# Patient Record
Sex: FEMALE | Race: WHITE | NOT HISPANIC OR LATINO | Employment: OTHER | URBAN - METROPOLITAN AREA
[De-identification: names, ages, dates, MRNs, and addresses within clinical notes are randomized per-mention and may not be internally consistent; named-entity substitution may affect disease eponyms.]

---

## 2017-01-13 ENCOUNTER — OFFICE VISIT (OUTPATIENT)
Dept: MEDICAL GROUP | Facility: PHYSICIAN GROUP | Age: 62
End: 2017-01-13
Payer: COMMERCIAL

## 2017-01-13 VITALS
DIASTOLIC BLOOD PRESSURE: 62 MMHG | SYSTOLIC BLOOD PRESSURE: 106 MMHG | TEMPERATURE: 98.4 F | WEIGHT: 214 LBS | HEIGHT: 67 IN | RESPIRATION RATE: 14 BRPM | HEART RATE: 86 BPM | BODY MASS INDEX: 33.59 KG/M2 | OXYGEN SATURATION: 96 %

## 2017-01-13 DIAGNOSIS — R42 VERTIGO: ICD-10-CM

## 2017-01-13 DIAGNOSIS — R42 DIZZINESS: ICD-10-CM

## 2017-01-13 LAB — GLUCOSE BLD-MCNC: 163 MG/DL (ref 70–100)

## 2017-01-13 PROCEDURE — 82962 GLUCOSE BLOOD TEST: CPT | Performed by: PHYSICIAN ASSISTANT

## 2017-01-13 PROCEDURE — 99214 OFFICE O/P EST MOD 30 MIN: CPT | Performed by: PHYSICIAN ASSISTANT

## 2017-01-13 RX ORDER — MECLIZINE HYDROCHLORIDE 50 MG/1
25 TABLET ORAL EVERY 8 HOURS PRN
Qty: 20 TAB | Refills: 0 | Status: SHIPPED | OUTPATIENT
Start: 2017-01-13 | End: 2017-03-07

## 2017-01-13 ASSESSMENT — ENCOUNTER SYMPTOMS: DIZZINESS: 1

## 2017-01-13 NOTE — PATIENT INSTRUCTIONS
Vertigo  Vertigo means you feel like you or your surroundings are moving when they are not. Vertigo can be dangerous if it occurs when you are at work, driving, or performing difficult activities.   CAUSES   Vertigo occurs when there is a conflict of signals sent to your brain from the visual and sensory systems in your body. There are many different causes of vertigo, including:  · Infections, especially in the inner ear.  · A bad reaction to a drug or misuse of alcohol and medicines.  · Withdrawal from drugs or alcohol.  · Rapidly changing positions, such as lying down or rolling over in bed.  · A migraine headache.  · Decreased blood flow to the brain.  · Increased pressure in the brain from a head injury, infection, tumor, or bleeding.  SYMPTOMS   You may feel as though the world is spinning around or you are falling to the ground. Because your balance is upset, vertigo can cause nausea and vomiting. You may have involuntary eye movements (nystagmus).  DIAGNOSIS   Vertigo is usually diagnosed by physical exam. If the cause of your vertigo is unknown, your caregiver may perform imaging tests, such as an MRI scan (magnetic resonance imaging).  TREATMENT   Most cases of vertigo resolve on their own, without treatment. Depending on the cause, your caregiver may prescribe certain medicines. If your vertigo is related to body position issues, your caregiver may recommend movements or procedures to correct the problem. In rare cases, if your vertigo is caused by certain inner ear problems, you may need surgery.  HOME CARE INSTRUCTIONS   · Follow your caregiver's instructions.  · Avoid driving.  · Avoid operating heavy machinery.  · Avoid performing any tasks that would be dangerous to you or others during a vertigo episode.  · Tell your caregiver if you notice that certain medicines seem to be causing your vertigo. Some of the medicines used to treat vertigo episodes can actually make them worse in some people.  SEEK  IMMEDIATE MEDICAL CARE IF:   · Your medicines do not relieve your vertigo or are making it worse.  · You develop problems with talking, walking, weakness, or using your arms, hands, or legs.  · You develop severe headaches.  · Your nausea or vomiting continues or gets worse.  · You develop visual changes.  · A family member notices behavioral changes.  · Your condition gets worse.  MAKE SURE YOU:  · Understand these instructions.  · Will watch your condition.  · Will get help right away if you are not doing well or get worse.     This information is not intended to replace advice given to you by your health care provider. Make sure you discuss any questions you have with your health care provider.     Document Released: 09/27/2006 Document Revised: 03/11/2013 Document Reviewed: 04/11/2016  Elsevier Interactive Patient Education ©2016 [employer] Inc.

## 2017-01-13 NOTE — PROGRESS NOTES
Subjective:      Shirley Unger is a 61 y.o. female who presents with Dizziness            Dizziness     patient presents for same-day access to discuss new onset of dizziness. Started in the middle of the night when she got up to go to the bathroom. Room spinning sensation. No loss of consciousness. Seems to be worse when shehad forward and backward. This has not happened before. She does have diabetes and did check her blood sugar last night which was about 134. No chest pain, shortness breath or difficulty breathing. No vision change. No focal weakness. No history of heart arrhythmia. No history of stroke. No recent change in medicine. No recent head injury. No ear pain, hearing loss or ringing in ears. No recent cold symptoms, congestion, sinus pain.    Review of Systems   Neurological: Positive for dizziness.    no fever or chills. No headache. No neck pain or stiffness. No chest pain, shortness breath or difficulty breathing. No nausea, vomiting or abdominal pain. No rash or skin lesion. No joint pain or swelling.    Active Ambulatory Problems     Diagnosis Date Noted   • Herpes genitalia 03/22/2013   • Dyslipidemia    • Nephrolithiasis 03/22/2013   • Vitamin D deficiency disease 07/08/2013   • Hand eczema 07/29/2014   • Albuminuria 12/09/2014   • Type 2 diabetes mellitus with other diabetic kidney complication (HCC) 08/04/2015   • Colon polyps 05/16/2016   • Primary osteoarthritis of left hip 07/07/2016   • Trigger finger of right thumb 07/07/2016   • Anxiety reaction 10/05/2016   • Acute mastitis of left breast 11/29/2016   • Herpes dermatitis 11/29/2016     Resolved Ambulatory Problems     Diagnosis Date Noted   • Type 2 DM, uncontrolled    • Right hand pain 02/25/2014     Past Medical History   Diagnosis Date   • DM (diabetes mellitus) (HCC) 2004   • Hyperlipidemia LDL goal < 100    • Vitamin d deficiency 7/8/2013      Current Outpatient Prescriptions   Medication Sig Dispense Refill   • meclizine (ANTIVERT)  50 MG tablet Take 0.5 Tabs by mouth every 8 hours as needed. 20 Tab 0   • fluticasone (FLONASE) 50 MCG/ACT nasal spray Spray 2 Sprays in nose every day. 16 g 0   • FREESTYLE LITE strip USE ONE STRIP TWICE DAILY AND AS NEEDED FOR SIGNS AND SYMPTOM OF HIGH OR LOW BLOOD SUGAR 100 Strip 0   • metformin (GLUCOPHAGE) 1000 MG tablet TAKE 1/2 TABLET BY MOUTH IN THE MORNING AND ONE TABLET AT NIGHT 180 Tab 3   • Exenatide ER (BYDUREON) 2 MG Pen-injector Inject 2 mg as instructed every 7 days. 13 Each 2   • glimepiride (AMARYL) 4 MG Tab Take 1 Tab by mouth every morning. 90 Tab 3   • atorvastatin (LIPITOR) 20 MG Tab Take 1 Tab by mouth every day. 90 Tab 3   • Canagliflozin (INVOKANA) 300 MG Tab Take 300 mg by mouth every day. 90 Tab 3   • colesevelam (WELCHOL) 625 MG Tab TAKE ONE TABLET BY MOUTH IN THE MORNING AND THEN TAKE TWO TABLETS IN THE EVENING WITH DINNER 270 Tab 3   • lisinopril (PRINIVIL) 5 MG Tab Take 1 Tab by mouth every bedtime. 90 Tab 3   • trolamine salicylate (ASPERCREME/ALOE) 10 % cream Apply 1 Squirt to affected area(s) 4 times a day. 1 Tube 3   • glucose blood (FREESTYLE LITE) strip Use twice a day and as needed signs and symptoms of high or low blood sugar. 100 Strip 3   • Lancets MISC Lancets order: Lancets for Abbott Freestyle Lite meter. Sig: use BID and prn ssx high or low sugar. #100 RF x 3 100 Each 3   • triamcinolone acetonide (KENALOG) 0.1 % OINT Apply to affected areas BID 15 g 1   • mupirocin calcium (BACTROBAN) 2 % CREA Apply to affected area(s) 2 times a day. 15 g 2   • Blood Glucose Monitoring Suppl SUPPLIES MISC Test strips order: Test strips for Abbott Freestyle Lite meter. Sig: use BID and prn ssx high or low sugar #100 RF x 3 100 Each 3   • cetirizine (ZYRTEC) 10 MG Tab Take 1 Tab by mouth every day. 30 Tab 3   • aspirin (ASA) 81 MG CHEW Take 81 mg by mouth every day.       • POTASSIUM CITRATE PO Take 2 Tabs by mouth 3 times a day.       No current facility-administered medications for this  "visit.   allergy to macrobid and PCN  Non-smoker. [employer] employee. Works from home   Objective:     /62 mmHg  Pulse 86  Temp(Src) 36.9 °C (98.4 °F)  Resp 14  Ht 1.702 m (5' 7\")  Wt 97.07 kg (214 lb)  BMI 33.51 kg/m2  SpO2 96%  LMP 11/29/2004  Breastfeeding? No     Physical Exam   Constitutional: She is oriented to person, place, and time. She appears well-developed and well-nourished. No distress.   HENT:   Head: Normocephalic and atraumatic.   Right Ear: Hearing, tympanic membrane, external ear and ear canal normal.   Left Ear: Hearing, tympanic membrane, external ear and ear canal normal.   Nose: Nose normal.   Mouth/Throat: Uvula is midline and oropharynx is clear and moist.   Eyes: Conjunctivae and EOM are normal. Pupils are equal, round, and reactive to light.   Neck: Normal range of motion. Neck supple.   Cardiovascular: Normal rate, regular rhythm and normal heart sounds.    Pulmonary/Chest: Effort normal and breath sounds normal.   Musculoskeletal: She exhibits no edema.   Neurological: She is alert and oriented to person, place, and time. No cranial nerve deficit. Coordination normal.   Vertigo is reproducible by having patient move head forward and backward   Skin: Skin is warm and dry. No rash noted. She is not diaphoretic. No pallor.   Psychiatric: She has a normal mood and affect. Her behavior is normal. Judgment and thought content normal.   Vitals reviewed.            EKG normal sinus rhythm. CT scan  In office blood glucose 166  Assessment/Plan:     1. Dizziness    - EKG; Future  - POCT Glucose  - meclizine (ANTIVERT) 50 MG tablet; Take 0.5 Tabs by mouth every 8 hours as needed.  Dispense: 20 Tab; Refill: 0    2. Vertigo  - meclizine (ANTIVERT) 50 MG tablet; Take 0.5 Tabs by mouth every 8 hours as needed.  Dispense: 20 Tab; Refill: 0     Discussed with patient this is likely benign vertigo either from viral labyrinthitis or benign positional vertigo. Emergency room precautions given. " If she develops headache, vision change, weakness, worsening dizziness, vomiting she should go to the ER for further evaluation. Otherwise follow-up with primary care within the week. Prescription for meclizine given to use when necessary.

## 2017-01-13 NOTE — MR AVS SNAPSHOT
"        Shirley Taylorp   2017 10:20 AM   Office Visit   MRN: [id number]    Department:  [facility]   Dept Phone:  [phone]    Description:  Female : 1955   Provider:  Brittnee Chaudhari PA-C           Reason for Visit     Dizziness x 1 day       Allergies as of 2017     Allergen Noted Reactions    Macrobid [Nitrofurantoin Monohydrate Macrocrystals] 10/17/2012   Hives    Pcn [Penicillins] 10/17/2012   Hives      You were diagnosed with     Dizziness   [983245]       Vertigo   [193036]         Vital Signs     Blood Pressure Pulse Temperature Respirations Height Weight    106/62 mmHg 86 36.9 °C (98.4 °F) 14 1.702 m (5' 7\") 97.07 kg (214 lb)    Body Mass Index Oxygen Saturation Last Menstrual Period Breastfeeding? Smoking Status       33.51 kg/m2 96% 2004 No Never Smoker        Basic Information     Date Of Birth Sex Race Ethnicity Preferred Language    1955 Female White Unknown English      Your appointments     2017  8:20 AM   Established Patient with Cristofer Tena M.D.   [facility] ([place])    [address] [facility] [address] [phone]           You will be receiving a confirmation call a few days before your appointment from our automated call confirmation system.            Mar 09, 2017  9:40 AM   Diabetes Care Visit with Amy Jain M.D., ENDOCRINOLOGY DIABETES RN   [facility]    [address] [facility], [address]  [facility] [address] [phone]           You will be receiving a confirmation call a few days before your appointment from our automated call confirmation system.              Problem List              ICD-10-CM Priority Class Noted - Resolved    Herpes genitalia A60.00   3/22/2013 - Present    Dyslipidemia E78.5   Unknown - Present    Nephrolithiasis N20.0   3/22/2013 - Present    Vitamin D deficiency disease E55.9   2013 - Present    Hand eczema L30.9   2014 - Present   " Albuminuria R80.9   12/9/2014 - Present    Type 2 diabetes mellitus with other diabetic kidney complication (HCC) E11.29   8/4/2015 - Present    Colon polyps K63.5   5/16/2016 - Present    Primary osteoarthritis of left hip M16.12   7/7/2016 - Present    Trigger finger of right thumb M65.311   7/7/2016 - Present    Anxiety reaction F41.1   10/5/2016 - Present    Acute mastitis of left breast N61.0   11/29/2016 - Present    Herpes dermatitis B00.89   11/29/2016 - Present      Health Maintenance        Date Due Completion Dates    DIABETES MONOFILAMTENT / LE EXAM 4/22/2017 4/22/2016 (Done), 12/10/2015 (Done), 6/18/2015 (Done), 7/29/2014 (Done), 7/8/2013, 7/8/2013 (Done)    Override on 4/22/2016: Done    Override on 12/10/2015: Done    Override on 6/18/2015: Done    Override on 7/29/2014: Done    Override on 7/8/2013: Done    FASTING LIPID PROFILE 4/23/2017 4/23/2016, 7/19/2014, 8/17/2013    URINE ACR / MICROALBUMIN 4/23/2017 4/23/2016, 6/20/2015, 7/19/2014, 8/17/2013    SERUM CREATININE 4/23/2017 4/23/2016, 3/21/2015, 7/19/2014, 3/22/2014, 2/18/2014, 11/16/2013, 8/17/2013, 4/7/2005    A1C SCREENING 5/10/2017 11/10/2016, 9/1/2016, 4/23/2016, 12/10/2015, 6/11/2015, 3/21/2015, 12/11/2014, 9/27/2014, 7/19/2014, 3/22/2014, 2/15/2014, 11/16/2013, 8/17/2013, 3/2/2013    MAMMOGRAM 7/5/2017 7/5/2016, 7/1/2015, 5/27/2014, 4/11/2013, 4/8/2013, 12/17/2008, 12/17/2008, 11/2/2007, 11/2/2007, 10/19/2006, 10/18/2005, 9/24/2004    RETINAL SCREENING 8/16/2017 8/16/2016, 8/16/2016 (Done), 7/22/2014, 6/19/2013    Override on 8/16/2016: Done    COLONOSCOPY 5/16/2021 5/16/2016    IMM DTaP/Tdap/Td Vaccine (2 - Td) 9/1/2022 9/1/2012            Results     POCT Glucose      Component Value Standard Range & Units    Glucose - Accu-Ck 163 70 - 100 mg/dL                        Current Immunizations     Influenza TIV (IM) 10/10/2013    Influenza Vaccine Quad Inj (Pf) 11/2/2016 11:13 AM, 10/9/2014    Influenza Vaccine Quad Inj (Preserved)  10/13/2015    Pneumococcal polysaccharide vaccine (PPSV-23) 1/1/2005    SHINGLES VACCINE 3/28/2014    Tdap Vaccine 9/1/2012      Below and/or attached are the medications your provider expects you to take. Review all of your home medications and newly ordered medications with your provider and/or pharmacist. Follow medication instructions as directed by your provider and/or pharmacist. Please keep your medication list with you and share with your provider. Update the information when medications are discontinued, doses are changed, or new medications (including over-the-counter products) are added; and carry medication information at all times in the event of emergency situations     Allergies:  MACROBID - Hives     PCN - Hives               Medications  Valid as of: January 13, 2017 - 11:22 AM    Generic Name Brand Name Tablet Size Instructions for use    Aspirin (Chew Tab) ASA 81 MG Take 81 mg by mouth every day.          Atorvastatin Calcium (Tab) LIPITOR 20 MG Take 1 Tab by mouth every day.        Blood Glucose Monitoring Suppl (Misc) Blood Glucose Monitoring Suppl SUPPLIES Test strips order: Test strips for Abbott Freestyle Lite meter. Sig: use BID and prn ssx high or low sugar #100 RF x 3        Canagliflozin (Tab) Canagliflozin 300 MG Take 300 mg by mouth every day.        Cetirizine HCl (Tab) ZYRTEC 10 MG Take 1 Tab by mouth every day.        Colesevelam HCl (Tab) WELCHOL 625 MG TAKE ONE TABLET BY MOUTH IN THE MORNING AND THEN TAKE TWO TABLETS IN THE EVENING WITH DINNER        Exenatide (Pen-injector) Exenatide ER 2 MG Inject 2 mg as instructed every 7 days.        Fluticasone Propionate (Suspension) FLONASE 50 MCG/ACT Spray 2 Sprays in nose every day.        Glimepiride (Tab) AMARYL 4 MG Take 1 Tab by mouth every morning.        Glucose Blood (Strip) glucose blood  Use twice a day and as needed signs and symptoms of high or low blood sugar.        Glucose Blood (Strip) FREESTYLE LITE  USE ONE STRIP TWICE  DAILY AND AS NEEDED FOR SIGNS AND SYMPTOM OF HIGH OR LOW BLOOD SUGAR        Lancets (Misc) Lancets  Lancets order: Lancets for Abbott Freestyle Lite meter. Sig: use BID and prn ssx high or low sugar. #100 RF x 3        Lisinopril (Tab) PRINIVIL 5 MG Take 1 Tab by mouth every bedtime.        Meclizine HCl (Tab) ANTIVERT 50 MG Take 0.5 Tabs by mouth every 8 hours as needed.        MetFORMIN HCl (Tab) GLUCOPHAGE 1000 MG TAKE 1/2 TABLET BY MOUTH IN THE MORNING AND ONE TABLET AT NIGHT        Mupirocin Calcium (Cream) BACTROBAN 2 % Apply to affected area(s) 2 times a day.        Potassium Citrate   Take 2 Tabs by mouth 3 times a day.        Triamcinolone Acetonide (Ointment) KENALOG 0.1 % Apply to affected areas BID        Trolamine Salicylate (Cream) ASPERCREME or MYOFLEX 10 % Apply 1 Squirt to affected area(s) 4 times a day.        .                 Medicines prescribed today were sent to:     [facility] [address] [place] [address]), [place]  [address]) [place] [address]    Phone: [phone] Fax: [phone]    Open 24 Hours?: No      Medication refill instructions:       If your prescription bottle indicates you have medication refills left, it is not necessary to call your provider’s office. Please contact your pharmacy and they will refill your medication.    If your prescription bottle indicates you do not have any refills left, you may request refills at any time through one of the following ways: The online [email or web address] system (except Urgent Care), by calling your provider’s office, or by asking your pharmacy to contact your provider’s office with a refill request. Medication refills are processed only during regular business hours and may not be available until the next business day. Your provider may request additional information or to have a follow-up visit with you prior to refilling your medication.   *Please Note: Medication refills are assigned a new Rx number when refilled  electronically. Your pharmacy may indicate that no refills were authorized even though a new prescription for the same medication is available at the pharmacy. Please request the medicine by name with the pharmacy before contacting your provider for a refill.        Your To Do List     Future Labs/Procedures Complete By Expires    EKG  As directed 1/13/2018      Instructions    Vertigo  Vertigo means you feel like you or your surroundings are moving when they are not. Vertigo can be dangerous if it occurs when you are at work, driving, or performing difficult activities.   CAUSES   Vertigo occurs when there is a conflict of signals sent to your brain from the visual and sensory systems in your body. There are many different causes of vertigo, including:  · Infections, especially in the inner ear.  · A bad reaction to a drug or misuse of alcohol and medicines.  · Withdrawal from drugs or alcohol.  · Rapidly changing positions, such as lying down or rolling over in bed.  · A migraine headache.  · Decreased blood flow to the brain.  · Increased pressure in the brain from a head injury, infection, tumor, or bleeding.  SYMPTOMS   You may feel as though the world is spinning around or you are falling to the ground. Because your balance is upset, vertigo can cause nausea and vomiting. You may have involuntary eye movements (nystagmus).  DIAGNOSIS   Vertigo is usually diagnosed by physical exam. If the cause of your vertigo is unknown, your caregiver may perform imaging tests, such as an MRI scan (magnetic resonance imaging).  TREATMENT   Most cases of vertigo resolve on their own, without treatment. Depending on the cause, your caregiver may prescribe certain medicines. If your vertigo is related to body position issues, your caregiver may recommend movements or procedures to correct the problem. In rare cases, if your vertigo is caused by certain inner ear problems, you may need surgery.  HOME CARE INSTRUCTIONS   · Follow  your caregiver's instructions.  · Avoid driving.  · Avoid operating heavy machinery.  · Avoid performing any tasks that would be dangerous to you or others during a vertigo episode.  · Tell your caregiver if you notice that certain medicines seem to be causing your vertigo. Some of the medicines used to treat vertigo episodes can actually make them worse in some people.  SEEK IMMEDIATE MEDICAL CARE IF:   · Your medicines do not relieve your vertigo or are making it worse.  · You develop problems with talking, walking, weakness, or using your arms, hands, or legs.  · You develop severe headaches.  · Your nausea or vomiting continues or gets worse.  · You develop visual changes.  · A family member notices behavioral changes.  · Your condition gets worse.  MAKE SURE YOU:  · Understand these instructions.  · Will watch your condition.  · Will get help right away if you are not doing well or get worse.     This information is not intended to replace advice given to you by your health care provider. Make sure you discuss any questions you have with your health care provider.     Document Released: 09/27/2006 Document Revised: 03/11/2013 Document Reviewed: 04/11/2016  [employer] Interactive Patient Education ©2016 [employer] Inc.            [employer] Access Code: Activation code not generated  Current [employer] Status: Active

## 2017-03-07 ENCOUNTER — OFFICE VISIT (OUTPATIENT)
Dept: MEDICAL GROUP | Facility: PHYSICIAN GROUP | Age: 62
End: 2017-03-07
Payer: COMMERCIAL

## 2017-03-07 VITALS
OXYGEN SATURATION: 95 % | DIASTOLIC BLOOD PRESSURE: 60 MMHG | HEART RATE: 83 BPM | BODY MASS INDEX: 32.98 KG/M2 | TEMPERATURE: 97.9 F | SYSTOLIC BLOOD PRESSURE: 90 MMHG | HEIGHT: 67 IN | WEIGHT: 210.1 LBS

## 2017-03-07 DIAGNOSIS — E78.5 DYSLIPIDEMIA: ICD-10-CM

## 2017-03-07 DIAGNOSIS — M65.311 TRIGGER FINGER OF RIGHT THUMB: ICD-10-CM

## 2017-03-07 DIAGNOSIS — E11.29 TYPE 2 DIABETES MELLITUS WITH OTHER DIABETIC KIDNEY COMPLICATION (HCC): ICD-10-CM

## 2017-03-07 DIAGNOSIS — M16.12 PRIMARY OSTEOARTHRITIS OF LEFT HIP: ICD-10-CM

## 2017-03-07 DIAGNOSIS — E55.9 VITAMIN D DEFICIENCY DISEASE: ICD-10-CM

## 2017-03-07 DIAGNOSIS — J30.1 SEASONAL ALLERGIC RHINITIS DUE TO POLLEN: ICD-10-CM

## 2017-03-07 DIAGNOSIS — N20.0 RECURRENT NEPHROLITHIASIS: ICD-10-CM

## 2017-03-07 PROCEDURE — 99214 OFFICE O/P EST MOD 30 MIN: CPT | Performed by: INTERNAL MEDICINE

## 2017-03-07 NOTE — MR AVS SNAPSHOT
"        Shirley Taylorp   3/7/2017 8:20 AM   Office Visit   MRN: [id number]    Department:  [facility]   Dept Phone:  [phone]    Description:  Female : 1955   Provider:  Rogers Mahmood M.D.           Reason for Visit     Establish Care diabetes check, pneumonia shot. lab work      Allergies as of 3/7/2017     Allergen Noted Reactions    Macrobid [Nitrofurantoin Monohydrate Macrocrystals] 10/17/2012   Hives    Pcn [Penicillins] 10/17/2012   Hives      You were diagnosed with     Seasonal allergic rhinitis due to pollen   [0925282]       Vitamin D deficiency disease   [866882]       Type 2 diabetes mellitus with other diabetic kidney complication (CMS-HCC)   [6907557]       Trigger finger of right thumb   [0571451]       Primary osteoarthritis of left hip   [925021]       Dyslipidemia   [914279]         Vital Signs     Blood Pressure Pulse Temperature Height Weight Body Mass Index    90/60 mmHg 83 36.6 °C (97.9 °F) 1.702 m (5' 7.01\") 95.3 kg (210 lb 1.6 oz) 32.90 kg/m2    Oxygen Saturation Last Menstrual Period Breastfeeding? Smoking Status          95% 2004 No Never Smoker         Basic Information     Date Of Birth Sex Race Ethnicity Preferred Language    1955 Female White Non- English      Your appointments     Mar 09, 2017 10:00 AM   Established Patient with Amy Jain M.D.   [facility]    [address], [address]  [facility] [address] [phone]           You will be receiving a confirmation call a few days before your appointment from our automated call confirmation system.              Problem List              ICD-10-CM Priority Class Noted - Resolved    Dyslipidemia E78.5   Unknown - Present    Recurrent nephrolithiasis N20.0   3/22/2013 - Present    Vitamin D deficiency disease E55.9   2013 - Present    Albuminuria R80.9   2014 - Present    Type 2 diabetes mellitus with other diabetic kidney complication (CMS-HCC) " E11.29   8/4/2015 - Present    Colon polyps K63.5   5/16/2016 - Present    Primary osteoarthritis of left hip M16.12   7/7/2016 - Present    Trigger finger of right thumb M65.311   7/7/2016 - Present    Herpes dermatitis B00.89   11/29/2016 - Present    Seasonal allergic rhinitis due to pollen J30.1   3/7/2017 - Present      Health Maintenance        Date Due Completion Dates    DIABETES MONOFILAMENT / LE EXAM 4/22/2017 4/22/2016 (Done), 12/10/2015 (Done), 6/18/2015 (Done), 7/29/2014 (Done), 7/8/2013, 7/8/2013 (Done)    Override on 4/22/2016: Done    Override on 12/10/2015: Done    Override on 6/18/2015: Done    Override on 7/29/2014: Done    Override on 7/8/2013: Done    FASTING LIPID PROFILE 4/23/2017 4/23/2016, 7/19/2014, 8/17/2013    URINE ACR / MICROALBUMIN 4/23/2017 4/23/2016, 6/20/2015, 7/19/2014, 8/17/2013    SERUM CREATININE 4/23/2017 4/23/2016, 3/21/2015, 7/19/2014, 3/22/2014, 2/18/2014, 11/16/2013, 8/17/2013, 4/7/2005    A1C SCREENING 5/10/2017 11/10/2016, 9/1/2016, 4/23/2016, 12/10/2015, 6/11/2015, 3/21/2015, 12/11/2014, 9/27/2014, 7/19/2014, 3/22/2014, 2/15/2014, 11/16/2013, 8/17/2013, 3/2/2013    RETINAL SCREENING 8/16/2017 8/16/2016, 8/16/2016 (Done), 7/22/2014, 6/19/2013    Override on 8/16/2016: Done    MAMMOGRAM 12/15/2017 12/15/2016, 7/5/2016, 7/1/2015, 5/27/2014, 4/11/2013, 4/8/2013, 12/17/2008, 12/17/2008, 11/2/2007, 11/2/2007, 10/19/2006, 10/18/2005, 9/24/2004    COLONOSCOPY 5/16/2021 5/16/2016    IMM DTaP/Tdap/Td Vaccine (2 - Td) 9/1/2022 9/1/2012            Current Immunizations     Influenza TIV (IM) 10/10/2013    Influenza Vaccine Quad Inj (Pf) 11/2/2016 11:13 AM, 10/9/2014    Influenza Vaccine Quad Inj (Preserved) 10/13/2015    Pneumococcal polysaccharide vaccine (PPSV-23) 1/1/2005    SHINGLES VACCINE 3/28/2014    Tdap Vaccine 9/1/2012      Below and/or attached are the medications your provider expects you to take. Review all of your home medications and newly ordered medications with  your provider and/or pharmacist. Follow medication instructions as directed by your provider and/or pharmacist. Please keep your medication list with you and share with your provider. Update the information when medications are discontinued, doses are changed, or new medications (including over-the-counter products) are added; and carry medication information at all times in the event of emergency situations     Allergies:  MACROBID - Hives     PCN - Hives               Medications  Valid as of: March 07, 2017 -  9:23 AM    Generic Name Brand Name Tablet Size Instructions for use    Aspirin (Chew Tab) ASA 81 MG Take 81 mg by mouth every day.          Atorvastatin Calcium (Tab) LIPITOR 20 MG Take 1 Tab by mouth every day.        Blood Glucose Monitoring Suppl (Misc) Blood Glucose Monitoring Suppl SUPPLIES Test strips order: Test strips for Abbott Freestyle Lite meter. Sig: use BID and prn ssx high or low sugar #100 RF x 3        Canagliflozin (Tab) Canagliflozin 300 MG Take 300 mg by mouth every day.        Cetirizine HCl (Tab) ZYRTEC 10 MG Take 1 Tab by mouth every day.        Colesevelam HCl (Tab) WELCHOL 625 MG TAKE ONE TABLET BY MOUTH IN THE MORNING AND THEN TAKE TWO TABLETS IN THE EVENING WITH DINNER        Exenatide (Pen-injector) Exenatide ER 2 MG Inject 2 mg as instructed every 7 days.        Fluticasone Propionate (Suspension) FLONASE 50 MCG/ACT Spray 2 Sprays in nose every day.        Glimepiride (Tab) AMARYL 4 MG Take 1 Tab by mouth every morning.        Glucose Blood (Strip) glucose blood  Use twice a day and as needed signs and symptoms of high or low blood sugar.        Glucose Blood (Strip) FREESTYLE LITE  USE ONE STRIP TWICE DAILY AND AS NEEDED FOR SIGNS AND SYMPTOM OF HIGH OR LOW BLOOD SUGAR        Lancets (Misc) Lancets  Lancets order: Lancets for Abbott Freestyle Lite meter. Sig: use BID and prn ssx high or low sugar. #100 RF x 3        Lisinopril (Tab) PRINIVIL 5 MG Take 1 Tab by mouth every  bedtime.        MetFORMIN HCl (Tab) GLUCOPHAGE 1000 MG TAKE 1/2 TABLET BY MOUTH IN THE MORNING AND ONE TABLET AT NIGHT        Mupirocin Calcium (Cream) BACTROBAN 2 % Apply to affected area(s) 2 times a day.        Potassium Citrate   Take 2 Tabs by mouth 3 times a day.        Trolamine Salicylate (Cream) ASPERCREME or MYOFLEX 10 % Apply 1 Squirt to affected area(s) 4 times a day.        .                 Medicines prescribed today were sent to:     [address] (), [place] [address]    [address] () [place] [address]    Phone: [phone] Fax: [phone]    Open 24 Hours?: No      Medication refill instructions:       If your prescription bottle indicates you have medication refills left, it is not necessary to call your provider’s office. Please contact your pharmacy and they will refill your medication.    If your prescription bottle indicates you do not have any refills left, you may request refills at any time through one of the following ways: The online [employer] system (except Urgent Care), by calling your provider’s office, or by asking your pharmacy to contact your provider’s office with a refill request. Medication refills are processed only during regular business hours and may not be available until the next business day. Your provider may request additional information or to have a follow-up visit with you prior to refilling your medication.   *Please Note: Medication refills are assigned a new Rx number when refilled electronically. Your pharmacy may indicate that no refills were authorized even though a new prescription for the same medication is available at the pharmacy. Please request the medicine by name with the pharmacy before contacting your provider for a refill.        Your To Do List     Future Labs/Procedures Complete By Expires    CBC WITH DIFFERENTIAL  As directed 3/8/2018    COMP METABOLIC PANEL  As directed 3/8/2018    LIPID PROFILE  As directed  3/8/2018    VITAMIN D,25 HYDROXY  As directed 3/8/2018         MyChart Access Code: Activation code not generated  Current [employer] Status: Active

## 2017-03-09 ENCOUNTER — OFFICE VISIT (OUTPATIENT)
Dept: ENDOCRINOLOGY | Facility: MEDICAL CENTER | Age: 62
End: 2017-03-09
Payer: COMMERCIAL

## 2017-03-09 VITALS
DIASTOLIC BLOOD PRESSURE: 58 MMHG | SYSTOLIC BLOOD PRESSURE: 98 MMHG | HEIGHT: 68 IN | HEART RATE: 113 BPM | OXYGEN SATURATION: 94 % | WEIGHT: 204 LBS | BODY MASS INDEX: 30.92 KG/M2

## 2017-03-09 DIAGNOSIS — E78.2 MIXED HYPERLIPIDEMIA: ICD-10-CM

## 2017-03-09 DIAGNOSIS — E11.9 TYPE 2 DIABETES MELLITUS WITHOUT COMPLICATION, WITHOUT LONG-TERM CURRENT USE OF INSULIN (HCC): ICD-10-CM

## 2017-03-09 DIAGNOSIS — R07.81 RIB PAIN ON LEFT SIDE: ICD-10-CM

## 2017-03-09 LAB
HBA1C MFR BLD: 7.1 % (ref ?–5.8)
INT CON NEG: NORMAL
INT CON POS: NORMAL

## 2017-03-09 PROCEDURE — 83036 HEMOGLOBIN GLYCOSYLATED A1C: CPT | Performed by: INTERNAL MEDICINE

## 2017-03-09 PROCEDURE — 99214 OFFICE O/P EST MOD 30 MIN: CPT | Mod: 25 | Performed by: INTERNAL MEDICINE
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
## 2017-03-09 NOTE — MR AVS SNAPSHOT
"        Shirley Taylorp   3/9/2017 10:00 AM   Office Visit   MRN: [id number]    Department:  [facility]   Dept Phone:  [phone]    Description:  Female : 1955   Provider:  Amy Jain M.D.           Reason for Visit     Follow-Up Diabetes      Allergies as of 3/9/2017     Allergen Noted Reactions    Macrobid [Nitrofurantoin Monohydrate Macrocrystals] 10/17/2012   Hives    Pcn [Penicillins] 10/17/2012   Hives      You were diagnosed with     Type 2 diabetes mellitus without complication, without long-term current use of insulin (CMS-HCC)   [2340787]         Vital Signs     Blood Pressure Pulse Height Weight Body Mass Index Oxygen Saturation    98/58 mmHg 113 1.727 m (5' 8\") 92.534 kg (204 lb) 31.03 kg/m2 94%    Last Menstrual Period Smoking Status                2004 Never Smoker           Basic Information     Date Of Birth Sex Race Ethnicity Preferred Language    1955 Female White Non- English      Your appointments     2017  9:00 AM   Diabetes Care Visit with Amy Jain M.D., ENDOCRINOLOGY DIABETES RN   [facility]    [address] [place] [address], [address]  [facility] [address] [phone]           You will be receiving a confirmation call a few days before your appointment from our automated call confirmation system.              Problem List              ICD-10-CM Priority Class Noted - Resolved    Dyslipidemia E78.5   Unknown - Present    Recurrent nephrolithiasis N20.0   3/22/2013 - Present    Vitamin D deficiency disease E55.9   2013 - Present    Albuminuria R80.9   2014 - Present    Type 2 diabetes mellitus with other diabetic kidney complication (CMS-HCC) E11.29   2015 - Present    Colon polyps K63.5   2016 - Present    Primary osteoarthritis of left hip M16.12   2016 - Present    Trigger finger of right thumb M65.311   2016 - Present    Herpes dermatitis B00.89   2016 - Present   " Seasonal allergic rhinitis due to pollen J30.1   3/7/2017 - Present      Health Maintenance        Date Due Completion Dates    DIABETES MONOFILAMENT / LE EXAM 4/22/2017 4/22/2016 (Done), 12/10/2015 (Done), 6/18/2015 (Done), 7/29/2014 (Done), 7/8/2013, 7/8/2013 (Done)    Override on 4/22/2016: Done    Override on 12/10/2015: Done    Override on 6/18/2015: Done    Override on 7/29/2014: Done    Override on 7/8/2013: Done    FASTING LIPID PROFILE 4/23/2017 4/23/2016, 7/19/2014, 8/17/2013    URINE ACR / MICROALBUMIN 4/23/2017 4/23/2016, 6/20/2015, 7/19/2014, 8/17/2013    SERUM CREATININE 4/23/2017 4/23/2016, 3/21/2015, 7/19/2014, 3/22/2014, 2/18/2014, 11/16/2013, 8/17/2013, 4/7/2005    A1C SCREENING 5/10/2017 11/10/2016, 9/1/2016, 4/23/2016, 12/10/2015, 6/11/2015, 3/21/2015, 12/11/2014, 9/27/2014, 7/19/2014, 3/22/2014, 2/15/2014, 11/16/2013, 8/17/2013, 3/2/2013    RETINAL SCREENING 8/16/2017 8/16/2016, 8/16/2016 (Done), 7/22/2014, 6/19/2013    Override on 8/16/2016: Done    MAMMOGRAM 12/15/2017 12/15/2016, 7/5/2016, 7/1/2015, 5/27/2014, 4/11/2013, 4/8/2013, 12/17/2008, 12/17/2008, 11/2/2007, 11/2/2007, 10/19/2006, 10/18/2005, 9/24/2004    COLONOSCOPY 5/16/2021 5/16/2016    IMM DTaP/Tdap/Td Vaccine (2 - Td) 9/1/2022 9/1/2012            Current Immunizations     Influenza TIV (IM) 10/10/2013    Influenza Vaccine Quad Inj (Pf) 11/2/2016 11:13 AM, 10/9/2014    Influenza Vaccine Quad Inj (Preserved) 10/13/2015    Pneumococcal polysaccharide vaccine (PPSV-23) 1/1/2005    SHINGLES VACCINE 3/28/2014    Tdap Vaccine 9/1/2012      Below and/or attached are the medications your provider expects you to take. Review all of your home medications and newly ordered medications with your provider and/or pharmacist. Follow medication instructions as directed by your provider and/or pharmacist. Please keep your medication list with you and share with your provider. Update the information when medications are discontinued, doses are  changed, or new medications (including over-the-counter products) are added; and carry medication information at all times in the event of emergency situations     Allergies:  MACROBID - Hives     PCN - Hives               Medications  Valid as of: March 09, 2017 - 10:16 AM    Generic Name Brand Name Tablet Size Instructions for use    Aspirin (Chew Tab) ASA 81 MG Take 81 mg by mouth every day.          Atorvastatin Calcium (Tab) LIPITOR 20 MG Take 1 Tab by mouth every day.        Blood Glucose Monitoring Suppl (Misc) Blood Glucose Monitoring Suppl SUPPLIES Test strips order: Test strips for Abbott Freestyle Lite meter. Sig: use BID and prn ssx high or low sugar #100 RF x 3        Canagliflozin (Tab) Canagliflozin 300 MG Take 300 mg by mouth every day.        Cetirizine HCl (Tab) ZYRTEC 10 MG Take 1 Tab by mouth every day.        Colesevelam HCl (Tab) WELCHOL 625 MG TAKE ONE TABLET BY MOUTH IN THE MORNING AND THEN TAKE TWO TABLETS IN THE EVENING WITH DINNER        Exenatide (Pen-injector) Exenatide ER 2 MG Inject 2 mg as instructed every 7 days.        Fluticasone Propionate (Suspension) FLONASE 50 MCG/ACT Spray 2 Sprays in nose every day.        Glimepiride (Tab) AMARYL 4 MG Take 1 Tab by mouth every morning.        Glucose Blood (Strip) glucose blood  Use twice a day and as needed signs and symptoms of high or low blood sugar.        Glucose Blood (Strip) FREESTYLE LITE  USE ONE STRIP TWICE DAILY AND AS NEEDED FOR SIGNS AND SYMPTOM OF HIGH OR LOW BLOOD SUGAR        Lancets (Misc) Lancets  Lancets order: Lancets for Abbott Freestyle Lite meter. Sig: use BID and prn ssx high or low sugar. #100 RF x 3        Lisinopril (Tab) PRINIVIL 5 MG Take 1 Tab by mouth every bedtime.        MetFORMIN HCl (Tab) GLUCOPHAGE 1000 MG TAKE 1/2 TABLET BY MOUTH IN THE MORNING AND ONE TABLET AT NIGHT        Mupirocin Calcium (Cream) BACTROBAN 2 % Apply to affected area(s) 2 times a day.        Potassium Citrate   Take 2 Tabs by mouth 3  times a day.        Trolamine Salicylate (Cream) ASPERCREME or MYOFLEX 10 % Apply 1 Squirt to affected area(s) 4 times a day.        .                 Medicines prescribed today were sent to:     [facility] [address] [place] [address] (), [place] - [address]    [address] () [place] [address]    Phone: [phone] Fax: [phone]    Open 24 Hours?: No      Medication refill instructions:       If your prescription bottle indicates you have medication refills left, it is not necessary to call your provider’s office. Please contact your pharmacy and they will refill your medication.    If your prescription bottle indicates you do not have any refills left, you may request refills at any time through one of the following ways: The online [employer] system (except Urgent Care), by calling your provider’s office, or by asking your pharmacy to contact your provider’s office with a refill request. Medication refills are processed only during regular business hours and may not be available until the next business day. Your provider may request additional information or to have a follow-up visit with you prior to refilling your medication.   *Please Note: Medication refills are assigned a new Rx number when refilled electronically. Your pharmacy may indicate that no refills were authorized even though a new prescription for the same medication is available at the pharmacy. Please request the medicine by name with the pharmacy before contacting your provider for a refill.        Your To Do List     Future Labs/Procedures Complete By Expires    BASIC METABOLIC PANEL  As directed 3/9/2019    HEMOGLOBIN A1C  As directed 3/9/2018    LIPID PROFILE  As directed 3/9/2019    MICROALBUMIN CREAT RATIO URINE  As directed 3/9/2018    TSH WITH REFLEX TO FT4  As directed 3/9/2019         [employer] Access Code: Activation code not generated  Current [employer] Status: Active

## 2017-03-14 NOTE — PROGRESS NOTES
Subjective:   Shirley Unger is a 62 y.o. female here today for diabetes, rash in her breast     Previous apt pt was seen for a rash in the breast. Breast US was done and was negative. Mammogram negative. Likely cellulitis. Repeat mammogram in one year   Dyslipidemia  On atorvastatin, tolerating medication well. LDL wnl    Recurrent nephrolithiasis  No recent episode    Vitamin D deficiency disease  On vitamin D supplement    Type 2 diabetes mellitus with other diabetic kidney complication  Follows with endo. Last A1c 6.6--> 7.1. Have lost weight since invokana. She is also on metformin, glimepiride, exenatide. She is on asa daily. Regular ophthalmologist apt.     Trigger finger of right thumb  stable    Seasonal allergic rhinitis due to pollen  Stable, on fluticonasole          Current medicines (including changes today)  Current Outpatient Prescriptions   Medication Sig Dispense Refill   • fluticasone (FLONASE) 50 MCG/ACT nasal spray Spray 2 Sprays in nose every day. 16 g 0   • metformin (GLUCOPHAGE) 1000 MG tablet TAKE 1/2 TABLET BY MOUTH IN THE MORNING AND ONE TABLET AT NIGHT 180 Tab 3   • Exenatide ER (BYDUREON) 2 MG Pen-injector Inject 2 mg as instructed every 7 days. 13 Each 2   • glimepiride (AMARYL) 4 MG Tab Take 1 Tab by mouth every morning. 90 Tab 3   • atorvastatin (LIPITOR) 20 MG Tab Take 1 Tab by mouth every day. 90 Tab 3   • Canagliflozin (INVOKANA) 300 MG Tab Take 300 mg by mouth every day. 90 Tab 3   • colesevelam (WELCHOL) 625 MG Tab TAKE ONE TABLET BY MOUTH IN THE MORNING AND THEN TAKE TWO TABLETS IN THE EVENING WITH DINNER 270 Tab 3   • lisinopril (PRINIVIL) 5 MG Tab Take 1 Tab by mouth every bedtime. 90 Tab 3   • mupirocin calcium (BACTROBAN) 2 % CREA Apply to affected area(s) 2 times a day. 15 g 2   • aspirin (ASA) 81 MG CHEW Take 81 mg by mouth every day.       • POTASSIUM CITRATE PO Take 2 Tabs by mouth 3 times a day.     • cetirizine (ZYRTEC) 10 MG Tab Take 1 Tab by mouth every day. 30 Tab 3    • FREESTYLE LITE strip USE ONE STRIP TWICE DAILY AND AS NEEDED FOR SIGNS AND SYMPTOM OF HIGH OR LOW BLOOD SUGAR 100 Strip 0   • trolamine salicylate (ASPERCREME/ALOE) 10 % cream Apply 1 Squirt to affected area(s) 4 times a day. 1 Tube 3   • glucose blood (FREESTYLE LITE) strip Use twice a day and as needed signs and symptoms of high or low blood sugar. 100 Strip 3   • Lancets MISC Lancets order: Lancets for Abbott Freestyle Lite meter. Sig: use BID and prn ssx high or low sugar. #100 RF x 3 100 Each 3   • Blood Glucose Monitoring Suppl SUPPLIES MISC Test strips order: Test strips for Abbott Freestyle Lite meter. Sig: use BID and prn ssx high or low sugar #100 RF x 3 100 Each 3     No current facility-administered medications for this visit.     She  has a past medical history of DM (diabetes mellitus) (CMS-[facility]) (2004); Herpes genitalia (3/22/2013); Nephrolithiasis (3/22/2013); Hyperlipidemia LDL goal < 100; and Vitamin d deficiency (7/8/2013).    Current Outpatient Prescriptions   Medication Sig Dispense Refill   • fluticasone (FLONASE) 50 MCG/ACT nasal spray Spray 2 Sprays in nose every day. 16 g 0   • metformin (GLUCOPHAGE) 1000 MG tablet TAKE 1/2 TABLET BY MOUTH IN THE MORNING AND ONE TABLET AT NIGHT 180 Tab 3   • Exenatide ER (BYDUREON) 2 MG Pen-injector Inject 2 mg as instructed every 7 days. 13 Each 2   • glimepiride (AMARYL) 4 MG Tab Take 1 Tab by mouth every morning. 90 Tab 3   • atorvastatin (LIPITOR) 20 MG Tab Take 1 Tab by mouth every day. 90 Tab 3   • Canagliflozin (INVOKANA) 300 MG Tab Take 300 mg by mouth every day. 90 Tab 3   • colesevelam (WELCHOL) 625 MG Tab TAKE ONE TABLET BY MOUTH IN THE MORNING AND THEN TAKE TWO TABLETS IN THE EVENING WITH DINNER 270 Tab 3   • lisinopril (PRINIVIL) 5 MG Tab Take 1 Tab by mouth every bedtime. 90 Tab 3   • mupirocin calcium (BACTROBAN) 2 % CREA Apply to affected area(s) 2 times a day. 15 g 2   • aspirin (ASA) 81 MG CHEW Take 81 mg by mouth every day.       •  POTASSIUM CITRATE PO Take 2 Tabs by mouth 3 times a day.     • cetirizine (ZYRTEC) 10 MG Tab Take 1 Tab by mouth every day. 30 Tab 3   • FREESTYLE LITE strip USE ONE STRIP TWICE DAILY AND AS NEEDED FOR SIGNS AND SYMPTOM OF HIGH OR LOW BLOOD SUGAR 100 Strip 0   • trolamine salicylate (ASPERCREME/ALOE) 10 % cream Apply 1 Squirt to affected area(s) 4 times a day. 1 Tube 3   • glucose blood (FREESTYLE LITE) strip Use twice a day and as needed signs and symptoms of high or low blood sugar. 100 Strip 3   • Lancets MISC Lancets order: Lancets for Abbott Freestyle Lite meter. Sig: use BID and prn ssx high or low sugar. #100 RF x 3 100 Each 3   • Blood Glucose Monitoring Suppl SUPPLIES MISC Test strips order: Test strips for Abbott Freestyle Lite meter. Sig: use BID and prn ssx high or low sugar #100 RF x 3 100 Each 3     No current facility-administered medications for this visit.       Allergies as of 2017 - Kenn as Reviewed 2017   Allergen Reaction Noted   • Macrobid [nitrofurantoin monohydrate macrocrystals] Hives 10/17/2012   • Pcn [penicillins] Hives 10/17/2012       Social History     Social History   • Marital Status:      Spouse Name: N/A   • Number of Children: N/A   • Years of Education: N/A     Occupational History   •  [employer]     Social History Main Topics   • Smoking status: Never Smoker    • Smokeless tobacco: Never Used   • Alcohol Use: No      Comment: Rare Useage   • Drug Use: No   • Sexual Activity:     Partners: Male     Other Topics Concern   • Not on file     Social History Narrative        Family History   Problem Relation Age of Onset   • Cancer Mother      Lung   • Heart Disease Father    • Diabetes Maternal Aunt    • Diabetes Maternal Grandfather        Past Surgical History   Procedure Laterality Date   • Hysterectomy, total abdominal       For non cancerous reasons   • Cholecystectomy     • Carpal tunnel release       Right hand   • Pr   "delivery only  1980/1983   • Cystoscopy stent placement       several, stent removed in MD office   • Ganglion excision  2/25/2014     Performed by Efra Ramirez M.D. at [facility]       ROS   All system reviewed and were negative except for HPI        Objective:     Blood pressure 90/60, pulse 83, temperature 36.6 °C (97.9 °F), height 1.702 m (5' 7.01\"), weight 95.3 kg (210 lb 1.6 oz), last menstrual period 11/29/2004, SpO2 95 %, not currently breastfeeding. Body mass index is 32.9 kg/(m^2).  Physical Exam:  Constitutional: Alert, no distress.  Skin: Warm, dry, good turgor, no rashes in visible areas.  Eye: Equal, round and reactive, conjunctiva clear, lids normal.  ENMT: Lips without lesions, good dentition, oropharynx clear.  Neck: Trachea midline, no masses, no thyromegaly. No cervical or supraclavicular lymphadenopathy  Respiratory: Unlabored respiratory effort, lungs clear to auscultation, no wheezes, no ronchi.  Cardiovascular: Normal S1, S2, no murmur, no edema.  Abdomen: Soft, non-tender, no masses, no hepatosplenomegaly.  Psych: Alert and oriented x3, normal affect and mood.        Assessment and Plan:   The following treatment plan was discussed    1. Type 2 diabetes mellitus with other diabetic kidney complication (CMS-HCC)  Very well controled. Continue same regimen. Follow with endo'    - CBC WITH DIFFERENTIAL; Future  - COMP METABOLIC PANEL; Future    2. Seasonal allergic rhinitis due to pollen  Stable. Continue to monitor   - CBC WITH DIFFERENTIAL; Future    2. Vitamin D deficiency disease  Continue supplement   - VITAMIN D,25 HYDROXY; Future      4. Trigger finger of right thumb  stable    5. Primary osteoarthritis of left hip  Stable, if worse will refer for PT or ortho     6. Dyslipidemia  Continue current regimen. LDL at the goal   - CBC WITH DIFFERENTIAL; Future  - LIPID PROFILE; Future    7. Recurrent nephrolithiasis  No recent occurences       Followup: Return in about 1 year " (around 3/7/2018) for Long, annual exam.

## 2017-03-14 NOTE — ASSESSMENT & PLAN NOTE
Follows with endo. Last A1c 6.6--> 7.1. Have lost weight since invokana. She is also on metformin, glimepiride, exenatide. She is on asa daily. Regular ophthalmologist apt.

## 2017-04-10 ENCOUNTER — TELEPHONE (OUTPATIENT)
Dept: MEDICAL GROUP | Facility: PHYSICIAN GROUP | Age: 62
End: 2017-04-10

## 2017-04-10 DIAGNOSIS — M65.311 TRIGGER FINGER OF RIGHT THUMB: ICD-10-CM

## 2017-04-10 NOTE — TELEPHONE ENCOUNTER
1. Caller Name: Shirley Unger                                         Call Back Number: [phone] (home)       Patient approves a detailed voicemail message: N\A    2. SPECIFIC Action To Be Taken: Referral pending, please sign.    3. Diagnosis/Clinical Reason for Request: Trigger finger- middle    4. Specialty & Provider Name/Lab/Imaging Location: [facility]    5. Is appointment scheduled for requested order/referral: no    Patient informed they will receive a return phone call from the office ONLY if there are any questions before processing their request. Advised to call back if they haven't received a call from the referral department in 5 days.

## 2017-04-10 NOTE — TELEPHONE ENCOUNTER
----- Message from Your Healthcare Team sent at 4/10/2017  8:31 AM PDT -----  Regarding: Non-Urgent Medical Question  Contact: [phone]  I was wondering if you could put in a referral for me to go to [facility]?  My right middle finger has now started to be a trigger finger and is very painful.   I am looking to get a shot from them to take the swelling down.  If I need to see you first that would be okay.  Thank you,  Shirley Unger  1955  [phone]

## 2017-04-18 ENCOUNTER — OFFICE VISIT (OUTPATIENT)
Dept: MEDICAL GROUP | Facility: PHYSICIAN GROUP | Age: 62
End: 2017-04-18
Payer: COMMERCIAL

## 2017-04-18 VITALS
WEIGHT: 213 LBS | SYSTOLIC BLOOD PRESSURE: 100 MMHG | HEART RATE: 93 BPM | DIASTOLIC BLOOD PRESSURE: 56 MMHG | RESPIRATION RATE: 16 BRPM | TEMPERATURE: 98.1 F | HEIGHT: 68 IN | OXYGEN SATURATION: 95 % | BODY MASS INDEX: 32.28 KG/M2

## 2017-04-18 DIAGNOSIS — M26.69 TMJ INFLAMMATION: ICD-10-CM

## 2017-04-18 DIAGNOSIS — J06.9 VIRAL UPPER RESPIRATORY TRACT INFECTION: ICD-10-CM

## 2017-04-18 PROCEDURE — 99213 OFFICE O/P EST LOW 20 MIN: CPT | Performed by: FAMILY MEDICINE

## 2017-04-18 RX ORDER — MELOXICAM 15 MG/1
15 TABLET ORAL DAILY
Qty: 30 TAB | Refills: 0 | Status: SHIPPED | OUTPATIENT
Start: 2017-04-18 | End: 2019-04-22

## 2017-04-18 ASSESSMENT — PATIENT HEALTH QUESTIONNAIRE - PHQ9: CLINICAL INTERPRETATION OF PHQ2 SCORE: 0

## 2017-04-18 NOTE — MR AVS SNAPSHOT
"        Shirley Unger   2017 9:20 AM   Office Visit   MRN: [id number]    Department:  [facility]   Dept Phone:  [phone]    Description:  Female : 1955   Provider:  Mierla Villalpando M.D.           Reason for Visit     Jaw Pain having trouble with pain and popping     Cough           Allergies as of 2017     Allergen Noted Reactions    Macrobid [Nitrofurantoin Monohydrate Macrocrystals] 10/17/2012   Hives    Pcn [Penicillins] 10/17/2012   Hives      You were diagnosed with     TMJ inflammation   [815261]       Viral upper respiratory tract infection   [491378]         Vital Signs     Blood Pressure Pulse Temperature Respirations Height Weight    100/56 mmHg 93 36.7 °C (98.1 °F) 16 1.727 m (5' 8\") 96.616 kg (213 lb)    Body Mass Index Oxygen Saturation Last Menstrual Period Smoking Status          32.39 kg/m2 95% 2004 Never Smoker         Basic Information     Date Of Birth Sex Race Ethnicity Preferred Language    1955 Female White Non- English      Your appointments     2017 10:00 AM   ACUPUNCTURE 30 with Luis Miguel Hirsch D.O.   [facility] (--)    [address] [facility].  [place] [address] [phone]            2017  9:00 AM   Diabetes Care Visit with Amy Jain M.D., ENDOCRINOLOGY DIABETES RN   [facility]    [address], [address]  [place] [address] [phone]           You will be receiving a confirmation call a few days before your appointment from our automated call confirmation system.              Problem List              ICD-10-CM Priority Class Noted - Resolved    Dyslipidemia E78.5   Unknown - Present    Recurrent nephrolithiasis N20.0   3/22/2013 - Present    Vitamin D deficiency disease E55.9   2013 - Present    Albuminuria R80.9   2014 - Present    Type 2 diabetes mellitus with other diabetic kidney complication (CMS-HCC) E11.29   2015 " - Present    Colon polyps K63.5   5/16/2016 - Present    Primary osteoarthritis of left hip M16.12   7/7/2016 - Present    Trigger finger of right thumb M65.311   7/7/2016 - Present    Herpes dermatitis B00.89   11/29/2016 - Present    Seasonal allergic rhinitis due to pollen J30.1   3/7/2017 - Present      Health Maintenance        Date Due Completion Dates    DIABETES MONOFILAMENT / LE EXAM 4/22/2017 4/22/2016 (Done), 12/10/2015 (Done), 6/18/2015 (Done), 7/29/2014 (Done), 7/8/2013, 7/8/2013 (Done)    Override on 4/22/2016: Done    Override on 12/10/2015: Done    Override on 6/18/2015: Done    Override on 7/29/2014: Done    Override on 7/8/2013: Done    FASTING LIPID PROFILE 4/23/2017 4/23/2016, 7/19/2014, 8/17/2013    URINE ACR / MICROALBUMIN 4/23/2017 4/23/2016, 6/20/2015, 7/19/2014, 8/17/2013    SERUM CREATININE 4/23/2017 4/23/2016, 3/21/2015, 7/19/2014, 3/22/2014, 2/18/2014, 11/16/2013, 8/17/2013, 4/7/2005    RETINAL SCREENING 8/16/2017 8/16/2016, 8/16/2016 (Done), 7/22/2014, 6/19/2013    Override on 8/16/2016: Done    A1C SCREENING 9/9/2017 3/9/2017, 11/10/2016, 9/1/2016, 4/23/2016, 12/10/2015, 6/11/2015, 3/21/2015, 12/11/2014, 9/27/2014, 7/19/2014, 3/22/2014, 2/15/2014, 11/16/2013, 8/17/2013, 3/2/2013    MAMMOGRAM 12/15/2017 12/15/2016, 7/5/2016, 7/1/2015, 5/27/2014, 4/11/2013, 12/17/2008, 12/17/2008, 11/2/2007, 11/2/2007, 10/19/2006, 10/18/2005, 9/24/2004    COLONOSCOPY 5/16/2021 5/16/2016    IMM DTaP/Tdap/Td Vaccine (2 - Td) 9/1/2022 9/1/2012            Current Immunizations     Influenza TIV (IM) 10/10/2013    Influenza Vaccine Quad Inj (Pf) 11/2/2016 11:13 AM, 10/9/2014    Influenza Vaccine Quad Inj (Preserved) 10/13/2015    Pneumococcal polysaccharide vaccine (PPSV-23) 1/1/2005    SHINGLES VACCINE 3/28/2014    Tdap Vaccine 9/1/2012      Below and/or attached are the medications your provider expects you to take. Review all of your home medications and newly ordered medications with your provider and/or  pharmacist. Follow medication instructions as directed by your provider and/or pharmacist. Please keep your medication list with you and share with your provider. Update the information when medications are discontinued, doses are changed, or new medications (including over-the-counter products) are added; and carry medication information at all times in the event of emergency situations     Allergies:  MACROBID - Hives     PCN - Hives               Medications  Valid as of: April 18, 2017 - 10:26 AM    Generic Name Brand Name Tablet Size Instructions for use    Aspirin (Chew Tab) ASA 81 MG Take 81 mg by mouth every day.          Atorvastatin Calcium (Tab) LIPITOR 20 MG Take 1 Tab by mouth every day.        Blood Glucose Monitoring Suppl (Misc) Blood Glucose Monitoring Suppl SUPPLIES Test strips order: Test strips for Abbott Freestyle Lite meter. Sig: use BID and prn ssx high or low sugar #100 RF x 3        Canagliflozin (Tab) Canagliflozin 300 MG Take 300 mg by mouth every day.        Cetirizine HCl (Tab) ZYRTEC 10 MG Take 1 Tab by mouth every day.        Colesevelam HCl (Tab) WELCHOL 625 MG TAKE ONE TABLET BY MOUTH IN THE MORNING AND THEN TAKE TWO TABLETS IN THE EVENING WITH DINNER        Exenatide (Pen-injector) Exenatide ER 2 MG Inject 2 mg as instructed every 7 days.        Fluticasone Propionate (Suspension) FLONASE 50 MCG/ACT Spray 2 Sprays in nose every day.        Glimepiride (Tab) AMARYL 4 MG Take 1 Tab by mouth every morning.        Glucose Blood (Strip) glucose blood  Use twice a day and as needed signs and symptoms of high or low blood sugar.        Glucose Blood (Strip) FREESTYLE LITE  USE ONE STRIP TWICE DAILY AND AS NEEDED FOR SIGNS AND SYMPTOM OF HIGH OR LOW BLOOD SUGAR        Lancets (Misc) Lancets  Lancets order: Lancets for Abbott Freestyle Lite meter. Sig: use BID and prn ssx high or low sugar. #100 RF x 3        Lisinopril (Tab) PRINIVIL 5 MG Take 1 Tab by mouth every bedtime.        Meloxicam  (Tab) MOBIC 15 MG Take 1 Tab by mouth every day.        MetFORMIN HCl (Tab) GLUCOPHAGE 1000 MG TAKE 1/2 TABLET BY MOUTH IN THE MORNING AND ONE TABLET AT NIGHT        Mupirocin Calcium (Cream) BACTROBAN 2 % Apply to affected area(s) 2 times a day.        Potassium Citrate   Take 2 Tabs by mouth 3 times a day.        Trolamine Salicylate (Cream) ASPERCREME or MYOFLEX 10 % Apply 1 Squirt to affected area(s) 4 times a day.        .                 Medicines prescribed today were sent to:     [facility] [address] [place] [address] (), [place] - [address]    [address] () [place] [address]    Phone: [phone] Fax: [phone]    Open 24 Hours?: No      Medication refill instructions:       If your prescription bottle indicates you have medication refills left, it is not necessary to call your provider’s office. Please contact your pharmacy and they will refill your medication.    If your prescription bottle indicates you do not have any refills left, you may request refills at any time through one of the following ways: The online [employer] system (except Urgent Care), by calling your provider’s office, or by asking your pharmacy to contact your provider’s office with a refill request. Medication refills are processed only during regular business hours and may not be available until the next business day. Your provider may request additional information or to have a follow-up visit with you prior to refilling your medication.   *Please Note: Medication refills are assigned a new Rx number when refilled electronically. Your pharmacy may indicate that no refills were authorized even though a new prescription for the same medication is available at the pharmacy. Please request the medicine by name with the pharmacy before contacting your provider for a refill.           [employer] Access Code: Activation code not generated  Current [employer] Status: Active

## 2017-04-19 ASSESSMENT — ENCOUNTER SYMPTOMS: COUGH: 1

## 2017-04-19 NOTE — PROGRESS NOTES
"Subjective:      Shirley Unger is a 62 y.o. female who presents with Jaw Pain and Cough            Cough      This is a 62-year-old white female patient of Dr. Mahmood.Patient is here complaining of 1-1/2 weeks of left jaw pain. She said it started after she was eating jerky. She said she had episodes of left jaw pain a few times in the past with the last one in the summertime which cleared continuously. She said the pain is worse with yawning.    She also has been coughing for about one and half weeks now with phlegm that she cannot expectorate. The cough is only occasional without any shortness of breath, wheezing. She denies any fever or chills. She denies any nasal congestion, runny nose, postnasal drip.    Past medical history, past surgical history, family history reviewed-no changes    Social history reviewed-no changes    Allergies reviewed-no changes    Medications reviewed-no changes    Review of Systems   Respiratory: Positive for cough.    additionally as per history of present illness, the rest are negative.       Objective:     /56 mmHg  Pulse 93  Temp(Src) 36.7 °C (98.1 °F)  Resp 16  Ht 1.727 m (5' 8\")  Wt 96.616 kg (213 lb)  BMI 32.39 kg/m2  SpO2 95%  LMP 11/29/2004     Physical Exam     Examined alert, awake, oriented, not in distress    Ears-tympanic membranes intact bilaterally without evidence of infection  Nose-no discharge, no obstruction, nontender frontal and most recently sinusitis  Throat-no erythema, tonsils not enlarged, no exudates  Jaw-patient is able to open and close mouth without any difficulty, no lock jaw, there is tenderness on palpation of the left TMJ, there is mild clicking of the left TMJ on opening and closing the mouth  Neck-supple, no lymphadenopathy, no thyromegaly  Lungs-clear to auscultation, no rales, no wheezes  Heart-regular rate and rhythm, no murmur  Extremities-no edema, clubbing, cyanosis            Assessment/Plan:     1. TMJ inflammation  She probably " has TMJ inflammation. We will try conservative measures with warm compresses and anti-inflammatory. She has an appointment with her dentist coming up in the next few weeks and I told her to get this checked. If she has worsening of the problem and if she does not have any improvement with conservative measures she may need to see TMJ specialist/dentist Dr. Ascencio. I did inform her that most of the time insurance does not cover seeing this specialist. Advised to eat small pieces of food are easy to chew.  - meloxicam (MOBIC) 15 MG tablet; Take 1 Tab by mouth every day.  Dispense: 30 Tab; Refill: 0    2. Viral upper respiratory tract infection  Supportive measures advised. May get over-the-counter Mucinex. If she develops colored phlegm, fever, chills, shortness of breath, wheezing she will let us know.      Please note that this dictation was created using voice recognition software. I have worked with consultants from the vendor as well as technical experts from [facility]  [employer] to optimize the interface. I have made every reasonable attempt to correct obvious errors, but I expect that there are errors of grammar and possibly content I did not discover before finalizing the note.

## 2017-04-26 ENCOUNTER — OFFICE VISIT (OUTPATIENT)
Dept: OTHER | Facility: IMAGING CENTER | Age: 62
End: 2017-04-26
Payer: COMMERCIAL

## 2017-04-26 DIAGNOSIS — M16.12 PRIMARY OSTEOARTHRITIS OF LEFT HIP: ICD-10-CM

## 2017-04-26 PROCEDURE — 97813 ACUP 1/> W/ESTIM 1ST 15 MIN: CPT | Performed by: FAMILY MEDICINE

## 2017-04-26 PROCEDURE — 99213 OFFICE O/P EST LOW 20 MIN: CPT | Mod: 25 | Performed by: FAMILY MEDICINE

## 2017-04-26 PROCEDURE — 97811 ACUP 1/> W/O ESTIM EA ADD 15: CPT | Performed by: FAMILY MEDICINE

## 2017-04-26 NOTE — PROGRESS NOTES
[facility] Acupuncture Progress Note  [address] [place] [address]  Dept: [phone]      Patient Name: Shirley Unger   MRN: [id number]  YOB: 1955  PCP: Cristofer Tena M.D.  Date of Service: 2017 12:10 PM    CC L hip pain, left thumb and right middle trigger finger.   HPI Patient is a 60 yo  female with chronic mild left hip intermittent pain that was deep and sharp.  Happens to be exacerbated when she hyperflex her hip and prohibits her from tieing her shoe and to some extend also performing certain daily activity.  She also has multiple trigger fingers which hurts more on the left.  Since last tx, her left hip has occasional sharp catching sensation but has been improved significantly with no pain for almost 4 months.  She characterized it as overall much better in pain condition but as the time went by without tx she has feel it more.  The quality of pain is also described as chaneged to occasionally sharp without deep ache sensation.   ROS Birthplace:[place], [place] 7:30 AM  Color: Red  Season:   Right-handed  Scars: right palm below ring finger cyst remain scar that is painful at times.   PMH Past Medical History   Diagnosis Date   • DM (diabetes mellitus) (CMS-HCC)    • Herpes genitalia 3/22/2013   • Nephrolithiasis 3/22/2013     Recurrent Dr Monge - Calcium stones,    • Hyperlipidemia LDL goal < 100    • Vitamin d deficiency 2013     Past Surgical History   Procedure Laterality Date   • Hysterectomy, total abdominal       For non cancerous reasons   • Cholecystectomy     • Carpal tunnel release       Right hand   • Pr  delivery only     • Cystoscopy stent placement       several, stent removed in MD office   • Ganglion excision  2014     Performed by Efra Ramirez M.D. at SURGERY [facility] Social History     Social History   • Marital Status:      Spouse Name: N/A   • Number of Children:  N/A   • Years of Education: N/A     Occupational History   •  [employer]     Social History Main Topics   • Smoking status: Never Smoker    • Smokeless tobacco: Never Used   • Alcohol Use: No      Comment: Rare Useage   • Drug Use: No   • Sexual Activity:     Partners: Male     Other Topics Concern   • Not on file     Social History Narrative      MEDS Current Outpatient Prescriptions on File Prior to Visit   Medication Sig Dispense Refill   • Exenatide ER (BYDUREON) 2 MG Pen-injector Inject 2 mg as instructed every 7 days. 13 Each 0   • meloxicam (MOBIC) 15 MG tablet Take 1 Tab by mouth every day. 30 Tab 0   • cetirizine (ZYRTEC) 10 MG Tab Take 1 Tab by mouth every day. 30 Tab 3   • fluticasone (FLONASE) 50 MCG/ACT nasal spray Spray 2 Sprays in nose every day. 16 g 0   • FREESTYLE LITE strip USE ONE STRIP TWICE DAILY AND AS NEEDED FOR SIGNS AND SYMPTOM OF HIGH OR LOW BLOOD SUGAR 100 Strip 0   • metformin (GLUCOPHAGE) 1000 MG tablet TAKE 1/2 TABLET BY MOUTH IN THE MORNING AND ONE TABLET AT NIGHT 180 Tab 3   • glimepiride (AMARYL) 4 MG Tab Take 1 Tab by mouth every morning. 90 Tab 3   • atorvastatin (LIPITOR) 20 MG Tab Take 1 Tab by mouth every day. 90 Tab 3   • Canagliflozin (INVOKANA) 300 MG Tab Take 300 mg by mouth every day. 90 Tab 3   • colesevelam (WELCHOL) 625 MG Tab TAKE ONE TABLET BY MOUTH IN THE MORNING AND THEN TAKE TWO TABLETS IN THE EVENING WITH DINNER 270 Tab 3   • lisinopril (PRINIVIL) 5 MG Tab Take 1 Tab by mouth every bedtime. 90 Tab 3   • trolamine salicylate (ASPERCREME/ALOE) 10 % cream Apply 1 Squirt to affected area(s) 4 times a day. 1 Tube 3   • glucose blood (FREESTYLE LITE) strip Use twice a day and as needed signs and symptoms of high or low blood sugar. 100 Strip 3   • Lancets MISC Lancets order: Lancets for Abbott Freestyle Lite meter. Sig: use BID and prn ssx high or low sugar. #100 RF x 3 100 Each 3   • mupirocin calcium (BACTROBAN) 2 % CREA Apply to affected area(s) 2  times a day. 15 g 2   • Blood Glucose Monitoring Suppl SUPPLIES MISC Test strips order: Test strips for Abbott Freestyle Lite meter. Sig: use BID and prn ssx high or low sugar #100 RF x 3 100 Each 3   • aspirin (ASA) 81 MG CHEW Take 81 mg by mouth every day.       • POTASSIUM CITRATE PO Take 2 Tabs by mouth 3 times a day.       No current facility-administered medications on file prior to visit.      ALLERGIES Allergies   Allergen Reactions   • Macrobid [Nitrofurantoin Monohydrate Macrocrystals] Hives   • Pcn [Penicillins] Hives      PE Deonna Exam: Stomach Qi: (+) pecking radial pulse  (+) Oketsu, (+) Immune,   (L) Adrenal - B/LKid16 St9 DaiMai ASIS Kid2       Assessment Eastern Liver/blood stagnation, Stomach qi imbalance, Immune imbalance, Adrenal exhaustion.    Western Encounter Diagnoses   Name Primary?   • Primary osteoarthritis of left hip                   Plan Set 1: Left (Lv4, Lu5), B/L LI10-11 area,   Set 2: L (Tw9 + Gb40), L Kd 5, 7, 10, 27  Set 3: L (LI 4.5 --> 10, SP 6 --> 8, LR 4 ++> 1), R (REHAN 7--> 10, ST 39 --> 36, GB 36 --> GB 34)  Set 4: L Mushtaq bill,  Maria Guadalupe valadez.     Luis Miguel Hirsch D.O.    More than 15 minutes were spent discussing with the patient about her condition which did not include procedure time. >50% of the face to face time was spent in counseling and coordination. Topics discussed included:   Hip decompression  Total acupuncture treatment time = 45 minutes

## 2017-04-26 NOTE — MR AVS SNAPSHOT
Shirley Unger   2017 10:00 AM   Appointment   MRN: [id number]    Department:  Acupuncture Med   Dept Phone:  [phone]    Description:  Female : 1955   Provider:  Luis Miguel Hirsch D.O.           Allergies as of 2017     Allergen Noted Reactions    Macrobid [Nitrofurantoin Monohydrate Macrocrystals] 10/17/2012   Hives    Pcn [Penicillins] 10/17/2012   Hives      Vital Signs     Last Menstrual Period Smoking Status                2004 Never Smoker           Basic Information     Date Of Birth Sex Race Ethnicity Preferred Language    1955 Female White Non- English      Your appointments     May 17, 2017 10:00 AM   ACUPUNCTURE 30 with [facility] (--)    [address] [facility]  [facility] [address]   [phone]            2017 10:00 AM   ACUPUNCTURE 30 with Luis Miguel Hirsch D.O.   [facility] (--)    [address] [facility]  [facility] [address]   [phone]            2017  9:00 AM   Diabetes Care Visit with Amy Jain M.D., Bindu Gill R.N.   [facility]    [address], [address]  [facility] [id number] [phone]           You will be receiving a confirmation call a few days before your appointment from our automated call confirmation system.              Problem List              ICD-10-CM Priority Class Noted - Resolved    Dyslipidemia E78.5   Unknown - Present    Recurrent nephrolithiasis N20.0   3/22/2013 - Present    Vitamin D deficiency disease E55.9   2013 - Present    Albuminuria R80.9   2014 - Present    Type 2 diabetes mellitus with other diabetic kidney complication (CMS-HCC) E11.29   2015 - Present    Colon polyps K63.5   2016 - Present    Primary osteoarthritis of left hip M16.12   2016 - Present    Trigger finger of right thumb M65.311   2016 - Present    Herpes  dermatitis B00.89   11/29/2016 - Present    Seasonal allergic rhinitis due to pollen J30.1   3/7/2017 - Present      Health Maintenance        Date Due Completion Dates    DIABETES MONOFILAMENT / LE EXAM 4/22/2017 4/22/2016 (Done), 12/10/2015 (Done), 6/18/2015 (Done), 7/29/2014 (Done), 7/8/2013, 7/8/2013 (Done)    Override on 4/22/2016: Done    Override on 12/10/2015: Done    Override on 6/18/2015: Done    Override on 7/29/2014: Done    Override on 7/8/2013: Done    FASTING LIPID PROFILE 4/23/2017 4/23/2016, 7/19/2014, 8/17/2013    URINE ACR / MICROALBUMIN 4/23/2017 4/23/2016, 6/20/2015, 7/19/2014, 8/17/2013    SERUM CREATININE 4/23/2017 4/23/2016, 3/21/2015, 7/19/2014, 3/22/2014, 2/18/2014, 11/16/2013, 8/17/2013, 4/7/2005    RETINAL SCREENING 8/16/2017 8/16/2016, 8/16/2016 (Done), 7/22/2014, 6/19/2013    Override on 8/16/2016: Done    A1C SCREENING 9/9/2017 3/9/2017, 11/10/2016, 9/1/2016, 4/23/2016, 12/10/2015, 6/11/2015, 3/21/2015, 12/11/2014, 9/27/2014, 7/19/2014, 3/22/2014, 2/15/2014, 11/16/2013, 8/17/2013, 3/2/2013    MAMMOGRAM 12/15/2017 12/15/2016, 7/5/2016, 7/1/2015, 5/27/2014, 4/11/2013, 12/17/2008, 12/17/2008, 11/2/2007, 11/2/2007, 10/19/2006, 10/18/2005, 9/24/2004    COLONOSCOPY 5/16/2021 5/16/2016    IMM DTaP/Tdap/Td Vaccine (2 - Td) 9/1/2022 9/1/2012            Current Immunizations     Influenza TIV (IM) 10/10/2013    Influenza Vaccine Quad Inj (Pf) 11/2/2016 11:13 AM, 10/9/2014    Influenza Vaccine Quad Inj (Preserved) 10/13/2015    Pneumococcal polysaccharide vaccine (PPSV-23) 1/1/2005    SHINGLES VACCINE 3/28/2014    Tdap Vaccine 9/1/2012      Below and/or attached are the medications your provider expects you to take. Review all of your home medications and newly ordered medications with your provider and/or pharmacist. Follow medication instructions as directed by your provider and/or pharmacist. Please keep your medication list with you and share with your provider. Update the information when  medications are discontinued, doses are changed, or new medications (including over-the-counter products) are added; and carry medication information at all times in the event of emergency situations     Allergies:  MACROBID - Hives     PCN - Hives               Medications  Valid as of: April 26, 2017 - 11:22 AM    Generic Name Brand Name Tablet Size Instructions for use    Aspirin (Chew Tab) ASA 81 MG Take 81 mg by mouth every day.          Atorvastatin Calcium (Tab) LIPITOR 20 MG Take 1 Tab by mouth every day.        Blood Glucose Monitoring Suppl (Misc) Blood Glucose Monitoring Suppl SUPPLIES Test strips order: Test strips for Abbott Freestyle Lite meter. Sig: use BID and prn ssx high or low sugar #100 RF x 3        Canagliflozin (Tab) Canagliflozin 300 MG Take 300 mg by mouth every day.        Cetirizine HCl (Tab) ZYRTEC 10 MG Take 1 Tab by mouth every day.        Colesevelam HCl (Tab) WELCHOL 625 MG TAKE ONE TABLET BY MOUTH IN THE MORNING AND THEN TAKE TWO TABLETS IN THE EVENING WITH DINNER        Exenatide (Pen-injector) Exenatide ER 2 MG Inject 2 mg as instructed every 7 days.        Fluticasone Propionate (Suspension) FLONASE 50 MCG/ACT Spray 2 Sprays in nose every day.        Glimepiride (Tab) AMARYL 4 MG Take 1 Tab by mouth every morning.        Glucose Blood (Strip) glucose blood  Use twice a day and as needed signs and symptoms of high or low blood sugar.        Glucose Blood (Strip) FREESTYLE LITE  USE ONE STRIP TWICE DAILY AND AS NEEDED FOR SIGNS AND SYMPTOM OF HIGH OR LOW BLOOD SUGAR        Lancets (Misc) Lancets  Lancets order: Lancets for Abbott Freestyle Lite meter. Sig: use BID and prn ssx high or low sugar. #100 RF x 3        Lisinopril (Tab) PRINIVIL 5 MG Take 1 Tab by mouth every bedtime.        Meloxicam (Tab) MOBIC 15 MG Take 1 Tab by mouth every day.        MetFORMIN HCl (Tab) GLUCOPHAGE 1000 MG TAKE 1/2 TABLET BY MOUTH IN THE MORNING AND ONE TABLET AT NIGHT        Mupirocin Calcium (Cream)  BACTROBAN 2 % Apply to affected area(s) 2 times a day.        Potassium Citrate   Take 2 Tabs by mouth 3 times a day.        Trolamine Salicylate (Cream) ASPERCREME or MYOFLEX 10 % Apply 1 Squirt to affected area(s) 4 times a day.        .                 Medicines prescribed today were sent to:     [facility] [address] [place] [address] (), [place] - [address]    [address] () [place] [address]    Phone: [phone] Fax: [phone]    Open 24 Hours?: No      Medication refill instructions:       If your prescription bottle indicates you have medication refills left, it is not necessary to call your provider’s office. Please contact your pharmacy and they will refill your medication.    If your prescription bottle indicates you do not have any refills left, you may request refills at any time through one of the following ways: The online [employer] system (except Urgent Care), by calling your provider’s office, or by asking your pharmacy to contact your provider’s office with a refill request. Medication refills are processed only during regular business hours and may not be available until the next business day. Your provider may request additional information or to have a follow-up visit with you prior to refilling your medication.   *Please Note: Medication refills are assigned a new Rx number when refilled electronically. Your pharmacy may indicate that no refills were authorized even though a new prescription for the same medication is available at the pharmacy. Please request the medicine by name with the pharmacy before contacting your provider for a refill.           [employer] Access Code: Activation code not generated  Current [employer] Status: Active

## 2017-04-26 NOTE — PATIENT INSTRUCTIONS
Have encouraged the patient to rest after the acupuncture session today - taking naps or going to sleep early as necessary.  Increase intake of water and refrain from strenuous activities.  Patient may expect to feel transient worsening of symptoms, but this should resolve to benefit in the next day or two after treatment.    The side effects of Acupuncture needle insertion include: minor bruising, bleeding, or pain at the site of needle insertion.  If more worrisome symptoms, such as continued bleeding, severe bruising, or continue pain or altered sensation persist, please contact [facility] office @ [phone]

## 2017-05-17 ENCOUNTER — OFFICE VISIT (OUTPATIENT)
Dept: OTHER | Facility: IMAGING CENTER | Age: 62
End: 2017-05-17
Payer: COMMERCIAL

## 2017-05-17 DIAGNOSIS — J30.1 SEASONAL ALLERGIC RHINITIS DUE TO POLLEN: ICD-10-CM

## 2017-05-17 DIAGNOSIS — M16.12 PRIMARY OSTEOARTHRITIS OF LEFT HIP: ICD-10-CM

## 2017-05-17 DIAGNOSIS — M26.622 ARTHRALGIA OF LEFT TEMPOROMANDIBULAR JOINT: ICD-10-CM

## 2017-05-17 PROCEDURE — 97813 ACUP 1/> W/ESTIM 1ST 15 MIN: CPT | Performed by: FAMILY MEDICINE

## 2017-05-17 PROCEDURE — 97811 ACUP 1/> W/O ESTIM EA ADD 15: CPT | Performed by: FAMILY MEDICINE

## 2017-05-17 PROCEDURE — 99213 OFFICE O/P EST LOW 20 MIN: CPT | Mod: 25 | Performed by: FAMILY MEDICINE

## 2017-05-17 NOTE — PROGRESS NOTES
[facility] Acupuncture Progress Note  [address] [place] [address]  Dept: [phone]      Patient Name: Shirley Unger   MRN: [id number]  YOB: 1955  PCP: Cristofer Tena M.D.  Date of Service: 2017 10:12 AM    CC L hip pain, left thumb and right middle trigger finger.   HPI Patient is a 60 yo  female with chronic mild left hip intermittent pain that was deep and sharp.  Happens to be exacerbated when she hyperflex her hip and prohibits her from tieing her shoe and to some extend also performing certain daily activity.  She also has multiple trigger fingers which hurts more on the left.  Since last tx, her left hip hasn't had occasional sharp catching sensation anymore and when the tightness comes back, it has been improved significantly with no pain.  She characterized it as overall much better in pain condition but as the time went by without tx she has feel it more tight.  The quality of pain is also described as deep ache sensation.  She did have recent left sided TMJ for the last month that was seen by her dentist and it is still hurting this monrning   ROS Birthplace:[place], [place] 7:30 AM  Color: Red  Season: Fall  Right-handed  Scars: right palm below ring finger cyst remain scar that is painful at times.   PMH Past Medical History   Diagnosis Date   • DM (diabetes mellitus) (CMS-HCC)    • Herpes genitalia 3/22/2013   • Nephrolithiasis 3/22/2013     Recurrent Dr Monge - Calcium stones,    • Hyperlipidemia LDL goal < 100    • Vitamin d deficiency 2013     Past Surgical History   Procedure Laterality Date   • Hysterectomy, total abdominal       For non cancerous reasons   • Cholecystectomy     • Carpal tunnel release       Right hand   • Pr  delivery only     • Cystoscopy stent placement       several, stent removed in MD office   • Ganglion excision  2014     Performed by Efra Ramirez M.D. at [facility]  [facility]      SH Social History     Social History   • Marital Status:      Spouse Name: N/A   • Number of Children: N/A   • Years of Education: N/A     Occupational History   •  [employer]     Social History Main Topics   • Smoking status: Never Smoker    • Smokeless tobacco: Never Used   • Alcohol Use: No      Comment: Rare Useage   • Drug Use: No   • Sexual Activity:     Partners: Male     Other Topics Concern   • Not on file     Social History Narrative      MEDS Current Outpatient Prescriptions on File Prior to Visit   Medication Sig Dispense Refill   • Exenatide ER (BYDUREON) 2 MG Pen-injector Inject 2 mg as instructed every 7 days. 13 Each 0   • meloxicam (MOBIC) 15 MG tablet Take 1 Tab by mouth every day. 30 Tab 0   • cetirizine (ZYRTEC) 10 MG Tab Take 1 Tab by mouth every day. 30 Tab 3   • fluticasone (FLONASE) 50 MCG/ACT nasal spray Spray 2 Sprays in nose every day. 16 g 0   • FREESTYLE LITE strip USE ONE STRIP TWICE DAILY AND AS NEEDED FOR SIGNS AND SYMPTOM OF HIGH OR LOW BLOOD SUGAR 100 Strip 0   • metformin (GLUCOPHAGE) 1000 MG tablet TAKE 1/2 TABLET BY MOUTH IN THE MORNING AND ONE TABLET AT NIGHT 180 Tab 3   • glimepiride (AMARYL) 4 MG Tab Take 1 Tab by mouth every morning. 90 Tab 3   • atorvastatin (LIPITOR) 20 MG Tab Take 1 Tab by mouth every day. 90 Tab 3   • Canagliflozin (INVOKANA) 300 MG Tab Take 300 mg by mouth every day. 90 Tab 3   • colesevelam (WELCHOL) 625 MG Tab TAKE ONE TABLET BY MOUTH IN THE MORNING AND THEN TAKE TWO TABLETS IN THE EVENING WITH DINNER 270 Tab 3   • lisinopril (PRINIVIL) 5 MG Tab Take 1 Tab by mouth every bedtime. 90 Tab 3   • trolamine salicylate (ASPERCREME/ALOE) 10 % cream Apply 1 Squirt to affected area(s) 4 times a day. 1 Tube 3   • glucose blood (FREESTYLE LITE) strip Use twice a day and as needed signs and symptoms of high or low blood sugar. 100 Strip 3   • Lancets MISC Lancets order: Lancets for Abbott Freestyle Lite meter. Sig: use BID and prn  ssx high or low sugar. #100 RF x 3 100 Each 3   • mupirocin calcium (BACTROBAN) 2 % CREA Apply to affected area(s) 2 times a day. 15 g 2   • Blood Glucose Monitoring Suppl SUPPLIES MISC Test strips order: Test strips for Abbott Freestyle Lite meter. Sig: use BID and prn ssx high or low sugar #100 RF x 3 100 Each 3   • aspirin (ASA) 81 MG CHEW Take 81 mg by mouth every day.       • POTASSIUM CITRATE PO Take 2 Tabs by mouth 3 times a day.       No current facility-administered medications on file prior to visit.      ALLERGIES Allergies   Allergen Reactions   • Macrobid [Nitrofurantoin Monohydrate Macrocrystals] Hives   • Pcn [Penicillins] Hives      PE Deonna Exam: Stomach Qi: (+) pecking radial pulse  (+) Oketsu, (+) Immune,   (L) Adrenal - B/LKid16 St9 DaiMai ASIS Kid2       Assessment Eastern Liver/blood stagnation, Stomach qi imbalance, Immune imbalance, Adrenal exhaustion.    Western Encounter Diagnoses   Name Primary?   • Primary osteoarthritis of left hip    • Seasonal allergic rhinitis due to pollen    • Arthralgia of left temporomandibular joint                   Plan Set 1: Left (Lv4, Lu5), B/L LI10-11 area,   Set 2: L (Tw9 + Gb40), L Kd 5, 7, 10, 27  Set 3: L (LI 4.5 --> 10, KD 7 --> KD 10, SP 8, LR 4 ++> 8), R (REHAN 7--> 10, ST 36, GB 36 ++> GB 34, BL 59 --> BL 40, BL 62)  Set 4: L Maria Guadalupe Chau.     Total face to face time was 20 minutes with more than 15 minutes were spent discussing with the patient about her condition which did not include procedure time. >50% of the face to face time was spent in counseling and coordination. Topics discussed included:   Hip hot compression with warm towel and avoid food type would exacerbated TMJ exercise.  Her emotional improvement with increased level of activity such as tieing her own shoe laces.  Total acupuncture treatment time = 45 minutes

## 2017-05-17 NOTE — PATIENT INSTRUCTIONS
Have encouraged the patient to rest after the acupuncture session today - taking naps or going to sleep early as necessary.  Increase intake of water and refrain from strenuous activities.  Patient may expect to feel transient worsening of symptoms, but this should resolve to benefit in the next day or two after treatment.    The side effects of Acupuncture needle insertion include: minor bruising, bleeding, or pain at the site of needle insertion.  If more worrisome symptoms, such as continued bleeding, severe bruising, or continue pain or altered sensation persist, please contact [facility] office @ [phone]

## 2017-05-17 NOTE — MR AVS SNAPSHOT
Shirley Unger   2017 10:00 AM   Office Visit   MRN: [id number]    Department:  Acupuncture Med   Dept Phone:  [phone]    Description:  Female : 1955   Provider:  Luis Miguel Hirsch D.O.           Allergies as of 2017     Allergen Noted Reactions    Macrobid [Nitrofurantoin Monohydrate Macrocrystals] 10/17/2012   Hives    Pcn [Penicillins] 10/17/2012   Hives      You were diagnosed with     Primary osteoarthritis of left hip   [608915]       Seasonal allergic rhinitis due to pollen   [7173318]       Arthralgia of left temporomandibular joint   [090952]         Vital Signs     Last Menstrual Period Smoking Status                2004 Never Smoker           Basic Information     Date Of Birth Sex Race Ethnicity Preferred Language    1955 Female White Non- English      Your appointments     2017 10:00 AM   ACUPUNCTURE 30 with Luis Miguel Hirsch D.O.   [facility]uncture and Integrative Medicine (--)    [address] [facility].  [facility] [address] [phone]            2017  8:30 AM   Adult Draw/Collection with LAB [facility]   [facility] - [facility] (--)    [address]  [facility] [address] [phone]            2017  9:00 AM   Diabetes Care Visit with Amy Jain M.D., Bindu Gill R.N.   [facility]    [address]  [facility] [address] [phone]           You will be receiving a confirmation call a few days before your appointment from our automated call confirmation system.            2017  8:30 AM   MA SCRN10 with RBHC MG 1   [facility] ([address])    [address]  [facility] [address] [phone]           No deodorant, powder, perfume or lotion under the arm or breast area.              Problem List              ICD-10-CM Priority Class Noted - Resolved    Dyslipidemia E78.5   Unknown - Present    Recurrent nephrolithiasis N20.0   3/22/2013 -  Present    Vitamin D deficiency disease E55.9   7/8/2013 - Present    Albuminuria R80.9   12/9/2014 - Present    Type 2 diabetes mellitus with other diabetic kidney complication (CMS-HCC) E11.29   8/4/2015 - Present    Colon polyps K63.5   5/16/2016 - Present    Primary osteoarthritis of left hip M16.12   7/7/2016 - Present    Trigger finger of right thumb M65.311   7/7/2016 - Present    Herpes dermatitis B00.89   11/29/2016 - Present    Seasonal allergic rhinitis due to pollen J30.1   3/7/2017 - Present    Arthralgia of left temporomandibular joint M26.622   5/17/2017 - Present      Health Maintenance        Date Due Completion Dates    DIABETES MONOFILAMENT / LE EXAM 4/22/2017 4/22/2016 (Done), 12/10/2015 (Done), 6/18/2015 (Done), 7/29/2014 (Done), 7/8/2013, 7/8/2013 (Done)    Override on 4/22/2016: Done    Override on 12/10/2015: Done    Override on 6/18/2015: Done    Override on 7/29/2014: Done    Override on 7/8/2013: Done    FASTING LIPID PROFILE 4/23/2017 4/23/2016, 7/19/2014, 8/17/2013    URINE ACR / MICROALBUMIN 4/23/2017 4/23/2016, 6/20/2015, 7/19/2014, 8/17/2013    SERUM CREATININE 4/23/2017 4/23/2016, 3/21/2015, 7/19/2014, 3/22/2014, 2/18/2014, 11/16/2013, 8/17/2013, 4/7/2005    RETINAL SCREENING 8/16/2017 8/16/2016, 8/16/2016 (Done), 7/22/2014, 6/19/2013    Override on 8/16/2016: Done    A1C SCREENING 9/9/2017 3/9/2017, 11/10/2016, 9/1/2016, 4/23/2016, 12/10/2015, 6/11/2015, 3/21/2015, 12/11/2014, 9/27/2014, 7/19/2014, 3/22/2014, 2/15/2014, 11/16/2013, 8/17/2013, 3/2/2013    MAMMOGRAM 12/15/2017 12/15/2016, 7/5/2016, 7/1/2015, 5/27/2014, 4/11/2013, 12/17/2008, 12/17/2008, 11/2/2007, 11/2/2007, 10/19/2006, 10/18/2005, 9/24/2004    COLONOSCOPY 5/16/2021 5/16/2016    IMM DTaP/Tdap/Td Vaccine (2 - Td) 9/1/2022 9/1/2012            Current Immunizations     Influenza TIV (IM) 10/10/2013    Influenza Vaccine Quad Inj (Pf) 11/2/2016 11:13 AM, 10/9/2014    Influenza Vaccine Quad Inj (Preserved) 10/13/2015     Pneumococcal polysaccharide vaccine (PPSV-23) 1/1/2005    SHINGLES VACCINE 3/28/2014    Tdap Vaccine 9/1/2012      Below and/or attached are the medications your provider expects you to take. Review all of your home medications and newly ordered medications with your provider and/or pharmacist. Follow medication instructions as directed by your provider and/or pharmacist. Please keep your medication list with you and share with your provider. Update the information when medications are discontinued, doses are changed, or new medications (including over-the-counter products) are added; and carry medication information at all times in the event of emergency situations     Allergies:  MACROBID - Hives     PCN - Hives               Medications  Valid as of: May 17, 2017 - 11:17 AM    Generic Name Brand Name Tablet Size Instructions for use    Aspirin (Chew Tab) ASA 81 MG Take 81 mg by mouth every day.          Atorvastatin Calcium (Tab) LIPITOR 20 MG Take 1 Tab by mouth every day.        Blood Glucose Monitoring Suppl (Misc) Blood Glucose Monitoring Suppl SUPPLIES Test strips order: Test strips for Abbott Freestyle Lite meter. Sig: use BID and prn ssx high or low sugar #100 RF x 3        Canagliflozin (Tab) Canagliflozin 300 MG Take 300 mg by mouth every day.        Cetirizine HCl (Tab) ZYRTEC 10 MG Take 1 Tab by mouth every day.        Colesevelam HCl (Tab) WELCHOL 625 MG TAKE ONE TABLET BY MOUTH IN THE MORNING AND THEN TAKE TWO TABLETS IN THE EVENING WITH DINNER        Exenatide (Pen-injector) Exenatide ER 2 MG Inject 2 mg as instructed every 7 days.        Fluticasone Propionate (Suspension) FLONASE 50 MCG/ACT Spray 2 Sprays in nose every day.        Glimepiride (Tab) AMARYL 4 MG Take 1 Tab by mouth every morning.        Glucose Blood (Strip) glucose blood  Use twice a day and as needed signs and symptoms of high or low blood sugar.        Glucose Blood (Strip) FREESTYLE LITE  USE ONE STRIP TWICE DAILY AND AS NEEDED  FOR SIGNS AND SYMPTOM OF HIGH OR LOW BLOOD SUGAR        Lancets (Misc) Lancets  Lancets order: Lancets for Abbott Freestyle Lite meter. Sig: use BID and prn ssx high or low sugar. #100 RF x 3        Lisinopril (Tab) PRINIVIL 5 MG Take 1 Tab by mouth every bedtime.        Meloxicam (Tab) MOBIC 15 MG Take 1 Tab by mouth every day.        MetFORMIN HCl (Tab) GLUCOPHAGE 1000 MG TAKE 1/2 TABLET BY MOUTH IN THE MORNING AND ONE TABLET AT NIGHT        Mupirocin Calcium (Cream) BACTROBAN 2 % Apply to affected area(s) 2 times a day.        Potassium Citrate   Take 2 Tabs by mouth 3 times a day.        Trolamine Salicylate (Cream) ASPERCREME or MYOFLEX 10 % Apply 1 Squirt to affected area(s) 4 times a day.        .                 Medicines prescribed today were sent to:     [facility] [address] [place] [address] (), [place] [address] () [place] [address]    Phone: [phone] Fax: [phone]    Open 24 Hours?: No      Medication refill instructions:       If your prescription bottle indicates you have medication refills left, it is not necessary to call your provider’s office. Please contact your pharmacy and they will refill your medication.    If your prescription bottle indicates you do not have any refills left, you may request refills at any time through one of the following ways: The online POKKT system (except Urgent Care), by calling your provider’s office, or by asking your pharmacy to contact your provider’s office with a refill request. Medication refills are processed only during regular business hours and may not be available until the next business day. Your provider may request additional information or to have a follow-up visit with you prior to refilling your medication.   *Please Note: Medication refills are assigned a new Rx number when refilled electronically. Your pharmacy may indicate that no refills were authorized even though a new prescription for the same medication is  available at the pharmacy. Please request the medicine by name with the pharmacy before contacting your provider for a refill.        Instructions    Have encouraged the patient to rest after the acupuncture session today - taking naps or going to sleep early as necessary.  Increase intake of water and refrain from strenuous activities.  Patient may expect to feel transient worsening of symptoms, but this should resolve to benefit in the next day or two after treatment.    The side effects of Acupuncture needle insertion include: minor bruising, bleeding, or pain at the site of needle insertion.  If more worrisome symptoms, such as continued bleeding, severe bruising, or continue pain or altered sensation persist, please contact [facility] office @ [phone]            [employer] Access Code: Activation code not generated  Current [employer] Status: Active

## 2017-06-07 ENCOUNTER — OFFICE VISIT (OUTPATIENT)
Dept: OTHER | Facility: IMAGING CENTER | Age: 62
End: 2017-06-07
Payer: COMMERCIAL

## 2017-06-07 DIAGNOSIS — M16.12 PRIMARY OSTEOARTHRITIS OF LEFT HIP: ICD-10-CM

## 2017-06-07 PROCEDURE — 97811 ACUP 1/> W/O ESTIM EA ADD 15: CPT | Performed by: FAMILY MEDICINE

## 2017-06-07 PROCEDURE — 97813 ACUP 1/> W/ESTIM 1ST 15 MIN: CPT | Performed by: FAMILY MEDICINE

## 2017-06-07 PROCEDURE — 99213 OFFICE O/P EST LOW 20 MIN: CPT | Mod: 25 | Performed by: FAMILY MEDICINE

## 2017-06-07 NOTE — PATIENT INSTRUCTIONS
Have encouraged the patient to rest after the acupuncture session today - taking naps or going to sleep early as necessary.  Increase intake of water and refrain from strenuous activities.  Patient may expect to feel transient worsening of symptoms, but this should resolve to benefit in the next day or two after treatment.    The side effects of Acupuncture needle insertion include: minor bruising, bleeding, or pain at the site of needle insertion.  If more worrisome symptoms, such as continued bleeding, severe bruising, or continue pain or altered sensation persist, please contact [facility] office @ [phone]

## 2017-06-07 NOTE — MR AVS SNAPSHOT
Shirley Unger   2017 10:00 AM   Office Visit   MRN: [id number]    Department:  Acupuncture Med   Dept Phone:  [phone]    Description:  Female : 1955   Provider:  Luis Miguel Hirsch D.O.           Allergies as of 2017     Allergen Noted Reactions    Macrobid [Nitrofurantoin Monohydrate Macrocrystals] 10/17/2012   Hives    Pcn [Penicillins] 10/17/2012   Hives      You were diagnosed with     Primary osteoarthritis of left hip   [597896]         Vital Signs     Last Menstrual Period Smoking Status                2004 Never Smoker           Basic Information     Date Of Birth Sex Race Ethnicity Preferred Language    1955 Female White Non- English      Your appointments     2017  8:30 AM   Adult Draw/Collection with LAB [facility]   LAB - [facility] (--)    [address]  [facility] [address]   [phone]            2017  9:00 AM   ACUPUNCTURE 30 with Luis Miguel Hirsch D.O.   [facility] Acupuncture [facility] Integr[facility] (--)    [address] [facility].  [facility] [id number] [phone]            2017  9:00 AM   Diabetes Care Visit with Amy Jain M.D., Bindu Gill R.N.   [facility] End[facility]inology [facility]    [address] [facility], [address]  [facility] [address] [phone]           You will be receiving a confirmation call a few days before your appointment from our automated call confirmation system.            2017  8:30 AM   MA SCRN10 with RBHC MG 1   [facility] ([address])    [address]  [facility] [address] [phone]           No deodorant, powder, perfume or lotion under the arm or breast area.              Problem List              ICD-10-CM Priority Class Noted - Resolved    Dyslipidemia E78.5   Unknown - Present    Recurrent nephrolithiasis N20.0   3/22/2013 - Present    Vitamin D deficiency disease E55.9   2013 - Present    Albuminuria R80.9   2014 - Present    Type 2  diabetes mellitus with other diabetic kidney complication (CMS-HCC) E11.29   8/4/2015 - Present    Colon polyps K63.5   5/16/2016 - Present    Primary osteoarthritis of left hip M16.12   7/7/2016 - Present    Trigger finger of right thumb M65.311   7/7/2016 - Present    Herpes dermatitis B00.89   11/29/2016 - Present    Seasonal allergic rhinitis due to pollen J30.1   3/7/2017 - Present    Arthralgia of left temporomandibular joint M26.622   5/17/2017 - Present      Health Maintenance        Date Due Completion Dates    DIABETES MONOFILAMENT / LE EXAM 4/22/2017 4/22/2016 (Done), 12/10/2015 (Done), 6/18/2015 (Done), 7/29/2014 (Done), 7/8/2013, 7/8/2013 (Done)    Override on 4/22/2016: Done    Override on 12/10/2015: Done    Override on 6/18/2015: Done    Override on 7/29/2014: Done    Override on 7/8/2013: Done    FASTING LIPID PROFILE 4/23/2017 4/23/2016, 7/19/2014, 8/17/2013    URINE ACR / MICROALBUMIN 4/23/2017 4/23/2016, 6/20/2015, 7/19/2014, 8/17/2013    SERUM CREATININE 4/23/2017 4/23/2016, 3/21/2015, 7/19/2014, 3/22/2014, 2/18/2014, 11/16/2013, 8/17/2013, 4/7/2005    RETINAL SCREENING 8/16/2017 8/16/2016, 8/16/2016 (Done), 7/22/2014, 6/19/2013    Override on 8/16/2016: Done    A1C SCREENING 9/9/2017 3/9/2017, 11/10/2016, 9/1/2016, 4/23/2016, 12/10/2015, 6/11/2015, 3/21/2015, 12/11/2014, 9/27/2014, 7/19/2014, 3/22/2014, 2/15/2014, 11/16/2013, 8/17/2013, 3/2/2013    MAMMOGRAM 12/15/2017 12/15/2016, 7/5/2016, 7/1/2015, 5/27/2014, 4/11/2013, 12/17/2008, 12/17/2008, 11/2/2007, 11/2/2007, 10/19/2006, 10/18/2005, 9/24/2004    COLONOSCOPY 5/16/2021 5/16/2016    IMM DTaP/Tdap/Td Vaccine (2 - Td) 9/1/2022 9/1/2012            Current Immunizations     Influenza TIV (IM) 10/10/2013    Influenza Vaccine Quad Inj (Pf) 11/2/2016 11:13 AM, 10/9/2014    Influenza Vaccine Quad Inj (Preserved) 10/13/2015    Pneumococcal polysaccharide vaccine (PPSV-23) 1/1/2005    SHINGLES VACCINE 3/28/2014    Tdap Vaccine 9/1/2012      Below  and/or attached are the medications your provider expects you to take. Review all of your home medications and newly ordered medications with your provider and/or pharmacist. Follow medication instructions as directed by your provider and/or pharmacist. Please keep your medication list with you and share with your provider. Update the information when medications are discontinued, doses are changed, or new medications (including over-the-counter products) are added; and carry medication information at all times in the event of emergency situations     Allergies:  MACROBID - Hives     PCN - Hives               Medications  Valid as of: June 07, 2017 - 11:11 AM    Generic Name Brand Name Tablet Size Instructions for use    Aspirin (Chew Tab) ASA 81 MG Take 81 mg by mouth every day.          Atorvastatin Calcium (Tab) LIPITOR 20 MG Take 1 Tab by mouth every day.        Blood Glucose Monitoring Suppl (Misc) Blood Glucose Monitoring Suppl SUPPLIES Test strips order: Test strips for Abbott Freestyle Lite meter. Sig: use BID and prn ssx high or low sugar #100 RF x 3        Canagliflozin (Tab) Canagliflozin 300 MG Take 300 mg by mouth every day.        Cetirizine HCl (Tab) ZYRTEC 10 MG Take 1 Tab by mouth every day.        Colesevelam HCl (Tab) WELCHOL 625 MG TAKE ONE TABLET BY MOUTH IN THE MORNING AND THEN TAKE TWO TABLETS IN THE EVENING WITH DINNER        Exenatide (Pen-injector) Exenatide ER 2 MG Inject 2 mg as instructed every 7 days.        Fluticasone Propionate (Suspension) FLONASE 50 MCG/ACT Spray 2 Sprays in nose every day.        Glimepiride (Tab) AMARYL 4 MG Take 1 Tab by mouth every morning.        Glucose Blood (Strip) glucose blood  Use twice a day and as needed signs and symptoms of high or low blood sugar.        Glucose Blood (Strip) FREESTYLE LITE  USE ONE STRIP TWICE DAILY AND AS NEEDED FOR SIGNS AND SYMPTOM OF HIGH OR LOW BLOOD SUGAR        Lancets (Misc) Lancets  Lancets order: Lancets for Abbott  Freestyle Lite meter. Sig: use BID and prn ssx high or low sugar. #100 RF x 3        Lisinopril (Tab) PRINIVIL 5 MG Take 1 Tab by mouth every bedtime.        Meloxicam (Tab) MOBIC 15 MG Take 1 Tab by mouth every day.        MetFORMIN HCl (Tab) GLUCOPHAGE 1000 MG TAKE 1/2 TABLET BY MOUTH IN THE MORNING AND ONE TABLET AT NIGHT        Mupirocin Calcium (Cream) BACTROBAN 2 % Apply to affected area(s) 2 times a day.        Potassium Citrate   Take 2 Tabs by mouth 3 times a day.        Trolamine Salicylate (Cream) ASPERCREME or MYOFLEX 10 % Apply 1 Squirt to affected area(s) 4 times a day.        .                 Medicines prescribed today were sent to:     [facility] [address] [place] [address] (), [place] [id number] () [place] [address]    Phone: [phone] Fax: [phone]    Open 24 Hours?: No      Medication refill instructions:       If your prescription bottle indicates you have medication refills left, it is not necessary to call your provider’s office. Please contact your pharmacy and they will refill your medication.    If your prescription bottle indicates you do not have any refills left, you may request refills at any time through one of the following ways: The online OfferIQ system (except Urgent Care), by calling your provider’s office, or by asking your pharmacy to contact your provider’s office with a refill request. Medication refills are processed only during regular business hours and may not be available until the next business day. Your provider may request additional information or to have a follow-up visit with you prior to refilling your medication.   *Please Note: Medication refills are assigned a new Rx number when refilled electronically. Your pharmacy may indicate that no refills were authorized even though a new prescription for the same medication is available at the pharmacy. Please request the medicine by name with the pharmacy before contacting your provider for  a refill.           [employer] Access Code: Activation code not generated  Current [employer] Status: Active

## 2017-06-07 NOTE — PROGRESS NOTES
[facility] Progress Note  [address] [place] [address]  Dept: [phone]      Patient Name: Shirley Unger   MRN: [id number]  YOB: 1955  PCP: Cristofer Tena M.D.  Date of Service: 2017 10:05 AM    CC L hip pain, left thumb and right middle trigger finger.   HPI Patient is a 60 yo  female with chronic mild left hip intermittent pain that was deep and sharp.  Happens to be exacerbated when she hyperflex her hip and prohibits her from tieing her shoe and to some extend also performing certain daily activity.  She also has multiple trigger fingers which hurts more on the left.  Since last tx, her left hip hasn't had occasional sharp catching sensation anymore and when the tightness comes back, it has been improved significantly with no pain for 3 weeks post treatment.  Afterward it as overall much better in pain condition but as the time went by without tx she has feel it more tight.  The quality of pain is also described as deep ache sensation.  Her left sided TMJ for the last month has no long to be painful.   ROS Birthplace:[place], [place] 7:30 AM  Color: Red  Season: Fall  Right-handed  Scars: right palm below ring finger cyst remain scar that is painful at times.   PMH Past Medical History   Diagnosis Date   • DM (diabetes mellitus) (CMS-HCC)    • Herpes genitalia 3/22/2013   • Nephrolithiasis 3/22/2013     Recurrent Dr Monge - Calcium stones,    • Hyperlipidemia LDL goal < 100    • Vitamin d deficiency 2013     Past Surgical History   Procedure Laterality Date   • Hysterectomy, total abdominal       For non cancerous reasons   • Cholecystectomy     • Carpal tunnel release       Right hand   • Pr  delivery only     • Cystoscopy stent placement       several, stent removed in MD office   • Ganglion excision  2014     Performed by Efra Ramirez M.D. at SURGERY [facility] Social History     Social  History   • Marital Status:      Spouse Name: N/A   • Number of Children: N/A   • Years of Education: N/A     Occupational History   •  [employer]     Social History Main Topics   • Smoking status: Never Smoker    • Smokeless tobacco: Never Used   • Alcohol Use: No      Comment: Rare Useage   • Drug Use: No   • Sexual Activity:     Partners: Male     Other Topics Concern   • Not on file     Social History Narrative      MEDS Current Outpatient Prescriptions on File Prior to Visit   Medication Sig Dispense Refill   • Exenatide ER (BYDUREON) 2 MG Pen-injector Inject 2 mg as instructed every 7 days. 13 Each 0   • meloxicam (MOBIC) 15 MG tablet Take 1 Tab by mouth every day. 30 Tab 0   • cetirizine (ZYRTEC) 10 MG Tab Take 1 Tab by mouth every day. 30 Tab 3   • fluticasone (FLONASE) 50 MCG/ACT nasal spray Spray 2 Sprays in nose every day. 16 g 0   • FREESTYLE LITE strip USE ONE STRIP TWICE DAILY AND AS NEEDED FOR SIGNS AND SYMPTOM OF HIGH OR LOW BLOOD SUGAR 100 Strip 0   • metformin (GLUCOPHAGE) 1000 MG tablet TAKE 1/2 TABLET BY MOUTH IN THE MORNING AND ONE TABLET AT NIGHT 180 Tab 3   • glimepiride (AMARYL) 4 MG Tab Take 1 Tab by mouth every morning. 90 Tab 3   • atorvastatin (LIPITOR) 20 MG Tab Take 1 Tab by mouth every day. 90 Tab 3   • Canagliflozin (INVOKANA) 300 MG Tab Take 300 mg by mouth every day. 90 Tab 3   • colesevelam (WELCHOL) 625 MG Tab TAKE ONE TABLET BY MOUTH IN THE MORNING AND THEN TAKE TWO TABLETS IN THE EVENING WITH DINNER 270 Tab 3   • lisinopril (PRINIVIL) 5 MG Tab Take 1 Tab by mouth every bedtime. 90 Tab 3   • trolamine salicylate (ASPERCREME/ALOE) 10 % cream Apply 1 Squirt to affected area(s) 4 times a day. 1 Tube 3   • glucose blood (FREESTYLE LITE) strip Use twice a day and as needed signs and symptoms of high or low blood sugar. 100 Strip 3   • Lancets MISC Lancets order: Lancets for Abbott Freestyle Lite meter. Sig: use BID and prn ssx high or low sugar. #100 RF x 3 100  "Each 3   • mupirocin calcium (BACTROBAN) 2 % CREA Apply to affected area(s) 2 times a day. 15 g 2   • Blood Glucose Monitoring Suppl SUPPLIES MISC Test strips order: Test strips for Abbott Freestyle Lite meter. Sig: use BID and prn ssx high or low sugar #100 RF x 3 100 Each 3   • aspirin (ASA) 81 MG CHEW Take 81 mg by mouth every day.       • POTASSIUM CITRATE PO Take 2 Tabs by mouth 3 times a day.       No current facility-administered medications on file prior to visit.      ALLERGIES Allergies   Allergen Reactions   • Macrobid [Nitrofurantoin Monohydrate Macrocrystals] Hives   • Pcn [Penicillins] Hives      PE Deonna Exam: Stomach Qi: (+) pecking radial pulse  (+) Oketsu, (+) Immune,   (L) Adrenal - B/LKid16 St9 DaiMai ASIS Kid2       Assessment Eastern Liver/blood stagnation, Stomach qi imbalance, Immune imbalance, Adrenal exhaustion.    Western Encounter Diagnoses   Name Primary?   • Primary osteoarthritis of left hip                   Plan Set 1: Left (Lv4, Lu5), B/L LI10-11 area,   Set 2: L (Tw9 + Gb40), L Kd 5, 7, 10, 27  Set 3: L (LI 4.5 --> 10, KD 7 --> KD 10, SP 8, LR 4 ++> 8), R (REHAN 7--> 10, ST 36, GB 36 ++> GB 34, BL 59 --> BL 40, BL 62)  Set 4: L Mushtaq bill,  Maria Guadalupe valadez.     Total face to face time was 20 minutes with more than 15 minutes were spent discussing with the patient about her condition which did not include procedure time. >50% of the face to face time was spent in counseling and coordination. Topics discussed included:   Hip hot compression with warm towel and avoid food type would exacerbated her hip pain.  Her emotional improvement with increased level of activity for the ambulation.  Discussed antiinflammatory diet and information on \"how not to die\" was given.  Her weight management will be discussed further during next visit.  Total acupuncture treatment time = 45 minutes        "

## 2017-06-19 DIAGNOSIS — I10 ESSENTIAL HYPERTENSION: ICD-10-CM

## 2017-06-20 RX ORDER — LISINOPRIL 5 MG/1
5 TABLET ORAL
Qty: 90 TAB | Refills: 1 | Status: SHIPPED | OUTPATIENT
Start: 2017-06-20 | End: 2018-06-12

## 2017-06-21 ENCOUNTER — HOSPITAL ENCOUNTER (OUTPATIENT)
Dept: LAB | Facility: MEDICAL CENTER | Age: 62
End: 2017-06-21
Attending: INTERNAL MEDICINE
Payer: COMMERCIAL

## 2017-06-21 DIAGNOSIS — E78.5 DYSLIPIDEMIA: ICD-10-CM

## 2017-06-21 DIAGNOSIS — E11.29 TYPE 2 DIABETES MELLITUS WITH OTHER DIABETIC KIDNEY COMPLICATION (HCC): ICD-10-CM

## 2017-06-21 DIAGNOSIS — E55.9 VITAMIN D DEFICIENCY DISEASE: ICD-10-CM

## 2017-06-21 DIAGNOSIS — J30.1 SEASONAL ALLERGIC RHINITIS DUE TO POLLEN: ICD-10-CM

## 2017-06-21 DIAGNOSIS — E11.9 TYPE 2 DIABETES MELLITUS WITHOUT COMPLICATION, WITHOUT LONG-TERM CURRENT USE OF INSULIN (HCC): ICD-10-CM

## 2017-06-21 LAB
25(OH)D3 SERPL-MCNC: 38 NG/ML (ref 30–100)
ALBUMIN SERPL BCP-MCNC: 4.2 G/DL (ref 3.2–4.9)
ALBUMIN/GLOB SERPL: 1.6 G/DL
ALP SERPL-CCNC: 54 U/L (ref 30–99)
ALT SERPL-CCNC: 11 U/L (ref 2–50)
ANION GAP SERPL CALC-SCNC: 6 MMOL/L (ref 0–11.9)
ANION GAP SERPL CALC-SCNC: 6 MMOL/L (ref 0–11.9)
AST SERPL-CCNC: 11 U/L (ref 12–45)
BASOPHILS # BLD AUTO: 0.8 % (ref 0–1.8)
BASOPHILS # BLD: 0.05 K/UL (ref 0–0.12)
BILIRUB SERPL-MCNC: 0.6 MG/DL (ref 0.1–1.5)
BUN SERPL-MCNC: 18 MG/DL (ref 8–22)
BUN SERPL-MCNC: 18 MG/DL (ref 8–22)
CALCIUM SERPL-MCNC: 9.3 MG/DL (ref 8.5–10.5)
CALCIUM SERPL-MCNC: 9.4 MG/DL (ref 8.5–10.5)
CHLORIDE SERPL-SCNC: 103 MMOL/L (ref 96–112)
CHLORIDE SERPL-SCNC: 103 MMOL/L (ref 96–112)
CHOLEST SERPL-MCNC: 126 MG/DL (ref 100–199)
CO2 SERPL-SCNC: 29 MMOL/L (ref 20–33)
CO2 SERPL-SCNC: 29 MMOL/L (ref 20–33)
CREAT SERPL-MCNC: 0.67 MG/DL (ref 0.5–1.4)
CREAT SERPL-MCNC: 0.7 MG/DL (ref 0.5–1.4)
CREAT UR-MCNC: 54.6 MG/DL
EOSINOPHIL # BLD AUTO: 0.11 K/UL (ref 0–0.51)
EOSINOPHIL NFR BLD: 1.8 % (ref 0–6.9)
ERYTHROCYTE [DISTWIDTH] IN BLOOD BY AUTOMATED COUNT: 43.2 FL (ref 35.9–50)
EST. AVERAGE GLUCOSE BLD GHB EST-MCNC: 157 MG/DL
GFR SERPL CREATININE-BSD FRML MDRD: >60 ML/MIN/1.73 M 2
GFR SERPL CREATININE-BSD FRML MDRD: >60 ML/MIN/1.73 M 2
GLOBULIN SER CALC-MCNC: 2.7 G/DL (ref 1.9–3.5)
GLUCOSE SERPL-MCNC: 134 MG/DL (ref 65–99)
GLUCOSE SERPL-MCNC: 136 MG/DL (ref 65–99)
HBA1C MFR BLD: 7.1 % (ref 0–5.6)
HCT VFR BLD AUTO: 45.4 % (ref 37–47)
HDLC SERPL-MCNC: 38 MG/DL
HGB BLD-MCNC: 14.5 G/DL (ref 12–16)
IMM GRANULOCYTES # BLD AUTO: 0.01 K/UL (ref 0–0.11)
IMM GRANULOCYTES NFR BLD AUTO: 0.2 % (ref 0–0.9)
LDLC SERPL CALC-MCNC: 46 MG/DL
LYMPHOCYTES # BLD AUTO: 2.14 K/UL (ref 1–4.8)
LYMPHOCYTES NFR BLD: 35.1 % (ref 22–41)
MCH RBC QN AUTO: 28.8 PG (ref 27–33)
MCHC RBC AUTO-ENTMCNC: 31.9 G/DL (ref 33.6–35)
MCV RBC AUTO: 90.3 FL (ref 81.4–97.8)
MICROALBUMIN UR-MCNC: <0.7 MG/DL
MICROALBUMIN/CREAT UR: NORMAL MG/G (ref 0–30)
MONOCYTES # BLD AUTO: 0.52 K/UL (ref 0–0.85)
MONOCYTES NFR BLD AUTO: 8.5 % (ref 0–13.4)
NEUTROPHILS # BLD AUTO: 3.26 K/UL (ref 2–7.15)
NEUTROPHILS NFR BLD: 53.6 % (ref 44–72)
NRBC # BLD AUTO: 0 K/UL
NRBC BLD AUTO-RTO: 0 /100 WBC
PLATELET # BLD AUTO: 232 K/UL (ref 164–446)
PMV BLD AUTO: 10.3 FL (ref 9–12.9)
POTASSIUM SERPL-SCNC: 4.1 MMOL/L (ref 3.6–5.5)
POTASSIUM SERPL-SCNC: 4.2 MMOL/L (ref 3.6–5.5)
PROT SERPL-MCNC: 6.9 G/DL (ref 6–8.2)
RBC # BLD AUTO: 5.03 M/UL (ref 4.2–5.4)
SODIUM SERPL-SCNC: 138 MMOL/L (ref 135–145)
SODIUM SERPL-SCNC: 138 MMOL/L (ref 135–145)
TRIGL SERPL-MCNC: 208 MG/DL (ref 0–149)
TSH SERPL DL<=0.005 MIU/L-ACNC: 0.83 UIU/ML (ref 0.3–3.7)
WBC # BLD AUTO: 6.1 K/UL (ref 4.8–10.8)

## 2017-06-21 PROCEDURE — 82306 VITAMIN D 25 HYDROXY: CPT

## 2017-06-21 PROCEDURE — 80053 COMPREHEN METABOLIC PANEL: CPT

## 2017-06-21 PROCEDURE — 83036 HEMOGLOBIN GLYCOSYLATED A1C: CPT

## 2017-06-21 PROCEDURE — 82043 UR ALBUMIN QUANTITATIVE: CPT

## 2017-06-21 PROCEDURE — 85025 COMPLETE CBC W/AUTO DIFF WBC: CPT

## 2017-06-21 PROCEDURE — 80048 BASIC METABOLIC PNL TOTAL CA: CPT

## 2017-06-21 PROCEDURE — 80061 LIPID PANEL: CPT

## 2017-06-21 PROCEDURE — 82570 ASSAY OF URINE CREATININE: CPT

## 2017-06-21 PROCEDURE — 36415 COLL VENOUS BLD VENIPUNCTURE: CPT

## 2017-06-21 PROCEDURE — 84443 ASSAY THYROID STIM HORMONE: CPT

## 2017-06-22 ENCOUNTER — TELEPHONE (OUTPATIENT)
Dept: MEDICAL GROUP | Facility: PHYSICIAN GROUP | Age: 62
End: 2017-06-22

## 2017-06-22 NOTE — TELEPHONE ENCOUNTER
Phone Number Called: [phone] (home)     Message: Unable to reach pt left vm to call back.     Left Message for patient to call back: yes

## 2017-06-22 NOTE — TELEPHONE ENCOUNTER
----- Message from Rogers Mahmood M.D. sent at 6/22/2017  7:03 AM PDT -----  Please notify patient that kidney function, liver function, cholesterol, blood count and Vitamin D are normal  Blood sugars fasting slight elevated . Continue same treatment for diabetes, will order A1c at your next apt  Thank you    Rogers Mahmood M.D.

## 2017-06-28 ENCOUNTER — OFFICE VISIT (OUTPATIENT)
Dept: OTHER | Facility: IMAGING CENTER | Age: 62
End: 2017-06-28
Payer: COMMERCIAL

## 2017-06-28 DIAGNOSIS — M26.622 ARTHRALGIA OF LEFT TEMPOROMANDIBULAR JOINT: ICD-10-CM

## 2017-06-28 DIAGNOSIS — J30.1 SEASONAL ALLERGIC RHINITIS DUE TO POLLEN: ICD-10-CM

## 2017-06-28 DIAGNOSIS — M16.12 PRIMARY OSTEOARTHRITIS OF LEFT HIP: ICD-10-CM

## 2017-06-28 PROCEDURE — 97813 ACUP 1/> W/ESTIM 1ST 15 MIN: CPT | Performed by: FAMILY MEDICINE

## 2017-06-28 PROCEDURE — 97811 ACUP 1/> W/O ESTIM EA ADD 15: CPT | Performed by: FAMILY MEDICINE

## 2017-06-28 PROCEDURE — 99213 OFFICE O/P EST LOW 20 MIN: CPT | Mod: 25 | Performed by: FAMILY MEDICINE

## 2017-06-28 NOTE — PATIENT INSTRUCTIONS
Have encouraged the patient to rest after the acupuncture session today - taking naps or going to sleep early as necessary.  Increase intake of water and refrain from strenuous activities.  Patient may expect to feel transient worsening of symptoms, but this should resolve to benefit in the next day or two after treatment.    The side effects of Acupuncture needle insertion include: minor bruising, bleeding, or pain at the site of needle insertion.  If more worrisome symptoms, such as continued bleeding, severe bruising, or continue pain or altered sensation persist, please contact [facility] office @ [phone]

## 2017-06-28 NOTE — TELEPHONE ENCOUNTER
Phone Number Called: [phone] (home)     Message: Pt notified of results.     Left Message for patient to call back: N\A

## 2017-06-28 NOTE — PROGRESS NOTES
[facility] Acupuncture Progress Note  [address] [place] [address]  Dept: [phone]      Patient Name: Shirley Unger   MRN: [id number]  YOB: 1955  PCP: Cristofer Tena M.D.  Date of Service: 2017  8:59 AM    CC L hip pain, left thumb and right middle trigger finger.   HPI Patient is a 60 yo  female with chronic mild left hip intermittent pain that was deep and sharp.  Happens to be exacerbated when she hyperflex her hip and prohibits her from tieing her shoe and to some extend also performing certain daily activity.  She also has multiple trigger fingers which hurts more on the left.  Since last tx, her left hip hasn't had occasional sharp catching sensation anymore and when the tightness comes back, it has been improved significantly with no pain for 3 weeks post treatment.  There was an episode of passing kidney stone 3 days ago and today the right lower rib is  from that.  The quality of pain is also described as deep ache sensation.  Her left sided TMJ for the last month remains to be still resolved.   ROS Birthplace:[place], [place] 7:30 AM  Color: Red  Season: Fall  Right-handed  Scars: right palm below ring finger cyst remain scar that is painful at times.   PMH Past Medical History   Diagnosis Date   • DM (diabetes mellitus) (CMS-HCC)    • Herpes genitalia 3/22/2013   • Nephrolithiasis 3/22/2013     Recurrent Dr Monge - Calcium stones,    • Hyperlipidemia LDL goal < 100    • Vitamin d deficiency 2013     Past Surgical History   Procedure Laterality Date   • Hysterectomy, total abdominal       For non cancerous reasons   • Cholecystectomy     • Carpal tunnel release       Right hand   • Pr  delivery only     • Cystoscopy stent placement       several, stent removed in MD office   • Ganglion excision  2014     Performed by Efra Ramirez M.D. at SURGERY [facility] Social History     Social  History   • Marital Status:      Spouse Name: N/A   • Number of Children: N/A   • Years of Education: N/A     Occupational History   •  [employer]     Social History Main Topics   • Smoking status: Never Smoker    • Smokeless tobacco: Never Used   • Alcohol Use: No      Comment: Rare Useage   • Drug Use: No   • Sexual Activity:     Partners: Male     Other Topics Concern   • Not on file     Social History Narrative      MEDS Current Outpatient Prescriptions on File Prior to Visit   Medication Sig Dispense Refill   • lisinopril (PRINIVIL) 5 MG Tab Take 1 Tab by mouth every bedtime. 90 Tab 1   • lisinopril (PRINIVIL) 5 MG Tab TAKE ONE TABLET BY MOUTH AT BEDTIME 90 Tab 1   • Exenatide ER (BYDUREON) 2 MG Pen-injector Inject 2 mg as instructed every 7 days. 13 Each 0   • meloxicam (MOBIC) 15 MG tablet Take 1 Tab by mouth every day. 30 Tab 0   • cetirizine (ZYRTEC) 10 MG Tab Take 1 Tab by mouth every day. 30 Tab 3   • fluticasone (FLONASE) 50 MCG/ACT nasal spray Spray 2 Sprays in nose every day. 16 g 0   • FREESTYLE LITE strip USE ONE STRIP TWICE DAILY AND AS NEEDED FOR SIGNS AND SYMPTOM OF HIGH OR LOW BLOOD SUGAR 100 Strip 0   • metformin (GLUCOPHAGE) 1000 MG tablet TAKE 1/2 TABLET BY MOUTH IN THE MORNING AND ONE TABLET AT NIGHT 180 Tab 3   • glimepiride (AMARYL) 4 MG Tab Take 1 Tab by mouth every morning. 90 Tab 3   • atorvastatin (LIPITOR) 20 MG Tab Take 1 Tab by mouth every day. 90 Tab 3   • Canagliflozin (INVOKANA) 300 MG Tab Take 300 mg by mouth every day. 90 Tab 3   • colesevelam (WELCHOL) 625 MG Tab TAKE ONE TABLET BY MOUTH IN THE MORNING AND THEN TAKE TWO TABLETS IN THE EVENING WITH DINNER 270 Tab 3   • trolamine salicylate (ASPERCREME/ALOE) 10 % cream Apply 1 Squirt to affected area(s) 4 times a day. 1 Tube 3   • glucose blood (FREESTYLE LITE) strip Use twice a day and as needed signs and symptoms of high or low blood sugar. 100 Strip 3   • Lancets MISC Lancets order: Lancets for Abbott  "Freestyle Lite meter. Sig: use BID and prn ssx high or low sugar. #100 RF x 3 100 Each 3   • mupirocin calcium (BACTROBAN) 2 % CREA Apply to affected area(s) 2 times a day. 15 g 2   • Blood Glucose Monitoring Suppl SUPPLIES MISC Test strips order: Test strips for Abbott Freestyle Lite meter. Sig: use BID and prn ssx high or low sugar #100 RF x 3 100 Each 3   • aspirin (ASA) 81 MG CHEW Take 81 mg by mouth every day.       • POTASSIUM CITRATE PO Take 2 Tabs by mouth 3 times a day.       No current facility-administered medications on file prior to visit.      ALLERGIES Allergies   Allergen Reactions   • Macrobid [Nitrofurantoin Monohydrate Macrocrystals] Hives   • Pcn [Penicillins] Hives      PE Deonna Exam: Stomach Qi: (+) pecking radial pulse  (+) Oketsu, (+) Immune,   (L) Adrenal - B/LKid16 St9 DaiMai ASIS Kid2       Assessment Eastern Liver/blood stagnation, Stomach qi imbalance, Immune imbalance, Adrenal exhaustion.    Western Encounter Diagnoses   Name Primary?   • Arthralgia of left temporomandibular joint    • Primary osteoarthritis of left hip    • Seasonal allergic rhinitis due to pollen                   Plan Set 1: Left (Lv4, Lu5), B/L LI10-11 area,   Set 2: L (Tw9 + Gb40), L Kd 5, 7, 10, 27  Set 3: L (LI 4.5 --> 10, KD 7 --> KD 10, SP 8, LR 4 ++> LR 8), R (REHAN 7--> 10, ST 36, GB 36 ++> GB 34, BL 59 --> BL 40, BL 62)  Set 4: L Mushtaq bill,  Maria Guadalupe valadez.     Total face to face time was 20 minutes with more than 15 minutes were spent discussing with the patient about her condition which did not include procedure time. >50% of the face to face time was spent in counseling and coordination. Topics discussed included:   Hip hot compression with warm towel and avoid food type would exacerbated her hip pain.  Her emotional improvement with increased level of activity for the ambulation.  Discussed antiinflammatory diet and information on \"how not to die\" was talked over with her questions answered.  Her " weight management will be discussed further during next visit.  Total acupuncture treatment time = 45 minutes

## 2017-06-28 NOTE — MR AVS SNAPSHOT
Shirley Unger   2017 9:00 AM   Office Visit   MRN: [id number]    Department:  Acupuncture Med   Dept Phone:  [phone]    Description:  Female : 1955   Provider:  Luis Miguel Hirsch D.O.           Allergies as of 2017     Allergen Noted Reactions    Macrobid [Nitrofurantoin Monohydrate Macrocrystals] 10/17/2012   Hives    Pcn [Penicillins] 10/17/2012   Hives      You were diagnosed with     Arthralgia of left temporomandibular joint   [563050]       Primary osteoarthritis of left hip   [019133]       Seasonal allergic rhinitis due to pollen   [8355148]         Vital Signs     Last Menstrual Period Smoking Status                2004 Never Smoker           Basic Information     Date Of Birth Sex Race Ethnicity Preferred Language    1955 Female White Non- English      Your appointments     2017  9:00 AM   Diabetes Care Visit with Amy Jain M.D., Bindu Gill R.N.   [facility]    [address], [address]  [facility] [id number] [phone]           You will be receiving a confirmation call a few days before your appointment from our automated call confirmation system.            2017  8:30 AM   MA SCRN10 with [facility] MG 1   [facility] ([address])    [address] [place] [address] [address]  [facility] [id number] [phone]           No deodorant, powder, perfume or lotion under the arm or breast area.              Problem List              ICD-10-CM Priority Class Noted - Resolved    Dyslipidemia E78.5   Unknown - Present    Recurrent nephrolithiasis N20.0   3/22/2013 - Present    Vitamin D deficiency disease E55.9   2013 - Present    Albuminuria R80.9   2014 - Present    Type 2 diabetes mellitus with other diabetic kidney complication (CMS-HCC) E11.29   2015 - Present    Colon polyps K63.5   2016 - Present    Primary osteoarthritis of left hip M16.12   2016 - Present    Trigger finger of right thumb M65.311   7/7/2016 - Present    Herpes dermatitis B00.89   11/29/2016 - Present    Seasonal allergic rhinitis due to pollen J30.1   3/7/2017 - Present    Arthralgia of left temporomandibular joint M26.622   5/17/2017 - Present      Health Maintenance        Date Due Completion Dates    DIABETES MONOFILAMENT / LE EXAM 4/22/2017 4/22/2016 (Done), 12/10/2015 (Done), 6/18/2015 (Done), 7/29/2014 (Done), 7/8/2013, 7/8/2013 (Done)    Override on 4/22/2016: Done    Override on 12/10/2015: Done    Override on 6/18/2015: Done    Override on 7/29/2014: Done    Override on 7/8/2013: Done    RETINAL SCREENING 8/16/2017 8/16/2016, 8/16/2016 (Done), 7/22/2014, 6/19/2013    Override on 8/16/2016: Done    MAMMOGRAM 12/15/2017 12/15/2016, 7/5/2016, 7/1/2015, 5/27/2014, 4/11/2013, 12/17/2008, 12/17/2008, 11/2/2007, 11/2/2007, 10/19/2006, 10/18/2005, 9/24/2004    A1C SCREENING 12/21/2017 6/21/2017, 3/9/2017, 11/10/2016, 9/1/2016, 4/23/2016, 12/10/2015, 6/11/2015, 3/21/2015, 12/11/2014, 9/27/2014, 7/19/2014, 3/22/2014, 2/15/2014, 11/16/2013, 8/17/2013, 3/2/2013    FASTING LIPID PROFILE 6/21/2018 6/21/2017, 4/23/2016, 7/19/2014, 8/17/2013    URINE ACR / MICROALBUMIN 6/21/2018 6/21/2017, 4/23/2016, 6/20/2015, 7/19/2014, 8/17/2013    SERUM CREATININE 6/21/2018 6/21/2017, 6/21/2017, 4/23/2016, 3/21/2015, 7/19/2014, 3/22/2014, 2/18/2014, 11/16/2013, 8/17/2013, 4/7/2005    COLONOSCOPY 5/16/2021 5/16/2016    IMM DTaP/Tdap/Td Vaccine (2 - Td) 9/1/2022 9/1/2012            Current Immunizations     Influenza TIV (IM) 10/10/2013    Influenza Vaccine Quad Inj (Pf) 11/2/2016 11:13 AM, 10/9/2014    Influenza Vaccine Quad Inj (Preserved) 10/13/2015    Pneumococcal polysaccharide vaccine (PPSV-23) 1/1/2005    SHINGLES VACCINE 3/28/2014    Tdap Vaccine 9/1/2012      Below and/or attached are the medications your provider expects you to take. Review all of your home medications and newly ordered medications with your  provider and/or pharmacist. Follow medication instructions as directed by your provider and/or pharmacist. Please keep your medication list with you and share with your provider. Update the information when medications are discontinued, doses are changed, or new medications (including over-the-counter products) are added; and carry medication information at all times in the event of emergency situations     Allergies:  MACROBID - Hives     PCN - Hives               Medications  Valid as of: June 28, 2017 - 10:06 AM    Generic Name Brand Name Tablet Size Instructions for use    Aspirin (Chew Tab) ASA 81 MG Take 81 mg by mouth every day.          Atorvastatin Calcium (Tab) LIPITOR 20 MG Take 1 Tab by mouth every day.        Blood Glucose Monitoring Suppl (Misc) Blood Glucose Monitoring Suppl SUPPLIES Test strips order: Test strips for Abbott Freestyle Lite meter. Sig: use BID and prn ssx high or low sugar #100 RF x 3        Canagliflozin (Tab) Canagliflozin 300 MG Take 300 mg by mouth every day.        Cetirizine HCl (Tab) ZYRTEC 10 MG Take 1 Tab by mouth every day.        Colesevelam HCl (Tab) WELCHOL 625 MG TAKE ONE TABLET BY MOUTH IN THE MORNING AND THEN TAKE TWO TABLETS IN THE EVENING WITH DINNER        Exenatide (Pen-injector) Exenatide ER 2 MG Inject 2 mg as instructed every 7 days.        Fluticasone Propionate (Suspension) FLONASE 50 MCG/ACT Spray 2 Sprays in nose every day.        Glimepiride (Tab) AMARYL 4 MG Take 1 Tab by mouth every morning.        Glucose Blood (Strip) glucose blood  Use twice a day and as needed signs and symptoms of high or low blood sugar.        Glucose Blood (Strip) FREESTYLE LITE  USE ONE STRIP TWICE DAILY AND AS NEEDED FOR SIGNS AND SYMPTOM OF HIGH OR LOW BLOOD SUGAR        Lancets (Misc) Lancets  Lancets order: Lancets for Abbott Freestyle Lite meter. Sig: use BID and prn ssx high or low sugar. #100 RF x 3        Lisinopril (Tab) PRINIVIL 5 MG TAKE ONE TABLET BY MOUTH AT BEDTIME           Lisinopril (Tab) PRINIVIL 5 MG Take 1 Tab by mouth every bedtime.        Meloxicam (Tab) MOBIC 15 MG Take 1 Tab by mouth every day.        MetFORMIN HCl (Tab) GLUCOPHAGE 1000 MG TAKE 1/2 TABLET BY MOUTH IN THE MORNING AND ONE TABLET AT NIGHT        Mupirocin Calcium (Cream) BACTROBAN 2 % Apply to affected area(s) 2 times a day.        Potassium Citrate   Take 2 Tabs by mouth 3 times a day.        Trolamine Salicylate (Cream) ASPERCREME or MYOFLEX 10 % Apply 1 Squirt to affected area(s) 4 times a day.        .                 Medicines prescribed today were sent to:     [facility] [address] [place] [address] (), [place] [address]    [address] () [place] [address]    Phone: [phone] Fax: [phone]    Open 24 Hours?: No      Medication refill instructions:       If your prescription bottle indicates you have medication refills left, it is not necessary to call your provider’s office. Please contact your pharmacy and they will refill your medication.    If your prescription bottle indicates you do not have any refills left, you may request refills at any time through one of the following ways: The online [employer] system (except Urgent Care), by calling your provider’s office, or by asking your pharmacy to contact your provider’s office with a refill request. Medication refills are processed only during regular business hours and may not be available until the next business day. Your provider may request additional information or to have a follow-up visit with you prior to refilling your medication.   *Please Note: Medication refills are assigned a new Rx number when refilled electronically. Your pharmacy may indicate that no refills were authorized even though a new prescription for the same medication is available at the pharmacy. Please request the medicine by name with the pharmacy before contacting your provider for a refill.        Instructions    Have encouraged the patient to rest after the  acupuncture session today - taking naps or going to sleep early as necessary.  Increase intake of water and refrain from strenuous activities.  Patient may expect to feel transient worsening of symptoms, but this should resolve to benefit in the next day or two after treatment.    The side effects of Acupuncture needle insertion include: minor bruising, bleeding, or pain at the site of needle insertion.  If more worrisome symptoms, such as continued bleeding, severe bruising, or continue pain or altered sensation persist, please contact [facility] office @ [phone]           [employer] Software Access Code: Activation code not generated  Current [employer] Software Status: Active

## 2017-06-29 ENCOUNTER — OFFICE VISIT (OUTPATIENT)
Dept: ENDOCRINOLOGY | Facility: MEDICAL CENTER | Age: 62
End: 2017-06-29
Payer: COMMERCIAL

## 2017-06-29 VITALS
WEIGHT: 202 LBS | HEIGHT: 68 IN | SYSTOLIC BLOOD PRESSURE: 98 MMHG | OXYGEN SATURATION: 93 % | BODY MASS INDEX: 30.62 KG/M2 | DIASTOLIC BLOOD PRESSURE: 62 MMHG | HEART RATE: 96 BPM

## 2017-06-29 DIAGNOSIS — E78.2 MIXED HYPERLIPIDEMIA: ICD-10-CM

## 2017-06-29 DIAGNOSIS — E11.29 TYPE 2 DIABETES MELLITUS WITH OTHER DIABETIC KIDNEY COMPLICATION (HCC): ICD-10-CM

## 2017-06-29 DIAGNOSIS — I10 ESSENTIAL HYPERTENSION: ICD-10-CM

## 2017-06-29 PROCEDURE — 99214 OFFICE O/P EST MOD 30 MIN: CPT | Performed by: INTERNAL MEDICINE

## 2017-06-29 NOTE — PROGRESS NOTES
Patient with existing type diabetes:  Type 2 diabetes here for follow up.      Patient's health status since last visit: feels good, except had a kidney stone last week.   Issues with diabetes since last visit none.   Current Diabetes Medications: Bydureon 2mg per week, Metformin 500 mg in the morning and 1000 mg in the evening, Glimepiride 4 mg daily and Invokana 300 mg daily    HbA1c: @hba1c@  Lab Results   Component Value Date/Time    GLYCOHEMOGLOBIN 7.1* 06/21/2017 08:01 AM         FSBS  Testing: testing her blood sugars two times a week.     Hypoglycemia: none.   Exercise: walking.     Retinal Exam:current.     Daily Foot Exam: checks, denies problems.   Foot Exam:  Monofilament: done  Monofilament testing with a 10 gram force: sensation intact: intact bilaterally  Visual Inspection: Feet without maceration, ulcers, fissures.  Pedal pulses: intact bilaterally      Routine Dental Exams: current.   Flu vaccine: current.   Pneumonia vaccine current.

## 2017-06-29 NOTE — PROGRESS NOTES
Endocrinology Clinic Progress Note  PCP: Rogers Mahmood M.D.    CC: Diabetes    HPI:  Shirley Unger is a 62 y.o. old patient who comes in today for routine follow up.     Type 2 diabetes: She is currently on bydureon and 2 mg once a week, metformin 1500 mg per day, glimepiride 4 mg every morning and Invokana 300 mg daily. She reports compliance with medications. She checks blood sugars a few times a week, most readings are below 150. No hypoglycemia.    Hyperlipidemia: She is currently on Lipitor, tolerating well.    Hypertension: Blood pressure is well controlled. She is on ACE inhibitor.    ROS:  Constitutional: No unintentional weight loss  Endo: Denies excessive thirst or frequent urination    Past Medical History:  Patient Active Problem List    Diagnosis Date Noted   • Arthralgia of left temporomandibular joint 05/17/2017   • Seasonal allergic rhinitis due to pollen 03/07/2017   • Herpes dermatitis 11/29/2016   • Primary osteoarthritis of left hip 07/07/2016   • Trigger finger of right thumb 07/07/2016   • Colon polyps 05/16/2016   • Type 2 diabetes mellitus with other diabetic kidney complication (CMS-HCC) 08/04/2015   • Albuminuria 12/09/2014   • Vitamin D deficiency disease 07/08/2013   • Dyslipidemia    • Recurrent nephrolithiasis 03/22/2013       Medications:    Current outpatient prescriptions:   •  lisinopril (PRINIVIL) 5 MG Tab, TAKE ONE TABLET BY MOUTH AT BEDTIME, Disp: 90 Tab, Rfl: 1  •  Exenatide ER (BYDUREON) 2 MG Pen-injector, Inject 2 mg as instructed every 7 days., Disp: 13 Each, Rfl: 0  •  meloxicam (MOBIC) 15 MG tablet, Take 1 Tab by mouth every day., Disp: 30 Tab, Rfl: 0  •  FREESTYLE LITE strip, USE ONE STRIP TWICE DAILY AND AS NEEDED FOR SIGNS AND SYMPTOM OF HIGH OR LOW BLOOD SUGAR, Disp: 100 Strip, Rfl: 0  •  metformin (GLUCOPHAGE) 1000 MG tablet, TAKE 1/2 TABLET BY MOUTH IN THE MORNING AND ONE TABLET AT NIGHT, Disp: 180 Tab, Rfl: 3  •  glimepiride (AMARYL) 4 MG Tab, Take 1 Tab by mouth every  morning., Disp: 90 Tab, Rfl: 3  •  atorvastatin (LIPITOR) 20 MG Tab, Take 1 Tab by mouth every day., Disp: 90 Tab, Rfl: 3  •  Canagliflozin (INVOKANA) 300 MG Tab, Take 300 mg by mouth every day., Disp: 90 Tab, Rfl: 3  •  colesevelam (WELCHOL) 625 MG Tab, TAKE ONE TABLET BY MOUTH IN THE MORNING AND THEN TAKE TWO TABLETS IN THE EVENING WITH DINNER, Disp: 270 Tab, Rfl: 3  •  Lancets MISC, Lancets order: Lancets for Abbott Freestyle Lite meter. Sig: use BID and prn ssx high or low sugar. #100 RF x 3, Disp: 100 Each, Rfl: 3  •  Blood Glucose Monitoring Suppl SUPPLIES MISC, Test strips order: Test strips for Abbott Freestyle Lite meter. Sig: use BID and prn ssx high or low sugar #100 RF x 3, Disp: 100 Each, Rfl: 3  •  aspirin (ASA) 81 MG CHEW, Take 81 mg by mouth every day.  , Disp: , Rfl:   •  POTASSIUM CITRATE PO, Take 2 Tabs by mouth 3 times a day., Disp: , Rfl:   •  lisinopril (PRINIVIL) 5 MG Tab, Take 1 Tab by mouth every bedtime., Disp: 90 Tab, Rfl: 1  •  cetirizine (ZYRTEC) 10 MG Tab, Take 1 Tab by mouth every day., Disp: 30 Tab, Rfl: 3  •  fluticasone (FLONASE) 50 MCG/ACT nasal spray, Spray 2 Sprays in nose every day., Disp: 16 g, Rfl: 0  •  trolamine salicylate (ASPERCREME/ALOE) 10 % cream, Apply 1 Squirt to affected area(s) 4 times a day., Disp: 1 Tube, Rfl: 3  •  glucose blood (FREESTYLE LITE) strip, Use twice a day and as needed signs and symptoms of high or low blood sugar., Disp: 100 Strip, Rfl: 3  •  mupirocin calcium (BACTROBAN) 2 % CREA, Apply to affected area(s) 2 times a day., Disp: 15 g, Rfl: 2    Labs:   Results for JIANKACEY ADALBERTO (MRN [id number]) as of 6/29/2017 09:38   Ref. Range 6/21/2017 08:01 6/21/2017 08:02 6/21/2017 08:06   Sodium Latest Ref Range: 135-145 mmol/L 138  138   Potassium Latest Ref Range: 3.6-5.5 mmol/L 4.2  4.1   Chloride Latest Ref Range:  mmol/L 103  103   Co2 Latest Ref Range: 20-33 mmol/L 29  29   Anion Gap Latest Ref Range: 0.0-11.9  6.0  6.0   Glucose Latest Ref Range:  "65-99 mg/dL 136 (H)  134 (H)   Bun Latest Ref Range: 8-22 mg/dL 18  18   Creatinine Latest Ref Range: 0.50-1.40 mg/dL 0.67  0.70   GFR If  Latest Ref Range: >60 mL/min/1.73 m 2 >60  >60   GFR If Non  Latest Ref Range: >60 mL/min/1.73 m 2 >60  >60   Calcium Latest Ref Range: 8.5-10.5 mg/dL 9.3  9.4   AST(SGOT) Latest Ref Range: 12-45 U/L   11 (L)   ALT(SGPT) Latest Ref Range: 2-50 U/L   11   Alkaline Phosphatase Latest Ref Range: 30-99 U/L   54   Total Bilirubin Latest Ref Range: 0.1-1.5 mg/dL   0.6   Albumin Latest Ref Range: 3.2-4.9 g/dL   4.2   Total Protein Latest Ref Range: 6.0-8.2 g/dL   6.9   Globulin Latest Ref Range: 1.9-3.5 g/dL   2.7   A-G Ratio Latest Units: g/dL   1.6   Glycohemoglobin Latest Ref Range: 0.0-5.6 % 7.1 (H)     Estim. Avg Glu Latest Units: mg/dL 157     Cholesterol,Tot Latest Ref Range: 100-199 mg/dL 126     Triglycerides Latest Ref Range: 0-149 mg/dL 208 (H)     HDL Latest Ref Range: >=40 mg/dL 38 (A)     LDL Latest Ref Range: <100 mg/dL 46     Micro Alb Creat Ratio Latest Ref Range: 0-30 mg/g  see below    Creatinine, Urine Latest Units: mg/dL  54.60    Microalbumin, Urine Random Latest Units: mg/dL  <0.7    25-Hydroxy   Vitamin D 25 Latest Ref Range:  ng/mL   38   TSH Latest Ref Range: 0.300-3.700 uIU/mL 0.830       Physical Examination:  Vital signs: BP 98/62 mmHg  Pulse 96  Ht 1.727 m (5' 8\")  Wt 91.627 kg (202 lb)  BMI 30.72 kg/m2  SpO2 93%  LMP 11/29/2004  General: No apparent distress, cooperative  Eyes: No scleral icterus, no discharge, normal eyelids  Neck: No abnormal masses on inspection   Resp: Normal effort, clear to auscultation bilaterally  CVS: Regular rate and rhythm, S1 S2 normal, no murmur  Extremities: No lower extremity edema  Musculoskeletal: Normal gait  Skin: No rash on visible skin  Psych: Alert and oriented, normal mood and affect    Assessment and Plan:    1. Type 2 diabetes mellitus with other diabetic kidney " complication (CMS-HCC)  · Hemoglobin A1c earlier this month was 7.1%  · Goal hemoglobin A1c less than 7%  · No changes to medications today, continue bydureon, metformin, glimepiride, Invokana  - Diabetic Monofilament LE Exam    2. Mixed hyperlipidemia  · LDL 46  · Continue Lipitor    3. Essential hypertension  · Blood pressure is well controlled  · Continue ACE inhibitor    Return in about 3 months (around 9/29/2017).    Thank you for allowing me to participate in the care of this patient.    Amy Jain M.D.    CC:   Rogers Mahmood M.D.    This note was created using voice recognition software (Dragon). The accuracy of the dictation is limited by the abilities of the software. I have reviewed the note prior to signing, however some errors in grammar and context are still possible. If you have any questions related to this note please do not hesitate to contact our office.

## 2017-06-29 NOTE — MR AVS SNAPSHOT
"        Shirley Unger   2017 9:00 AM   Office Visit   MRN: [id number]    Department:  Endocrinology Med [facility]   Dept Phone:  [phone]    Description:  Female : 1955   Provider:  Bindu Gill R.N.; Amy Jain M.D.           Reason for Visit     Follow-Up Diabetes      Allergies as of 2017     Allergen Noted Reactions    Macrobid [Nitrofurantoin Monohydrate Macrocrystals] 10/17/2012   Hives    Pcn [Penicillins] 10/17/2012   Hives      You were diagnosed with     Type 2 diabetes mellitus with other diabetic kidney complication (CMS-[facility])   [7294508]         Vital Signs     Blood Pressure Pulse Height Weight Body Mass Index Oxygen Saturation    98/62 mmHg 96 1.727 m (5' 8\") 91.627 kg (202 lb) 30.72 kg/m2 93%    Last Menstrual Period Smoking Status                2004 Never Smoker           Basic Information     Date Of Birth Sex Race Ethnicity Preferred Language    1955 Female White Non- English      Your appointments     2017  8:30 AM   MA SCRN10 with [facility] MG 1   [facility] ([address])    [address]  [facility] [address] [phone]           No deodorant, powder, perfume or lotion under the arm or breast area.            Aug 03, 2017  1:30 PM   ACUPUNCTURE 30 with Luis Miguel Hirsch D.O.   [facility] (--)    [address] [facility].  [facility] [address] [phone]            Oct 19, 2017  9:00 AM   Diabetes Care Visit with Amy Jain M.D., Bindu Gill R.N.   [facility])    [address] [facility], [address]  [facility] [address] [phone]           You will be receiving a confirmation call a few days before your appointment from our automated call confirmation system.              Problem List              ICD-10-CM Priority Class Noted - Resolved    Dyslipidemia E78.5   Unknown - Present    Recurrent nephrolithiasis N20.0   3/22/2013 - " Present    Vitamin D deficiency disease E55.9   7/8/2013 - Present    Albuminuria R80.9   12/9/2014 - Present    Type 2 diabetes mellitus with other diabetic kidney complication (CMS-HCC) E11.29   8/4/2015 - Present    Colon polyps K63.5   5/16/2016 - Present    Primary osteoarthritis of left hip M16.12   7/7/2016 - Present    Trigger finger of right thumb M65.311   7/7/2016 - Present    Herpes dermatitis B00.89   11/29/2016 - Present    Seasonal allergic rhinitis due to pollen J30.1   3/7/2017 - Present    Arthralgia of left temporomandibular joint M26.622   5/17/2017 - Present      Health Maintenance        Date Due Completion Dates    RETINAL SCREENING 8/16/2017 8/16/2016, 8/16/2016 (Done), 7/22/2014, 6/19/2013    Override on 8/16/2016: Done    MAMMOGRAM 12/15/2017 12/15/2016, 7/5/2016, 7/1/2015, 5/27/2014, 4/11/2013, 12/17/2008, 12/17/2008, 11/2/2007, 11/2/2007, 10/19/2006, 10/18/2005, 9/24/2004    A1C SCREENING 12/21/2017 6/21/2017, 3/9/2017, 11/10/2016, 9/1/2016, 4/23/2016, 12/10/2015, 6/11/2015, 3/21/2015, 12/11/2014, 9/27/2014, 7/19/2014, 3/22/2014, 2/15/2014, 11/16/2013, 8/17/2013, 3/2/2013    FASTING LIPID PROFILE 6/21/2018 6/21/2017, 4/23/2016, 7/19/2014, 8/17/2013    URINE ACR / MICROALBUMIN 6/21/2018 6/21/2017, 4/23/2016, 6/20/2015, 7/19/2014, 8/17/2013    SERUM CREATININE 6/21/2018 6/21/2017, 6/21/2017, 4/23/2016, 3/21/2015, 7/19/2014, 3/22/2014, 2/18/2014, 11/16/2013, 8/17/2013, 4/7/2005    DIABETES MONOFILAMENT / LE EXAM 6/29/2018 6/29/2017, 4/22/2016 (Done), 12/10/2015 (Done), 6/18/2015 (Done), 7/29/2014 (Done), 7/8/2013, 7/8/2013 (Done)    Override on 4/22/2016: Done    Override on 12/10/2015: Done    Override on 6/18/2015: Done    Override on 7/29/2014: Done    Override on 7/8/2013: Done    COLONOSCOPY 5/16/2021 5/16/2016    IMM DTaP/Tdap/Td Vaccine (2 - Td) 9/1/2022 9/1/2012            Current Immunizations     Influenza TIV (IM) 10/10/2013    Influenza Vaccine Quad Inj (Pf) 11/2/2016 11:13 AM,  10/9/2014    Influenza Vaccine Quad Inj (Preserved) 10/13/2015    Pneumococcal polysaccharide vaccine (PPSV-23) 1/1/2005    SHINGLES VACCINE 3/28/2014    Tdap Vaccine 9/1/2012      Below and/or attached are the medications your provider expects you to take. Review all of your home medications and newly ordered medications with your provider and/or pharmacist. Follow medication instructions as directed by your provider and/or pharmacist. Please keep your medication list with you and share with your provider. Update the information when medications are discontinued, doses are changed, or new medications (including over-the-counter products) are added; and carry medication information at all times in the event of emergency situations     Allergies:  MACROBID - Hives     PCN - Hives               Medications  Valid as of: June 29, 2017 -  9:32 AM    Generic Name Brand Name Tablet Size Instructions for use    Aspirin (Chew Tab) ASA 81 MG Take 81 mg by mouth every day.          Atorvastatin Calcium (Tab) LIPITOR 20 MG Take 1 Tab by mouth every day.        Blood Glucose Monitoring Suppl (Misc) Blood Glucose Monitoring Suppl SUPPLIES Test strips order: Test strips for Abbott Freestyle Lite meter. Sig: use BID and prn ssx high or low sugar #100 RF x 3        Canagliflozin (Tab) Canagliflozin 300 MG Take 300 mg by mouth every day.        Cetirizine HCl (Tab) ZYRTEC 10 MG Take 1 Tab by mouth every day.        Colesevelam HCl (Tab) WELCHOL 625 MG TAKE ONE TABLET BY MOUTH IN THE MORNING AND THEN TAKE TWO TABLETS IN THE EVENING WITH DINNER        Exenatide (Pen-injector) Exenatide ER 2 MG Inject 2 mg as instructed every 7 days.        Fluticasone Propionate (Suspension) FLONASE 50 MCG/ACT Spray 2 Sprays in nose every day.        Glimepiride (Tab) AMARYL 4 MG Take 1 Tab by mouth every morning.        Glucose Blood (Strip) glucose blood  Use twice a day and as needed signs and symptoms of high or low blood sugar.        Glucose  Blood (Strip) FREESTYLE LITE  USE ONE STRIP TWICE DAILY AND AS NEEDED FOR SIGNS AND SYMPTOM OF HIGH OR LOW BLOOD SUGAR        Lancets (Misc) Lancets  Lancets order: Lancets for Abbott Freestyle Lite meter. Sig: use BID and prn ssx high or low sugar. #100 RF x 3        Lisinopril (Tab) PRINIVIL 5 MG TAKE ONE TABLET BY MOUTH AT BEDTIME        Lisinopril (Tab) PRINIVIL 5 MG Take 1 Tab by mouth every bedtime.        Meloxicam (Tab) MOBIC 15 MG Take 1 Tab by mouth every day.        MetFORMIN HCl (Tab) GLUCOPHAGE 1000 MG TAKE 1/2 TABLET BY MOUTH IN THE MORNING AND ONE TABLET AT NIGHT        Mupirocin Calcium (Cream) BACTROBAN 2 % Apply to affected area(s) 2 times a day.        Potassium Citrate   Take 2 Tabs by mouth 3 times a day.        Trolamine Salicylate (Cream) ASPERCREME or MYOFLEX 10 % Apply 1 Squirt to affected area(s) 4 times a day.        .                 Medicines prescribed today were sent to:     [facility] [address] [place] [address]), [place]  [address]) [place] [address]    Phone: [phone] Fax: [phone]    Open 24 Hours?: No      Medication refill instructions:       If your prescription bottle indicates you have medication refills left, it is not necessary to call your provider’s office. Please contact your pharmacy and they will refill your medication.    If your prescription bottle indicates you do not have any refills left, you may request refills at any time through one of the following ways: The online [facility] system (except Urgent Care), by calling your provider’s office, or by asking your pharmacy to contact your provider’s office with a refill request. Medication refills are processed only during regular business hours and may not be available until the next business day. Your provider may request additional information or to have a follow-up visit with you prior to refilling your medication.   *Please Note: Medication refills are assigned a new Rx number when  refilled electronically. Your pharmacy may indicate that no refills were authorized even though a new prescription for the same medication is available at the pharmacy. Please request the medicine by name with the pharmacy before contacting your provider for a refill.           [employer] Access Code: Activation code not generated  Current Creative[employer] Status: Active

## 2017-07-14 RX ORDER — EXENATIDE 2 MG/.65ML
INJECTION, SUSPENSION, EXTENDED RELEASE SUBCUTANEOUS
Qty: 12 EACH | Refills: 3 | Status: SHIPPED | OUTPATIENT
Start: 2017-07-14 | End: 2018-06-27 | Stop reason: SDUPTHER

## 2017-07-17 ENCOUNTER — HOSPITAL ENCOUNTER (OUTPATIENT)
Dept: RADIOLOGY | Facility: MEDICAL CENTER | Age: 62
End: 2017-07-17
Attending: INTERNAL MEDICINE
Payer: COMMERCIAL

## 2017-07-17 DIAGNOSIS — Z30.09 SCREENING AND EVALUATION FOR FEMALE STERILIZATION: ICD-10-CM

## 2017-07-17 PROCEDURE — 77063 BREAST TOMOSYNTHESIS BI: CPT

## 2017-07-30 ENCOUNTER — PATIENT OUTREACH (OUTPATIENT)
Dept: HEALTH INFORMATION MANAGEMENT | Facility: OTHER | Age: 62
End: 2017-07-30

## 2017-07-30 NOTE — Clinical Note
July 30, 2017        Shirley Unger  [address]  [place] [address]        Dear Shirley:    I am sorry we missed you with our calls this week.  I wanted to let you know about [facility] Health Care Coordination Services that are available to you at no cost to you.  I have enclosed a brochure and left you a message on your voicemail.      Care Coordination is here to help you with health and wellness.  Services we offer are    · Scheduling and coordination of doctor visits  · Pharmacy review for costs and S/Es  · Disease specific Education  · Social Determinants Review (Food, housing, community programs)  · Health and Wellness Programs connection  · Remote Health Monitoring for Some Health Impairments  • Longitudinal Plan of Care to assist you in success to be your healthiest you across the healthcare continuum    We look forward to hearing from you if we can help or you have questions.      If you have any questions or concerns, please don't hesitate to call.        Sincerely,        OLIVIA Sarkar, RN, CCM, CHPN  Manager of Care Coordination Services  [phone]    Enc

## 2017-07-30 NOTE — PROGRESS NOTES
[facility] Health and Wellness Outreach to members of the health plan:    Unable to reach member this last week.  Sending letter and brochure on services.    · Scheduling and coordination of doctor visits  · Pharmacy review for costs and S/Es  · Disease specific Education  · Social Determinants Review (Food, housing, community programs)  · Health and Wellness Programs connection  · Remote Health Monitoring for Some Health Impairments  • Longitudinal Plan of Care to assist you in success to be your healthiest you across the healthcare continuum    Note Diabetes and CC does education and RHM with diabetes.  Pt appears to have f/u in place and up coming procedure outpatient.  Mailing info in case she determines she would like f/u.

## 2017-07-31 ENCOUNTER — HOSPITAL ENCOUNTER (OUTPATIENT)
Dept: LAB | Facility: MEDICAL CENTER | Age: 62
End: 2017-07-31
Payer: COMMERCIAL

## 2017-07-31 LAB
BDY FAT % MEASURED: 40 %
BP DIAS: 61 MMHG
BP SYS: 102 MMHG
CHOLEST SERPL-MCNC: 127 MG/DL (ref 100–199)
DIABETES HTDIA: YES
EST. AVERAGE GLUCOSE BLD GHB EST-MCNC: 157 MG/DL
EVENT NAME HTEVT: NORMAL
FASTING HTFAS: YES
GLUCOSE SERPL-MCNC: 112 MG/DL (ref 65–99)
HBA1C MFR BLD: 7.1 % (ref 0–5.6)
HDLC SERPL-MCNC: 41 MG/DL
HYPERTENSION HTHYP: NO
LDLC SERPL CALC-MCNC: 44 MG/DL
SCREENING LOC CITY HTCIT: NORMAL
SCREENING LOC STATE HTSTA: NORMAL
SCREENING LOCATION HTLOC: NORMAL
SMOKING HTSMO: NO
SUBSCRIBER ID HTSID: NORMAL
TRIGL SERPL-MCNC: 210 MG/DL (ref 0–149)

## 2017-07-31 PROCEDURE — 36415 COLL VENOUS BLD VENIPUNCTURE: CPT

## 2017-07-31 PROCEDURE — 80061 LIPID PANEL: CPT

## 2017-07-31 PROCEDURE — 83036 HEMOGLOBIN GLYCOSYLATED A1C: CPT

## 2017-07-31 PROCEDURE — 82947 ASSAY GLUCOSE BLOOD QUANT: CPT

## 2017-07-31 PROCEDURE — S5190 WELLNESS ASSESSMENT BY NONPH: HCPCS

## 2017-08-03 ENCOUNTER — OFFICE VISIT (OUTPATIENT)
Dept: OTHER | Facility: IMAGING CENTER | Age: 62
End: 2017-08-03
Payer: COMMERCIAL

## 2017-08-03 DIAGNOSIS — J30.1 SEASONAL ALLERGIC RHINITIS DUE TO POLLEN: ICD-10-CM

## 2017-08-03 DIAGNOSIS — M16.12 PRIMARY OSTEOARTHRITIS OF LEFT HIP: ICD-10-CM

## 2017-08-03 PROBLEM — M26.622 ARTHRALGIA OF LEFT TEMPOROMANDIBULAR JOINT: Status: RESOLVED | Noted: 2017-05-17 | Resolved: 2017-08-03

## 2017-08-03 PROCEDURE — 97811 ACUP 1/> W/O ESTIM EA ADD 15: CPT | Performed by: FAMILY MEDICINE

## 2017-08-03 PROCEDURE — 99213 OFFICE O/P EST LOW 20 MIN: CPT | Mod: 25 | Performed by: FAMILY MEDICINE

## 2017-08-03 PROCEDURE — 97813 ACUP 1/> W/ESTIM 1ST 15 MIN: CPT | Performed by: FAMILY MEDICINE

## 2017-08-03 NOTE — PATIENT INSTRUCTIONS
Have encouraged the patient to rest after the acupuncture session today - taking naps or going to sleep early as necessary.  Increase intake of water and refrain from strenuous activities.  Patient may expect to feel transient worsening of symptoms, but this should resolve to benefit in the next day or two after treatment.    The side effects of Acupuncture needle insertion include: minor bruising, bleeding, or pain at the site of needle insertion.  If more worrisome symptoms, such as continued bleeding, severe bruising, or continue pain or altered sensation persist, please contact [facility] office @ [phone]

## 2017-08-03 NOTE — PROGRESS NOTES
[facility] Acupuncture Progress Note  [address] [place] [address]  Dept: [phone]      Patient Name: Shirley Unger   MRN: [id number]  YOB: 1955  PCP: Cristofer Tena M.D.  Date of Service: 8/3/2017  1:36 PM    CC L hip pain   HPI Patient is a 60 yo  female with chronic mild left hip intermittent pain that was deep and sharp.  Happens to be exacerbated when she hyperflex her hip and prohibits her from tieing her shoe and to some extend also performing certain daily activity.  She also has multiple trigger fingers which hurts more on the left.  Since last tx, her left hip hasn't had sharp catching sensation anymore and when the tightness comes back, it has been improved significantly with no pain for 4 weeks post treatment.  There was an episode of passing kidney stone 6 weeks ago and the right lower rib tenderness has gone.  The quality of pain is also described as deep ache sensation.  Her bowel also gets irritated in the last month that she would have intermittent loose stool followed by some constipation.  Not affected by change of diet.   ROS Birthplace:[place], [place] 7:30 AM  Color: Red  Season: Fall  Right-handed  Scars: right palm below ring finger cyst remain scar that is painful at times.   PMH Past Medical History   Diagnosis Date   • DM (diabetes mellitus) (CMS-HCC)    • Herpes genitalia 3/22/2013   • Nephrolithiasis 3/22/2013     Recurrent Dr Monge - Calcium stones,    • Hyperlipidemia LDL goal < 100    • Vitamin d deficiency 2013     Past Surgical History   Procedure Laterality Date   • Hysterectomy, total abdominal       For non cancerous reasons   • Cholecystectomy     • Carpal tunnel release       Right hand   • Pr  delivery only     • Cystoscopy stent placement       several, stent removed in MD office   • Ganglion excision  2014     Performed by Efra Ramirez M.D. at SURGERY [facility]  Social History     Social History   • Marital Status:      Spouse Name: N/A   • Number of Children: N/A   • Years of Education: N/A     Occupational History   •  [employer]     Social History Main Topics   • Smoking status: Never Smoker    • Smokeless tobacco: Never Used   • Alcohol Use: No      Comment: Rare Useage   • Drug Use: No   • Sexual Activity:     Partners: Male     Other Topics Concern   • None     Social History Narrative      MEDS Current Outpatient Prescriptions on File Prior to Visit   Medication Sig Dispense Refill   • BYDUREON 2 MG Pen-injector INJECT 2MG(1 PEN) SUBCUTANEOUSLY AS INSTRUCTED EVERY 7 DAYS 12 Each 3   • lisinopril (PRINIVIL) 5 MG Tab Take 1 Tab by mouth every bedtime. 90 Tab 1   • lisinopril (PRINIVIL) 5 MG Tab TAKE ONE TABLET BY MOUTH AT BEDTIME 90 Tab 1   • meloxicam (MOBIC) 15 MG tablet Take 1 Tab by mouth every day. 30 Tab 0   • cetirizine (ZYRTEC) 10 MG Tab Take 1 Tab by mouth every day. 30 Tab 3   • fluticasone (FLONASE) 50 MCG/ACT nasal spray Spray 2 Sprays in nose every day. 16 g 0   • FREESTYLE LITE strip USE ONE STRIP TWICE DAILY AND AS NEEDED FOR SIGNS AND SYMPTOM OF HIGH OR LOW BLOOD SUGAR 100 Strip 0   • metformin (GLUCOPHAGE) 1000 MG tablet TAKE 1/2 TABLET BY MOUTH IN THE MORNING AND ONE TABLET AT NIGHT 180 Tab 3   • glimepiride (AMARYL) 4 MG Tab Take 1 Tab by mouth every morning. 90 Tab 3   • atorvastatin (LIPITOR) 20 MG Tab Take 1 Tab by mouth every day. 90 Tab 3   • Canagliflozin (INVOKANA) 300 MG Tab Take 300 mg by mouth every day. 90 Tab 3   • colesevelam (WELCHOL) 625 MG Tab TAKE ONE TABLET BY MOUTH IN THE MORNING AND THEN TAKE TWO TABLETS IN THE EVENING WITH DINNER 270 Tab 3   • trolamine salicylate (ASPERCREME/ALOE) 10 % cream Apply 1 Squirt to affected area(s) 4 times a day. 1 Tube 3   • glucose blood (FREESTYLE LITE) strip Use twice a day and as needed signs and symptoms of high or low blood sugar. 100 Strip 3   • Lancets MISC Lancets  "order: Lancets for Abbott Freestyle Lite meter. Sig: use BID and prn ssx high or low sugar. #100 RF x 3 100 Each 3   • mupirocin calcium (BACTROBAN) 2 % CREA Apply to affected area(s) 2 times a day. 15 g 2   • Blood Glucose Monitoring Suppl SUPPLIES MISC Test strips order: Test strips for Abbott Freestyle Lite meter. Sig: use BID and prn ssx high or low sugar #100 RF x 3 100 Each 3   • aspirin (ASA) 81 MG CHEW Take 81 mg by mouth every day.       • POTASSIUM CITRATE PO Take 2 Tabs by mouth 3 times a day.       No current facility-administered medications on file prior to visit.      ALLERGIES Allergies   Allergen Reactions   • Macrobid [Nitrofurantoin Monohydrate Macrocrystals] Hives   • Pcn [Penicillins] Hives      PE Deonna Exam: Stomach Qi: (+) pecking radial pulse  (+) Oketsu, (+) Immune,   (L) Adrenal - B/LKid16 St9 DaiMai ASIS Kid2       Assessment Eastern Liver/blood stagnation, Stomach qi imbalance, Immune imbalance, Adrenal exhaustion.    Western Encounter Diagnoses   Name Primary?   • Primary osteoarthritis of left hip    • Seasonal allergic rhinitis due to pollen                   Plan Set 1: Left (Lv4, Lu5), B/L LI10-11 area,   Set 2: L (Tw9 + Gb40), L Kd 5, 7, 10, 27  Set 3: L (LI 4.5 --> 10, KD 7 --> KD 10, SP 8, LR 4 ++> LR 8), R (REHAN 7--> 10, ST 36, GB 36 ++> GB 34, BL 59 --> BL 40, BL 62)  Set 4: L Mushtaq bill,  Maria Guadalupe valadez.     Total face to face time was 20 minutes with more than 15 minutes were spent discussing with the patient about her condition which did not include procedure time. >50% of the face to face time was spent in counseling and coordination. Topics discussed included:   Hip hot compression with warm towel and avoid food type would exacerbated her hip pain.  Her emotional improvement with increased level of activity for the ambulation.  Discussed antiinflammatory diet and information on \"how not to die\" was talked over with her questions answered.  Her weight management will " be discussed further during next visit.  Total acupuncture treatment time = 45 minutes

## 2017-08-03 NOTE — MR AVS SNAPSHOT
Shirley Unger   8/3/2017 1:30 PM   Office Visit   MRN: [id number]    Department:  Acupuncture Med   Dept Phone:  [phone]    Description:  Female : 1955   Provider:  Luis Miguel Hirsch D.O.           Allergies as of 8/3/2017     Allergen Noted Reactions    Macrobid [Nitrofurantoin Monohydrate Macrocrystals] 10/17/2012   Hives    Pcn [Penicillins] 10/17/2012   Hives      You were diagnosed with     Primary osteoarthritis of left hip   [563912]         Vital Signs     Last Menstrual Period Smoking Status                2004 Never Smoker           Basic Information     Date Of Birth Sex Race Ethnicity Preferred Language    1955 Female White Non- English      Your appointments     Oct 19, 2017  9:00 AM   Diabetes Care Visit with Amy Jain M.D., Bindu Gill R.N.   [facility]    [address], [address]  [place] [address] [phone]           You will be receiving a confirmation call a few days before your appointment from our automated call confirmation system.              Problem List              ICD-10-CM Priority Class Noted - Resolved    Dyslipidemia E78.5   Unknown - Present    Recurrent nephrolithiasis N20.0   3/22/2013 - Present    Vitamin D deficiency disease E55.9   2013 - Present    Albuminuria R80.9   2014 - Present    Type 2 diabetes mellitus with other diabetic kidney complication (CMS-HCC) E11.29   2015 - Present    Colon polyps K63.5   2016 - Present    Primary osteoarthritis of left hip M16.12   2016 - Present    Trigger finger of right thumb M65.311   2016 - Present    Herpes dermatitis B00.89   2016 - Present    Seasonal allergic rhinitis due to pollen J30.1   3/7/2017 - Present      Health Maintenance        Date Due Completion Dates    RETINAL SCREENING 2017, 2016 (Done), 2014, 2013    Override on 2016: Done    IMM INFLUENZA (1) 2017  11/2/2016, 10/13/2015, 10/9/2014, 10/10/2013    A1C SCREENING 1/31/2018 7/31/2017, 6/21/2017, 3/9/2017, 11/10/2016, 9/1/2016, 4/23/2016, 12/10/2015, 6/11/2015, 3/21/2015, 12/11/2014, 9/27/2014, 7/19/2014, 3/22/2014, 2/15/2014, 11/16/2013, 8/17/2013, 3/2/2013    URINE ACR / MICROALBUMIN 6/21/2018 6/21/2017, 4/23/2016, 6/20/2015, 7/19/2014, 8/17/2013    SERUM CREATININE 6/21/2018 6/21/2017, 6/21/2017, 4/23/2016, 3/21/2015, 7/19/2014, 3/22/2014, 2/18/2014, 11/16/2013, 8/17/2013, 4/7/2005    DIABETES MONOFILAMENT / LE EXAM 6/29/2018 6/29/2017, 4/22/2016 (Done), 12/10/2015 (Done), 6/18/2015 (Done), 7/29/2014 (Done), 7/8/2013, 7/8/2013 (Done)    Override on 4/22/2016: Done    Override on 12/10/2015: Done    Override on 6/18/2015: Done    Override on 7/29/2014: Done    Override on 7/8/2013: Done    MAMMOGRAM 7/17/2018 7/17/2017, 12/15/2016, 7/5/2016, 7/1/2015, 5/27/2014, 4/11/2013, 12/17/2008, 12/17/2008, 11/2/2007, 11/2/2007, 10/19/2006, 10/18/2005, 9/24/2004    FASTING LIPID PROFILE 7/31/2018 7/31/2017, 6/21/2017, 9/1/2016, 4/23/2016, 6/11/2015, 9/27/2014, 7/19/2014, 2/15/2014, 8/17/2013, 3/2/2013    COLONOSCOPY 5/16/2021 5/16/2016    IMM DTaP/Tdap/Td Vaccine (2 - Td) 9/1/2022 9/1/2012            Current Immunizations     Influenza TIV (IM) 10/10/2013    Influenza Vaccine Quad Inj (Pf) 11/2/2016 11:13 AM, 10/9/2014    Influenza Vaccine Quad Inj (Preserved) 10/13/2015    Pneumococcal polysaccharide vaccine (PPSV-23) 1/1/2005    SHINGLES VACCINE 3/28/2014    Tdap Vaccine 9/1/2012      Below and/or attached are the medications your provider expects you to take. Review all of your home medications and newly ordered medications with your provider and/or pharmacist. Follow medication instructions as directed by your provider and/or pharmacist. Please keep your medication list with you and share with your provider. Update the information when medications are discontinued, doses are changed, or new medications (including  over-the-counter products) are added; and carry medication information at all times in the event of emergency situations     Allergies:  MACROBID - Hives     PCN - Hives               Medications  Valid as of: August 03, 2017 -  2:19 PM    Generic Name Brand Name Tablet Size Instructions for use    Aspirin (Chew Tab) ASA 81 MG Take 81 mg by mouth every day.          Atorvastatin Calcium (Tab) LIPITOR 20 MG Take 1 Tab by mouth every day.        Blood Glucose Monitoring Suppl (Misc) Blood Glucose Monitoring Suppl SUPPLIES Test strips order: Test strips for Abbott Freestyle Lite meter. Sig: use BID and prn ssx high or low sugar #100 RF x 3        Canagliflozin (Tab) Canagliflozin 300 MG Take 300 mg by mouth every day.        Cetirizine HCl (Tab) ZYRTEC 10 MG Take 1 Tab by mouth every day.        Colesevelam HCl (Tab) WELCHOL 625 MG TAKE ONE TABLET BY MOUTH IN THE MORNING AND THEN TAKE TWO TABLETS IN THE EVENING WITH DINNER        Exenatide (Pen-injector) BYDUREON 2 MG INJECT 2MG(1 PEN) SUBCUTANEOUSLY AS INSTRUCTED EVERY 7 DAYS        Fluticasone Propionate (Suspension) FLONASE 50 MCG/ACT Spray 2 Sprays in nose every day.        Glimepiride (Tab) AMARYL 4 MG Take 1 Tab by mouth every morning.        Glucose Blood (Strip) glucose blood  Use twice a day and as needed signs and symptoms of high or low blood sugar.        Glucose Blood (Strip) FREESTYLE LITE  USE ONE STRIP TWICE DAILY AND AS NEEDED FOR SIGNS AND SYMPTOM OF HIGH OR LOW BLOOD SUGAR        Lancets (Misc) Lancets  Lancets order: Lancets for Abbott Freestyle Lite meter. Sig: use BID and prn ssx high or low sugar. #100 RF x 3        Lisinopril (Tab) PRINIVIL 5 MG TAKE ONE TABLET BY MOUTH AT BEDTIME        Lisinopril (Tab) PRINIVIL 5 MG Take 1 Tab by mouth every bedtime.        Meloxicam (Tab) MOBIC 15 MG Take 1 Tab by mouth every day.        MetFORMIN HCl (Tab) GLUCOPHAGE 1000 MG TAKE 1/2 TABLET BY MOUTH IN THE MORNING AND ONE TABLET AT NIGHT        Mupirocin  Calcium (Cream) BACTROBAN 2 % Apply to affected area(s) 2 times a day.        Potassium Citrate   Take 2 Tabs by mouth 3 times a day.        Trolamine Salicylate (Cream) ASPERCREME or MYOFLEX 10 % Apply 1 Squirt to affected area(s) 4 times a day.        .                 Medicines prescribed today were sent to:     [facility] [address] [place] [address] (), [place] - [address]    [address] () [place] [address]    Phone: [phone] Fax: [phone]    Open 24 Hours?: No      Medication refill instructions:       If your prescription bottle indicates you have medication refills left, it is not necessary to call your provider’s office. Please contact your pharmacy and they will refill your medication.    If your prescription bottle indicates you do not have any refills left, you may request refills at any time through one of the following ways: The online Medmonk system (except Urgent Care), by calling your provider’s office, or by asking your pharmacy to contact your provider’s office with a refill request. Medication refills are processed only during regular business hours and may not be available until the next business day. Your provider may request additional information or to have a follow-up visit with you prior to refilling your medication.   *Please Note: Medication refills are assigned a new Rx number when refilled electronically. Your pharmacy may indicate that no refills were authorized even though a new prescription for the same medication is available at the pharmacy. Please request the medicine by name with the pharmacy before contacting your provider for a refill.           Medmonk Access Code: Activation code not generated  Current Medmonk Status: Active

## 2017-08-25 DIAGNOSIS — E11.29 TYPE 2 DIABETES MELLITUS WITH OTHER DIABETIC KIDNEY COMPLICATION (HCC): ICD-10-CM

## 2017-08-25 DIAGNOSIS — E78.5 DYSLIPIDEMIA: ICD-10-CM

## 2017-08-25 RX ORDER — COLESEVELAM 180 1/1
TABLET ORAL
Qty: 270 TAB | Refills: 3 | Status: SHIPPED | OUTPATIENT
Start: 2017-08-25 | End: 2018-09-03 | Stop reason: SDUPTHER

## 2017-08-25 RX ORDER — LISINOPRIL 5 MG/1
TABLET ORAL
Qty: 90 TAB | Refills: 3 | Status: SHIPPED | OUTPATIENT
Start: 2017-08-25 | End: 2017-09-28

## 2017-08-25 RX ORDER — GLIMEPIRIDE 4 MG/1
4 TABLET ORAL EVERY MORNING
Qty: 90 TAB | Refills: 3 | Status: SHIPPED | OUTPATIENT
Start: 2017-08-25 | End: 2018-08-20 | Stop reason: SDUPTHER

## 2017-08-25 NOTE — TELEPHONE ENCOUNTER
----- Message from Your Healthcare Team sent at 2017  4:29 PM PDT -----  Regarding: Prescription Question  Contact: [phone]  I am trying to get all my medicines to be refilled by my new doctor Dr. Mahmood.  Dr Tena was my old doctor.  Could she please refill the following?  Glimepiride 4MG I need this refilled A.S.A.P  Atorvastatin 20MG due to  on 2017  Welchol 625MG due to  on 2017    Thank you for your help.  Shirley Unger  1955  [phone]

## 2017-09-11 DIAGNOSIS — E11.29 TYPE 2 DIABETES MELLITUS WITH OTHER DIABETIC KIDNEY COMPLICATION (HCC): ICD-10-CM

## 2017-09-11 DIAGNOSIS — E78.5 DYSLIPIDEMIA: ICD-10-CM

## 2017-09-11 RX ORDER — ATORVASTATIN CALCIUM 20 MG/1
20 TABLET, FILM COATED ORAL DAILY
Qty: 90 TAB | Refills: 3 | Status: SHIPPED | OUTPATIENT
Start: 2017-09-11 | End: 2018-09-03 | Stop reason: SDUPTHER

## 2017-09-11 NOTE — TELEPHONE ENCOUNTER
----- Message from Shirley Unger sent at 9/9/2017  3:42 PM PDT -----  Regarding: Prescription Question  Contact: [phone]  Janet Mahmood,  [facility] on [address] did not receive the refill for ATORVASTATIN 20 MG (Lipitor) recently.  Could you please call in a refill?  Anam 9/10/2017 is my last pill.  Thank you,  Shirley Unger  1955  [phone]

## 2017-09-25 DIAGNOSIS — E11.9 DIABETES MELLITUS WITHOUT COMPLICATION (HCC): ICD-10-CM

## 2017-09-25 RX ORDER — CANAGLIFLOZIN 300 MG/1
TABLET, FILM COATED ORAL
Qty: 90 TAB | Refills: 0 | Status: SHIPPED | OUTPATIENT
Start: 2017-09-25 | End: 2017-09-28 | Stop reason: SDUPTHER

## 2017-09-25 NOTE — TELEPHONE ENCOUNTER
Covering for Dr. Tena this week.  Received refill request.  Pt was seen in clinic recently and is taking this medication chronically. Refill sent to pharmacy    Luis Miguel Kaur M.D.

## 2017-09-27 ENCOUNTER — TELEPHONE (OUTPATIENT)
Dept: MEDICAL GROUP | Facility: PHYSICIAN GROUP | Age: 62
End: 2017-09-27

## 2017-09-27 NOTE — TELEPHONE ENCOUNTER
ESTABLISHED PATIENT PRE-VISIT PLANNING     Note: Patient will not be contacted if there is no indication to call.     1.  Reviewed notes from the last few office visits within the medical group: Yes    2.  If any orders were placed at last visit or intended to be done for this visit (i.e. 6 mos follow-up), do we have Results/Consult Notes?        •  Labs - Labs ordered, completed on 07/31/17 and results are in chart.       •  Imaging - Imaging ordered, completed and results are in chart.       •  Referrals - Referral ordered, patient has NOT been seen.    3. Is this appointment scheduled as a Hospital Follow-Up? No    4.  Immunizations were updated in House Party using WebIZ?: Yes       •  Web Iz Recommendations: FLU and TD    5.  Patient is due for the following Health Maintenance Topics:   Health Maintenance Due   Topic Date Due   • IMM INFLUENZA (1) 09/01/2017       - Patient has completed FLU, PNEUMOVAX (PPSV23), TDAP and ZOSTAVAX (Shingles) Immunization(s) per WebIZ. Chart has been updated.      6.  Patient was NOT informed to arrive 15 min prior to their scheduled appointment and bring in their medication bottles.

## 2017-09-28 ENCOUNTER — OFFICE VISIT (OUTPATIENT)
Dept: MEDICAL GROUP | Facility: PHYSICIAN GROUP | Age: 62
End: 2017-09-28
Payer: COMMERCIAL

## 2017-09-28 VITALS
RESPIRATION RATE: 16 BRPM | SYSTOLIC BLOOD PRESSURE: 108 MMHG | HEART RATE: 74 BPM | TEMPERATURE: 97.3 F | WEIGHT: 206 LBS | BODY MASS INDEX: 31.22 KG/M2 | HEIGHT: 68 IN | OXYGEN SATURATION: 96 % | DIASTOLIC BLOOD PRESSURE: 72 MMHG

## 2017-09-28 DIAGNOSIS — M16.12 PRIMARY OSTEOARTHRITIS OF LEFT HIP: ICD-10-CM

## 2017-09-28 DIAGNOSIS — E11.9 DIABETES MELLITUS WITHOUT COMPLICATION (HCC): ICD-10-CM

## 2017-09-28 DIAGNOSIS — E66.9 OBESITY (BMI 30-39.9): ICD-10-CM

## 2017-09-28 DIAGNOSIS — R20.0 NUMBNESS OF LEFT FOOT: ICD-10-CM

## 2017-09-28 DIAGNOSIS — E11.29 TYPE 2 DIABETES MELLITUS WITH OTHER DIABETIC KIDNEY COMPLICATION (HCC): ICD-10-CM

## 2017-09-28 DIAGNOSIS — Z23 NEED FOR VACCINATION: ICD-10-CM

## 2017-09-28 DIAGNOSIS — E78.5 DYSLIPIDEMIA: ICD-10-CM

## 2017-09-28 DIAGNOSIS — E55.9 VITAMIN D DEFICIENCY DISEASE: ICD-10-CM

## 2017-09-28 PROCEDURE — 90686 IIV4 VACC NO PRSV 0.5 ML IM: CPT | Performed by: INTERNAL MEDICINE

## 2017-09-28 PROCEDURE — 90471 IMMUNIZATION ADMIN: CPT | Performed by: INTERNAL MEDICINE

## 2017-09-28 PROCEDURE — 99214 OFFICE O/P EST MOD 30 MIN: CPT | Mod: 25 | Performed by: INTERNAL MEDICINE

## 2017-09-29 NOTE — ASSESSMENT & PLAN NOTE
She reports some numbness to left toe, going on for a month. She does not recall a trauma. But her upper nail, slight darker. She has chronic pain in her back. Strength and range of motion intact. No swelling. Pain, numbness is intermittent. Rare swollen. No fever, no rash. Not worse. Circulation is good   Monofilament testing with a 10 gram force: sensation intact: intact bilaterally  Visual Inspection: Feet without maceration, ulcers, fissures.  Pedal pulses: intact bilaterally

## 2017-09-29 NOTE — ASSESSMENT & PLAN NOTE
Stable. a1c 7.1. Not improving, not worse, she is on glimepiride 4 mg daily, metformin 1500 mg daily, bydureon pen injector, invokana. Electrolytes, kidney function. Stable . Slight microalbuminuria. She is a statin, aspirin, ace.

## 2017-09-29 NOTE — ASSESSMENT & PLAN NOTE
She reports left chronic hip pain, worse when she walks. She takes meloxicam prn. XR hip mild osteoarthritis

## 2017-09-29 NOTE — PROGRESS NOTES
Subjective:   Shirley Unger is a 62 y.o. female here today for toe pain, diabetes     Dyslipidemia  On atorvastatin, tolerating medication well. LDL wnl 07/2017    Vitamin D deficiency disease  On vitamin D supplement. Lab 06/2017    Type 2 diabetes mellitus with other diabetic kidney complication  Stable. a1c 7.1. Not improving, not worse, she is on glimepiride 4 mg daily, metformin 1500 mg daily, bydureon pen injector, invokana. Electrolytes, kidney function. Stable . Slight microalbuminuria. She is a statin, aspirin, ace.    Primary osteoarthritis of left hip  She reports left chronic hip pain, worse when she walks. She takes meloxicam prn. XR hip mild osteoarthritis    Obesity (BMI 30-39.9)  Counseling for a small portion, balanced diet, exercising 3-5 times per week.      Numbness of left foot  She reports some numbness to left toe, going on for a month. She does not recall a trauma. But her upper nail, slight darker. She has chronic pain in her back. Strength and range of motion intact. No swelling. Pain, numbness is intermittent. Rare swollen. No fever, no rash. Not worse. Circulation is good   Monofilament testing with a 10 gram force: sensation intact: intact bilaterally  Visual Inspection: Feet without maceration, ulcers, fissures.  Pedal pulses: intact bilaterally         Current medicines (including changes today)  Current Outpatient Prescriptions   Medication Sig Dispense Refill   • Canagliflozin (INVOKANA) 300 MG Tab Take 1 Tab by mouth every day. 90 Tab 3   • metformin (GLUCOPHAGE) 1000 MG tablet TAKE 1/2 TABLET BY MOUTH IN THE MORNING AND ONE TABLET AT NIGHT 180 Tab 3   • atorvastatin (LIPITOR) 20 MG Tab Take 1 Tab by mouth every day. 90 Tab 3   • glimepiride (AMARYL) 4 MG Tab Take 1 Tab by mouth every morning. 90 Tab 3   • colesevelam (WELCHOL) 625 MG Tab TAKE ONE TABLET BY MOUTH IN THE MORNING AND THEN TAKE TWO TABLETS IN THE EVENING WITH DINNER 270 Tab 3   • BYDUREON 2 MG Pen-injector INJECT 2MG(1  PEN) SUBCUTANEOUSLY AS INSTRUCTED EVERY 7 DAYS 12 Each 3   • lisinopril (PRINIVIL) 5 MG Tab Take 1 Tab by mouth every bedtime. 90 Tab 1   • meloxicam (MOBIC) 15 MG tablet Take 1 Tab by mouth every day. 30 Tab 0   • cetirizine (ZYRTEC) 10 MG Tab Take 1 Tab by mouth every day. 30 Tab 3   • fluticasone (FLONASE) 50 MCG/ACT nasal spray Spray 2 Sprays in nose every day. 16 g 0   • FREESTYLE LITE strip USE ONE STRIP TWICE DAILY AND AS NEEDED FOR SIGNS AND SYMPTOM OF HIGH OR LOW BLOOD SUGAR 100 Strip 0   • trolamine salicylate (ASPERCREME/ALOE) 10 % cream Apply 1 Squirt to affected area(s) 4 times a day. 1 Tube 3   • Lancets MISC Lancets order: Lancets for Abbott Freestyle Lite meter. Sig: use BID and prn ssx high or low sugar. #100 RF x 3 100 Each 3   • mupirocin calcium (BACTROBAN) 2 % CREA Apply to affected area(s) 2 times a day. 15 g 2   • Blood Glucose Monitoring Suppl SUPPLIES MISC Test strips order: Test strips for Abbott Freestyle Lite meter. Sig: use BID and prn ssx high or low sugar #100 RF x 3 100 Each 3   • aspirin (ASA) 81 MG CHEW Take 81 mg by mouth every day.       • POTASSIUM CITRATE PO Take 2 Tabs by mouth 3 times a day.       No current facility-administered medications for this visit.      She  has a past medical history of DM (diabetes mellitus) (CMS-[facility]) (2004); Herpes genitalia (3/22/2013); Hyperlipidemia LDL goal < 100; Nephrolithiasis (3/22/2013); and Vitamin d deficiency (7/8/2013).    Current Outpatient Prescriptions   Medication Sig Dispense Refill   • Canagliflozin (INVOKANA) 300 MG Tab Take 1 Tab by mouth every day. 90 Tab 3   • metformin (GLUCOPHAGE) 1000 MG tablet TAKE 1/2 TABLET BY MOUTH IN THE MORNING AND ONE TABLET AT NIGHT 180 Tab 3   • atorvastatin (LIPITOR) 20 MG Tab Take 1 Tab by mouth every day. 90 Tab 3   • glimepiride (AMARYL) 4 MG Tab Take 1 Tab by mouth every morning. 90 Tab 3   • colesevelam (WELCHOL) 625 MG Tab TAKE ONE TABLET BY MOUTH IN THE MORNING AND THEN TAKE TWO TABLETS IN  THE EVENING WITH DINNER 270 Tab 3   • BYDUREON 2 MG Pen-injector INJECT 2MG(1 PEN) SUBCUTANEOUSLY AS INSTRUCTED EVERY 7 DAYS 12 Each 3   • lisinopril (PRINIVIL) 5 MG Tab Take 1 Tab by mouth every bedtime. 90 Tab 1   • meloxicam (MOBIC) 15 MG tablet Take 1 Tab by mouth every day. 30 Tab 0   • cetirizine (ZYRTEC) 10 MG Tab Take 1 Tab by mouth every day. 30 Tab 3   • fluticasone (FLONASE) 50 MCG/ACT nasal spray Spray 2 Sprays in nose every day. 16 g 0   • FREESTYLE LITE strip USE ONE STRIP TWICE DAILY AND AS NEEDED FOR SIGNS AND SYMPTOM OF HIGH OR LOW BLOOD SUGAR 100 Strip 0   • trolamine salicylate (ASPERCREME/ALOE) 10 % cream Apply 1 Squirt to affected area(s) 4 times a day. 1 Tube 3   • Lancets MISC Lancets order: Lancets for Abbott Freestyle Lite meter. Sig: use BID and prn ssx high or low sugar. #100 RF x 3 100 Each 3   • mupirocin calcium (BACTROBAN) 2 % CREA Apply to affected area(s) 2 times a day. 15 g 2   • Blood Glucose Monitoring Suppl SUPPLIES MISC Test strips order: Test strips for Abbott Freestyle Lite meter. Sig: use BID and prn ssx high or low sugar #100 RF x 3 100 Each 3   • aspirin (ASA) 81 MG CHEW Take 81 mg by mouth every day.       • POTASSIUM CITRATE PO Take 2 Tabs by mouth 3 times a day.       No current facility-administered medications for this visit.        Allergies as of 09/28/2017 - Reviewed 09/28/2017   Allergen Reaction Noted   • Macrobid [nitrofurantoin monohydrate macrocrystals] Hives 10/17/2012   • Pcn [penicillins] Hives 10/17/2012       Social History     Social History   • Marital status:      Spouse name: N/A   • Number of children: N/A   • Years of education: N/A     Occupational History   •  [employer]     Social History Main Topics   • Smoking status: Never Smoker   • Smokeless tobacco: Never Used   • Alcohol use No      Comment: Rare Useage   • Drug use: No   • Sexual activity: Yes     Partners: Male     Other Topics Concern   • Not on file     Social  "History Narrative   • No narrative on file        Family History   Problem Relation Age of Onset   • Cancer Mother      Lung   • Heart Disease Father    • Diabetes Maternal Aunt    • Diabetes Maternal Grandfather        Past Surgical History:   Procedure Laterality Date   • GANGLION EXCISION  2014    Performed by Efra Ramirez M.D. at [facility] ORS   • HYSTERECTOMY, TOTAL ABDOMINAL      For non cancerous reasons   • CHOLECYSTECTOMY     • CARPAL TUNNEL RELEASE      Right hand   • CYSTOSCOPY STENT PLACEMENT      several, stent removed in MD office   • PB  DELIVERY ONLY         ROS   All systems reviewed are negative except for HPI       Objective:     Blood pressure 108/72, pulse 74, temperature 36.3 °C (97.3 °F), resp. rate 16, height 1.727 m (5' 8\"), weight 93.4 kg (206 lb), last menstrual period 2004, SpO2 96 %, not currently breastfeeding. Body mass index is 31.32 kg/m².   Physical Exam:  Constitutional: Alert, no distress.  Skin: Warm, dry, good turgor, no rashes in visible areas.  Eye: Equal, round and reactive, conjunctiva clear, lids normal.  ENMT: Lips without lesions, good dentition, oropharynx clear.  Neck: Trachea midline, no masses, no thyromegaly. No cervical or supraclavicular lymphadenopathy  Respiratory: Unlabored respiratory effort, lungs clear to auscultation, no wheezes, no ronchi.  Cardiovascular: Normal S1, S2, no murmur, no edema.  Abdomen: Soft, non-tender, no masses, no hepatosplenomegaly.  Psych: Alert and oriented x3, normal affect and mood.  Monofilament test as per above  Toe left, no swelling, no redness, range of motion intact       Assessment and Plan:   The following treatment plan was discussed    1. Diabetes mellitus without complication (CMS-HCC)  Continue same treatment, follow up with endo   - INFLUENZA VACCINE QUAD INJ >3Y(PF)  - Canagliflozin (INVOKANA) 300 MG Tab; Take 1 Tab by mouth every day.  Dispense: 90 Tab; Refill: 3  - " metformin (GLUCOPHAGE) 1000 MG tablet; TAKE 1/2 TABLET BY MOUTH IN THE MORNING AND ONE TABLET AT NIGHT  Dispense: 180 Tab; Refill: 3  - LIPID PROFILE; Future  - HEMOGLOBIN A1C; Future  - MICROALBUMIN CREAT RATIO URINE; Future  - COMP METABOLIC PANEL; Future    2. Need for vaccination  Flu shot today     3. Obesity (BMI 30-39.9)  Counseling for a small portion, balanced diet, exercising 3-5 times per week.    - Patient identified as having weight management issue.  Appropriate orders and counseling given.      4. Vitamin D deficiency disease  Congestive supplement. Continue to monitor  - VITAMIN D,25 HYDROXY; Future    5. Dyslipidemia  Continue same treatment. Continue to monitor    6. Primary osteoarthritis of left hip  Mild osteoarthritis . continue same pain medication    7. Numbness of left foot  I cannot rule out gout, vs radiculopathy at this time   Her back no tenderness, no sciatic. Range of motion intact' sensation intact. Circulation intact. I advised pt to keep a diary to see any association of her symptoms with food. Consider uric acid, back MRI for further eval   No other pain meds required       Followup: Return in about 6 months (around 3/28/2018) for Long.

## 2017-10-16 ENCOUNTER — PATIENT MESSAGE (OUTPATIENT)
Dept: MEDICAL GROUP | Facility: PHYSICIAN GROUP | Age: 62
End: 2017-10-16

## 2017-10-16 DIAGNOSIS — E11.9 DIABETES MELLITUS WITHOUT COMPLICATION (HCC): ICD-10-CM

## 2017-10-19 ENCOUNTER — OFFICE VISIT (OUTPATIENT)
Dept: ENDOCRINOLOGY | Facility: MEDICAL CENTER | Age: 62
End: 2017-10-19
Payer: COMMERCIAL

## 2017-10-19 ENCOUNTER — TELEPHONE (OUTPATIENT)
Dept: MEDICAL GROUP | Facility: PHYSICIAN GROUP | Age: 62
End: 2017-10-19

## 2017-10-19 VITALS
HEART RATE: 98 BPM | HEIGHT: 68 IN | DIASTOLIC BLOOD PRESSURE: 62 MMHG | WEIGHT: 204 LBS | SYSTOLIC BLOOD PRESSURE: 108 MMHG | OXYGEN SATURATION: 95 % | BODY MASS INDEX: 30.92 KG/M2

## 2017-10-19 DIAGNOSIS — E11.65 TYPE 2 DIABETES MELLITUS WITH HYPERGLYCEMIA, WITHOUT LONG-TERM CURRENT USE OF INSULIN (HCC): ICD-10-CM

## 2017-10-19 DIAGNOSIS — I10 ESSENTIAL HYPERTENSION: ICD-10-CM

## 2017-10-19 DIAGNOSIS — E78.2 MIXED HYPERLIPIDEMIA: ICD-10-CM

## 2017-10-19 LAB
HBA1C MFR BLD: 7.2 % (ref ?–5.8)
INT CON NEG: NORMAL
INT CON POS: NORMAL

## 2017-10-19 PROCEDURE — 99214 OFFICE O/P EST MOD 30 MIN: CPT | Performed by: INTERNAL MEDICINE

## 2017-10-19 PROCEDURE — 83036 HEMOGLOBIN GLYCOSYLATED A1C: CPT | Performed by: INTERNAL MEDICINE

## 2017-10-19 NOTE — PROGRESS NOTES
Patient with existing type diabetes:  Type 2 diabetes here for follow up,.      Patient's health status since last visit: states no change.   Issues with diabetes since last visit states no problems.   Current Diabetes Medications: Metformin 500 mg in the morning and 1000 mg in the evening, Invokana 300 mg daily, Glimepiride 1 mg daily and Bydureon 2 mg weekly    HbA1c: @hba1c@  Lab Results   Component Value Date/Time    HBA1C 7.2 10/19/2017 09:21 AM        FSBS  Testing: testing 0-1 times per day    Hypoglycemia: denies.   Exercise: walking on occasion.     Retinal Exam:current.     Daily Foot Exam: yes    Routine Dental Exams: current.   Flu vaccine: current.   Pneumonia vaccine current.

## 2017-10-19 NOTE — PROGRESS NOTES
"Endocrinology Clinic Progress Note  PCP: Rogers Mahmood M.D.    CC: Diabetes    HPI:  Shirley Unger is a 62 y.o. old patient who comes in today for routine follow up.     Type 2 diabetes: She is currently on bydureon 2 mg once a week, metformin 1500 mg per day, glimepiride 4 mg every morning and Invokana 300 mg daily. She reports compliance with medications. She checks blood sugars a few times a month, most readings are in the range of 80 to 130. No hypoglycemia. No numbness and tingling in feet. She is up-to-date with eye exam.    Hyperlipidemia: She is currently on Lipitor, tolerating well.    Hypertension: Blood pressure is well controlled. She is on ACE inhibitor.    ROS:  Constitutional: No unintentional weight loss  Endo: Denies excessive thirst or frequent urination    Past Medical History:  Patient Active Problem List    Diagnosis Date Noted   • Obesity (BMI 30-39.9) 09/28/2017   • Numbness of left foot 09/28/2017   • Seasonal allergic rhinitis due to pollen 03/07/2017   • Herpes dermatitis 11/29/2016   • Primary osteoarthritis of left hip 07/07/2016   • Trigger finger of right thumb 07/07/2016   • Colon polyps 05/16/2016   • Uncontrolled type 2 diabetes mellitus without complication, without long-term current use of insulin (CMS-[facility]) 08/04/2015   • Vitamin D deficiency disease 07/08/2013   • Dyslipidemia    • Recurrent nephrolithiasis 03/22/2013     Physical Examination:  Vital signs: /62   Pulse 98   Ht 1.727 m (5' 8\")   Wt 92.5 kg (204 lb)   LMP 11/29/2004   SpO2 95%   BMI 31.02 kg/m²   General: No apparent distress, cooperative  Eyes: No scleral icterus, no discharge, normal eyelids  Neck: No abnormal masses on inspection   Resp: Normal effort, clear to auscultation bilaterally  CVS: Regular rate and rhythm, S1 S2 normal, no murmur  Extremities: No lower extremity edema  Skin: No rash on visible skin  Psych: Alert and oriented, normal mood and affect    Assessment and Plan:    1. Type 2 diabetes " mellitus with other diabetic kidney complication (CMS-HCC)  · Hemoglobin A1c today in the clinic is 7.2%  · Goal hemoglobin A1c less than 7%  · Increase metformin to 1000 mg twice a day  · Continue bydureon, metformin, glimepiride, Invokana  · Advised to check blood sugars at least every other day  - Diabetic Monofilament LE Exam    2. Mixed hyperlipidemia  · LDL 44  · Continue Lipitor    3. Essential hypertension  · Blood pressure is well controlled  · Continue ACE inhibitor    Return in about 3 months (around 1/19/2018).    Thank you for allowing me to participate in the care of this patient.    Amy Jain M.D.    CC:   Rogers Mahmood M.D.    This note was created using voice recognition software (Dragon). The accuracy of the dictation is limited by the abilities of the software. I have reviewed the note prior to signing, however some errors in grammar and context are still possible. If you have any questions related to this note please do not hesitate to contact our office.

## 2017-10-20 ENCOUNTER — TELEPHONE (OUTPATIENT)
Dept: MEDICAL GROUP | Facility: PHYSICIAN GROUP | Age: 62
End: 2017-10-20

## 2018-02-16 ENCOUNTER — OFFICE VISIT (OUTPATIENT)
Dept: URGENT CARE | Facility: CLINIC | Age: 63
End: 2018-02-16
Payer: COMMERCIAL

## 2018-02-16 ENCOUNTER — HOSPITAL ENCOUNTER (OUTPATIENT)
Dept: RADIOLOGY | Facility: MEDICAL CENTER | Age: 63
End: 2018-02-16
Attending: UROLOGY
Payer: COMMERCIAL

## 2018-02-16 VITALS
HEIGHT: 68 IN | OXYGEN SATURATION: 95 % | DIASTOLIC BLOOD PRESSURE: 64 MMHG | BODY MASS INDEX: 31.98 KG/M2 | HEART RATE: 78 BPM | WEIGHT: 211 LBS | SYSTOLIC BLOOD PRESSURE: 110 MMHG | TEMPERATURE: 97.2 F

## 2018-02-16 DIAGNOSIS — Z87.442 PERSONAL HISTORY OF URINARY CALCULI: ICD-10-CM

## 2018-02-16 DIAGNOSIS — R05.3 CHRONIC COUGH: ICD-10-CM

## 2018-02-16 PROCEDURE — 74018 RADEX ABDOMEN 1 VIEW: CPT

## 2018-02-16 PROCEDURE — 99214 OFFICE O/P EST MOD 30 MIN: CPT | Performed by: PHYSICIAN ASSISTANT

## 2018-02-16 RX ORDER — BENZONATATE 100 MG/1
100-200 CAPSULE ORAL 3 TIMES DAILY PRN
Qty: 60 CAP | Refills: 0 | Status: SHIPPED | OUTPATIENT
Start: 2018-02-16 | End: 2018-03-09

## 2018-02-16 RX ORDER — AZITHROMYCIN 250 MG/1
TABLET, FILM COATED ORAL
Qty: 6 TAB | Refills: 0 | Status: SHIPPED | OUTPATIENT
Start: 2018-02-16 | End: 2018-03-09

## 2018-02-22 ASSESSMENT — ENCOUNTER SYMPTOMS
SPUTUM PRODUCTION: 1
SORE THROAT: 0
COUGH: 1
FEVER: 0
CHILLS: 0
SHORTNESS OF BREATH: 0
DIZZINESS: 0
MUSCULOSKELETAL NEGATIVE: 1
WHEEZING: 0
NAUSEA: 0
DIARRHEA: 0
SWEATS: 1
VOMITING: 0
ABDOMINAL PAIN: 0

## 2018-02-22 ASSESSMENT — COPD QUESTIONNAIRES: COPD: 0

## 2018-02-22 NOTE — PROGRESS NOTES
"Subjective:      Shirley Unger is a 63 y.o. female who presents with Congestion (chest congestion on and off x6 weeks)            Cough   This is a new problem. The current episode started more than 1 month ago (6 weeks). Associated symptoms include sweats. Pertinent negatives include no chest pain, chills, ear congestion, fever, sore throat, shortness of breath or wheezing. Nothing aggravates the symptoms. She has tried OTC cough suppressant for the symptoms. The treatment provided mild relief. There is no history of asthma, COPD or pneumonia.     Patient presents to urgent care reporting a 6 week history of a productive cough off and on. No history of chronic lung disease or smoking. No fevers, chills, body aches, chest pain, SOB, or hemoptysis.     Review of Systems   Constitutional: Negative for chills and fever.   HENT: Negative for congestion and sore throat.    Respiratory: Positive for cough and sputum production. Negative for shortness of breath and wheezing.    Cardiovascular: Negative for chest pain.   Gastrointestinal: Negative for abdominal pain, diarrhea, nausea and vomiting.   Genitourinary: Negative.    Musculoskeletal: Negative.    Neurological: Negative for dizziness.        Objective:     /64   Pulse 78   Temp 36.2 °C (97.2 °F)   Ht 1.727 m (5' 7.99\")   Wt 95.7 kg (211 lb)   LMP 11/29/2004   SpO2 95%   BMI 32.09 kg/m²      Physical Exam   Constitutional: She is oriented to person, place, and time. She appears well-developed and well-nourished. No distress.   HENT:   Head: Normocephalic and atraumatic.   Right Ear: Hearing, tympanic membrane, external ear and ear canal normal.   Left Ear: Hearing, tympanic membrane, external ear and ear canal normal.   Mouth/Throat: Oropharynx is clear and moist. No oropharyngeal exudate, posterior oropharyngeal edema or posterior oropharyngeal erythema.   Eyes: Conjunctivae are normal. Pupils are equal, round, and reactive to light. Right eye exhibits " no discharge. Left eye exhibits no discharge.   Neck: Normal range of motion.   Cardiovascular: Normal rate, regular rhythm and normal heart sounds.    No murmur heard.  Pulmonary/Chest: Effort normal and breath sounds normal. No respiratory distress. She has no wheezes. She has no rales.   Musculoskeletal: Normal range of motion.   Lymphadenopathy:     She has no cervical adenopathy.   Neurological: She is alert and oriented to person, place, and time.   Skin: Skin is warm and dry. She is not diaphoretic.   Psychiatric: She has a normal mood and affect. Her behavior is normal.   Nursing note and vitals reviewed.         PMH:  has a past medical history of DM (diabetes mellitus) (CMS-[facility]) (2004); Herpes genitalia (3/22/2013); Hyperlipidemia LDL goal < 100; Nephrolithiasis (3/22/2013); and Vitamin d deficiency (7/8/2013).  MEDS:   Current Outpatient Prescriptions:   •  benzonatate (TESSALON) 100 MG Cap, Take 1-2 Caps by mouth 3 times a day as needed., Disp: 60 Cap, Rfl: 0  •  azithromycin (ZITHROMAX) 250 MG Tab, Take 2 tablets by mouth on day one, then 1 tablet by mouth on days two through five, Disp: 6 Tab, Rfl: 0  •  metformin (GLUCOPHAGE) 1000 MG tablet, TAKE 1/2 TABLET BY MOUTH IN THE MORNING AND ONE TABLET AT NIGHT, Disp: 180 Tab, Rfl: 3  •  Canagliflozin (INVOKANA) 300 MG Tab, Take 1 Tab by mouth every day., Disp: 90 Tab, Rfl: 3  •  atorvastatin (LIPITOR) 20 MG Tab, Take 1 Tab by mouth every day., Disp: 90 Tab, Rfl: 3  •  glimepiride (AMARYL) 4 MG Tab, Take 1 Tab by mouth every morning., Disp: 90 Tab, Rfl: 3  •  colesevelam (WELCHOL) 625 MG Tab, TAKE ONE TABLET BY MOUTH IN THE MORNING AND THEN TAKE TWO TABLETS IN THE EVENING WITH DINNER, Disp: 270 Tab, Rfl: 3  •  BYDUREON 2 MG Pen-injector, INJECT 2MG(1 PEN) SUBCUTANEOUSLY AS INSTRUCTED EVERY 7 DAYS, Disp: 12 Each, Rfl: 3  •  lisinopril (PRINIVIL) 5 MG Tab, Take 1 Tab by mouth every bedtime., Disp: 90 Tab, Rfl: 1  •  meloxicam (MOBIC) 15 MG tablet, Take 1 Tab  by mouth every day., Disp: 30 Tab, Rfl: 0  •  cetirizine (ZYRTEC) 10 MG Tab, Take 1 Tab by mouth every day., Disp: 30 Tab, Rfl: 3  •  fluticasone (FLONASE) 50 MCG/ACT nasal spray, Spray 2 Sprays in nose every day., Disp: 16 g, Rfl: 0  •  FREESTYLE LITE strip, USE ONE STRIP TWICE DAILY AND AS NEEDED FOR SIGNS AND SYMPTOM OF HIGH OR LOW BLOOD SUGAR, Disp: 100 Strip, Rfl: 0  •  trolamine salicylate (ASPERCREME/ALOE) 10 % cream, Apply 1 Squirt to affected area(s) 4 times a day., Disp: 1 Tube, Rfl: 3  •  Lancets MISC, Lancets order: Lancets for Abbott Freestyle Lite meter. Sig: use BID and prn ssx high or low sugar. #100 RF x 3, Disp: 100 Each, Rfl: 3  •  mupirocin calcium (BACTROBAN) 2 % CREA, Apply to affected area(s) 2 times a day., Disp: 15 g, Rfl: 2  •  Blood Glucose Monitoring Suppl SUPPLIES MISC, Test strips order: Test strips for Abbott Freestyle Lite meter. Sig: use BID and prn ssx high or low sugar #100 RF x 3, Disp: 100 Each, Rfl: 3  •  aspirin (ASA) 81 MG CHEW, Take 81 mg by mouth every day.  , Disp: , Rfl:   •  POTASSIUM CITRATE PO, Take 2 Tabs by mouth 3 times a day., Disp: , Rfl:   ALLERGIES:   Allergies   Allergen Reactions   • Macrobid [Nitrofurantoin Monohydrate Macrocrystals] Hives   • Pcn [Penicillins] Hives     SURGHX:   Past Surgical History:   Procedure Laterality Date   • GANGLION EXCISION  2014    Performed by Efra Ramirez M.D. at [facility]   • HYSTERECTOMY, TOTAL ABDOMINAL      For non cancerous reasons   • CHOLECYSTECTOMY     • CARPAL TUNNEL RELEASE      Right hand   • CYSTOSCOPY STENT PLACEMENT      several, stent removed in MD office   • PB  DELIVERY ONLY       SOCHX:  reports that she has never smoked. She has never used smokeless tobacco. She reports that she does not drink alcohol or use drugs.  FH: family history includes Cancer in her mother; Diabetes in her maternal aunt and maternal grandfather; Heart Disease in her father.        Assessment/Plan:     1. Chronic cough  - benzonatate (TESSALON) 100 MG Cap; Take 1-2 Caps by mouth 3 times a day as needed.  Dispense: 60 Cap; Refill: 0  - azithromycin (ZITHROMAX) 250 MG Tab; Take 2 tablets by mouth on day one, then 1 tablet by mouth on days two through five  Dispense: 6 Tab; Refill: 0   - Complete full course of antibiotics as prescribed     Follow up with PCP if symptoms persist/worsen. The patient demonstrated a good understanding and agreed with the treatment plan.

## 2018-03-05 ENCOUNTER — HOSPITAL ENCOUNTER (OUTPATIENT)
Dept: LAB | Facility: MEDICAL CENTER | Age: 63
End: 2018-03-05
Attending: INTERNAL MEDICINE
Payer: COMMERCIAL

## 2018-03-05 DIAGNOSIS — E11.9 DIABETES MELLITUS WITHOUT COMPLICATION (HCC): ICD-10-CM

## 2018-03-05 DIAGNOSIS — E55.9 VITAMIN D DEFICIENCY DISEASE: ICD-10-CM

## 2018-03-05 LAB
25(OH)D3 SERPL-MCNC: 23 NG/ML (ref 30–100)
ALBUMIN SERPL BCP-MCNC: 4.3 G/DL (ref 3.2–4.9)
ALBUMIN/GLOB SERPL: 2 G/DL
ALP SERPL-CCNC: 43 U/L (ref 30–99)
ALT SERPL-CCNC: 14 U/L (ref 2–50)
ANION GAP SERPL CALC-SCNC: 7 MMOL/L (ref 0–11.9)
AST SERPL-CCNC: 13 U/L (ref 12–45)
BILIRUB SERPL-MCNC: 0.6 MG/DL (ref 0.1–1.5)
BUN SERPL-MCNC: 20 MG/DL (ref 8–22)
CALCIUM SERPL-MCNC: 9.5 MG/DL (ref 8.5–10.5)
CHLORIDE SERPL-SCNC: 102 MMOL/L (ref 96–112)
CHOLEST SERPL-MCNC: 117 MG/DL (ref 100–199)
CO2 SERPL-SCNC: 28 MMOL/L (ref 20–33)
CREAT SERPL-MCNC: 0.64 MG/DL (ref 0.5–1.4)
CREAT UR-MCNC: 62.2 MG/DL
EST. AVERAGE GLUCOSE BLD GHB EST-MCNC: 163 MG/DL
GLOBULIN SER CALC-MCNC: 2.2 G/DL (ref 1.9–3.5)
GLUCOSE SERPL-MCNC: 133 MG/DL (ref 65–99)
HBA1C MFR BLD: 7.3 % (ref 0–5.6)
HDLC SERPL-MCNC: 36 MG/DL
LDLC SERPL CALC-MCNC: 22 MG/DL
MICROALBUMIN UR-MCNC: <0.7 MG/DL
MICROALBUMIN/CREAT UR: NORMAL MG/G (ref 0–30)
POTASSIUM SERPL-SCNC: 4.2 MMOL/L (ref 3.6–5.5)
PROT SERPL-MCNC: 6.5 G/DL (ref 6–8.2)
SODIUM SERPL-SCNC: 137 MMOL/L (ref 135–145)
TRIGL SERPL-MCNC: 297 MG/DL (ref 0–149)

## 2018-03-05 PROCEDURE — 80061 LIPID PANEL: CPT

## 2018-03-05 PROCEDURE — 83036 HEMOGLOBIN GLYCOSYLATED A1C: CPT

## 2018-03-05 PROCEDURE — 82570 ASSAY OF URINE CREATININE: CPT

## 2018-03-05 PROCEDURE — 82306 VITAMIN D 25 HYDROXY: CPT

## 2018-03-05 PROCEDURE — 82043 UR ALBUMIN QUANTITATIVE: CPT

## 2018-03-05 PROCEDURE — 80053 COMPREHEN METABOLIC PANEL: CPT

## 2018-03-05 PROCEDURE — 36415 COLL VENOUS BLD VENIPUNCTURE: CPT

## 2018-03-06 NOTE — PROGRESS NOTES
Janet Watson,  I wanted to let you know that the labs look good. Diabetes is about the same. Slight low on vitamin D.  We can discuss in detail at your next appointment.   [place]orge,  Rogers Mahmood M.D.

## 2018-03-09 ENCOUNTER — OFFICE VISIT (OUTPATIENT)
Dept: MEDICAL GROUP | Facility: PHYSICIAN GROUP | Age: 63
End: 2018-03-09
Payer: COMMERCIAL

## 2018-03-09 VITALS
BODY MASS INDEX: 31.67 KG/M2 | WEIGHT: 209 LBS | HEART RATE: 94 BPM | HEIGHT: 68 IN | TEMPERATURE: 98.8 F | OXYGEN SATURATION: 90 % | SYSTOLIC BLOOD PRESSURE: 112 MMHG | RESPIRATION RATE: 14 BRPM | DIASTOLIC BLOOD PRESSURE: 76 MMHG

## 2018-03-09 DIAGNOSIS — E78.5 DYSLIPIDEMIA: ICD-10-CM

## 2018-03-09 DIAGNOSIS — E55.9 VITAMIN D DEFICIENCY DISEASE: ICD-10-CM

## 2018-03-09 DIAGNOSIS — R21 RASH: ICD-10-CM

## 2018-03-09 DIAGNOSIS — B00.89 HERPES DERMATITIS: ICD-10-CM

## 2018-03-09 DIAGNOSIS — E66.9 OBESITY (BMI 30-39.9): ICD-10-CM

## 2018-03-09 DIAGNOSIS — K59.1 FUNCTIONAL DIARRHEA: ICD-10-CM

## 2018-03-09 DIAGNOSIS — R20.0 NUMBNESS OF LEFT FOOT: ICD-10-CM

## 2018-03-09 PROCEDURE — 99214 OFFICE O/P EST MOD 30 MIN: CPT | Performed by: INTERNAL MEDICINE

## 2018-03-09 RX ORDER — MUPIROCIN CALCIUM 20 MG/G
CREAM TOPICAL
Qty: 15 G | Refills: 2 | Status: SHIPPED | OUTPATIENT
Start: 2018-03-09 | End: 2021-09-01

## 2018-03-09 RX ORDER — TRIAMCINOLONE ACETONIDE 1 MG/G
1 OINTMENT TOPICAL 2 TIMES DAILY PRN
COMMUNITY

## 2018-03-09 NOTE — ASSESSMENT & PLAN NOTE
No complications. She is on statin, asa. She is on ace. She is on invokana, metformin (500 during the day, 1000 mg at the evening), glimepiride 4 mg daily. She is also on bydureon pen injectors. A1c slight worse 7.3 (previous one 7.1, 7.2). She was having diarrhea, she is keeping diary of what is causing, she cut on metformin and diarrhea got better, but she started to have again. She stopped fish oil, and later probiotic. Right now she is doing well since she stopped them. I advise to try going back to metformin 1000 mg BID. She is working on her weight .

## 2018-03-09 NOTE — ASSESSMENT & PLAN NOTE
At previous apt pt was complaining of numbness on her feet, worse the left toe. I did examination, range of motion intact. Monofilament normal. Pain and numbness is more pronounced at night when she goes to sleep. She feels better if she keeps one sock on the right foot only. She describes more at pins and needles. No back pain.

## 2018-03-09 NOTE — PROGRESS NOTES
Subjective:   Shirley Unger is a 63 y.o. female here today for diabetes, diarrhea, lab work review     Uncontrolled type 2 diabetes mellitus without complication, without long-term current use of insulin (CMS-[facility])  No complications. She is on statin, asa. She is on ace. She is on invokana, metformin (500 during the day, 1000 mg at the evening), glimepiride 4 mg daily. She is also on bydureon pen injectors. A1c slight worse 7.3 (previous one 7.1, 7.2). She was having diarrhea, she is keeping diary of what is causing, she cut on metformin and diarrhea got better, but she started to have again. She stopped fish oil, and later probiotic. Right now she is doing well since she stopped them. I advise to try going back to metformin 1000 mg BID. She is working on her weight .    Vitamin D deficiency disease  Still on low side, she is taking d3 3000 U daily. I advise to take 5000 U daily. Continue to monitor. She feels when she is low on vitamin D, because she has muscle aches     Dyslipidemia  She is on stain. No contraindication. No side effects. Primary prevention, last lipid panel 02/2018     Herpes dermatitis  Once in a while she has a rash on her lower back, seems herpes. mupirocin cream works for her. Refill provided     Obesity (BMI 30-39.9)  Counseling for a small portion, balanced diet, exercising for3-5 times per week.      Numbness of left foot  At previous apt pt was complaining of numbness on her feet, worse the left toe. I did examination, range of motion intact. Monofilament normal. Pain and numbness is more pronounced at night when she goes to sleep. She feels better if she keeps one sock on the right foot only. She describes more at pins and needles. No back pain.     Functional diarrhea  As per above, imodium helps. She denies hematochezia, melanotic stools.       Current medicines (including changes today)  Current Outpatient Prescriptions   Medication Sig Dispense Refill   • triamcinolone acetonide  (KENALOG) 0.1 % Ointment Apply  to affected area(s) 2 times a day.     • mupirocin calcium (BACTROBAN) 2 % Cream Apply to affected area(s) 2 times a day. 15 g 2   • metformin (GLUCOPHAGE) 1000 MG tablet TAKE 1/2 TABLET BY MOUTH IN THE MORNING AND ONE TABLET AT NIGHT 180 Tab 3   • Canagliflozin (INVOKANA) 300 MG Tab Take 1 Tab by mouth every day. 90 Tab 3   • atorvastatin (LIPITOR) 20 MG Tab Take 1 Tab by mouth every day. 90 Tab 3   • glimepiride (AMARYL) 4 MG Tab Take 1 Tab by mouth every morning. 90 Tab 3   • colesevelam (WELCHOL) 625 MG Tab TAKE ONE TABLET BY MOUTH IN THE MORNING AND THEN TAKE TWO TABLETS IN THE EVENING WITH DINNER 270 Tab 3   • BYDUREON 2 MG Pen-injector INJECT 2MG(1 PEN) SUBCUTANEOUSLY AS INSTRUCTED EVERY 7 DAYS 12 Each 3   • lisinopril (PRINIVIL) 5 MG Tab Take 1 Tab by mouth every bedtime. 90 Tab 1   • meloxicam (MOBIC) 15 MG tablet Take 1 Tab by mouth every day. 30 Tab 0   • cetirizine (ZYRTEC) 10 MG Tab Take 1 Tab by mouth every day. 30 Tab 3   • fluticasone (FLONASE) 50 MCG/ACT nasal spray Spray 2 Sprays in nose every day. 16 g 0   • FREESTYLE LITE strip USE ONE STRIP TWICE DAILY AND AS NEEDED FOR SIGNS AND SYMPTOM OF HIGH OR LOW BLOOD SUGAR 100 Strip 0   • trolamine salicylate (ASPERCREME/ALOE) 10 % cream Apply 1 Squirt to affected area(s) 4 times a day. 1 Tube 3   • Lancets MISC Lancets order: Lancets for Abbott Freestyle Lite meter. Sig: use BID and prn ssx high or low sugar. #100 RF x 3 100 Each 3   • Blood Glucose Monitoring Suppl SUPPLIES MISC Test strips order: Test strips for Abbott Freestyle Lite meter. Sig: use BID and prn ssx high or low sugar #100 RF x 3 100 Each 3   • aspirin (ASA) 81 MG CHEW Take 81 mg by mouth every day.       • POTASSIUM CITRATE PO Take 2 Tabs by mouth 3 times a day.       No current facility-administered medications for this visit.      She  has a past medical history of DM (diabetes mellitus) (CMS-[facility]) (2004); Herpes genitalia (3/22/2013); Hyperlipidemia  LDL goal < 100; Nephrolithiasis (3/22/2013); and Vitamin d deficiency (7/8/2013).    Current Outpatient Prescriptions   Medication Sig Dispense Refill   • triamcinolone acetonide (KENALOG) 0.1 % Ointment Apply  to affected area(s) 2 times a day.     • mupirocin calcium (BACTROBAN) 2 % Cream Apply to affected area(s) 2 times a day. 15 g 2   • metformin (GLUCOPHAGE) 1000 MG tablet TAKE 1/2 TABLET BY MOUTH IN THE MORNING AND ONE TABLET AT NIGHT 180 Tab 3   • Canagliflozin (INVOKANA) 300 MG Tab Take 1 Tab by mouth every day. 90 Tab 3   • atorvastatin (LIPITOR) 20 MG Tab Take 1 Tab by mouth every day. 90 Tab 3   • glimepiride (AMARYL) 4 MG Tab Take 1 Tab by mouth every morning. 90 Tab 3   • colesevelam (WELCHOL) 625 MG Tab TAKE ONE TABLET BY MOUTH IN THE MORNING AND THEN TAKE TWO TABLETS IN THE EVENING WITH DINNER 270 Tab 3   • BYDUREON 2 MG Pen-injector INJECT 2MG(1 PEN) SUBCUTANEOUSLY AS INSTRUCTED EVERY 7 DAYS 12 Each 3   • lisinopril (PRINIVIL) 5 MG Tab Take 1 Tab by mouth every bedtime. 90 Tab 1   • meloxicam (MOBIC) 15 MG tablet Take 1 Tab by mouth every day. 30 Tab 0   • cetirizine (ZYRTEC) 10 MG Tab Take 1 Tab by mouth every day. 30 Tab 3   • fluticasone (FLONASE) 50 MCG/ACT nasal spray Spray 2 Sprays in nose every day. 16 g 0   • FREESTYLE LITE strip USE ONE STRIP TWICE DAILY AND AS NEEDED FOR SIGNS AND SYMPTOM OF HIGH OR LOW BLOOD SUGAR 100 Strip 0   • trolamine salicylate (ASPERCREME/ALOE) 10 % cream Apply 1 Squirt to affected area(s) 4 times a day. 1 Tube 3   • Lancets MISC Lancets order: Lancets for Abbott Freestyle Lite meter. Sig: use BID and prn ssx high or low sugar. #100 RF x 3 100 Each 3   • Blood Glucose Monitoring Suppl SUPPLIES MISC Test strips order: Test strips for Abbott Freestyle Lite meter. Sig: use BID and prn ssx high or low sugar #100 RF x 3 100 Each 3   • aspirin (ASA) 81 MG CHEW Take 81 mg by mouth every day.       • POTASSIUM CITRATE PO Take 2 Tabs by mouth 3 times a day.       No current  "facility-administered medications for this visit.        Allergies as of 2018 - Reviewed 2018   Allergen Reaction Noted   • Macrobid [nitrofurantoin monohydrate macrocrystals] Hives 10/17/2012   • Pcn [penicillins] Hives 10/17/2012       Social History     Social History   • Marital status:      Spouse name: N/A   • Number of children: N/A   • Years of education: N/A     Occupational History   •  [employer]     Social History Main Topics   • Smoking status: Never Smoker   • Smokeless tobacco: Never Used   • Alcohol use No      Comment: Rare Useage   • Drug use: No   • Sexual activity: Yes     Partners: Male     Other Topics Concern   • Not on file     Social History Narrative   • No narrative on file        Family History   Problem Relation Age of Onset   • Cancer Mother      Lung   • Heart Disease Father    • Diabetes Maternal Aunt    • Diabetes Maternal Grandfather        Past Surgical History:   Procedure Laterality Date   • GANGLION EXCISION  2014    Performed by Efra Ramirez M.D. at [facility]   • HYSTERECTOMY, TOTAL ABDOMINAL      For non cancerous reasons   • CHOLECYSTECTOMY     • CARPAL TUNNEL RELEASE      Right hand   • CYSTOSCOPY STENT PLACEMENT      several, stent removed in MD office   • PB  DELIVERY ONLY         ROS   All systems reviewed are negative except for HPI       Objective:     Blood pressure 112/76, pulse 94, temperature 37.1 °C (98.8 °F), resp. rate 14, height 1.727 m (5' 7.99\"), weight 94.8 kg (209 lb), last menstrual period 2004, SpO2 90 %, not currently breastfeeding. Body mass index is 31.79 kg/m².   Physical Exam:  Constitutional: Alert, no distress.  Skin: Warm, dry, good turgor, no rashes in visible areas.  Eye: Equal, round and reactive, conjunctiva clear, lids normal.  ENMT: Lips without lesions, good dentition, oropharynx clear.  Neck: Trachea midline, no masses, no thyromegaly. No cervical or " supraclavicular lymphadenopathy. Tympanic membrane intact   Respiratory: Unlabored respiratory effort, lungs clear to auscultation, no wheezes, no ronchi.  Cardiovascular: Normal S1, S2, no murmur, no edema.  Abdomen: Soft, non-tender, no masses, no hepatosplenomegaly.  Psych: Alert and oriented x3, normal affect and mood.        Assessment and Plan:   The following treatment plan was discussed    1. Uncontrolled type 2 diabetes mellitus without complication, without long-term current use of insulin (CMS-[facility])  Increase metformin to 1000 in the morning and night. Continue rest of medication same. Continue to monitor   - CBC WITH DIFFERENTIAL; Future  - COMP METABOLIC PANEL; Future  - LIPID PROFILE; Future  - MICROALBUMIN CREAT RATIO URINE; Future  - HEMOGLOBIN A1C; Future    2. Vitamin D deficiency disease  Increase supplement to 5000 U daily. Continue to monitor   - VITAMIN D,25 HYDROXY; Future      3. Herpes dermatitis  Continue to monitor, she does not want oral pills.   - mupirocin calcium (BACTROBAN) 2 % Cream; Apply to affected area(s) 2 times a day.  Dispense: 15 g; Refill: 2  - CBC WITH DIFFERENTIAL; Future    4. Obesity (BMI 30-39.9)  - Patient identified as having weight management issue.  Appropriate orders and counseling given.    5. Dyslipidemia  Continue same treatment, continue to monitor   - COMP METABOLIC PANEL; Future  - LIPID PROFILE; Future    6. Numbness of left foot  Due to diabetic neuropathy?? Vs back pain?. If worsen consider XR of her back     7. Functional diarrhea  Idiopathic, try to go up with metformin. Continue to monitor, she is up to date with colonoscopy, imodium prn       Followup: Return in about 6 months (around 9/9/2018), or if symptoms worsen or fail to improve, for Long.

## 2018-03-09 NOTE — ASSESSMENT & PLAN NOTE
She is on stain. No contraindication. No side effects. Primary prevention, last lipid panel 02/2018

## 2018-03-09 NOTE — ASSESSMENT & PLAN NOTE
Still on low side, she is taking d3 3000 U daily. I advise to take 5000 U daily. Continue to monitor. She feels when she is low on vitamin D, because she has muscle aches

## 2018-03-13 ENCOUNTER — OFFICE VISIT (OUTPATIENT)
Dept: URGENT CARE | Facility: CLINIC | Age: 63
End: 2018-03-13
Payer: COMMERCIAL

## 2018-03-13 VITALS
TEMPERATURE: 97.8 F | WEIGHT: 209.44 LBS | DIASTOLIC BLOOD PRESSURE: 60 MMHG | OXYGEN SATURATION: 96 % | SYSTOLIC BLOOD PRESSURE: 102 MMHG | HEART RATE: 90 BPM | HEIGHT: 67 IN | RESPIRATION RATE: 14 BRPM | BODY MASS INDEX: 32.87 KG/M2

## 2018-03-13 DIAGNOSIS — J02.9 VIRAL PHARYNGITIS: ICD-10-CM

## 2018-03-13 LAB
INT CON NEG: NORMAL
INT CON POS: NORMAL
S PYO AG THROAT QL: NEGATIVE

## 2018-03-13 PROCEDURE — 87880 STREP A ASSAY W/OPTIC: CPT | Performed by: PHYSICIAN ASSISTANT

## 2018-03-13 PROCEDURE — 99214 OFFICE O/P EST MOD 30 MIN: CPT | Performed by: PHYSICIAN ASSISTANT

## 2018-03-13 RX ORDER — DIPHENHYDRAMINE HYDROCHLORIDE AND LIDOCAINE HYDROCHLORIDE AND ALUMINUM HYDROXIDE AND MAGNESIUM HYDRO
5 KIT EVERY 6 HOURS PRN
Qty: 100 ML | Refills: 0 | Status: SHIPPED | OUTPATIENT
Start: 2018-03-13 | End: 2018-04-26

## 2018-03-13 ASSESSMENT — ENCOUNTER SYMPTOMS
HEADACHES: 0
COUGH: 0
DIARRHEA: 0
ABDOMINAL PAIN: 0
SPUTUM PRODUCTION: 0
DIZZINESS: 0
SWOLLEN GLANDS: 0
NAUSEA: 0
TROUBLE SWALLOWING: 1
HOARSE VOICE: 0
FEVER: 0
CHILLS: 0
SHORTNESS OF BREATH: 0
SORE THROAT: 1
MUSCULOSKELETAL NEGATIVE: 1
VOMITING: 0

## 2018-03-13 NOTE — LETTER
March 13, 2018         Patient: Shirley Unger   YOB: 1955   Date of Visit: 3/13/2018           To Whom it May Concern:    Shirley Unger was seen in my clinic on 3/13/2018. Please excuse her absence on 3/14/18.    If you have any questions or concerns, please don't hesitate to call.        Sincerely,           Ingrid Loredo P.A.-C.  Electronically Signed

## 2018-03-13 NOTE — PROGRESS NOTES
"Subjective:      Shirley Unger is a 63 y.o. female who presents with Pharyngitis (x6days, sore throat, body aches)            Pharyngitis    This is a new problem. The current episode started in the past 7 days (6 days). The problem has been unchanged. The pain is worse on the left side. There has been no fever. The pain is at a severity of 3/10. The pain is mild. Associated symptoms include trouble swallowing. Pertinent negatives include no abdominal pain, congestion, coughing, diarrhea, ear pain, headaches, hoarse voice, shortness of breath, swollen glands or vomiting. She has had no exposure to strep or mono. She has tried nothing for the symptoms.     Patient denies fevers, chills, cough, congestion, post nasal drip, chest pain, or SOB. No known sick contacts or history of tonsillectomy.     Review of Systems   Constitutional: Negative for chills and fever.   HENT: Positive for sore throat and trouble swallowing. Negative for congestion, ear pain and hoarse voice.    Respiratory: Negative for cough, sputum production and shortness of breath.    Cardiovascular: Negative for chest pain.   Gastrointestinal: Negative for abdominal pain, diarrhea, nausea and vomiting.   Genitourinary: Negative.    Musculoskeletal: Negative.    Skin: Negative for rash.   Neurological: Negative for dizziness and headaches.        Objective:     /60   Pulse 90   Temp 36.6 °C (97.8 °F)   Resp 14   Ht 1.702 m (5' 7\")   Wt 95 kg (209 lb 7 oz)   LMP 11/29/2004   SpO2 96%   BMI 32.80 kg/m²      Physical Exam   Constitutional: She is oriented to person, place, and time. She appears well-developed and well-nourished. No distress.   HENT:   Head: Normocephalic and atraumatic.   Right Ear: Hearing, tympanic membrane, external ear and ear canal normal.   Left Ear: Hearing, tympanic membrane, external ear and ear canal normal.   Mouth/Throat: Posterior oropharyngeal erythema present. No oropharyngeal exudate or posterior oropharyngeal " edema.   Mild posterior oropharyngeal erythema without enlarged tonsils or exudates noted.    Eyes: Conjunctivae are normal. Pupils are equal, round, and reactive to light.   Neck: Normal range of motion.   Cardiovascular: Normal rate and regular rhythm.    No murmur heard.  Pulmonary/Chest: Effort normal and breath sounds normal. No respiratory distress. She has no wheezes. She has no rales.   Musculoskeletal: Normal range of motion.   Lymphadenopathy:     She has no cervical adenopathy.   Neurological: She is alert and oriented to person, place, and time.   Skin: Skin is warm and dry. She is not diaphoretic.   Psychiatric: She has a normal mood and affect. Her behavior is normal.   Nursing note and vitals reviewed.         PMH:  has a past medical history of DM (diabetes mellitus) (CMS-[facility]) (2004); Herpes genitalia (3/22/2013); Hyperlipidemia LDL goal < 100; Nephrolithiasis (3/22/2013); and Vitamin d deficiency (7/8/2013).  MEDS:   Current Outpatient Prescriptions:   •  DPH-Lido-AlHydr-MgHydr-Simeth (MAGIC MOUTHWASH BLM) Suspension, Take 5 mL by mouth every 6 hours as needed., Disp: 100 mL, Rfl: 0  •  triamcinolone acetonide (KENALOG) 0.1 % Ointment, Apply  to affected area(s) 2 times a day., Disp: , Rfl:   •  metformin (GLUCOPHAGE) 1000 MG tablet, TAKE 1/2 TABLET BY MOUTH IN THE MORNING AND ONE TABLET AT NIGHT, Disp: 180 Tab, Rfl: 3  •  Canagliflozin (INVOKANA) 300 MG Tab, Take 1 Tab by mouth every day., Disp: 90 Tab, Rfl: 3  •  atorvastatin (LIPITOR) 20 MG Tab, Take 1 Tab by mouth every day., Disp: 90 Tab, Rfl: 3  •  glimepiride (AMARYL) 4 MG Tab, Take 1 Tab by mouth every morning., Disp: 90 Tab, Rfl: 3  •  colesevelam (WELCHOL) 625 MG Tab, TAKE ONE TABLET BY MOUTH IN THE MORNING AND THEN TAKE TWO TABLETS IN THE EVENING WITH DINNER, Disp: 270 Tab, Rfl: 3  •  BYDUREON 2 MG Pen-injector, INJECT 2MG(1 PEN) SUBCUTANEOUSLY AS INSTRUCTED EVERY 7 DAYS, Disp: 12 Each, Rfl: 3  •  lisinopril (PRINIVIL) 5 MG Tab, Take 1  Tab by mouth every bedtime., Disp: 90 Tab, Rfl: 1  •  meloxicam (MOBIC) 15 MG tablet, Take 1 Tab by mouth every day., Disp: 30 Tab, Rfl: 0  •  cetirizine (ZYRTEC) 10 MG Tab, Take 1 Tab by mouth every day., Disp: 30 Tab, Rfl: 3  •  fluticasone (FLONASE) 50 MCG/ACT nasal spray, Spray 2 Sprays in nose every day., Disp: 16 g, Rfl: 0  •  FREESTYLE LITE strip, USE ONE STRIP TWICE DAILY AND AS NEEDED FOR SIGNS AND SYMPTOM OF HIGH OR LOW BLOOD SUGAR, Disp: 100 Strip, Rfl: 0  •  trolamine salicylate (ASPERCREME/ALOE) 10 % cream, Apply 1 Squirt to affected area(s) 4 times a day., Disp: 1 Tube, Rfl: 3  •  Lancets MISC, Lancets order: Lancets for Abbott Freestyle Lite meter. Sig: use BID and prn ssx high or low sugar. #100 RF x 3, Disp: 100 Each, Rfl: 3  •  Blood Glucose Monitoring Suppl SUPPLIES MISC, Test strips order: Test strips for Abbott Freestyle Lite meter. Sig: use BID and prn ssx high or low sugar #100 RF x 3, Disp: 100 Each, Rfl: 3  •  aspirin (ASA) 81 MG CHEW, Take 81 mg by mouth every day.  , Disp: , Rfl:   •  POTASSIUM CITRATE PO, Take 2 Tabs by mouth 3 times a day., Disp: , Rfl:   •  mupirocin calcium (BACTROBAN) 2 % Cream, Apply to affected area(s) 2 times a day., Disp: 15 g, Rfl: 2  ALLERGIES:   Allergies   Allergen Reactions   • Macrobid [Nitrofurantoin Monohydrate Macrocrystals] Hives   • Pcn [Penicillins] Hives     SURGHX:   Past Surgical History:   Procedure Laterality Date   • GANGLION EXCISION  2014    Performed by Efra Ramirez M.D. at [facility]S   • HYSTERECTOMY, TOTAL ABDOMINAL      For non cancerous reasons   • CHOLECYSTECTOMY     • CARPAL TUNNEL RELEASE      Right hand   • CYSTOSCOPY STENT PLACEMENT      several, stent removed in MD office   • PB  DELIVERY ONLY       SOCHX:  reports that she has never smoked. She has never used smokeless tobacco. She reports that she does not drink alcohol or use drugs.  FH: family history includes Cancer in her mother;  Diabetes in her maternal aunt and maternal grandfather; Heart Disease in her father.       Assessment/Plan:     1. Viral pharyngitis  - POCT Rapid Strep A: NEGATIVE  - DPH-Lido-AlHydr-MgHydr-Simeth (MAGIC MOUTHWASH BLM) Suspension; Take 5 mL by mouth every 6 hours as needed.  Dispense: 100 mL; Refill: 0    Advised patient symptoms are most likely viral in etiology, recommend supportive care. Increased fluids and rest. Warm salt water gargles and magic mouthwash as needed for symptomatic relief. Call or return to office if symptoms persist or worsen. Work note given at today's visit. The patient demonstrated a good understanding and agreed with the treatment plan.

## 2018-03-14 ENCOUNTER — HOSPITAL ENCOUNTER (OUTPATIENT)
Facility: MEDICAL CENTER | Age: 63
End: 2018-03-14
Attending: UROLOGY
Payer: COMMERCIAL

## 2018-03-14 LAB
ALBUMIN SERPL BCP-MCNC: 4.6 G/DL (ref 3.2–4.9)
ALBUMIN/GLOB SERPL: 2 G/DL
ALP SERPL-CCNC: 51 U/L (ref 30–99)
ALT SERPL-CCNC: 14 U/L (ref 2–50)
ANION GAP SERPL CALC-SCNC: 10 MMOL/L (ref 0–11.9)
AST SERPL-CCNC: 14 U/L (ref 12–45)
BILIRUB SERPL-MCNC: 0.7 MG/DL (ref 0.1–1.5)
BUN SERPL-MCNC: 17 MG/DL (ref 8–22)
CALCIUM SERPL-MCNC: 10.3 MG/DL (ref 8.5–10.5)
CHLORIDE SERPL-SCNC: 102 MMOL/L (ref 96–112)
CO2 SERPL-SCNC: 28 MMOL/L (ref 20–33)
COLLECT DURATION TIME UR: 24 HR
CREAT CL/1.73 SQ M ?TM UR+SERPL-ARVRAT: 100 ML/MIN (ref 88–128)
CREAT SERPL-MCNC: 0.73 MG/DL (ref 0.5–1.4)
CREAT SERPL-MCNC: 0.73 MG/DL (ref 0.5–1.4)
CREAT UR-MCNC: 65.4 MG/DL
GLOBULIN SER CALC-MCNC: 2.3 G/DL (ref 1.9–3.5)
GLUCOSE SERPL-MCNC: 151 MG/DL (ref 65–99)
POTASSIUM SERPL-SCNC: 4.5 MMOL/L (ref 3.6–5.5)
PROT 24H UR-MCNC: 68.8 MG/24 HR (ref 30–150)
PROT 24H UR-MRATE: 4.3 MG/DL (ref 0–15)
PROT SERPL-MCNC: 6.9 G/DL (ref 6–8.2)
PTH-INTACT SERPL-MCNC: 18.7 PG/ML (ref 14–72)
SODIUM SERPL-SCNC: 140 MMOL/L (ref 135–145)
SPECIMEN VOL UR: 1600 ML
SPECIMEN VOL UR: 1600 ML
URATE SERPL-MCNC: 4.9 MG/DL (ref 1.9–8.2)
URINE CREATININE EXCRETED 1125: 1046 MG/24 HR

## 2018-03-14 PROCEDURE — 81050 URINALYSIS VOLUME MEASURE: CPT | Mod: 91

## 2018-03-14 PROCEDURE — 84156 ASSAY OF PROTEIN URINE: CPT

## 2018-03-14 PROCEDURE — 83945 ASSAY OF OXALATE: CPT

## 2018-03-14 PROCEDURE — 82436 ASSAY OF URINE CHLORIDE: CPT

## 2018-03-14 PROCEDURE — 84550 ASSAY OF BLOOD/URIC ACID: CPT

## 2018-03-14 PROCEDURE — 84560 ASSAY OF URINE/URIC ACID: CPT

## 2018-03-14 PROCEDURE — 84300 ASSAY OF URINE SODIUM: CPT

## 2018-03-14 PROCEDURE — 84133 ASSAY OF URINE POTASSIUM: CPT

## 2018-03-14 PROCEDURE — 82131 AMINO ACIDS SINGLE QUANT: CPT

## 2018-03-14 PROCEDURE — 84105 ASSAY OF URINE PHOSPHORUS: CPT

## 2018-03-14 PROCEDURE — 83970 ASSAY OF PARATHORMONE: CPT

## 2018-03-14 PROCEDURE — 83986 ASSAY PH BODY FLUID NOS: CPT

## 2018-03-14 PROCEDURE — 83735 ASSAY OF MAGNESIUM: CPT

## 2018-03-14 PROCEDURE — 84392 ASSAY OF URINE SULFATE: CPT

## 2018-03-14 PROCEDURE — 82575 CREATININE CLEARANCE TEST: CPT

## 2018-03-14 PROCEDURE — 82507 ASSAY OF CITRATE: CPT

## 2018-03-14 PROCEDURE — 80053 COMPREHEN METABOLIC PANEL: CPT

## 2018-03-14 PROCEDURE — 84540 ASSAY OF URINE/UREA-N: CPT

## 2018-03-14 PROCEDURE — 82340 ASSAY OF CALCIUM IN URINE: CPT

## 2018-03-15 LAB
SPECIMEN VOL UR: 1600 ML
UUN 24H UR-MRATE: 6864 MG/24 HR (ref 12000–20000)

## 2018-03-16 ENCOUNTER — APPOINTMENT (OUTPATIENT)
Dept: ENDOCRINOLOGY | Facility: MEDICAL CENTER | Age: 63
End: 2018-03-16
Payer: COMMERCIAL

## 2018-03-16 NOTE — PROGRESS NOTES
RN-CDE Note    Subjective:     Health changes since last visit/interval Hx: {NONE:16648}    Medications (including changes made today)  Current Outpatient Prescriptions   Medication Sig Dispense Refill   • DPH-Lido-AlHydr-MgHydr-Simeth (MAGIC MOUTHWASH BLM) Suspension Take 5 mL by mouth every 6 hours as needed. 100 mL 0   • triamcinolone acetonide (KENALOG) 0.1 % Ointment Apply  to affected area(s) 2 times a day.     • mupirocin calcium (BACTROBAN) 2 % Cream Apply to affected area(s) 2 times a day. 15 g 2   • metformin (GLUCOPHAGE) 1000 MG tablet TAKE 1/2 TABLET BY MOUTH IN THE MORNING AND ONE TABLET AT NIGHT 180 Tab 3   • Canagliflozin (INVOKANA) 300 MG Tab Take 1 Tab by mouth every day. 90 Tab 3   • atorvastatin (LIPITOR) 20 MG Tab Take 1 Tab by mouth every day. 90 Tab 3   • glimepiride (AMARYL) 4 MG Tab Take 1 Tab by mouth every morning. 90 Tab 3   • colesevelam (WELCHOL) 625 MG Tab TAKE ONE TABLET BY MOUTH IN THE MORNING AND THEN TAKE TWO TABLETS IN THE EVENING WITH DINNER 270 Tab 3   • BYDUREON 2 MG Pen-injector INJECT 2MG(1 PEN) SUBCUTANEOUSLY AS INSTRUCTED EVERY 7 DAYS 12 Each 3   • lisinopril (PRINIVIL) 5 MG Tab Take 1 Tab by mouth every bedtime. 90 Tab 1   • meloxicam (MOBIC) 15 MG tablet Take 1 Tab by mouth every day. 30 Tab 0   • cetirizine (ZYRTEC) 10 MG Tab Take 1 Tab by mouth every day. 30 Tab 3   • fluticasone (FLONASE) 50 MCG/ACT nasal spray Spray 2 Sprays in nose every day. 16 g 0   • FREESTYLE LITE strip USE ONE STRIP TWICE DAILY AND AS NEEDED FOR SIGNS AND SYMPTOM OF HIGH OR LOW BLOOD SUGAR 100 Strip 0   • trolamine salicylate (ASPERCREME/ALOE) 10 % cream Apply 1 Squirt to affected area(s) 4 times a day. 1 Tube 3   • Lancets MISC Lancets order: Lancets for Abbott Freestyle Lite meter. Sig: use BID and prn ssx high or low sugar. #100 RF x 3 100 Each 3   • Blood Glucose Monitoring Suppl SUPPLIES MISC Test strips order: Test strips for Abbott Freestyle Lite meter. Sig: use BID and prn ssx high or low  "sugar #100 RF x 3 100 Each 3   • aspirin (ASA) 81 MG CHEW Take 81 mg by mouth every day.       • POTASSIUM CITRATE PO Take 2 Tabs by mouth 3 times a day.       No current facility-administered medications for this visit.        Taking daily ASA: Yes  Taking above medications as prescribed: {YES (DEF)/NO:02744}  Patient {DENIES DEFAULT:20195::\"Denies\"} side effects of medication.    Exercise: sporadic irregular exercise, <half hour walking weekly  Diet: \"healthy\" diet  in general    Health Maintenance:   The patient has no Health Maintenance topics of status Overdue, Due On, or Due Soon    Immunizations:   PPSV23: up to date  Crgcigy15: up to date  Tdap: up to date  Flu: up to date      DM:   Last A1c:   Lab Results   Component Value Date/Time    HBA1C 7.3 (H) 03/05/2018 07:52 AM      A1c goal: < 7    Glucose monitoring frequency: 0-1 times per day      Hypoglycemic episodes: no     Last Retinal Exam: 8/15/17  Daily Foot Exam: yes  Routine Dental Exams: yes    Lab Results   Component Value Date/Time    MALBCRT see below 03/05/2018 07:53 AM    MICROALBUR <0.7 03/05/2018 07:53 AM        ACR Albumin/Creatinine Ratio goal <30 at goal        HTN:   Blood pressure goal <140/<80 ***.   Currently Rx ACE/ARB: Yes    Dyslipidemia:    Lab Results   Component Value Date/Time    CHOLSTRLTOT 117 03/05/2018 07:52 AM    LDL 22 03/05/2018 07:52 AM    HDL 36 (A) 03/05/2018 07:52 AM    TRIGLYCERIDE 297 (H) 03/05/2018 07:52 AM       Lab Results   Component Value Date/Time    SODIUM 140 03/14/2018 08:45 AM    POTASSIUM 4.5 03/14/2018 08:45 AM    CHLORIDE 102 03/14/2018 08:45 AM    CO2 28 03/14/2018 08:45 AM    GLUCOSE 151 (H) 03/14/2018 08:45 AM    BUN 17 03/14/2018 08:45 AM    CREATININE 0.73 03/14/2018 08:45 AM    CREATININE 0.9 04/07/2005 09:15 PM     Lab Results   Component Value Date/Time    ALKPHOSPHAT 51 03/14/2018 08:45 AM    ASTSGOT 14 03/14/2018 08:45 AM    ALTSGPT 14 03/14/2018 08:45 AM    TBILIRUBIN 0.7 03/14/2018 08:45 AM    "     Currently Rx Statin: Yes      She  reports that she has never smoked. She has never used smokeless tobacco.    Objective:     Exam:  Monofilament: not done      Plan:     Discussed All medications, side effects and compliance (discussed carefully)  Annual eye examinations at Ophthalmology  Home glucose monitoring emphasized.           Recommended medication changes: ***

## 2018-03-19 LAB
CREAT UR-MCNC: 60 MG/DL
CYSTINE 24H UR-MCNC: 0.89 MG/DL
CYSTINE 24H UR-MRATE: 14 MG/D
CYSTINE/CREAT 24H UR-RTO: 62 UMOL/G CRT (ref 12–81)

## 2018-03-22 LAB
CA H2 PHOS DIHYD 24H UR-MRATE: 0.34
CALCIUM 24H UR-MCNC: 18.4 MG/DL
CALCIUM 24H UR-MRATE: 1.45 MG/(24.H)
CALCIUM 24H UR-MRATE: 294 MG/D
CAOX 24H UR-MRATE: 7.67
CHLORIDE 24H UR-SCNC: 102 MMOL/L
CHLORIDE 24H UR-SRATE: 163 MMOL/D (ref 140–250)
CITRATE 24H UR-MCNC: 710 MG/L
CITRATE 24H UR-MRATE: 1136 MG/D (ref 320–1240)
COLLECT DURATION TIME SPEC: 24 HRS
CREAT 24H UR-MCNC: 60 MG/DL
CREAT 24H UR-MRATE: 960 MG/D (ref 500–1400)
EER SUPERSAT PROFILE UR Q2098: ABNORMAL
MAGNESIUM 24H UR-MCNC: 5 MG/DL
MAGNESIUM 24H UR-MRATE: 80 MG/D (ref 12–199)
OXALATE 24H UR-MCNC: 20 MG/L
OXALATE 24H UR-MRATE: 32 MG/D (ref 13–40)
PH UR: 5.38 [PH] (ref 5–7.5)
PHOSPHATE 24H UR-MCNC: 29 MG/DL
PHOSPHATE 24H UR-MRATE: 464 MG/D (ref 400–1300)
POTASSIUM 24H UR-SCNC: 52 MMOL/L
POTASSIUM 24H UR-SRATE: 83 MMOL/D (ref 25–125)
REF LAB TEST RESULTS: ABNORMAL
SODIUM 24H UR-SCNC: 99 MMOL/L
SODIUM 24H UR-SRATE: 158 MMOL/D (ref 51–286)
SULFATE 24H UR-SRATE: 30 MMOL/D (ref 6–30)
SULFATE UR-SCNC: 19 MMOL/L
TOTAL VOLUME 1105: ABNORMAL ML
URATE 24H UR-MCNC: 34.3 MG/DL
URATE 24H UR-MRATE: 549 MG/D (ref 250–750)

## 2018-04-17 ENCOUNTER — HOSPITAL ENCOUNTER (OUTPATIENT)
Dept: LAB | Facility: MEDICAL CENTER | Age: 63
End: 2018-04-17
Attending: UROLOGY
Payer: COMMERCIAL

## 2018-04-17 LAB
PROT 24H UR-MCNC: 67.5 MG/24 HR (ref 30–150)
PROT 24H UR-MRATE: 4.5 MG/DL (ref 0–15)
SPECIMEN VOL UR: 1500 ML
UUN 24H UR-MRATE: 8715 MG/24 HR (ref 12000–20000)

## 2018-04-17 PROCEDURE — 84392 ASSAY OF URINE SULFATE: CPT

## 2018-04-17 PROCEDURE — 83986 ASSAY PH BODY FLUID NOS: CPT

## 2018-04-17 PROCEDURE — 84133 ASSAY OF URINE POTASSIUM: CPT

## 2018-04-17 PROCEDURE — 84540 ASSAY OF URINE/UREA-N: CPT

## 2018-04-17 PROCEDURE — 83945 ASSAY OF OXALATE: CPT

## 2018-04-17 PROCEDURE — 82131 AMINO ACIDS SINGLE QUANT: CPT

## 2018-04-17 PROCEDURE — 83735 ASSAY OF MAGNESIUM: CPT

## 2018-04-17 PROCEDURE — 81050 URINALYSIS VOLUME MEASURE: CPT

## 2018-04-17 PROCEDURE — 84560 ASSAY OF URINE/URIC ACID: CPT

## 2018-04-17 PROCEDURE — 84105 ASSAY OF URINE PHOSPHORUS: CPT

## 2018-04-17 PROCEDURE — 82507 ASSAY OF CITRATE: CPT

## 2018-04-17 PROCEDURE — 84156 ASSAY OF PROTEIN URINE: CPT

## 2018-04-17 PROCEDURE — 82436 ASSAY OF URINE CHLORIDE: CPT

## 2018-04-17 PROCEDURE — 84300 ASSAY OF URINE SODIUM: CPT

## 2018-04-17 PROCEDURE — 82340 ASSAY OF CALCIUM IN URINE: CPT

## 2018-04-22 LAB
COLLECT DURATION TIME SPEC: 24 HRS
CREAT UR-MCNC: 57 MG/DL
CYSTINE 24H UR-MCNC: 1.04 MG/DL
CYSTINE 24H UR-MRATE: 16 MG/D
CYSTINE/CREAT 24H UR-RTO: 76 UMOL/G CRT (ref 12–81)
TOTAL VOLUME 1105: 1500 ML

## 2018-04-23 LAB
CA H2 PHOS DIHYD 24H UR-MRATE: 0.72
CALCIUM 24H UR-MCNC: 23.3 MG/DL
CALCIUM 24H UR-MRATE: 0.74 MG/(24.H)
CALCIUM 24H UR-MRATE: 350 MG/D
CAOX 24H UR-MRATE: 5.53
CHLORIDE 24H UR-SCNC: 80 MMOL/L
CHLORIDE 24H UR-SRATE: 120 MMOL/D (ref 140–250)
CITRATE 24H UR-MCNC: 773 MG/L
CITRATE 24H UR-MRATE: 1160 MG/D (ref 320–1240)
COLLECT DURATION TIME SPEC: 24 HRS
CREAT 24H UR-MCNC: 60 MG/DL
CREAT 24H UR-MRATE: 900 MG/D (ref 500–1400)
EER SUPERSAT PROFILE UR Q2098: ABNORMAL
MAGNESIUM 24H UR-MCNC: 4.8 MG/DL
MAGNESIUM 24H UR-MRATE: 72 MG/D (ref 12–199)
OXALATE 24H UR-MCNC: 12 MG/L
OXALATE 24H UR-MRATE: 18 MG/D (ref 13–40)
PH UR: 5.77 [PH] (ref 5–7.5)
PHOSPHATE 24H UR-MCNC: 23 MG/DL
PHOSPHATE 24H UR-MRATE: 345 MG/D (ref 400–1300)
POTASSIUM 24H UR-SCNC: 50 MMOL/L
POTASSIUM 24H UR-SRATE: 75 MMOL/D (ref 25–125)
REF LAB TEST RESULTS: ABNORMAL
SODIUM 24H UR-SCNC: 86 MMOL/L
SODIUM 24H UR-SRATE: 129 MMOL/D (ref 51–286)
SULFATE 24H UR-SRATE: 32 MMOL/D (ref 6–30)
SULFATE UR-SCNC: 21 MMOL/L
TOTAL VOLUME 1105: 1500 ML
URATE 24H UR-MCNC: 32.4 MG/DL
URATE 24H UR-MRATE: 486 MG/D (ref 250–750)

## 2018-04-26 ENCOUNTER — OFFICE VISIT (OUTPATIENT)
Dept: ENDOCRINOLOGY | Facility: MEDICAL CENTER | Age: 63
End: 2018-04-26
Payer: COMMERCIAL

## 2018-04-26 VITALS
HEIGHT: 67 IN | SYSTOLIC BLOOD PRESSURE: 88 MMHG | OXYGEN SATURATION: 95 % | WEIGHT: 208 LBS | BODY MASS INDEX: 32.65 KG/M2 | DIASTOLIC BLOOD PRESSURE: 60 MMHG | HEART RATE: 121 BPM

## 2018-04-26 DIAGNOSIS — I10 ESSENTIAL HYPERTENSION: ICD-10-CM

## 2018-04-26 DIAGNOSIS — E78.2 MIXED HYPERLIPIDEMIA: ICD-10-CM

## 2018-04-26 DIAGNOSIS — E11.65 TYPE 2 DIABETES MELLITUS WITH HYPERGLYCEMIA, WITHOUT LONG-TERM CURRENT USE OF INSULIN (HCC): ICD-10-CM

## 2018-04-26 PROCEDURE — 99214 OFFICE O/P EST MOD 30 MIN: CPT | Performed by: INTERNAL MEDICINE

## 2018-04-26 NOTE — PROGRESS NOTES
RN-CDE Note    Subjective:     Health changes since last visit/interval Hx: had a viral infection in her throat about a month ago.   Complaining of left hip pain    Medications (including changes made today)  Current Outpatient Prescriptions   Medication Sig Dispense Refill   • triamcinolone acetonide (KENALOG) 0.1 % Ointment Apply  to affected area(s) 2 times a day.     • mupirocin calcium (BACTROBAN) 2 % Cream Apply to affected area(s) 2 times a day. 15 g 2   • metformin (GLUCOPHAGE) 1000 MG tablet TAKE 1/2 TABLET BY MOUTH IN THE MORNING AND ONE TABLET AT NIGHT 180 Tab 3   • Canagliflozin (INVOKANA) 300 MG Tab Take 1 Tab by mouth every day. 90 Tab 3   • atorvastatin (LIPITOR) 20 MG Tab Take 1 Tab by mouth every day. 90 Tab 3   • glimepiride (AMARYL) 4 MG Tab Take 1 Tab by mouth every morning. 90 Tab 3   • colesevelam (WELCHOL) 625 MG Tab TAKE ONE TABLET BY MOUTH IN THE MORNING AND THEN TAKE TWO TABLETS IN THE EVENING WITH DINNER 270 Tab 3   • BYDUREON 2 MG Pen-injector INJECT 2MG(1 PEN) SUBCUTANEOUSLY AS INSTRUCTED EVERY 7 DAYS 12 Each 3   • lisinopril (PRINIVIL) 5 MG Tab Take 1 Tab by mouth every bedtime. 90 Tab 1   • FREESTYLE LITE strip USE ONE STRIP TWICE DAILY AND AS NEEDED FOR SIGNS AND SYMPTOM OF HIGH OR LOW BLOOD SUGAR 100 Strip 0   • aspirin (ASA) 81 MG CHEW Take 81 mg by mouth every day.       • POTASSIUM CITRATE PO Take 2 Tabs by mouth 3 times a day.     • meloxicam (MOBIC) 15 MG tablet Take 1 Tab by mouth every day. 30 Tab 0   • cetirizine (ZYRTEC) 10 MG Tab Take 1 Tab by mouth every day. 30 Tab 3   • fluticasone (FLONASE) 50 MCG/ACT nasal spray Spray 2 Sprays in nose every day. 16 g 0   • trolamine salicylate (ASPERCREME/ALOE) 10 % cream Apply 1 Squirt to affected area(s) 4 times a day. 1 Tube 3   • Lancets MISC Lancets order: Lancets for Abbott Freestyle Lite meter. Sig: use BID and prn ssx high or low sugar. #100 RF x 3 100 Each 3   • Blood Glucose Monitoring Suppl SUPPLIES MISC Test strips order:  "Test strips for Abbott Freestyle Lite meter. Sig: use BID and prn ssx high or low sugar #100 RF x 3 100 Each 3     No current facility-administered medications for this visit.        Taking daily ASA: Yes  Taking above medications as prescribed: Yes  Patient Denies side effects of medication.    Exercise: walking about 1 to 1.5 miles in per day (per fitbit)  Diet: \"healthy\" diet  in general  Patient's body mass index is 32.58 kg/m². Exercise and nutrition counseling were performed at this visit.      Health Maintenance:   The patient has no Health Maintenance topics of status Overdue, Due On, or Due Soon        DM:   Last A1c:   Lab Results   Component Value Date/Time    HBA1C 7.3 (H) 03/05/2018 07:52 AM      A1c goal: < 7    Glucose monitoring frequency: testing 0-1 times per day.   When she checks fasting her blood sugars are usually in the  106-125 range, ac lunch   range and ac dinner  100-125 range.       Hypoglycemic episodes: yes - had a blood sugar of 64 the other day, only symptom was hunger.      Last Retinal Exam: 8/15/17  Daily Foot Exam: yes denies problems.   Routine Dental Exams: yes    Lab Results   Component Value Date/Time    MALBCRT see below 03/05/2018 07:53 AM    MICROALBUR <0.7 03/05/2018 07:53 AM        ACR Albumin/Creatinine Ratio goal <30       HTN:   Blood pressure goal <140/<80 .   Currently Rx ACE/ARB: Yes    Dyslipidemia:    Lab Results   Component Value Date/Time    CHOLSTRLTOT 117 03/05/2018 07:52 AM    LDL 22 03/05/2018 07:52 AM    HDL 36 (A) 03/05/2018 07:52 AM    TRIGLYCERIDE 297 (H) 03/05/2018 07:52 AM       Lab Results   Component Value Date/Time    SODIUM 140 03/14/2018 08:45 AM    POTASSIUM 4.5 03/14/2018 08:45 AM    CHLORIDE 102 03/14/2018 08:45 AM    CO2 28 03/14/2018 08:45 AM    GLUCOSE 151 (H) 03/14/2018 08:45 AM    BUN 17 03/14/2018 08:45 AM    CREATININE 0.73 03/14/2018 08:45 AM    CREATININE 0.9 04/07/2005 09:15 PM     Lab Results   Component Value Date/Time    " ALKPHOSPHAT 51 03/14/2018 08:45 AM    ASTSGOT 14 03/14/2018 08:45 AM    ALTSGPT 14 03/14/2018 08:45 AM    TBILIRUBIN 0.7 03/14/2018 08:45 AM        Currently Rx Statin: Yes      She  reports that she has never smoked. She has never used smokeless tobacco.    Objective:     Exam:  Monofilament: not done      Plan:     Discussed All medications, side effects and compliance (discussed carefully)  Annual eye examinations at Ophthalmology  Diabetic diet discussed in detail, written exchange diet given  Foot care discussed and Podiatry visits  Glycohemoglobin and other lab monitoring  Home glucose monitoring emphasized.

## 2018-04-26 NOTE — PROGRESS NOTES
"Endocrinology Clinic Progress Note  PCP: Rogers Mahmood M.D.    CC: Diabetes    HPI:  Shirley Unger is a 62 y.o. old patient who comes in today for routine follow up.     Type 2 diabetes: She is currently on bydureon 2 mg once a week, metformin 1000 mg twice a day, glimepiride 4 mg once a day and Invokana 300 mg daily. She reports compliance with medications. She checks blood sugars 0-1 times a day, most readings are in the range of 80 to 140. No hypoglycemia. No numbness and tingling in feet. She is up-to-date with eye exam.    Hyperlipidemia: She is currently on Lipitor, tolerating well.    Hypertension: Blood pressure is well controlled. She is on ACE inhibitor.    ROS:  Constitutional: No unintentional weight loss  Endo: Denies excessive thirst or frequent urination    Past Medical History:  Patient Active Problem List    Diagnosis Date Noted   • Functional diarrhea 03/09/2018   • Obesity (BMI 30-39.9) 09/28/2017   • Numbness of left foot 09/28/2017   • Seasonal allergic rhinitis due to pollen 03/07/2017   • Herpes dermatitis 11/29/2016   • Primary osteoarthritis of left hip 07/07/2016   • Uncontrolled type 2 diabetes mellitus without complication, without long-term current use of insulin (CMS-[facility]) 08/04/2015   • Vitamin D deficiency disease 07/08/2013   • Dyslipidemia    • Recurrent nephrolithiasis 03/22/2013     Physical Examination:  Vital signs: BP (!) 88/60   Pulse (!) 121   Ht 1.702 m (5' 7\")   Wt 94.3 kg (208 lb)   LMP 11/29/2004   SpO2 95%   BMI 32.58 kg/m²   General: No apparent distress, cooperative  Eyes: No scleral icterus, no discharge, normal eyelids  Neck: No abnormal masses on inspection   Resp: Normal effort, clear to auscultation bilaterally  CVS: Regular rate and rhythm, S1 S2 normal, no murmur  Extremities: No lower extremity edema  Skin: No rash on visible skin  Psych: Alert and oriented, normal mood and affect    Assessment and Plan:    1. Type 2 diabetes mellitus with hyperglycemia, " without long-term current use of insulin (CMS-[facility])  · Recent hemoglobin A1c is 7.3%  · Goal hemoglobin A1c less than 7%  · Continue bydureon, metformin, glimepiride, Invokana; no changes to medications today  · We discussed about diet and lifestyle modification    2. Mixed hyperlipidemia  · LDL 44  · Continue Lipitor    3. Essential hypertension  · Blood pressure is on the low side today, she denies any lightheadedness or dizziness, she is on a low dose of lisinopril    Return in about 4 months (around 8/26/2018).    Thank you for allowing me to participate in the care of this patient.    Amy Jain M.D.    CC:   Rogers Mahmood M.D.    This note was created using voice recognition software (Dragon). The accuracy of the dictation is limited by the abilities of the software. I have reviewed the note prior to signing, however some errors in grammar and context are still possible. If you have any questions related to this note please do not hesitate to contact our office.

## 2018-06-12 ENCOUNTER — OFFICE VISIT (OUTPATIENT)
Dept: MEDICAL GROUP | Facility: PHYSICIAN GROUP | Age: 63
End: 2018-06-12
Payer: COMMERCIAL

## 2018-06-12 VITALS
WEIGHT: 212 LBS | HEART RATE: 78 BPM | OXYGEN SATURATION: 94 % | TEMPERATURE: 97.8 F | RESPIRATION RATE: 14 BRPM | HEIGHT: 67 IN | DIASTOLIC BLOOD PRESSURE: 62 MMHG | SYSTOLIC BLOOD PRESSURE: 110 MMHG | BODY MASS INDEX: 33.27 KG/M2

## 2018-06-12 DIAGNOSIS — Z12.83 SKIN CANCER SCREENING: ICD-10-CM

## 2018-06-12 DIAGNOSIS — F51.01 PRIMARY INSOMNIA: ICD-10-CM

## 2018-06-12 DIAGNOSIS — K59.1 FUNCTIONAL DIARRHEA: ICD-10-CM

## 2018-06-12 DIAGNOSIS — E55.9 VITAMIN D DEFICIENCY DISEASE: ICD-10-CM

## 2018-06-12 DIAGNOSIS — E66.9 OBESITY (BMI 30-39.9): ICD-10-CM

## 2018-06-12 DIAGNOSIS — M16.12 PRIMARY OSTEOARTHRITIS OF LEFT HIP: ICD-10-CM

## 2018-06-12 PROCEDURE — 99214 OFFICE O/P EST MOD 30 MIN: CPT | Performed by: INTERNAL MEDICINE

## 2018-06-12 RX ORDER — LISINOPRIL 2.5 MG/1
2.5 TABLET ORAL DAILY
Qty: 30 TAB | COMMUNITY
Start: 2018-06-12 | End: 2019-03-12

## 2018-06-12 ASSESSMENT — PATIENT HEALTH QUESTIONNAIRE - PHQ9: CLINICAL INTERPRETATION OF PHQ2 SCORE: 0

## 2018-06-13 NOTE — ASSESSMENT & PLAN NOTE
I have started pt last time on low dose of lisinopril 5 mg. She noticed when she saw endo that BP is low as 88/60. She had couple of episode of dizziness, flushing, lightheaded. She is keep monitoring BP and it is low 100-120/70. The days she is out she feels low energy and more lightheaded. I advise to cut lisinopril to 2.5 mg daily. Continue to monitor . She had increased metformin to 1000 mg BID, not worsen diarrhea. Continue to monitor. Follow up with endocrinologist

## 2018-06-13 NOTE — PROGRESS NOTES
Subjective:   Shirley Unger is a 63 y.o. female here today for feeling tired, dermatology referral, right hip pain, insomnia, diabetes, low blood pressure     Uncontrolled type 2 diabetes mellitus without complication, without long-term current use of insulin (CMS-HCC) (HCC)  I have started pt last time on low dose of lisinopril 5 mg. She noticed when she saw endo that BP is low as 88/60. She had couple of episode of dizziness, flushing, lightheaded. She is keep monitoring BP and it is low 100-120/70. The days she is out she feels low energy and more lightheaded. I advise to cut lisinopril to 2.5 mg daily. Continue to monitor . She had increased metformin to 1000 mg BID, not worsen diarrhea. Continue to monitor. Follow up with endocrinologist    Vitamin D deficiency disease  On supplement, continue to monitor     Primary osteoarthritis of left hip  He left hip continues to bothers her, especially when she hikes or certain position. She is still able to get around. She is worried for surgery. She is not ready for evaluation at this time. Pain on on lateral side. There are days when she has no pain. She is not taking anything. I advise I can order XR and ortho referral when she is ready     Obesity (BMI 30-39.9)  Counseling for a small portion, balanced diet, exercising for3-5 times per week.      Primary insomnia  Other concern remain, her feeling sleepy sometimes at work, and low energy. I reviewed lab work and it is unremarkable. hgb normal, tsh normal. She had increased vitamin d supplement. Her sleeping pattern is bad. Her  snore. She wakes up multiple times due to that. She feels better if she naps during lunch     Skin cancer screening  Screening done today. No skin lesion to be concerned for malignancy.     Functional diarrhea  Stable. Slight better        Current medicines (including changes today)  Current Outpatient Prescriptions   Medication Sig Dispense Refill   • lisinopril (PRINIVIL) 2.5 MG Tab  Take 1 Tab by mouth every day. 30 Tab    • glucose blood (FREESTYLE LITE) strip 1 Strip by Other route as needed. 100 Strip 0   • triamcinolone acetonide (KENALOG) 0.1 % Ointment Apply  to affected area(s) 2 times a day.     • mupirocin calcium (BACTROBAN) 2 % Cream Apply to affected area(s) 2 times a day. 15 g 2   • metformin (GLUCOPHAGE) 1000 MG tablet TAKE 1/2 TABLET BY MOUTH IN THE MORNING AND ONE TABLET AT NIGHT 180 Tab 3   • Canagliflozin (INVOKANA) 300 MG Tab Take 1 Tab by mouth every day. 90 Tab 3   • atorvastatin (LIPITOR) 20 MG Tab Take 1 Tab by mouth every day. 90 Tab 3   • glimepiride (AMARYL) 4 MG Tab Take 1 Tab by mouth every morning. 90 Tab 3   • colesevelam (WELCHOL) 625 MG Tab TAKE ONE TABLET BY MOUTH IN THE MORNING AND THEN TAKE TWO TABLETS IN THE EVENING WITH DINNER 270 Tab 3   • BYDUREON 2 MG Pen-injector INJECT 2MG(1 PEN) SUBCUTANEOUSLY AS INSTRUCTED EVERY 7 DAYS 12 Each 3   • meloxicam (MOBIC) 15 MG tablet Take 1 Tab by mouth every day. 30 Tab 0   • cetirizine (ZYRTEC) 10 MG Tab Take 1 Tab by mouth every day. 30 Tab 3   • fluticasone (FLONASE) 50 MCG/ACT nasal spray Spray 2 Sprays in nose every day. 16 g 0   • trolamine salicylate (ASPERCREME/ALOE) 10 % cream Apply 1 Squirt to affected area(s) 4 times a day. 1 Tube 3   • Lancets MISC Lancets order: Lancets for Abbott Freestyle Lite meter. Sig: use BID and prn ssx high or low sugar. #100 RF x 3 100 Each 3   • Blood Glucose Monitoring Suppl SUPPLIES MISC Test strips order: Test strips for Abbott Freestyle Lite meter. Sig: use BID and prn ssx high or low sugar #100 RF x 3 100 Each 3   • aspirin (ASA) 81 MG CHEW Take 81 mg by mouth every day.       • POTASSIUM CITRATE PO Take 2 Tabs by mouth 3 times a day.       No current facility-administered medications for this visit.      She  has a past medical history of DM (diabetes mellitus) ([facility]) (2004); Herpes genitalia (3/22/2013); Hyperlipidemia LDL goal < 100; Nephrolithiasis (3/22/2013); and Vitamin  d deficiency (7/8/2013).    Current Outpatient Prescriptions   Medication Sig Dispense Refill   • lisinopril (PRINIVIL) 2.5 MG Tab Take 1 Tab by mouth every day. 30 Tab    • glucose blood (FREESTYLE LITE) strip 1 Strip by Other route as needed. 100 Strip 0   • triamcinolone acetonide (KENALOG) 0.1 % Ointment Apply  to affected area(s) 2 times a day.     • mupirocin calcium (BACTROBAN) 2 % Cream Apply to affected area(s) 2 times a day. 15 g 2   • metformin (GLUCOPHAGE) 1000 MG tablet TAKE 1/2 TABLET BY MOUTH IN THE MORNING AND ONE TABLET AT NIGHT 180 Tab 3   • Canagliflozin (INVOKANA) 300 MG Tab Take 1 Tab by mouth every day. 90 Tab 3   • atorvastatin (LIPITOR) 20 MG Tab Take 1 Tab by mouth every day. 90 Tab 3   • glimepiride (AMARYL) 4 MG Tab Take 1 Tab by mouth every morning. 90 Tab 3   • colesevelam (WELCHOL) 625 MG Tab TAKE ONE TABLET BY MOUTH IN THE MORNING AND THEN TAKE TWO TABLETS IN THE EVENING WITH DINNER 270 Tab 3   • BYDUREON 2 MG Pen-injector INJECT 2MG(1 PEN) SUBCUTANEOUSLY AS INSTRUCTED EVERY 7 DAYS 12 Each 3   • meloxicam (MOBIC) 15 MG tablet Take 1 Tab by mouth every day. 30 Tab 0   • cetirizine (ZYRTEC) 10 MG Tab Take 1 Tab by mouth every day. 30 Tab 3   • fluticasone (FLONASE) 50 MCG/ACT nasal spray Spray 2 Sprays in nose every day. 16 g 0   • trolamine salicylate (ASPERCREME/ALOE) 10 % cream Apply 1 Squirt to affected area(s) 4 times a day. 1 Tube 3   • Lancets MISC Lancets order: Lancets for Abbott Freestyle Lite meter. Sig: use BID and prn ssx high or low sugar. #100 RF x 3 100 Each 3   • Blood Glucose Monitoring Suppl SUPPLIES MISC Test strips order: Test strips for Abbott Freestyle Lite meter. Sig: use BID and prn ssx high or low sugar #100 RF x 3 100 Each 3   • aspirin (ASA) 81 MG CHEW Take 81 mg by mouth every day.       • POTASSIUM CITRATE PO Take 2 Tabs by mouth 3 times a day.       No current facility-administered medications for this visit.        Allergies as of 06/12/2018 - Reviewed  "2018   Allergen Reaction Noted   • Macrobid [nitrofurantoin monohydrate macrocrystals] Hives 10/17/2012   • Pcn [penicillins] Hives 10/17/2012       Social History     Social History   • Marital status:      Spouse name: N/A   • Number of children: N/A   • Years of education: N/A     Occupational History   •  [employer]     Social History Main Topics   • Smoking status: Never Smoker   • Smokeless tobacco: Never Used   • Alcohol use No      Comment: Rare Useage   • Drug use: No   • Sexual activity: Yes     Partners: Male     Other Topics Concern   • Not on file     Social History Narrative   • No narrative on file        Family History   Problem Relation Age of Onset   • Cancer Mother      Lung   • Heart Disease Father    • Diabetes Maternal Aunt    • Diabetes Maternal Grandfather        Past Surgical History:   Procedure Laterality Date   • GANGLION EXCISION  2014    Performed by Efra Ramirez M.D. at SURGERY [facility]   • HYSTERECTOMY, TOTAL ABDOMINAL      For non cancerous reasons   • CHOLECYSTECTOMY     • CARPAL TUNNEL RELEASE      Right hand   • CYSTOSCOPY STENT PLACEMENT      several, stent removed in MD office   • PB  DELIVERY ONLY         ROS   All systems reviewed are negative except for HPI     Objective:     Blood pressure 110/62, pulse 78, temperature 36.6 °C (97.8 °F), resp. rate 14, height 1.702 m (5' 7\"), weight 96.2 kg (212 lb), last menstrual period 2004, SpO2 94 %, not currently breastfeeding. Body mass index is 33.2 kg/m².   Physical Exam:  Constitutional: Alert, no distress.  Skin: Warm, dry, good turgor, no rashes in visible areas. No precancerous lesion, or suspicion lesion for cancer   Eye: Equal, round and reactive, conjunctiva clear, lids normal.  ENMT: Lips without lesions, good dentition, oropharynx clear.  Neck: Trachea midline, no masses, no thyromegaly. No cervical or supraclavicular lymphadenopathy  Respiratory: " Unlabored respiratory effort, lungs clear to auscultation, no wheezes, no ronchi.  Cardiovascular: Normal S1, S2, no murmur, no edema.  Abdomen: Soft, non-tender, no masses, no hepatosplenomegaly.  Psych: Alert and oriented x3, normal affect and mood.        Assessment and Plan:   The following treatment plan was discussed    1. Uncontrolled type 2 diabetes mellitus without complication, without long-term current use of insulin (HCC)  Decrease lisinopril to 2.5 mg daily. Continue to monitotor. Discontinue if BP still low   - lisinopril (PRINIVIL) 2.5 MG Tab; Take 1 Tab by mouth every day.  Dispense: 30 Tab    2. Skin cancer screening  Screening done today during examination. No need for dermatology referral at this time     3. Primary insomnia   Discuss in length in regards to sleep hygiene, sleeping the same time, waking up at the same time, avoid electronics on the room, keeping the room dark, avoid exercising late, have a bath before sleeping. cogntitive therapy. Avoid being stimulants as caffeine late afternoon. exercise early morning     4. Obesity (BMI 30-39.9)  Counseling for a small portion, balanced diet, exercising for3-5 times per week.    5. Vitamin D deficiency disease  Continue supplement, continue to monitor     6. Functional diarrhea  Stable, better     7. Primary osteoarthritis of left hip  Pt will let me know if she would like to have XR and ortho referral       Followup: Return in about 6 months (around 12/12/2018), or if symptoms worsen or fail to improve, for Long.

## 2018-06-13 NOTE — ASSESSMENT & PLAN NOTE
He left hip continues to bothers her, especially when she hikes or certain position. She is still able to get around. She is worried for surgery. She is not ready for evaluation at this time. Pain on on lateral side. There are days when she has no pain. She is not taking anything. I advise I can order XR and ortho referral when she is ready

## 2018-06-13 NOTE — ASSESSMENT & PLAN NOTE
Other concern remain, her feeling sleepy sometimes at work, and low energy. I reviewed lab work and it is unremarkable. hgb normal, tsh normal. She had increased vitamin d supplement. Her sleeping pattern is bad. Her  snore. She wakes up multiple times due to that. She feels better if she naps during lunch

## 2018-06-27 RX ORDER — EXENATIDE 2 MG/.65ML
INJECTION, SUSPENSION, EXTENDED RELEASE SUBCUTANEOUS
Qty: 12 EACH | Refills: 3 | Status: SHIPPED | OUTPATIENT
Start: 2018-06-27 | End: 2019-05-30 | Stop reason: SDUPTHER

## 2018-07-19 ENCOUNTER — HOSPITAL ENCOUNTER (OUTPATIENT)
Dept: RADIOLOGY | Facility: MEDICAL CENTER | Age: 63
End: 2018-07-19
Attending: INTERNAL MEDICINE
Payer: COMMERCIAL

## 2018-07-19 DIAGNOSIS — Z12.31 VISIT FOR SCREENING MAMMOGRAM: ICD-10-CM

## 2018-07-19 PROCEDURE — 77063 BREAST TOMOSYNTHESIS BI: CPT

## 2018-08-03 ENCOUNTER — TELEPHONE (OUTPATIENT)
Dept: MEDICAL GROUP | Facility: PHYSICIAN GROUP | Age: 63
End: 2018-08-03

## 2018-08-03 DIAGNOSIS — R20.0 NUMBNESS OF LEFT FOOT: ICD-10-CM

## 2018-08-03 NOTE — TELEPHONE ENCOUNTER
----- Message from Myles Roldan Ass't sent at 8/3/2018  9:13 AM PDT -----  Regarding: FW: Referral Request  Contact: [phone]      ----- Message -----  From: Flor Palumbo  Sent: 8/3/2018   8:51 AM  To: Félix Mg Ma  Subject: FW: Referral Request                                 ----- Message -----  From: Shirley Unger  Sent: 8/3/2018   6:09 AM  To: Donnie Steen Mas  Subject: Referral Request                                 Shirley Unger would like to request a referral.  Reason: Severe pain in ball of right foot  Requested provider: Dr. Rogers Mahmood  Comment:  Dr. Mahmood,  In  I saw you for severe pain in the ball of my right foot.  You told me it was tendonitis.  I am still in severe pain.  I have tried different shoes, Dr. Lucero in my shoes, icing my foot and even Epson Salt soakings.  I still hurt.  Pain relievers do not help.  I think maybe a referral to a foot doctor might help????  Please let me know how you can help me.  thank you,  Shirley Unger   1955  [phone]

## 2018-08-20 DIAGNOSIS — E11.29 TYPE 2 DIABETES MELLITUS WITH OTHER DIABETIC KIDNEY COMPLICATION (HCC): ICD-10-CM

## 2018-08-21 RX ORDER — GLIMEPIRIDE 4 MG/1
TABLET ORAL
Qty: 180 TAB | Refills: 3 | Status: SHIPPED | OUTPATIENT
Start: 2018-08-21 | End: 2019-12-10 | Stop reason: SDUPTHER

## 2018-08-30 ENCOUNTER — OFFICE VISIT (OUTPATIENT)
Dept: ENDOCRINOLOGY | Facility: MEDICAL CENTER | Age: 63
End: 2018-08-30
Payer: COMMERCIAL

## 2018-08-30 VITALS
WEIGHT: 207.8 LBS | HEART RATE: 107 BPM | BODY MASS INDEX: 32.62 KG/M2 | HEIGHT: 67 IN | OXYGEN SATURATION: 93 % | SYSTOLIC BLOOD PRESSURE: 116 MMHG | DIASTOLIC BLOOD PRESSURE: 62 MMHG

## 2018-08-30 DIAGNOSIS — E66.9 OBESITY (BMI 30-39.9): ICD-10-CM

## 2018-08-30 DIAGNOSIS — E11.9 TYPE 2 DIABETES MELLITUS WITHOUT COMPLICATION, WITHOUT LONG-TERM CURRENT USE OF INSULIN (HCC): ICD-10-CM

## 2018-08-30 DIAGNOSIS — E78.2 MIXED HYPERLIPIDEMIA: ICD-10-CM

## 2018-08-30 LAB
HBA1C MFR BLD: 7 % (ref ?–5.8)
INT CON NEG: NORMAL
INT CON POS: NORMAL

## 2018-08-30 PROCEDURE — 99214 OFFICE O/P EST MOD 30 MIN: CPT | Performed by: INTERNAL MEDICINE

## 2018-08-30 PROCEDURE — 83036 HEMOGLOBIN GLYCOSYLATED A1C: CPT | Performed by: INTERNAL MEDICINE

## 2018-08-30 NOTE — PROGRESS NOTES
"Endocrinology Clinic Progress Note  PCP: Rogers Mahmood M.D.    CC: Diabetes    HPI:  Shirley Unger is a 62 y.o. old patient who comes in today for routine follow up.     Type 2 diabetes: She is currently on bydureon 2 mg once a week, metformin 1000 mg twice a day, glimepiride 4 mg once a day and Invokana 300 mg daily. She reports compliance with medications. She checks blood sugars usually once a day, most readings are in the range of .  No hypoglycemia. No numbness and tingling in feet. She is up-to-date with eye exam.    Hyperlipidemia: She is currently on Lipitor, tolerating well.    Obesity: Weight is stable over the past 6 months.  Overall she is almost 100 pounds lower than her highest weight.    ROS:  Constitutional: No unintentional weight loss  Endo: Denies excessive thirst or frequent urination    Past Medical History:  Patient Active Problem List    Diagnosis Date Noted   • Skin cancer screening 06/12/2018   • Primary insomnia 06/12/2018   • Functional diarrhea 03/09/2018   • Obesity (BMI 30-39.9) 09/28/2017   • Numbness of left foot 09/28/2017   • Seasonal allergic rhinitis due to pollen 03/07/2017   • Herpes dermatitis 11/29/2016   • Primary osteoarthritis of left hip 07/07/2016   • Uncontrolled type 2 diabetes mellitus without complication, without long-term current use of insulin (HCC) 08/04/2015   • Vitamin D deficiency disease 07/08/2013   • Dyslipidemia    • Recurrent nephrolithiasis 03/22/2013     Physical Examination:  Vital signs: /62   Pulse (!) 107   Ht 1.702 m (5' 7\")   Wt 94.3 kg (207 lb 12.8 oz)   LMP 11/29/2004   SpO2 93%   BMI 32.55 kg/m²   General: No apparent distress, cooperative  Eyes: No scleral icterus, no discharge, normal eyelids  Neck: No abnormal masses on inspection   Resp: Normal effort, clear to auscultation bilaterally  CVS: Regular rate and rhythm, S1 S2 normal, no murmur  Extremities: No lower extremity edema  Skin: No rash on visible skin  Psych: Alert and " oriented, normal mood and affect    Assessment and Plan:    1. Type 2 diabetes mellitus with hyperglycemia, without long-term current use of insulin (CMS-[facility])  · Hemoglobin A1c today in the clinic is 7%  · Goal hemoglobin A1c less than 7%  · Continue bydureon, metformin, glimepiride, Invokana; no changes to medications today  · We discussed about diet and lifestyle modification    2. Mixed hyperlipidemia  · LDL 44  · Continue Lipitor    3.  Obesity  · Advised to continue working on diet and lifestyle modification for weight loss    Return in about 4 months (around 12/30/2018).    Thank you for allowing me to participate in the care of this patient.    Amy Jain M.D.    CC:   Rogers Mahmood M.D.    This note was created using voice recognition software (Dragon). The accuracy of the dictation is limited by the abilities of the software. I have reviewed the note prior to signing, however some errors in grammar and context are still possible. If you have any questions related to this note please do not hesitate to contact our office.

## 2018-08-30 NOTE — PROGRESS NOTES
RN-CDE Note    Subjective:     Health changes since last visit/interval Hx: tendinitis in right foot, does have appt. With podiatrist.  Having hip pain on the left may need hip replacement    Medications (including changes made today)  Current Outpatient Prescriptions   Medication Sig Dispense Refill   • glimepiride (AMARYL) 4 MG Tab TAKE ONE TABLET BY MOUTH ONCE DAILY IN THE MORNING 180 Tab 3   • lisinopril (PRINIVIL) 2.5 MG Tab Take 1 Tab by mouth every day. 30 Tab    • metformin (GLUCOPHAGE) 1000 MG tablet TAKE 1/2 TABLET BY MOUTH IN THE MORNING AND ONE TABLET AT NIGHT (Patient taking differently: Take 1,000 mg by mouth 2 times a day, with meals.) 180 Tab 3   • Canagliflozin (INVOKANA) 300 MG Tab Take 1 Tab by mouth every day. 90 Tab 3   • atorvastatin (LIPITOR) 20 MG Tab Take 1 Tab by mouth every day. 90 Tab 3   • colesevelam (WELCHOL) 625 MG Tab TAKE ONE TABLET BY MOUTH IN THE MORNING AND THEN TAKE TWO TABLETS IN THE EVENING WITH DINNER 270 Tab 3   • POTASSIUM CITRATE PO Take 2 Tabs by mouth 3 times a day.     • BYDUREON 2 MG Pen-injector INJECT ONE PEN SUBCUTANEOUSLY ONCE A WEEK 12 Each 3   • glucose blood (FREESTYLE LITE) strip 1 Strip by Other route as needed. 100 Strip 0   • triamcinolone acetonide (KENALOG) 0.1 % Ointment Apply  to affected area(s) 2 times a day.     • mupirocin calcium (BACTROBAN) 2 % Cream Apply to affected area(s) 2 times a day. 15 g 2   • meloxicam (MOBIC) 15 MG tablet Take 1 Tab by mouth every day. 30 Tab 0   • cetirizine (ZYRTEC) 10 MG Tab Take 1 Tab by mouth every day. 30 Tab 3   • fluticasone (FLONASE) 50 MCG/ACT nasal spray Spray 2 Sprays in nose every day. 16 g 0   • trolamine salicylate (ASPERCREME/ALOE) 10 % cream Apply 1 Squirt to affected area(s) 4 times a day. 1 Tube 3   • Lancets MISC Lancets order: Lancets for Abbott Freestyle Lite meter. Sig: use BID and prn ssx high or low sugar. #100 RF x 3 100 Each 3   • Blood Glucose Monitoring Suppl SUPPLIES MISC Test strips order:  "Test strips for Abbott Freestyle Lite meter. Sig: use BID and prn ssx high or low sugar #100 RF x 3 100 Each 3   • aspirin (ASA) 81 MG CHEW Take 81 mg by mouth every day.         No current facility-administered medications for this visit.        Taking daily ASA: Yes  Taking above medications as prescribed: yes  SIDE EFFECTS: Patient denies side effects to medications    Exercise: sporadic irregular exercise, <half hour walking weekly  Diet: \"healthy\" diet  in general  Patient's body mass index is 32.55 kg/m². Exercise and nutrition counseling were performed at this visit.      Health Maintenance:   Health Maintenance Due   Topic Date Due   • IMM HEP B VACCINE (1 of 3 - Risk 3-dose series) 01/17/1973   • IMM ZOSTER VACCINES (2 of 3) 05/23/2014   • DIABETES MONOFILAMENT / LE EXAM  06/29/2018   • RETINAL SCREENING  08/15/2018   • IMM INFLUENZA (1) 09/01/2018         DM:   Last A1c:   Lab Results   Component Value Date/Time    HBA1C 7 08/30/2018 09:53 AM      A1C GOAL: < 7    Glucose monitoring frequency: testing rarely    Hypoglycemic episodes: no    Last Retinal Exam: completed at [facility], will send request for records  Daily Foot Exam: Yes having some tendinitis on right foot.   Routine Dental Exams: Yes    Lab Results   Component Value Date/Time    MALBCRT see below 03/05/2018 07:53 AM    MICROALBUR <0.7 03/05/2018 07:53 AM        ACR Albumin/Creatinine Ratio goal <30     HTN:   Blood pressure goal <140/<80 at goal.   Currently Rx ACE/ARB: Yes    Dyslipidemia:    Lab Results   Component Value Date/Time    CHOLSTRLTOT 117 03/05/2018 07:52 AM    LDL 22 03/05/2018 07:52 AM    HDL 36 (A) 03/05/2018 07:52 AM    TRIGLYCERIDE 297 (H) 03/05/2018 07:52 AM       Lab Results   Component Value Date/Time    SODIUM 140 03/14/2018 08:45 AM    POTASSIUM 4.5 03/14/2018 08:45 AM    CHLORIDE 102 03/14/2018 08:45 AM    CO2 28 03/14/2018 08:45 AM    GLUCOSE 151 (H) 03/14/2018 08:45 AM    BUN 17 03/14/2018 08:45 AM    " CREATININE 0.73 03/14/2018 08:45 AM    CREATININE 0.9 04/07/2005 09:15 PM     Lab Results   Component Value Date/Time    ALKPHOSPHAT 51 03/14/2018 08:45 AM    ASTSGOT 14 03/14/2018 08:45 AM    ALTSGPT 14 03/14/2018 08:45 AM    TBILIRUBIN 0.7 03/14/2018 08:45 AM        Currently Rx Statin: Yes    She  reports that she has never smoked. She has never used smokeless tobacco.    Objective:     Exam:  Monofilament: done   Monofilament testing with a 10 gram force: sensation intact: intact bilaterally  Visual Inspection: Feet without maceration, ulcers, fissures.  Pedal pulses: intact bilaterally    Plan:     Discussed and educated on:   - All medications, side effects and compliance (discussed carefully)  - Annual eye examinations at Ophthalmology  - Foot Care: what to look for when checking feet every day and when to contact HCP  - HbA1C: target and what A1C is  - Home glucose monitoring emphasized  - Weight control and daily exercise    Recommended medication changes: none

## 2018-08-30 NOTE — LETTER
Authorization for Release/Disclosure of Protected Health Information   Name: Shirley Unger  : 1955  SSN: [id number]   Address: [address] [address]  Phone:    [phone] (home)     I authorize the entity listed below to release/disclose the PHI below to On license of [facility]/   Yamilex Jain MD   Provider or Entity Name:  [facility]   City, [facility], [facility]   Phone:  [phone]    Fax:     Reason for request: continuity of care   Information to be released:    [  ] Release all info [  ] LAST DEXA   [ xxx ] RETINA EXAM REPORT    [  ] Last Labs      [  ] Check here and initial the line next to each item to release ALL health information INCLUDING  _____ Care and treatment for drug and / or alcohol abuse  _____ HIV testing, infection status, or AIDS  _____ Genetic Testing    DATES OF SERVICE OR TIME PERIOD TO BE DISCLOSED: _2 years __  I understand and acknowledge that:  * This Authorization may be revoked at any time by you in writing, except if your health information has already been used or disclosed.  * Your health information that will be used or disclosed as a result of you signing this authorization could be re-disclosed by the recipient. If this occurs, your re-disclosed health information may no longer be protected by State or Federal laws.  * You may refuse to sign this Authorization. Your refusal will not affect your ability to obtain treatment.  * This Authorization becomes effective upon signing and will  on (date) __________. If no date is indicated, this Authorization will  one (1) year from the signature date.    Name: Shirley Unger     Signature:     Date: 2018

## 2018-09-03 DIAGNOSIS — E78.5 DYSLIPIDEMIA: ICD-10-CM

## 2018-09-03 DIAGNOSIS — E11.29 TYPE 2 DIABETES MELLITUS WITH OTHER DIABETIC KIDNEY COMPLICATION (HCC): ICD-10-CM

## 2018-09-04 RX ORDER — COLESEVELAM 180 1/1
TABLET ORAL
Qty: 270 TAB | Refills: 3 | Status: SHIPPED | OUTPATIENT
Start: 2018-09-04 | End: 2019-09-09 | Stop reason: SDUPTHER

## 2018-09-04 RX ORDER — ATORVASTATIN CALCIUM 20 MG/1
TABLET, FILM COATED ORAL
Qty: 90 TAB | Refills: 3 | Status: SHIPPED | OUTPATIENT
Start: 2018-09-04 | End: 2019-09-09 | Stop reason: SDUPTHER

## 2018-09-11 ENCOUNTER — HOSPITAL ENCOUNTER (OUTPATIENT)
Dept: LAB | Facility: MEDICAL CENTER | Age: 63
End: 2018-09-11
Payer: COMMERCIAL

## 2018-09-11 LAB
BDY FAT % MEASURED: 38.6 %
BP DIAS: 58 MMHG
BP SYS: 91 MMHG
CHOLEST SERPL-MCNC: 129 MG/DL (ref 100–199)
DIABETES HTDIA: YES
EST. AVERAGE GLUCOSE BLD GHB EST-MCNC: 157 MG/DL
EVENT NAME HTEVT: NORMAL
FASTING HTFAS: YES
GLUCOSE SERPL-MCNC: 138 MG/DL (ref 65–99)
HBA1C MFR BLD: 7.1 % (ref 0–5.6)
HDLC SERPL-MCNC: 33 MG/DL
HYPERTENSION HTHYP: NO
LDLC SERPL CALC-MCNC: 40 MG/DL
SCREENING LOC CITY HTCIT: NORMAL
SCREENING LOC STATE HTSTA: NORMAL
SCREENING LOCATION HTLOC: NORMAL
SMOKING HTSMO: NO
SUBSCRIBER ID HTSID: NORMAL
TRIGL SERPL-MCNC: 282 MG/DL (ref 0–149)

## 2018-09-11 PROCEDURE — 80061 LIPID PANEL: CPT

## 2018-09-11 PROCEDURE — 83036 HEMOGLOBIN GLYCOSYLATED A1C: CPT

## 2018-09-11 PROCEDURE — 36415 COLL VENOUS BLD VENIPUNCTURE: CPT

## 2018-09-11 PROCEDURE — S5190 WELLNESS ASSESSMENT BY NONPH: HCPCS

## 2018-09-11 PROCEDURE — 82947 ASSAY GLUCOSE BLOOD QUANT: CPT

## 2018-10-03 ENCOUNTER — IMMUNIZATION (OUTPATIENT)
Dept: OCCUPATIONAL MEDICINE | Facility: CLINIC | Age: 63
End: 2018-10-03

## 2018-10-03 DIAGNOSIS — Z23 NEED FOR VACCINATION: ICD-10-CM

## 2018-10-03 PROCEDURE — 90686 IIV4 VACC NO PRSV 0.5 ML IM: CPT | Performed by: PREVENTIVE MEDICINE

## 2018-10-08 ENCOUNTER — PATIENT MESSAGE (OUTPATIENT)
Dept: MEDICAL GROUP | Facility: PHYSICIAN GROUP | Age: 63
End: 2018-10-08

## 2018-10-08 DIAGNOSIS — E11.9 DIABETES MELLITUS WITHOUT COMPLICATION (HCC): ICD-10-CM

## 2018-10-08 NOTE — TELEPHONE ENCOUNTER
From: Shirley Unger  To: Rogers Mahmood M.D.  Sent: 10/8/2018 6:33 AM PDT  Subject: Prescription Question    My Metformin has . [facility] on [address] has faxed you.  But the amount of medicine needs to be changed. I am now back to 1 full pill in the morning and 1 full pill in the evening.  1000MG  I am now out of medicine and [facility] gave me 2 days supply until the order has been renewed by you.    thanks for your help  Shirley Unger  1955  [phone]

## 2018-10-08 NOTE — TELEPHONE ENCOUNTER
From: Shirley Unger  Sent: 10/8/2018 6:24 AM PDT  Subject: Medication Renewal Request    Shirley Unger would like a refill of the following medications:     glucose blood (FREESTYLE LITE) strip [Rogers Mahmood M.D.]    Preferred pharmacy: [facility] [address] [place] [address] (), [place] - [address]

## 2018-11-12 ENCOUNTER — OFFICE VISIT (OUTPATIENT)
Dept: MEDICAL GROUP | Facility: MEDICAL CENTER | Age: 63
End: 2018-11-12
Payer: COMMERCIAL

## 2018-11-12 VITALS
SYSTOLIC BLOOD PRESSURE: 122 MMHG | HEART RATE: 84 BPM | HEIGHT: 67 IN | OXYGEN SATURATION: 94 % | DIASTOLIC BLOOD PRESSURE: 74 MMHG | BODY MASS INDEX: 32.65 KG/M2 | WEIGHT: 208 LBS

## 2018-11-12 DIAGNOSIS — F51.01 PRIMARY INSOMNIA: ICD-10-CM

## 2018-11-12 PROCEDURE — 99213 OFFICE O/P EST LOW 20 MIN: CPT | Performed by: PHYSICIAN ASSISTANT

## 2018-11-13 NOTE — ASSESSMENT & PLAN NOTE
Couple of years. Sleepy during the day. Sometimes trouble going to sleep. Mostly the problem is that she is awake between 12-2 am then goes back to sleep. Takes usually one, 20 minute nap per day. Had to get special work accommodations to allow this. Doesn't snore or wake up gasping for air. Doesn't drink caffeine. Not sure which otc meds she can try.

## 2018-11-13 NOTE — PROGRESS NOTES
Subjective:   Shirley Unger is a 63 y.o. female here today for discussion on insomnia. Patient of Dr. Mahmood. Same day access.    Primary insomnia  Couple of years. Sleepy during the day. Sometimes trouble going to sleep. Mostly the problem is that she is awake between 12-2 am then goes back to sleep. Takes usually one, 20 minute nap per day. Had to get special work accommodations to allow this. Doesn't snore or wake up gasping for air. Doesn't drink caffeine. Not sure which otc meds she can try.       Current medicines (including changes today)  Current Outpatient Prescriptions   Medication Sig Dispense Refill   • glucose blood (FREESTYLE LITE) strip 1 Strip by Other route as needed. 100 Strip 3   • metformin (GLUCOPHAGE) 1000 MG tablet Take 1 Tab by mouth 2 times a day, with meals. 180 Tab 3   • colesevelam (WELCHOL) 625 MG Tab TAKE ONE TABLET BY MOUTH ONCE DAILY IN THE MORNING, THEN TAKE TWO TABLETS DAILY IN THE EVENING WITH  DINNER 270 Tab 3   • atorvastatin (LIPITOR) 20 MG Tab TAKE ONE TABLET BY MOUTH ONCE DAILY 90 Tab 3   • glimepiride (AMARYL) 4 MG Tab TAKE ONE TABLET BY MOUTH ONCE DAILY IN THE MORNING 180 Tab 3   • BYDUREON 2 MG Pen-injector INJECT ONE PEN SUBCUTANEOUSLY ONCE A WEEK 12 Each 3   • lisinopril (PRINIVIL) 2.5 MG Tab Take 1 Tab by mouth every day. 30 Tab    • triamcinolone acetonide (KENALOG) 0.1 % Ointment Apply  to affected area(s) 2 times a day.     • mupirocin calcium (BACTROBAN) 2 % Cream Apply to affected area(s) 2 times a day. 15 g 2   • Canagliflozin (INVOKANA) 300 MG Tab Take 1 Tab by mouth every day. 90 Tab 3   • meloxicam (MOBIC) 15 MG tablet Take 1 Tab by mouth every day. 30 Tab 0   • cetirizine (ZYRTEC) 10 MG Tab Take 1 Tab by mouth every day. 30 Tab 3   • fluticasone (FLONASE) 50 MCG/ACT nasal spray Spray 2 Sprays in nose every day. 16 g 0   • trolamine salicylate (ASPERCREME/ALOE) 10 % cream Apply 1 Squirt to affected area(s) 4 times a day. 1 Tube 3   • Lancets MISC Lancets order:  "Lancets for Abbott Freestyle Lite meter. Sig: use BID and prn ssx high or low sugar. #100 RF x 3 100 Each 3   • Blood Glucose Monitoring Suppl SUPPLIES MISC Test strips order: Test strips for Abbott Freestyle Lite meter. Sig: use BID and prn ssx high or low sugar #100 RF x 3 100 Each 3   • aspirin (ASA) 81 MG CHEW Take 81 mg by mouth every day.       • POTASSIUM CITRATE PO Take 2 Tabs by mouth 3 times a day.       No current facility-administered medications for this visit.      She  has a past medical history of DM (diabetes mellitus) (HCC) (2004); Herpes genitalia (3/22/2013); Hyperlipidemia LDL goal < 100; Nephrolithiasis (3/22/2013); and Vitamin d deficiency (7/8/2013).    ROS   No fever/chills. No weight change. No headache/dizziness. No focal weakness. No sore throat, nasal congestion, ear pain. No chest pain, no shortness of breath, difficulty breathing. No n/v/d/c or abdominal pain. No urinary complaint. No rash or skin lesion. No joint pain or swelling.     Objective:     Blood pressure 122/74, pulse 84, height 1.702 m (5' 7\"), weight 94.3 kg (208 lb), last menstrual period 11/29/2004, SpO2 94 %, not currently breastfeeding. Body mass index is 32.58 kg/m².   Physical Exam:  Constitutional: WDWN, NAD  Skin: Warm, dry, good turgor, no rashes in visible areas.  Psych: Alert and oriented x3, normal affect and mood.      Assessment and Plan:   The following treatment plan was discussed    1. Primary insomnia  Discussed otc meds and general sleep hygiene, f/u visit if she wants to discuss prescription med options      Followup: Return if symptoms worsen or fail to improve.         "

## 2018-11-20 RX ORDER — LISINOPRIL 5 MG/1
TABLET ORAL
Qty: 90 TAB | Refills: 0 | Status: SHIPPED | OUTPATIENT
Start: 2018-11-20 | End: 2019-03-12 | Stop reason: SDUPTHER

## 2018-12-09 ENCOUNTER — APPOINTMENT (OUTPATIENT)
Dept: RADIOLOGY | Facility: IMAGING CENTER | Age: 63
End: 2018-12-09
Attending: PHYSICIAN ASSISTANT
Payer: COMMERCIAL

## 2018-12-09 ENCOUNTER — OFFICE VISIT (OUTPATIENT)
Dept: URGENT CARE | Facility: CLINIC | Age: 63
End: 2018-12-09
Payer: COMMERCIAL

## 2018-12-09 VITALS
TEMPERATURE: 97 F | OXYGEN SATURATION: 97 % | HEIGHT: 67 IN | HEART RATE: 102 BPM | DIASTOLIC BLOOD PRESSURE: 70 MMHG | WEIGHT: 207 LBS | RESPIRATION RATE: 18 BRPM | BODY MASS INDEX: 32.49 KG/M2 | SYSTOLIC BLOOD PRESSURE: 110 MMHG

## 2018-12-09 DIAGNOSIS — M25.561 ACUTE PAIN OF RIGHT KNEE: ICD-10-CM

## 2018-12-09 PROCEDURE — 73564 X-RAY EXAM KNEE 4 OR MORE: CPT | Mod: 26,RT | Performed by: PHYSICIAN ASSISTANT

## 2018-12-09 PROCEDURE — 99214 OFFICE O/P EST MOD 30 MIN: CPT | Performed by: PHYSICIAN ASSISTANT

## 2018-12-09 RX ORDER — METHYLPREDNISOLONE 4 MG/1
TABLET ORAL
Qty: 1 TAB | Refills: 0 | Status: SHIPPED | OUTPATIENT
Start: 2018-12-09 | End: 2018-12-12

## 2018-12-09 ASSESSMENT — ENCOUNTER SYMPTOMS
WEAKNESS: 1
JOINT SWELLING: 0
SENSORY CHANGE: 0
FOCAL WEAKNESS: 1
NUMBNESS: 0

## 2018-12-09 NOTE — LETTER
December 9, 2018         Patient: Shirley Unger   YOB: 1955   Date of Visit: 12/9/2018           To Whom it May Concern:    Shirley Unger was seen in my clinic on 12/9/2018 for right knee pain; recommend one week limited /minimal weight bearing or walking /standing; will need follow up visit next week to Sports Medicine.     If you have any questions or concerns, please don't hesitate to call.        Sincerely,           Kennedy Rascon P.A.-C.  Electronically Signed

## 2018-12-09 NOTE — PROGRESS NOTES
Subjective:      Shirley Unger is a 63 y.o. female who presents with Knee Pain (x 4 days, Rt. knee pain, pain only with walking)            Knee Pain   This is a new (r knee pn w/wt bearing, no trauma; some clicking) problem. The current episode started in the past 7 days. The problem occurs constantly. The problem has been unchanged. Associated symptoms include weakness. Pertinent negatives include no joint swelling or numbness. The symptoms are aggravated by bending and walking. She has tried rest for the symptoms. The treatment provided mild relief.       Review of Systems   Musculoskeletal: Positive for joint pain. Negative for joint swelling.   Skin: Negative.    Neurological: Positive for focal weakness and weakness. Negative for sensory change and numbness.          Objective:     LMP 11/29/2004      Physical Exam   Constitutional: She is oriented to person, place, and time. She appears well-developed and well-nourished. No distress.   Musculoskeletal: Normal range of motion. She exhibits tenderness.        Right knee: She exhibits normal range of motion and no swelling. Tenderness found.   Neurological: She is alert and oriented to person, place, and time. No sensory deficit. She exhibits abnormal muscle tone. Gait abnormal. Coordination normal.   Skin: Skin is warm and dry. Capillary refill takes less than 2 seconds.   Psychiatric: She has a normal mood and affect. Her behavior is normal.   Nursing note and vitals reviewed.    Active Ambulatory Problems     Diagnosis Date Noted   • Dyslipidemia    • Recurrent nephrolithiasis 03/22/2013   • Vitamin D deficiency disease 07/08/2013   • Uncontrolled type 2 diabetes mellitus without complication, without long-term current use of insulin ([facility]) 08/04/2015   • Primary osteoarthritis of left hip 07/07/2016   • Herpes dermatitis 11/29/2016   • Seasonal allergic rhinitis due to pollen 03/07/2017   • Obesity (BMI 30-39.9) 09/28/2017   • Numbness of left foot 09/28/2017    • Functional diarrhea 03/09/2018   • Skin cancer screening 06/12/2018   • Primary insomnia 06/12/2018     Resolved Ambulatory Problems     Diagnosis Date Noted   • Type 2 DM, uncontrolled    • Right hand pain 02/25/2014   • Hand eczema 07/29/2014   • Colon polyps 05/16/2016   • Trigger finger of right thumb 07/07/2016   • Acute mastitis of left breast 11/29/2016   • Arthralgia of left temporomandibular joint 05/17/2017     Past Medical History:   Diagnosis Date   • DM (diabetes mellitus) (HCC) 2004   • Herpes genitalia 3/22/2013   • Hyperlipidemia LDL goal < 100    • Nephrolithiasis 3/22/2013   • Vitamin d deficiency 7/8/2013     Current Outpatient Prescriptions on File Prior to Visit   Medication Sig Dispense Refill   • glucose blood (FREESTYLE LITE) strip 1 Strip by Other route as needed. 100 Strip 3   • metformin (GLUCOPHAGE) 1000 MG tablet Take 1 Tab by mouth 2 times a day, with meals. 180 Tab 3   • colesevelam (WELCHOL) 625 MG Tab TAKE ONE TABLET BY MOUTH ONCE DAILY IN THE MORNING, THEN TAKE TWO TABLETS DAILY IN THE EVENING WITH  DINNER 270 Tab 3   • atorvastatin (LIPITOR) 20 MG Tab TAKE ONE TABLET BY MOUTH ONCE DAILY 90 Tab 3   • glimepiride (AMARYL) 4 MG Tab TAKE ONE TABLET BY MOUTH ONCE DAILY IN THE MORNING 180 Tab 3   • BYDUREON 2 MG Pen-injector INJECT ONE PEN SUBCUTANEOUSLY ONCE A WEEK 12 Each 3   • lisinopril (PRINIVIL) 2.5 MG Tab Take 1 Tab by mouth every day. 30 Tab    • triamcinolone acetonide (KENALOG) 0.1 % Ointment Apply  to affected area(s) 2 times a day.     • mupirocin calcium (BACTROBAN) 2 % Cream Apply to affected area(s) 2 times a day. 15 g 2   • Canagliflozin (INVOKANA) 300 MG Tab Take 1 Tab by mouth every day. 90 Tab 3   • trolamine salicylate (ASPERCREME/ALOE) 10 % cream Apply 1 Squirt to affected area(s) 4 times a day. 1 Tube 3   • Lancets MISC Lancets order: Lancets for Abbott Freestyle Lite meter. Sig: use BID and prn ssx high or low sugar. #100 RF x 3 100 Each 3   • Blood Glucose  Monitoring Suppl SUPPLIES MISC Test strips order: Test strips for Abbott Freestyle Lite meter. Sig: use BID and prn ssx high or low sugar #100 RF x 3 100 Each 3   • aspirin (ASA) 81 MG CHEW Take 81 mg by mouth every day.       • POTASSIUM CITRATE PO Take 2 Tabs by mouth 3 times a day.     • lisinopril (PRINIVIL) 5 MG Tab TAKE ONE TABLET BY MOUTH ONCE DAILY AT BEDTIME 90 Tab 0   • meloxicam (MOBIC) 15 MG tablet Take 1 Tab by mouth every day. (Patient not taking: Reported on 12/9/2018) 30 Tab 0   • cetirizine (ZYRTEC) 10 MG Tab Take 1 Tab by mouth every day. (Patient not taking: Reported on 12/9/2018) 30 Tab 3   • fluticasone (FLONASE) 50 MCG/ACT nasal spray Spray 2 Sprays in nose every day. 16 g 0     No current facility-administered medications on file prior to visit.      Social History     Social History   • Marital status:      Spouse name: N/A   • Number of children: N/A   • Years of education: N/A     Occupational History   •  [employer]     Social History Main Topics   • Smoking status: Never Smoker   • Smokeless tobacco: Never Used   • Alcohol use No      Comment: Rare Useage   • Drug use: No   • Sexual activity: Yes     Partners: Male     Other Topics Concern   • Not on file     Social History Narrative   • No narrative on file     Family History   Problem Relation Age of Onset   • Cancer Mother         Lung   • Heart Disease Father    • Diabetes Maternal Aunt    • Diabetes Maternal Grandfather      Macrobid [nitrofurantoin monohydrate macrocrystals] and Pcn [penicillins]         Xr=  Ng (read/interpret. By me. Rw)       Assessment/Plan:     ·  r knee pn; r.o meniscal? [medial horn meniscus?]      · RICE, brace, medrol; ortho f/u

## 2018-12-12 ENCOUNTER — OFFICE VISIT (OUTPATIENT)
Dept: MEDICAL GROUP | Facility: CLINIC | Age: 63
End: 2018-12-12
Payer: COMMERCIAL

## 2018-12-12 VITALS
SYSTOLIC BLOOD PRESSURE: 122 MMHG | HEIGHT: 67 IN | RESPIRATION RATE: 16 BRPM | BODY MASS INDEX: 32.49 KG/M2 | WEIGHT: 207 LBS | TEMPERATURE: 98.6 F | DIASTOLIC BLOOD PRESSURE: 78 MMHG | HEART RATE: 84 BPM | OXYGEN SATURATION: 95 %

## 2018-12-12 DIAGNOSIS — M17.11 PRIMARY OSTEOARTHRITIS OF RIGHT KNEE: ICD-10-CM

## 2018-12-12 PROCEDURE — 99213 OFFICE O/P EST LOW 20 MIN: CPT | Mod: 25 | Performed by: FAMILY MEDICINE

## 2018-12-12 PROCEDURE — 20610 DRAIN/INJ JOINT/BURSA W/O US: CPT | Mod: RT | Performed by: FAMILY MEDICINE

## 2018-12-12 RX ORDER — TRIAMCINOLONE ACETONIDE 40 MG/ML
40 INJECTION, SUSPENSION INTRA-ARTICULAR; INTRAMUSCULAR ONCE
Status: COMPLETED | OUTPATIENT
Start: 2018-12-12 | End: 2018-12-12

## 2018-12-12 RX ADMIN — TRIAMCINOLONE ACETONIDE 40 MG: 40 INJECTION, SUSPENSION INTRA-ARTICULAR; INTRAMUSCULAR at 19:02

## 2018-12-12 NOTE — LETTER
December 12, 2018         Patient: Shirley Unger   YOB: 1955   Date of Visit: 12/12/2018           To Whom it May Concern:    Shirley Unger was seen in my clinic on 12/12/2018.    If you have any questions or concerns, please don't hesitate to call.        Sincerely,           Jose Esqueda M.D.  Electronically Signed

## 2018-12-13 NOTE — PROGRESS NOTES
Subjective:     Shirley Unger is a 63 y.o. female who presents for Knee Pain (Referral from UC/ R knee pain )    HPI  Pt presents for evaluation of right knee pain   Pt with hx of bilateral knee pain which is intermittent   Pain in the right is much worse   Denies any fall or trauma   Does have an increase in activity recently which she believes exacerbated her knee pain over the past few weeks  Pain is anterior and deep  Pain does not radiate down into the foot  No associated numbness or tingling  Rest makes pain a little better    Review of Systems   Constitutional: Negative for fever.   HENT: Negative for sore throat.    Respiratory: Negative for shortness of breath.    Cardiovascular: Negative for chest pain.   Skin: Negative for rash.   Neurological: Negative for sensory change and focal weakness.     PMH:  has a past medical history of DM (diabetes mellitus) ([facility]) (2004); Herpes genitalia (3/22/2013); Hyperlipidemia LDL goal < 100; Nephrolithiasis (3/22/2013); and Vitamin d deficiency (7/8/2013).  MEDS:   Current Outpatient Prescriptions:   •  MethylPREDNISolone (MEDROL DOSEPAK) 4 MG Tablet Therapy Pack, U.D., Disp: 1 Tab, Rfl: 0  •  lisinopril (PRINIVIL) 5 MG Tab, TAKE ONE TABLET BY MOUTH ONCE DAILY AT BEDTIME, Disp: 90 Tab, Rfl: 0  •  glucose blood (FREESTYLE LITE) strip, 1 Strip by Other route as needed., Disp: 100 Strip, Rfl: 3  •  metformin (GLUCOPHAGE) 1000 MG tablet, Take 1 Tab by mouth 2 times a day, with meals., Disp: 180 Tab, Rfl: 3  •  colesevelam (WELCHOL) 625 MG Tab, TAKE ONE TABLET BY MOUTH ONCE DAILY IN THE MORNING, THEN TAKE TWO TABLETS DAILY IN THE EVENING WITH  DINNER, Disp: 270 Tab, Rfl: 3  •  atorvastatin (LIPITOR) 20 MG Tab, TAKE ONE TABLET BY MOUTH ONCE DAILY, Disp: 90 Tab, Rfl: 3  •  glimepiride (AMARYL) 4 MG Tab, TAKE ONE TABLET BY MOUTH ONCE DAILY IN THE MORNING, Disp: 180 Tab, Rfl: 3  •  BYDUREON 2 MG Pen-injector, INJECT ONE PEN SUBCUTANEOUSLY ONCE A WEEK, Disp: 12 Each, Rfl: 3  •   lisinopril (PRINIVIL) 2.5 MG Tab, Take 1 Tab by mouth every day., Disp: 30 Tab, Rfl:   •  triamcinolone acetonide (KENALOG) 0.1 % Ointment, Apply  to affected area(s) 2 times a day., Disp: , Rfl:   •  mupirocin calcium (BACTROBAN) 2 % Cream, Apply to affected area(s) 2 times a day., Disp: 15 g, Rfl: 2  •  Canagliflozin (INVOKANA) 300 MG Tab, Take 1 Tab by mouth every day., Disp: 90 Tab, Rfl: 3  •  meloxicam (MOBIC) 15 MG tablet, Take 1 Tab by mouth every day. (Patient not taking: Reported on 2018), Disp: 30 Tab, Rfl: 0  •  cetirizine (ZYRTEC) 10 MG Tab, Take 1 Tab by mouth every day. (Patient not taking: Reported on 2018), Disp: 30 Tab, Rfl: 3  •  fluticasone (FLONASE) 50 MCG/ACT nasal spray, Spray 2 Sprays in nose every day., Disp: 16 g, Rfl: 0  •  trolamine salicylate (ASPERCREME/ALOE) 10 % cream, Apply 1 Squirt to affected area(s) 4 times a day., Disp: 1 Tube, Rfl: 3  •  Lancets MISC, Lancets order: Lancets for Abbott Freestyle Lite meter. Sig: use BID and prn ssx high or low sugar. #100 RF x 3, Disp: 100 Each, Rfl: 3  •  Blood Glucose Monitoring Suppl SUPPLIES MISC, Test strips order: Test strips for Abbott Freestyle Lite meter. Sig: use BID and prn ssx high or low sugar #100 RF x 3, Disp: 100 Each, Rfl: 3  •  aspirin (ASA) 81 MG CHEW, Take 81 mg by mouth every day.  , Disp: , Rfl:   •  POTASSIUM CITRATE PO, Take 2 Tabs by mouth 3 times a day., Disp: , Rfl:   ALLERGIES:   Allergies   Allergen Reactions   • Macrobid [Nitrofurantoin Monohydrate Macrocrystals] Hives   • Pcn [Penicillins] Hives     SURGHX:   Past Surgical History:   Procedure Laterality Date   • GANGLION EXC[facility]  2014    [facility]   • HYSTERECTOMY, TOTAL ABDOMINAL      For non cancerous reasons   • CHOLECYSTECTOMY     • CARPAL TUNNEL RELEASE      Right hand   • CYSTOSCOPY STENT PLACEMENT      several, stent removed in MD office   • PB  DELIVERY ONLY    "    SOCHX:  reports that she has never smoked. She has never used smokeless tobacco. She reports that she does not drink alcohol or use drugs.     Objective:   /78 (BP Location: Right arm, Patient Position: Sitting, BP Cuff Size: Adult)   Pulse 84   Temp 37 °C (98.6 °F) (Temporal)   Resp 16   Ht 1.702 m (5' 7\")   Wt 93.9 kg (207 lb)   LMP 11/29/2004   SpO2 95%   BMI 32.42 kg/m²     Physical Exam   Constitutional: She is oriented to person, place, and time. She appears well-developed and well-nourished. No distress.   HENT:   Head: Normocephalic and atraumatic.   Musculoskeletal:   Right knee  Appearance - No bruising, erythema, or deformity appreciated  Palpation - +TTP along joint lines, no TTP at patellar tendon, hamstring tendons, or quads.  +palpable effusion.  ROM - FROM with crepitus  Strength - 5/5 throughout  Reflexes - Patellar reflexes 2+ bilaterally  Neuro - sensation equal and intact bilaterally  Special testing - No laxity or pain with varus/valgus stress, neg anterior drawer, neg posterior drawer, neg Lachman's, neg patellar apprehension test   Neurological: She is alert and oriented to person, place, and time. No sensory deficit.   Skin: Skin is warm and dry. She is not diaphoretic. No erythema.   Psychiatric: She has a normal mood and affect. Her behavior is normal. Judgment and thought content normal.     Knee injection   First, a verbal consent and a verbal time-out were done, explaining the risks and benefits of the procedure. Then the patient was placed in a seated position.  Anterior lateral joint line portals were identified. The skin was cleaned with alcohol and the clean, no touch technique was used with a 25-gauge 1.5-inch needle. A combination of 5 mL of 1% lidocaine without epinephrine and 1 milliliter of Kenalog 40 mg/mL was injected into the right knee. The patient tolerated the procedure well and there were no immediate post injection complications. Hemostasis was then " achieved with gentle pressure and a Band-Aid was placed over the lesion. Post injection instructions for range of motion, ice, activity modification, signs of infection, emergency precautions were discussed with the patient.    Assessment/Plan:   Assessment    1. Primary osteoarthritis of right knee  Patient is a 63-year-old female with history and exam consistent with osteoarthritis in her right knee.  Reviewed treatment options including therapy, injections, medications, and surgical consultation.  Patient would like an injection.  As above, injection tolerated well without any acute complications.  Reviewed benefits of stretching, light exercise, stationary bike.  Plan to follow-up on an as-needed basis if having recurrence of knee pain.  - triamcinolone acetonide (KENALOG-40) injection 40 mg; 1 mL by Intra-articular route Once.    F/U PRN

## 2018-12-18 DIAGNOSIS — E11.9 DIABETES MELLITUS WITHOUT COMPLICATION (HCC): ICD-10-CM

## 2018-12-18 RX ORDER — CANAGLIFLOZIN 300 MG/1
TABLET, FILM COATED ORAL
Qty: 90 TAB | Refills: 3 | Status: SHIPPED | OUTPATIENT
Start: 2018-12-18 | End: 2019-12-16

## 2018-12-26 ASSESSMENT — ENCOUNTER SYMPTOMS
SHORTNESS OF BREATH: 0
SORE THROAT: 0
SENSORY CHANGE: 0
FEVER: 0
FOCAL WEAKNESS: 0

## 2019-02-27 ENCOUNTER — PATIENT MESSAGE (OUTPATIENT)
Dept: MEDICAL GROUP | Facility: PHYSICIAN GROUP | Age: 64
End: 2019-02-27

## 2019-02-27 DIAGNOSIS — G57.62 MORTON'S NEUROMA OF LEFT FOOT: ICD-10-CM

## 2019-02-27 NOTE — TELEPHONE ENCOUNTER
----- Message from Maicol Coreas Med [facility]'t sent at 2/27/2019  7:06 AM PST -----  Regarding: FW: Non-Urgent Medical Question  Contact: [phone]      ----- Message -----  From: Shirley Unger  Sent: 2/27/2019   6:06 AM  To: Félix Crabtree Ma  Subject: Non-Urgent Medical Question                      Dr. Mahmood,  I forgot to tell you it is my right foot.  Dr. Crabtree told me it is a Huynh's Neuroma for the referral.  thank you  Shirley Unger  1955  [phone]

## 2019-03-02 ENCOUNTER — HOSPITAL ENCOUNTER (OUTPATIENT)
Dept: LAB | Facility: MEDICAL CENTER | Age: 64
End: 2019-03-02
Attending: INTERNAL MEDICINE
Payer: COMMERCIAL

## 2019-03-02 DIAGNOSIS — E55.9 VITAMIN D DEFICIENCY DISEASE: ICD-10-CM

## 2019-03-02 DIAGNOSIS — E78.5 DYSLIPIDEMIA: ICD-10-CM

## 2019-03-02 DIAGNOSIS — B00.89 HERPES DERMATITIS: ICD-10-CM

## 2019-03-02 LAB
25(OH)D3 SERPL-MCNC: 32 NG/ML (ref 30–100)
ALBUMIN SERPL BCP-MCNC: 4.3 G/DL (ref 3.2–4.9)
ALBUMIN/GLOB SERPL: 1.9 G/DL
ALP SERPL-CCNC: 53 U/L (ref 30–99)
ALT SERPL-CCNC: 10 U/L (ref 2–50)
ANION GAP SERPL CALC-SCNC: 8 MMOL/L (ref 0–11.9)
AST SERPL-CCNC: 11 U/L (ref 12–45)
BASOPHILS # BLD AUTO: 0.5 % (ref 0–1.8)
BASOPHILS # BLD: 0.03 K/UL (ref 0–0.12)
BILIRUB SERPL-MCNC: 0.6 MG/DL (ref 0.1–1.5)
BUN SERPL-MCNC: 19 MG/DL (ref 8–22)
CALCIUM SERPL-MCNC: 9.7 MG/DL (ref 8.5–10.5)
CHLORIDE SERPL-SCNC: 104 MMOL/L (ref 96–112)
CHOLEST SERPL-MCNC: 121 MG/DL (ref 100–199)
CO2 SERPL-SCNC: 28 MMOL/L (ref 20–33)
CREAT SERPL-MCNC: 0.72 MG/DL (ref 0.5–1.4)
CREAT UR-MCNC: 63.5 MG/DL
EOSINOPHIL # BLD AUTO: 0.12 K/UL (ref 0–0.51)
EOSINOPHIL NFR BLD: 2 % (ref 0–6.9)
ERYTHROCYTE [DISTWIDTH] IN BLOOD BY AUTOMATED COUNT: 44.1 FL (ref 35.9–50)
EST. AVERAGE GLUCOSE BLD GHB EST-MCNC: 183 MG/DL
FASTING STATUS PATIENT QL REPORTED: NORMAL
GLOBULIN SER CALC-MCNC: 2.3 G/DL (ref 1.9–3.5)
GLUCOSE SERPL-MCNC: 147 MG/DL (ref 65–99)
HBA1C MFR BLD: 8 % (ref 0–5.6)
HCT VFR BLD AUTO: 46.9 % (ref 37–47)
HDLC SERPL-MCNC: 39 MG/DL
HGB BLD-MCNC: 14.6 G/DL (ref 12–16)
IMM GRANULOCYTES # BLD AUTO: 0.01 K/UL (ref 0–0.11)
IMM GRANULOCYTES NFR BLD AUTO: 0.2 % (ref 0–0.9)
LDLC SERPL CALC-MCNC: 40 MG/DL
LYMPHOCYTES # BLD AUTO: 2.03 K/UL (ref 1–4.8)
LYMPHOCYTES NFR BLD: 33.3 % (ref 22–41)
MCH RBC QN AUTO: 29.3 PG (ref 27–33)
MCHC RBC AUTO-ENTMCNC: 31.1 G/DL (ref 33.6–35)
MCV RBC AUTO: 94.2 FL (ref 81.4–97.8)
MICROALBUMIN UR-MCNC: <0.7 MG/DL
MICROALBUMIN/CREAT UR: NORMAL MG/G (ref 0–30)
MONOCYTES # BLD AUTO: 0.55 K/UL (ref 0–0.85)
MONOCYTES NFR BLD AUTO: 9 % (ref 0–13.4)
NEUTROPHILS # BLD AUTO: 3.35 K/UL (ref 2–7.15)
NEUTROPHILS NFR BLD: 55 % (ref 44–72)
NRBC # BLD AUTO: 0 K/UL
NRBC BLD-RTO: 0 /100 WBC
PLATELET # BLD AUTO: 257 K/UL (ref 164–446)
PMV BLD AUTO: 9.9 FL (ref 9–12.9)
POTASSIUM SERPL-SCNC: 3.9 MMOL/L (ref 3.6–5.5)
PROT SERPL-MCNC: 6.6 G/DL (ref 6–8.2)
RBC # BLD AUTO: 4.98 M/UL (ref 4.2–5.4)
SODIUM SERPL-SCNC: 140 MMOL/L (ref 135–145)
TRIGL SERPL-MCNC: 211 MG/DL (ref 0–149)
WBC # BLD AUTO: 6.1 K/UL (ref 4.8–10.8)

## 2019-03-02 PROCEDURE — 82043 UR ALBUMIN QUANTITATIVE: CPT

## 2019-03-02 PROCEDURE — 85025 COMPLETE CBC W/AUTO DIFF WBC: CPT

## 2019-03-02 PROCEDURE — 36415 COLL VENOUS BLD VENIPUNCTURE: CPT

## 2019-03-02 PROCEDURE — 80061 LIPID PANEL: CPT

## 2019-03-02 PROCEDURE — 83036 HEMOGLOBIN GLYCOSYLATED A1C: CPT

## 2019-03-02 PROCEDURE — 82570 ASSAY OF URINE CREATININE: CPT

## 2019-03-02 PROCEDURE — 80053 COMPREHEN METABOLIC PANEL: CPT

## 2019-03-02 PROCEDURE — 82306 VITAMIN D 25 HYDROXY: CPT

## 2019-03-12 ENCOUNTER — OFFICE VISIT (OUTPATIENT)
Dept: MEDICAL GROUP | Facility: PHYSICIAN GROUP | Age: 64
End: 2019-03-12
Payer: COMMERCIAL

## 2019-03-12 VITALS
BODY MASS INDEX: 32.96 KG/M2 | RESPIRATION RATE: 14 BRPM | HEIGHT: 67 IN | OXYGEN SATURATION: 95 % | HEART RATE: 86 BPM | WEIGHT: 210 LBS | SYSTOLIC BLOOD PRESSURE: 116 MMHG | TEMPERATURE: 98.4 F | DIASTOLIC BLOOD PRESSURE: 76 MMHG

## 2019-03-12 DIAGNOSIS — E66.9 OBESITY (BMI 30-39.9): ICD-10-CM

## 2019-03-12 DIAGNOSIS — F51.01 PRIMARY INSOMNIA: ICD-10-CM

## 2019-03-12 DIAGNOSIS — E78.5 DYSLIPIDEMIA: ICD-10-CM

## 2019-03-12 DIAGNOSIS — Z23 NEED FOR VACCINATION: ICD-10-CM

## 2019-03-12 DIAGNOSIS — G57.62 MORTON'S NEUROMA OF LEFT FOOT: ICD-10-CM

## 2019-03-12 DIAGNOSIS — Z91.89 OTHER SPECIFIED PERSONAL RISK FACTORS, NOT ELSEWHERE CLASSIFIED: ICD-10-CM

## 2019-03-12 DIAGNOSIS — Z78.0 POST-MENOPAUSAL: ICD-10-CM

## 2019-03-12 PROBLEM — K59.1 FUNCTIONAL DIARRHEA: Status: RESOLVED | Noted: 2018-03-09 | Resolved: 2019-03-12

## 2019-03-12 PROBLEM — Z12.83 SKIN CANCER SCREENING: Status: RESOLVED | Noted: 2018-06-12 | Resolved: 2019-03-12

## 2019-03-12 PROBLEM — R20.0 NUMBNESS OF LEFT FOOT: Status: RESOLVED | Noted: 2017-09-28 | Resolved: 2019-03-12

## 2019-03-12 PROCEDURE — 90670 PCV13 VACCINE IM: CPT | Performed by: INTERNAL MEDICINE

## 2019-03-12 PROCEDURE — 90471 IMMUNIZATION ADMIN: CPT | Performed by: INTERNAL MEDICINE

## 2019-03-12 PROCEDURE — 99214 OFFICE O/P EST MOD 30 MIN: CPT | Mod: 25 | Performed by: INTERNAL MEDICINE

## 2019-03-12 RX ORDER — LISINOPRIL 5 MG/1
2.5 TABLET ORAL DAILY
Qty: 90 TAB | Refills: 0 | COMMUNITY
Start: 2019-03-12 | End: 2019-12-04 | Stop reason: SDUPTHER

## 2019-03-13 NOTE — PROGRESS NOTES
Subjective:   Shirley Unger is a 64 y.o. female here today for diabetes, lab work results, hypertension, foot pain    64-year-old female past medical history of diabetes, hypertension presented to follow-up in regards of blood work.  I did personally reviewed lab work with patient.  Complete blood count is unremarkable.  Electrolytes, kidney function, liver function are normal.   Diabetes is slightly worse.  A1c 8.  Previous A1c 7.1.  She contributed worseing of Diabetes due to steroid injections on her foot.  She is able to be active due to foot pain.  She is on the same medication.  She is on metformin 1000 mg twice daily, Vicryl, 300 mg daily,bydureon injector, glimepiride 4 mg daily.  She is up-to-date with monofilament examination, retinal screening.  She is up-to-date with lab work.  There is no complication from diabetes.  Negative microalbuminuria.  Follows up with endocrinologist.  In regards to left foot pain she is following with podiatry.  She was told that her foot pain is due to Huynh's neuroma.  She is given surgery.  She is asking for a second podiatry referral for second opinion.  She denies numbness, tingling, trauma. She is not taking anything. It it worse if she states on her feet   Regards to sleep she is doing better.  She is working with sleep hygiene. She is sleeping in a different room from her husbands, who snores a lot.   Blood pressure well controlled. She denies chest pain, shortness of breath, leg swelling, blurry vision.  Cholesterol is well managed.  Patient is on Lipitor 20 mg daily.  She denies any side effects from current regimen.  Vitamin d level is normal     Current medicines (including changes today)  Current Outpatient Prescriptions   Medication Sig Dispense Refill   • lisinopril (PRINIVIL) 5 MG Tab Take 0.5 Tabs by mouth every day. 90 Tab 0   • INVOKANA 300 MG Tab TAKE ONE TABLET BY MOUTH ONCE DAILY 90 Tab 3   • glucose blood (FREESTYLE LITE) strip 1 Strip by Other route as  needed. 100 Strip 3   • metformin (GLUCOPHAGE) 1000 MG tablet Take 1 Tab by mouth 2 times a day, with meals. 180 Tab 3   • colesevelam (WELCHOL) 625 MG Tab TAKE ONE TABLET BY MOUTH ONCE DAILY IN THE MORNING, THEN TAKE TWO TABLETS DAILY IN THE EVENING WITH  DINNER 270 Tab 3   • atorvastatin (LIPITOR) 20 MG Tab TAKE ONE TABLET BY MOUTH ONCE DAILY 90 Tab 3   • glimepiride (AMARYL) 4 MG Tab TAKE ONE TABLET BY MOUTH ONCE DAILY IN THE MORNING 180 Tab 3   • BYDUREON 2 MG Pen-injector INJECT ONE PEN SUBCUTANEOUSLY ONCE A WEEK 12 Each 3   • mupirocin calcium (BACTROBAN) 2 % Cream Apply to affected area(s) 2 times a day. 15 g 2   • meloxicam (MOBIC) 15 MG tablet Take 1 Tab by mouth every day. 30 Tab 0   • cetirizine (ZYRTEC) 10 MG Tab Take 1 Tab by mouth every day. 30 Tab 3   • fluticasone (FLONASE) 50 MCG/ACT nasal spray Spray 2 Sprays in nose every day. 16 g 0   • trolamine salicylate (ASPERCREME/ALOE) 10 % cream Apply 1 Squirt to affected area(s) 4 times a day. 1 Tube 3   • Lancets MISC Lancets order: Lancets for Abbott Freestyle Lite meter. Sig: use BID and prn ssx high or low sugar. #100 RF x 3 100 Each 3   • Blood Glucose Monitoring Suppl SUPPLIES MISC Test strips order: Test strips for Abbott Freestyle Lite meter. Sig: use BID and prn ssx high or low sugar #100 RF x 3 100 Each 3   • aspirin (ASA) 81 MG CHEW Take 81 mg by mouth every day.       • POTASSIUM CITRATE PO Take 2 Tabs by mouth 3 times a day.     • triamcinolone acetonide (KENALOG) 0.1 % Ointment Apply  to affected area(s) 2 times a day.       No current facility-administered medications for this visit.      She  has a past medical history of DM (diabetes mellitus) (HCC) (2004); Herpes genitalia (3/22/2013); Hyperlipidemia LDL goal < 100; Nephrolithiasis (3/22/2013); and Vitamin d deficiency (7/8/2013).    Current Outpatient Prescriptions   Medication Sig Dispense Refill   • lisinopril (PRINIVIL) 5 MG Tab Take 0.5 Tabs by mouth every day. 90 Tab 0   • INVOKANA  300 MG Tab TAKE ONE TABLET BY MOUTH ONCE DAILY 90 Tab 3   • glucose blood (FREESTYLE LITE) strip 1 Strip by Other route as needed. 100 Strip 3   • metformin (GLUCOPHAGE) 1000 MG tablet Take 1 Tab by mouth 2 times a day, with meals. 180 Tab 3   • colesevelam (WELCHOL) 625 MG Tab TAKE ONE TABLET BY MOUTH ONCE DAILY IN THE MORNING, THEN TAKE TWO TABLETS DAILY IN THE EVENING WITH  DINNER 270 Tab 3   • atorvastatin (LIPITOR) 20 MG Tab TAKE ONE TABLET BY MOUTH ONCE DAILY 90 Tab 3   • glimepiride (AMARYL) 4 MG Tab TAKE ONE TABLET BY MOUTH ONCE DAILY IN THE MORNING 180 Tab 3   • BYDUREON 2 MG Pen-injector INJECT ONE PEN SUBCUTANEOUSLY ONCE A WEEK 12 Each 3   • mupirocin calcium (BACTROBAN) 2 % Cream Apply to affected area(s) 2 times a day. 15 g 2   • meloxicam (MOBIC) 15 MG tablet Take 1 Tab by mouth every day. 30 Tab 0   • cetirizine (ZYRTEC) 10 MG Tab Take 1 Tab by mouth every day. 30 Tab 3   • fluticasone (FLONASE) 50 MCG/ACT nasal spray Spray 2 Sprays in nose every day. 16 g 0   • trolamine salicylate (ASPERCREME/ALOE) 10 % cream Apply 1 Squirt to affected area(s) 4 times a day. 1 Tube 3   • Lancets MISC Lancets order: Lancets for Abbott Freestyle Lite meter. Sig: use BID and prn ssx high or low sugar. #100 RF x 3 100 Each 3   • Blood Glucose Monitoring Suppl SUPPLIES MISC Test strips order: Test strips for Abbott Freestyle Lite meter. Sig: use BID and prn ssx high or low sugar #100 RF x 3 100 Each 3   • aspirin (ASA) 81 MG CHEW Take 81 mg by mouth every day.       • POTASSIUM CITRATE PO Take 2 Tabs by mouth 3 times a day.     • triamcinolone acetonide (KENALOG) 0.1 % Ointment Apply  to affected area(s) 2 times a day.       No current facility-administered medications for this visit.        Allergies as of 03/12/2019 - Reviewed 03/12/2019   Allergen Reaction Noted   • Macrobid [nitrofurantoin monohydrate macrocrystals] Hives 10/17/2012   • Pcn [penicillins] Hives 10/17/2012       Social History     Social History   •  "Marital status:      Spouse name: N/A   • Number of children: N/A   • Years of education: N/A     Occupational History   •  [employer]     Social History Main Topics   • Smoking status: Never Smoker   • Smokeless tobacco: Never Used   • Alcohol use No      Comment: Rare Useage   • Drug use: No   • Sexual activity: Yes     Partners: Male     Other Topics Concern   • Not on file     Social History Narrative   • No narrative on file        Family History   Problem Relation Age of Onset   • Cancer Mother         Lung   • Heart Disease Father    • Diabetes Maternal Aunt    • Diabetes Maternal Grandfather        Past Surgical History:   Procedure Laterality Date   • GANGLION EXCISION  2014    Performed by Efra Ramirez M.D. at SURGERY [facility]   • HYSTERECTOMY, TOTAL ABDOMINAL      For non cancerous reasons   • CHOLECYSTECTOMY     • CARPAL TUNNEL RELEASE      Right hand   • CYSTOSCOPY STENT PLACEMENT      several, stent removed in MD office   • PB  DELIVERY ONLY         ROS   All systems reviewed are negative except for HPI     Objective:     Blood pressure 116/76, pulse 86, temperature 36.9 °C (98.4 °F), temperature source Temporal, resp. rate 14, height 1.702 m (5' 7\"), weight 95.3 kg (210 lb), last menstrual period 2004, SpO2 95 %, not currently breastfeeding. Body mass index is 32.89 kg/m².   Physical Exam:  Constitutional: Alert, no distress.  Skin: Warm, dry, good turgor, no rashes in visible areas.  Eye: Equal, round and reactive, conjunctiva clear, lids normal.  ENMT: Lips without lesions, good dentition, oropharynx clear.  Neck: Trachea midline, no masses, no thyromegaly. No cervical or supraclavicular lymphadenopathy  Respiratory: Unlabored respiratory effort, lungs clear to auscultation, no wheezes, no ronchi.  Cardiovascular: Normal S1, S2, no murmur, no edema.  Abdomen: Soft, non-tender, no masses, no hepatosplenomegaly.  Psych: Alert and " oriented x3, normal affect and mood.        Assessment and Plan:   The following treatment plan was discussed    1. Huynh's neuroma of left foot  Follow up with podiatry.    2. Uncontrolled type 2 diabetes mellitus without complication, without long-term current use of insulin (HCC)  Continue same regimen.  I did encourage patient walking, low portion, low-carb and low-fat diet.  We will continue to monitor     3. Dyslipidemia  I did order CT cardiac scoring.  Patient is low to high cardiac risk due to diabetes and age.  However she is on proper treatment.  She is on aspirin 81 mg daily and statins   - CT-CARDIAC SCORING; Future    4. Other specified personal risk factors, not elsewhere classified  - CT-CARDIAC SCORING; Future    5. Need for vaccination  - Pneumococcal Conjugate Vaccine 13-Valent    6. Post-menopausal  She is due for review of bone density.  Orders in placed.  - DS-BONE DENSITY STUDY (DEXA); Future    7. Primary insomnia  Doing better. Continue with sleep hygiene     8. Obesity (BMI 30-39.9)  Counseling for a small portion, balanced diet, exercising for3-5 times per week.      Followup: Return in about 3 months (around 6/12/2019), or if symptoms worsen or fail to improve, for Short.

## 2019-03-26 ENCOUNTER — HOSPITAL ENCOUNTER (OUTPATIENT)
Dept: RADIOLOGY | Facility: MEDICAL CENTER | Age: 64
End: 2019-03-26
Attending: INTERNAL MEDICINE
Payer: COMMERCIAL

## 2019-03-26 DIAGNOSIS — Z91.89 OTHER SPECIFIED PERSONAL RISK FACTORS, NOT ELSEWHERE CLASSIFIED: ICD-10-CM

## 2019-03-26 DIAGNOSIS — E78.5 DYSLIPIDEMIA: ICD-10-CM

## 2019-03-26 PROCEDURE — 4410556 CT-CARDIAC SCORING

## 2019-03-29 ENCOUNTER — PATIENT MESSAGE (OUTPATIENT)
Dept: MEDICAL GROUP | Facility: PHYSICIAN GROUP | Age: 64
End: 2019-03-29

## 2019-03-29 DIAGNOSIS — R93.1 HIGH CORONARY ARTERY CALCIUM SCORE: ICD-10-CM

## 2019-03-29 NOTE — TELEPHONE ENCOUNTER
----- Message from Yeimi Marroquin, Med Ass't sent at 3/29/2019  9:06 AM PDT -----  Regarding: FW: Test Result Question  Contact: [phone]      ----- Message -----  From: Shirley Unger  Sent: 3/29/2019   9:06 AM  To: Félix Crabtree Ma  Subject: Test Result Question                             Helterrance Mahmood,  Yes please will you send in a referral to see a cardiologist with [facility].  Thank you very much for your help.  Shirley Unegr  1955  [phone]

## 2019-04-03 ENCOUNTER — HOSPITAL ENCOUNTER (OUTPATIENT)
Dept: RADIOLOGY | Facility: MEDICAL CENTER | Age: 64
End: 2019-04-03
Attending: INTERNAL MEDICINE
Payer: COMMERCIAL

## 2019-04-03 DIAGNOSIS — Z78.0 POST-MENOPAUSAL: ICD-10-CM

## 2019-04-03 PROCEDURE — 77080 DXA BONE DENSITY AXIAL: CPT

## 2019-04-22 ENCOUNTER — OFFICE VISIT (OUTPATIENT)
Dept: ENDOCRINOLOGY | Facility: MEDICAL CENTER | Age: 64
End: 2019-04-22
Payer: COMMERCIAL

## 2019-04-22 ENCOUNTER — APPOINTMENT (OUTPATIENT)
Dept: DERMATOLOGY | Facility: IMAGING CENTER | Age: 64
End: 2019-04-22

## 2019-04-22 VITALS
DIASTOLIC BLOOD PRESSURE: 58 MMHG | WEIGHT: 208 LBS | SYSTOLIC BLOOD PRESSURE: 112 MMHG | HEIGHT: 67 IN | OXYGEN SATURATION: 97 % | RESPIRATION RATE: 17 BRPM | BODY MASS INDEX: 32.65 KG/M2 | HEART RATE: 69 BPM

## 2019-04-22 DIAGNOSIS — E66.9 OBESITY (BMI 30-39.9): ICD-10-CM

## 2019-04-22 DIAGNOSIS — I10 ESSENTIAL HYPERTENSION: ICD-10-CM

## 2019-04-22 DIAGNOSIS — E78.2 MIXED HYPERLIPIDEMIA: ICD-10-CM

## 2019-04-22 DIAGNOSIS — E11.9 TYPE 2 DIABETES MELLITUS WITHOUT COMPLICATION, WITHOUT LONG-TERM CURRENT USE OF INSULIN (HCC): ICD-10-CM

## 2019-04-22 PROCEDURE — 99214 OFFICE O/P EST MOD 30 MIN: CPT | Performed by: INTERNAL MEDICINE

## 2019-05-01 ENCOUNTER — APPOINTMENT (OUTPATIENT)
Dept: RADIOLOGY | Facility: IMAGING CENTER | Age: 64
End: 2019-05-01
Attending: FAMILY MEDICINE
Payer: COMMERCIAL

## 2019-05-01 ENCOUNTER — OFFICE VISIT (OUTPATIENT)
Dept: MEDICAL GROUP | Facility: CLINIC | Age: 64
End: 2019-05-01
Payer: COMMERCIAL

## 2019-05-01 VITALS
DIASTOLIC BLOOD PRESSURE: 70 MMHG | BODY MASS INDEX: 32.65 KG/M2 | WEIGHT: 208 LBS | SYSTOLIC BLOOD PRESSURE: 110 MMHG | TEMPERATURE: 98.8 F | HEIGHT: 67 IN | RESPIRATION RATE: 14 BRPM | OXYGEN SATURATION: 98 % | HEART RATE: 98 BPM

## 2019-05-01 DIAGNOSIS — M25.551 GREATER TROCHANTERIC PAIN SYNDROME OF RIGHT LOWER EXTREMITY: ICD-10-CM

## 2019-05-01 PROCEDURE — 73501 X-RAY EXAM HIP UNI 1 VIEW: CPT | Mod: TC,RT | Performed by: FAMILY MEDICINE

## 2019-05-01 PROCEDURE — 99213 OFFICE O/P EST LOW 20 MIN: CPT | Performed by: FAMILY MEDICINE

## 2019-05-01 RX ORDER — METHYLPREDNISOLONE 4 MG/1
TABLET ORAL
Refills: 0 | COMMUNITY
Start: 2019-04-03 | End: 2019-05-08

## 2019-05-02 NOTE — PROGRESS NOTES
Subjective:     Shirley Unger is a 64 y.o. female who presents for Hip Pain (Right hip pain. Did gardening 2 weeks ago, had hip pain, felt like the hip would drop. )    HPI  Pt presents for evaluation of bilateral hip pain   Pain has been present for the past 2 weeks   First started noticing pain while bending over and gardening   Pain exacerbated while taking a walk in the woods a week ago   Pain is mainly in the right groin currently   Pain also in the posterior hip/buttock   Pain occasionally hurts so much that the hip gives out on her   Pain worse when ambulating or side stepping     Last visit, pt seen for knee pain and injected with Kenalog.  Feels that her knee pain has fully resolved and no longer has any problems with knee     Review of Systems   Constitutional: Negative for fever.   HENT: Negative for sore throat.    Respiratory: Negative for shortness of breath.    Cardiovascular: Negative for chest pain.   Skin: Negative for rash.   Neurological: Negative for sensory change and focal weakness.     PMH:  has a past medical history of DM (diabetes mellitus) ([facility]) (2004); Herpes genitalia (3/22/2013); Hyperlipidemia LDL goal < 100; Nephrolithiasis (3/22/2013); and Vitamin d deficiency (7/8/2013).  MEDS:   Current Outpatient Prescriptions:   •  lisinopril (PRINIVIL) 5 MG Tab, Take 0.5 Tabs by mouth every day., Disp: 90 Tab, Rfl: 0  •  INVOKANA 300 MG Tab, TAKE ONE TABLET BY MOUTH ONCE DAILY, Disp: 90 Tab, Rfl: 3  •  glucose blood (FREESTYLE LITE) strip, 1 Strip by Other route as needed., Disp: 100 Strip, Rfl: 3  •  metformin (GLUCOPHAGE) 1000 MG tablet, Take 1 Tab by mouth 2 times a day, with meals., Disp: 180 Tab, Rfl: 3  •  colesevelam (WELCHOL) 625 MG Tab, TAKE ONE TABLET BY MOUTH ONCE DAILY IN THE MORNING, THEN TAKE TWO TABLETS DAILY IN THE EVENING WITH  DINNER, Disp: 270 Tab, Rfl: 3  •  atorvastatin (LIPITOR) 20 MG Tab, TAKE ONE TABLET BY MOUTH ONCE DAILY, Disp: 90 Tab, Rfl: 3  •  glimepiride (AMARYL) 4  MG Tab, TAKE ONE TABLET BY MOUTH ONCE DAILY IN THE MORNING, Disp: 180 Tab, Rfl: 3  •  BYDUREON 2 MG Pen-injector, INJECT ONE PEN SUBCUTANEOUSLY ONCE A WEEK, Disp: 12 Each, Rfl: 3  •  triamcinolone acetonide (KENALOG) 0.1 % Ointment, Apply  to affected area(s) 2 times a day., Disp: , Rfl:   •  mupirocin calcium (BACTROBAN) 2 % Cream, Apply to affected area(s) 2 times a day., Disp: 15 g, Rfl: 2  •  cetirizine (ZYRTEC) 10 MG Tab, Take 1 Tab by mouth every day., Disp: 30 Tab, Rfl: 3  •  fluticasone (FLONASE) 50 MCG/ACT nasal spray, Spray 2 Sprays in nose every day., Disp: 16 g, Rfl: 0  •  trolamine salicylate (ASPERCREME/ALOE) 10 % cream, Apply 1 Squirt to affected area(s) 4 times a day., Disp: 1 Tube, Rfl: 3  •  Lancets MISC, Lancets order: Lancets for Abbott Freestyle Lite meter. Sig: use BID and prn ssx high or low sugar. #100 RF x 3, Disp: 100 Each, Rfl: 3  •  Blood Glucose Monitoring Suppl SUPPLIES MISC, Test strips order: Test strips for Abbott Freestyle Lite meter. Sig: use BID and prn ssx high or low sugar #100 RF x 3, Disp: 100 Each, Rfl: 3  •  aspirin (ASA) 81 MG CHEW, Take 81 mg by mouth every day.  , Disp: , Rfl:   •  POTASSIUM CITRATE PO, Take 2 Tabs by mouth 3 times a day., Disp: , Rfl:   •  MethylPREDNISolone (MEDROL DOSEPAK) 4 MG Tablet Therapy Pack, , Disp: , Rfl: 0  ALLERGIES:   Allergies   Allergen Reactions   • Macrobid [Nitrofurantoin Monohydrate Macrocrystals] Hives   • Pcn [Penicillins] Hives     SURGHX:   Past Surgical History:   Procedure Laterality Date   • GANGLION EXCISION  2014    Performed by Efra [facility] M[facility] at [facility]   • HYSTERECTOMY, TOTAL ABDOMINAL      For non cancerous reasons   • CHOLECYSTECTOMY     • CARPAL TUNNEL RELEASE      Right hand   • CYSTOSCOPY STENT PLACEMENT      several, stent removed in MD office   • PB  DELIVERY ONLY       SOCHX:  reports that she has never smoked. She has never used smokeless tobacco. She reports that  "she does not drink alcohol or use drugs.  FH: Family history was reviewed, not contributing to acute hip pain      Objective:   /70 (BP Location: Left arm, Patient Position: Sitting, BP Cuff Size: Adult)   Pulse 98   Temp 37.1 °C (98.8 °F) (Temporal)   Resp 14   Ht 1.702 m (5' 7\")   Wt 94.3 kg (208 lb)   LMP 11/29/2004   SpO2 98%   BMI 32.58 kg/m²     Physical Exam   Constitutional: She is oriented to person, place, and time. She appears well-developed and well-nourished. No distress.   HENT:   Head: Normocephalic and atraumatic.   Musculoskeletal:   Right hip:  General: No gross deformity  ROM: flexion 120 degrees, extension 10, ER 60, IR 40, abduction 50, adduction 30   Palpation: +TTP over greater trochanter.  No TTP over groin, pubis, gluteus max, gluteus min  Strength: 5/5 abduction (+pain), 5/5 adduction, 5/5 flexion, 5/5 extension  Special tests: Cecilia -, Fadir +, Labral shear -, Straight leg raise -  Neuro: Sensation intact and equal bilaterally in LE's   Neurological: She is alert and oriented to person, place, and time. No sensory deficit.   Skin: Skin is warm and dry. She is not diaphoretic. No erythema.   Psychiatric: She has a normal mood and affect. Her behavior is normal. Judgment and thought content normal.     Assessment/Plan:   Assessment    1. Greater trochanteric pain syndrome of right lower extremity  Patient is a 64-year-old female with greater trochanteric pain syndrome of right lower extremity.  Hip x-rays show some degenerative changes, and do not show any acute fracture or dislocation.  Suspect only a small amount of her pain is secondary to osteoarthritis.  Reviewed treatment options including oral medications, topical medications, home exercise, physical therapy, injections.  Patient would like to start with home exercises and avoid injections or formal physical therapy if possible.  Patient given handout with exercises to begin working on at home.  If not making good " progress on her own, will reconsider injection or physical therapy referral.  - DX-HIP-UNILATERAL-WITH PELVIS-1 VIEW RIGHT; Future

## 2019-05-08 ENCOUNTER — HOSPITAL ENCOUNTER (OUTPATIENT)
Dept: RADIOLOGY | Facility: MEDICAL CENTER | Age: 64
End: 2019-05-08
Attending: UROLOGY
Payer: COMMERCIAL

## 2019-05-08 ENCOUNTER — OFFICE VISIT (OUTPATIENT)
Dept: CARDIOLOGY | Facility: MEDICAL CENTER | Age: 64
End: 2019-05-08
Payer: COMMERCIAL

## 2019-05-08 VITALS
HEIGHT: 67 IN | HEART RATE: 100 BPM | WEIGHT: 207.56 LBS | BODY MASS INDEX: 32.58 KG/M2 | DIASTOLIC BLOOD PRESSURE: 60 MMHG | OXYGEN SATURATION: 94 % | SYSTOLIC BLOOD PRESSURE: 110 MMHG

## 2019-05-08 DIAGNOSIS — N20.0 KIDNEY STONE: ICD-10-CM

## 2019-05-08 DIAGNOSIS — E78.5 DYSLIPIDEMIA: ICD-10-CM

## 2019-05-08 DIAGNOSIS — I25.10 CORONARY ARTERY CALCIFICATION SEEN ON CT SCAN: ICD-10-CM

## 2019-05-08 PROCEDURE — 99204 OFFICE O/P NEW MOD 45 MIN: CPT | Performed by: INTERNAL MEDICINE

## 2019-05-08 PROCEDURE — 74018 RADEX ABDOMEN 1 VIEW: CPT

## 2019-05-08 PROCEDURE — 93000 ELECTROCARDIOGRAM COMPLETE: CPT | Performed by: INTERNAL MEDICINE

## 2019-05-08 NOTE — PROGRESS NOTES
Subjective:   Chief Complaint:   Chief Complaint   Patient presents with   • New Patient     Calcification       Shirley Unger is a 64 y.o. female who is referred by Dr. Mahmood for abnormal calcium score.  It was performed for risk stratification in 2019.  Total score was essentially 350, predominantly in the circumflex and LAD.  There was minimal left main disease.    No HTN, BP controlled, was on higher lisinopril but was lowering her BP.  Has HLP, last LDL 40, HDL low at 39, triglycerides elevated at 211, on lipitor 20 mg- has been on this for about 15 years.  Has been on Welchol due to no gallbladder.  Has DM2 for 15 years, on SGLT2 Inhibitor for a few years.  Last hemoglobin A1c 8.  Takes primary prevention ASA 81 mg.  Has lost 120 pounds. Goal is 180 for Healthy Tracks.  Father's history not clear,  at 71, she is not sure but didn't think it was an MI but having trouble beating, not clear.    She is not limited by chest pain, pressure or tightness.   No significant dyspnea on exertion, orthopnea or lower extremity swelling.   No significant palpitations, dizziness, or presyncope/syncope. No symptoms of leg claudication.   No stroke/TIA like symptoms.  She is limited by hip pain, walking with a cane.    Just had a KUB for possible kidney stone.    Works at [employer] as an .    DATA REVIEWED by me:  ECG 19  Sinus 96, LAFB, Delayed R wave progression    Calcium score 3-26-19  Coronary calcification:  LMA - 45.1  LCX - 138.1  LAD - 166.2  RCA - 0.0  PDA - 0.0    Total Calcium Score: 349.3    Most recent labs:     3/2/2019 hemoglobin 14.6, platelets 257, sodium 140, potassium 3.9, creatinine 0.72, LFTs normal, hemoglobin A1c 8, LDL 40, HDL 39, triglycerides 211, total cholesterol 121    Past Medical History:   Diagnosis Date   • DM (diabetes mellitus) (HCC)    • Herpes genitalia 3/22/2013   • Hyperlipidemia LDL goal < 100    • Nephrolithiasis 3/22/2013    Recurrent Dr Monge - [place]  stones,    • Vitamin d deficiency 2013     Past Surgical History:   Procedure Laterality Date   • GANGLION EXCISION  2014    Performed by Efra Ramirez M.D. at SURGERY [facility]   • HYSTERECTOMY, TOTAL ABDOMINAL      For non cancerous reasons   • CHOLECYSTECTOMY     • CARPAL TUNNEL RELEASE      Right hand   • CYSTOSCOPY STENT PLACEMENT      several, stent removed in MD office   • PB  DELIVERY ONLY       Family History   Problem Relation Age of Onset   • Cancer Mother         Lung   • Heart Disease Father          at 71, No MI? May have been electrical   • Diabetes Maternal Aunt    • Diabetes Maternal Grandfather      Social History     Social History   • Marital status:      Spouse name: N/A   • Number of children: N/A   • Years of education: N/A     Occupational History   •  [employer]     Social History Main Topics   • Smoking status: Never Smoker   • Smokeless tobacco: Never Used   • Alcohol use No      Comment: Rare Useage   • Drug use: No   • Sexual activity: Yes     Partners: Male     Other Topics Concern   • Not on file     Social History Narrative   • No narrative on file     Allergies   Allergen Reactions   • Macrobid [Nitrofurantoin Monohydrate Macrocrystals] Hives   • Pcn [Penicillins] Hives       Current Outpatient Prescriptions   Medication Sig Dispense Refill   • lisinopril (PRINIVIL) 5 MG Tab Take 0.5 Tabs by mouth every day. 90 Tab 0   • INVOKANA 300 MG Tab TAKE ONE TABLET BY MOUTH ONCE DAILY 90 Tab 3   • glucose blood (FREESTYLE LITE) strip 1 Strip by Other route as needed. 100 Strip 3   • metformin (GLUCOPHAGE) 1000 MG tablet Take 1 Tab by mouth 2 times a day, with meals. 180 Tab 3   • colesevelam (WELCHOL) 625 MG Tab TAKE ONE TABLET BY MOUTH ONCE DAILY IN THE MORNING, THEN TAKE TWO TABLETS DAILY IN THE EVENING WITH  DINNER 270 Tab 3   • atorvastatin (LIPITOR) 20 MG Tab TAKE ONE TABLET BY MOUTH ONCE DAILY 90 Tab 3   • glimepiride  "(AMARYL) 4 MG Tab TAKE ONE TABLET BY MOUTH ONCE DAILY IN THE MORNING 180 Tab 3   • BYDUREON 2 MG Pen-injector INJECT ONE PEN SUBCUTANEOUSLY ONCE A WEEK 12 Each 3   • triamcinolone acetonide (KENALOG) 0.1 % Ointment Apply  to affected area(s) 2 times a day.     • mupirocin calcium (BACTROBAN) 2 % Cream Apply to affected area(s) 2 times a day. 15 g 2   • cetirizine (ZYRTEC) 10 MG Tab Take 1 Tab by mouth every day. 30 Tab 3   • fluticasone (FLONASE) 50 MCG/ACT nasal spray Spray 2 Sprays in nose every day. 16 g 0   • trolamine salicylate (ASPERCREME/ALOE) 10 % cream Apply 1 Squirt to affected area(s) 4 times a day. 1 Tube 3   • Lancets MISC Lancets order: Lancets for Abbott Freestyle Lite meter. Sig: use BID and prn ssx high or low sugar. #100 RF x 3 100 Each 3   • Blood Glucose Monitoring Suppl SUPPLIES MISC Test strips order: Test strips for Abbott Freestyle Lite meter. Sig: use BID and prn ssx high or low sugar #100 RF x 3 100 Each 3   • aspirin (ASA) 81 MG CHEW Take 81 mg by mouth every day.       • POTASSIUM CITRATE PO Take 2 Tabs by mouth 3 times a day.       No current facility-administered medications for this visit.        ROS  All others systems reviewed and negative.     Objective:     /60 (BP Location: Left arm, Patient Position: Sitting, BP Cuff Size: Adult)   Pulse 100   Ht 1.702 m (5' 7\")   Wt 94.2 kg (207 lb 9 oz)   SpO2 94%  Body mass index is 32.51 kg/m².    Physical Exam   General: No acute distress. Well nourished.  HEENT: EOM grossly intact, no scleral icterus, no pharyngeal erythema.   Neck:  No JVD, no bruits, trachea midline  CVS: RRR. Normal S1, S2. No M/R/G. No LE edema.  2+ radial pulses, 2+ DP pulses  Resp: CTAB. No wheezing or crackles/rhonchi. Normal respiratory effort.  Abdomen: Soft, NT, no alvaro hepatomegaly.  MSK/Ext: No clubbing or cyanosis.  Skin: Warm and dry, no rashes.  Neurological: CN III-XII grossly intact. No focal deficits. Walking with a cane, mild limp  Psych: A&O " x 3, appropriate affect, good judgement      Assessment:     1. Coronary artery calcification seen on CT scan  EKG   2. Dyslipidemia     3. Uncontrolled type 2 diabetes mellitus without complication, without long-term current use of insulin (HCC)         Medical Decision Making:  Today's Assessment / Status / Plan:     -LDL cholesterol is at goal at 40 on moderate intensity statin  -Statin have been known to promote calcified coronary disease, it is speculated through the mechanism of stabilizing the plaque.  -Continue aspirin 81 mg  -Continue lifestyle modification including exercise for possible when her leg/hip gets better.  -I am pleased she is on an SGLT2 Inhibitor  -She will return if/when she develops symptoms or has any questions.      Return if symptoms worsen or fail to improve.    It is my pleasure to participate in the care of Ms. Unger.  Please do not hesitate to contact me with questions or concerns.    Viri Lagos MD, [facility]  Cardiologist [facility]    Please note that this dictation was created using voice recognition software. I have made every reasonable attempt to correct obvious errors, but it is possible there are errors of grammar and possibly content that I did not discover before finalizing the note.

## 2019-05-09 LAB — EKG IMPRESSION: NORMAL

## 2019-05-15 ASSESSMENT — ENCOUNTER SYMPTOMS
SHORTNESS OF BREATH: 0
SENSORY CHANGE: 0
FEVER: 0
FOCAL WEAKNESS: 0
SORE THROAT: 0

## 2019-05-22 ENCOUNTER — PATIENT MESSAGE (OUTPATIENT)
Dept: MEDICAL GROUP | Facility: CLINIC | Age: 64
End: 2019-05-22

## 2019-05-22 DIAGNOSIS — M25.551 GREATER TROCHANTERIC PAIN SYNDROME OF RIGHT LOWER EXTREMITY: ICD-10-CM

## 2019-05-28 ENCOUNTER — HOSPITAL ENCOUNTER (OUTPATIENT)
Dept: RADIOLOGY | Facility: MEDICAL CENTER | Age: 64
End: 2019-05-28
Attending: UROLOGY
Payer: COMMERCIAL

## 2019-05-28 DIAGNOSIS — N20.0 URIC ACID NEPHROLITHIASIS: ICD-10-CM

## 2019-05-28 PROCEDURE — 74176 CT ABD & PELVIS W/O CONTRAST: CPT

## 2019-06-11 RX ORDER — LISINOPRIL 5 MG/1
TABLET ORAL
Qty: 100 TAB | Refills: 0 | Status: SHIPPED | OUTPATIENT
Start: 2019-06-11 | End: 2019-10-28

## 2019-06-12 ENCOUNTER — PHYSICAL THERAPY (OUTPATIENT)
Dept: PHYSICAL THERAPY | Facility: REHABILITATION | Age: 64
End: 2019-06-12
Attending: FAMILY MEDICINE
Payer: COMMERCIAL

## 2019-06-12 DIAGNOSIS — M25.551 GREATER TROCHANTERIC PAIN SYNDROME OF RIGHT LOWER EXTREMITY: ICD-10-CM

## 2019-06-12 PROCEDURE — 97110 THERAPEUTIC EXERCISES: CPT

## 2019-06-12 PROCEDURE — 97163 PT EVAL HIGH COMPLEX 45 MIN: CPT

## 2019-06-12 ASSESSMENT — ENCOUNTER SYMPTOMS
PAIN SCALE AT HIGHEST: 10
PAIN SCALE: 7
QUALITY: SHARP
QUALITY: STABBING
QUALITY: DEEP

## 2019-06-12 NOTE — OP THERAPY EVALUATION
Outpatient Physical Therapy  INITIAL EVALUATION    [facility]  [address], [address]  [place] [address]  Phone:  [phone]    Date of Evaluation: 06/12/2019    Patient: Shirley Unger  YOB: 1955  MRN: [id number]     Referring Provider: Jose Esqueda M.D.  [address]  [address]  [place] [address]   Referring Diagnosis Greater trochanteric pain syndrome of right lower extremity [M25.551]     Time Calculation  Start time: 1600  Stop time: 1700 Time Calculation (min): 60 minutes     Physical Therapy Occurrence Codes    Date of onset of impairment:  11/13/18   Date physical therapy care plan established or reviewed:  6/12/19   Date physical therapy treatment started:  6/12/19          Chief Complaint: Back Problem and Hip Problem    Visit Diagnoses     ICD-10-CM   1. Greater trochanteric pain syndrome of right lower extremity M25.551         Subjective:   History of Present Illness:     Mechanism of injury:  Pt is 63 yo female with long h/o L hip pain.  Had PT in past that did not help.  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  Sought 2nd opinion.  Got orthotic which eventually got better but began to have R glut pain 7 months ago.  Uses a SPC because she feels like her R hip will give out on her.  This has been going on x 2 months.  Pain in R hip has gradually gotten better.  Takes glucosomine for R L hip but states she can't pick anything up from floor due to stiffness.  Has stiffened up due to her lack of using it.  Has occassional R foot pain after a long day at work.  Feels better with moving around and walking.  Has been restless at night and had increased pain over last 2 nights.  States she has increased urinary incontinence when her hip hurts more.  Enjoys hiking but is nervous to do it.  She did hike over last couple of weekends and did well.   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  Sits for 8 hours/day.     Sleep disturbance:  Difficulty falling asleep and interrupted sleep (only sleeps for a couple hours of night. )  Pain:     Current pain ratin    Pain scale at lowest: difficult to say.    At worst pain rating:  10    Location:  R glut that radiates into anterior hip.  R foot can hurt more when hip hurts.      Quality:  Deep, stabbing and sharp    Pain timing:  Intermittent    Relieving factors:  Rest    Pain Comments::  Aggs:  Getting in and out of low chair,  Stepping laterally with R leg.  Standing x 15' then R hip feels weak.  Difficult to begin walking after prolonged sitting x 2 hours.  Can't go down steps with R foot and pulls R leg up stairs.  Difficult to walk on inclines.    Diagnostic Tests:     X-ray: abnormal      Diagnostic Tests Comments:  XRAy results:  1.  No pelvic or proximal femoral fracture identified    2.  Bilateral hip joint osteoarthritis, left greater than right    3.  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      Past Medical History:   Diagnosis Date   • DM (diabetes mellitus) (HCC)    • Herpes genitalia 3/22/2013   • Hyperlipidemia LDL goal < 100    • Nephrolithiasis 3/22/2013    Recurrent Dr Monge - Calcium stones,    • Vitamin d deficiency 2013     Past Surgical History:   Procedure Laterality Date   • GANGLION EXCISION  2014    Performed by Efra Ramirez M.D. at [facility]   • HYSTERECTOMY, TOTAL ABDOMINAL      For non cancerous reasons   • CHOLECYSTECTOMY     • CARPAL TUNNEL RELEASE      Right hand   • CYSTOSCOPY STENT PLACEMENT      several, stent removed in MD office   • PB  DELIVERY ONLY       Social History   Substance Use Topics   • Smoking status: Never Smoker   • Smokeless tobacco: Never  Used   • Alcohol use No      Comment: Rare Useage     Family and Occupational History     Social History   • Marital status:      Spouse name: N/A   • Number of children: N/A   • Years of education: N/A     Occupational History   •  [employer]       Objective     Observations   Central spine     Positive for lumbar scoliosis and thoracic kyphosis.    Additional Observation Details  Stands in 22 degrees forward flex.    Neurological Testing     Reflexes     Comments   Left Patellar (L4): difficult to illicit  Left Achilles (S1): difficult to illicit  Right Patellar (L4): difficult to illicit  Right Achilles (S1): Difficult to illicit    Myotome testing   Lumbar (left)   All left lumbar myotomes within normal limits    Lumbar (right)   All right lumbar myotomes within normal limits    Dermatome testing   Lumbar (left)   L5: decreased  S1: decreased    Lumbar (right)   All right lumbar dermatomes intact    Tenderness     Right Hip   Tenderness in the PSIS. No tenderness in the greater trochanter.     Additional Tenderness Details  TTP over R sciatic nerve    Active Range of Motion     Lumbar   Flexion: within functional limits  Extension: decreased (0 degrees)  Left lateral flexion: decreased (decreased 75%)  Right lateral flexion: decreased (decreased 75%)    Right Hip   Flexion: 100 degrees     Passive Range of Motion     Right Hip   Flexion: 100 degrees with pain  External rotation (90/90): 22 degrees   Internal rotation (90/90): 15 degrees     Joint Play   Spine     Central PA [place]        T10: hypomobile       T12: hypomobile       L1: hypomobile and painful       L2: hypomobile and painful       L3: hypomobile and painful       L4: hypomobile       L5: hypomobile       S1: hypomobile      Right Hip     Posterior hip: within functional limits    Anterior hip: hypomobile    Lateral hip: within functional limits    Strength:      Left Hip   Planes of Motion   Flexion: 5  Extension:  5  Abduction: 5    Right Hip   Planes of Motion   Flexion: 5  Extension: 5  Abduction: 5    Tests     Right Pelvic Girdle/Sacrum   Negative: thigh thrust.     Right Hip   Positive FADIR and scour.   Negative GEOVANNY, SI compression, SI distraction and sign of the buttock.   SLR: Negative.     Additional Tests Details  N    Ashley LumbarTest   Static tests   Lying prone: unable  Lying prone on elbows: unable  Lying prone in extension: unable    Standing repeated motions:   Flexion in standing     Symptoms during testing: produces (R glut)  Repeat flexion in standing     Symptoms during testing: produces    Symptoms after testing: worse  Extension in standing     Symptoms during testing: centralizes    Symptoms after testing: better  Ambulation     Observational Gait   Increased left stance time and right stance time. Decreased walking speed, left swing time, right swing time, left step length and right step length.   Left foot contact pattern: heel to toe  Right foot contact pattern: toe to heel  Left arm swing: increased  Right arm swing: increased  Base of support: increased    Additional Observational Gait Details  Pt has poor B hip extension in stance phase and poor B anterior pelvic rotation and lumbar rotation.  Generates most of her forward progression with increased B arm swing and thoracic rotation.  Forward lean persists thru/o gait cycle.        Therapeutic Exercises (CPT 52561):     1. RAJESH with handout.  Educated in Ashley principles.       Time-based treatments/modalities:  Therapeutic exercise minutes (CPT 66305): 15 minutes       Assessment, Response and Plan:   Impairments: abnormal gait, abnormal or restricted ROM, impaired functional mobility, impaired balance, impaired physical strength, limited mobility, pain with function and weight-bearing intolerance    Other Impairments:  B hip ROm deficits, L/S ROm deficits, difficult to illicit B LE reflexes, urinary incontinance, disrupted sleep,  difficulty doing rec activities  Assessment details:  Pt presents with R hip and glut pain with insidious onset 7 months ago.  Was able to reproduce R hip pain with directional biased movements which suggests there is a lumbar referral component to her c/o's.  Pt also has moderately decreased R hip ROM and objective findings that suggest internal derangement.  Of most concern, however, is the patient's report of urinary incontinence when her hip/glut pain is worse.  If this persists, additional imaging of spine may be indicated.  To benefit from skilled PT to help restore lumbar and hip mobility and address above deficits to enable ability to perform daily routine functionally and return to recreational activities.    Barriers to therapy:  None  Other barriers to therapy:  Works long hours which limit her availability to participate in PT  Prognosis: good    Goals:   Short Term Goals:   1.  Pt will be able to sleep thru the night 50% of the time.  2.  Pt will be able to rigoberto her shoes with 50% improvement.  Short term goal time span:  2-4 weeks      Long Term Goals:    1.  Pt will be able to sleep thru the night 80% of the time.  2.  Pt will be able to rigoberto her shoes with 80% improvement.  3. Pt will be able to stand for 30' without c/o R LE fatigue to cook a meal.  4.  Pt will be able to get on/off toilet without c/0, 80% of the time.  5. Pt will be able to go for 45' hike without increase in sxs.  6. I in HEP for self maintenance.  Long term goal time span:  6-8 weeks    Plan:   Therapy options:  Physical therapy treatment to continue  Planned therapy interventions:  E Stim Unattended (CPT 79177), Manual Therapy (CPT 33484), Therapeutic Activities (CPT 18569), Therapeutic Exercise (CPT 48326), Neuromuscular Re-education (CPT 50103) and Mechanical Traction (CPT 40654)  Frequency:  2x week  Duration in weeks:  8  Discussed with:  Patient      Functional Limitations and Severity Modifiers  WOMAC Grand Total: 91.67    Current:     Goal:       Referring provider co-signature:  I have reviewed this plan of care and my co-signature certifies the need for services.  Certification Dates:   From 6/12/2019     To 8/7/2019    Physician Signature: ________________________________ Date: ______________

## 2019-06-13 ASSESSMENT — ACTIVITIES OF DAILY LIVING (ADL): POOR_BALANCE: 1

## 2019-06-13 ASSESSMENT — ENCOUNTER SYMPTOMS
PAIN TIMING: INTERMITTENT
ALLEVIATING FACTORS: REST

## 2019-06-17 ENCOUNTER — PHYSICAL THERAPY (OUTPATIENT)
Dept: PHYSICAL THERAPY | Facility: REHABILITATION | Age: 64
End: 2019-06-17
Attending: FAMILY MEDICINE
Payer: COMMERCIAL

## 2019-06-17 DIAGNOSIS — M25.551 GREATER TROCHANTERIC PAIN SYNDROME OF RIGHT LOWER EXTREMITY: ICD-10-CM

## 2019-06-17 PROCEDURE — 97140 MANUAL THERAPY 1/> REGIONS: CPT

## 2019-06-17 PROCEDURE — 97112 NEUROMUSCULAR REEDUCATION: CPT

## 2019-06-17 NOTE — OP THERAPY DAILY TREATMENT
Outpatient Physical Therapy  DAILY TREATMENT     [facility] [place] [address] [facility], [address]  [place] [address]  Phone:  [phone]    Date: 06/17/2019    Patient: Shirley Unger  YOB: 1955  MRN: [id number]     Time Calculation  Start time: 1655  Stop time: 1725 Time Calculation (min): 30 minutes     Chief Complaint: Hip Problem    Visit #: 2    SUBJECTIVE:  Been doing RAJESH as well as she could.  Thinks hips are feeling better. Still can't bend over but feeling significantly better.      OBJECTIVE:  Current objective measures: AROM R hip flex with OP:  102          Therapeutic Treatments and Modalities:     1. Manual Therapy (CPT 23359), L SL for R L/S rot with gapping gr3-4.  Seated prone over ball since patient can't lie prone for MFR to L>R multifidus, central and unilateral PAs gr 3-4 T12-L4.  T/S central PAs with active T/S ext.     2. Neuromuscular Re-education (CPT 55935), RAJESH for R hip with patient OP.  Added to HEP 4-5 times/day., Standing posture on wall with [place] B shoulder flex.  Practiced walking with this posture. , Backward walking erect with emphasis on increased B hip ext and upright trunk with HHA for confidance.     Time-based treatments/modalities:  Manual therapy minutes (CPT 72589): 15 minutes  Neuromusc re-ed, balance, coor, post minutes (CPT 15556): 15 minutes       Pain rating before treatment: 6  Pain rating after treatment: 0    ASSESSMENT:   Response to treatment: Poor lumbar segmental mobility candace at L2-3.  Significant hypertonicity L paraspinals.  Difficult to maintain upright posture without wall for support.  Still lacks significant B hip ext in gait.  Good results with treatment focused on lumbar spine suggesting large lumbar referral component.       PLAN/RECOMMENDATIONS:   Plan for treatment: therapy treatment to continue next visit.  Planned interventions for next visit: continue with current treatment.  Progress above.

## 2019-06-19 ENCOUNTER — PHYSICAL THERAPY (OUTPATIENT)
Dept: PHYSICAL THERAPY | Facility: REHABILITATION | Age: 64
End: 2019-06-19
Attending: FAMILY MEDICINE
Payer: COMMERCIAL

## 2019-06-19 DIAGNOSIS — M25.551 GREATER TROCHANTERIC PAIN SYNDROME OF RIGHT LOWER EXTREMITY: ICD-10-CM

## 2019-06-19 PROCEDURE — 97112 NEUROMUSCULAR REEDUCATION: CPT

## 2019-06-19 PROCEDURE — 97140 MANUAL THERAPY 1/> REGIONS: CPT

## 2019-06-20 NOTE — OP THERAPY DAILY TREATMENT
Outpatient Physical Therapy  DAILY TREATMENT     [facility] [place] [address] [facility], [address]  [place] [address]  Phone:  [phone]    Date: 06/19/2019    Patient: Shirley Unger  YOB: 1955  MRN: [id number]     Time Calculation  Start time: 1700  Stop time: 1735 Time Calculation (min): 35 minutes     Chief Complaint: Hip Problem    Visit #: 3    SUBJECTIVE:  Has pain in hips but it feels like new muscles.  Hasn't used the cane at home since last visit due to not feeling like leg will give out.    OBJECTIVE:          Therapeutic Treatments and Modalities:     1. Manual Therapy (CPT 99206), L SL for R L/S rot with gapping gr3-4.  Seated prone over ball since patient can't lie prone for MFR to L>R multifidus, central and unilateral PAs gr 3-4 T12-L4.  T/S central PAs with active T/S ext.     2. Neuromuscular Re-education (CPT 87862), RAJESH for R hip with patient OP x 15, RAJESH at counter x 10// decrease in anterior hip pain, B BKFO x 20.  Added to HEP    Time-based treatments/modalities:  Manual therapy minutes (CPT 85329): 15 minutes  Neuromusc re-ed, balance, coor, post minutes (CPT 42761): 15 minutes       Pain rating before treatment: 8  Pain rating after treatment: 0    ASSESSMENT:   Response to treatment: Continues to have poor B hip and lumbar spine dissociation.  Able to decrease B hip pain with lumbar extension bias.    PLAN/RECOMMENDATIONS:   Plan for treatment: therapy treatment to continue next visit.  Planned interventions for next visit: continue with current treatment. Progress lumbar stabillity.

## 2019-06-24 ENCOUNTER — PHYSICAL THERAPY (OUTPATIENT)
Dept: PHYSICAL THERAPY | Facility: REHABILITATION | Age: 64
End: 2019-06-24
Attending: FAMILY MEDICINE
Payer: COMMERCIAL

## 2019-06-24 DIAGNOSIS — M25.551 GREATER TROCHANTERIC PAIN SYNDROME OF RIGHT LOWER EXTREMITY: ICD-10-CM

## 2019-06-24 PROCEDURE — 97014 ELECTRIC STIMULATION THERAPY: CPT

## 2019-06-24 PROCEDURE — 97140 MANUAL THERAPY 1/> REGIONS: CPT

## 2019-06-24 PROCEDURE — 97112 NEUROMUSCULAR REEDUCATION: CPT

## 2019-06-25 NOTE — OP THERAPY DAILY TREATMENT
Outpatient Physical Therapy  DAILY TREATMENT     [facility] [address] [facility], [address]  [place] [address]  Phone:  [phone]    Date: 06/24/2019    Patient: Shirley Unger  YOB: 1955  MRN: [id number]     Time Calculation  Start time: 1705  Stop time: 1755 Time Calculation (min): 50 minutes     Chief Complaint: Back Problem and Hip Problem    Visit #: 4    SUBJECTIVE:  Has good day and bad days.  Bad days are when she does too much like go to the [employer].  Had to use the cane for that.  Not using SPC most of the time. Exercises help relieve.    OBJECTIVE:            Therapeutic Treatments and Modalities:     1. Manual Therapy (CPT 88427), FDN For B L5-S1,  R glut med, glut min, R quad stretch in prone on pillows, R hip IR/ER mobs gr 3-4, // no R hip pain    2. Neuromuscular Re-education (CPT 12078), Ball wall squats x 20, B multifidus press outs pink TB x 20.  Reviewed RAJESH and RAJESH r hip    3. E Stim Unattended (CPT 24819), IFC with MHP to L/S x 15'    Time-based treatments/modalities:  Manual therapy minutes (CPT 21862): 20 minutes  Neuromusc re-ed, balance, coor, post minutes (CPT 58098): 10 minutes       Pain rating before treatment: 4  Pain rating after treatment: 0    ASSESSMENT:   Response to treatment: Able to achieve prone which she has not been able to do for FDN.  Poor lumbar rotation and ext persists with forward lean present in L/S.  Improved R LE stability as patient rarely uses SPC. Good gradual gains.    PLAN/RECOMMENDATIONS:   Plan for treatment: therapy treatment to continue next visit.  Planned interventions for next visit: continue with current treatment. Assess FDN.  Con't if improved.  Progress generalized lumbar and hip mobility.

## 2019-06-26 ENCOUNTER — PHYSICAL THERAPY (OUTPATIENT)
Dept: PHYSICAL THERAPY | Facility: REHABILITATION | Age: 64
End: 2019-06-26
Attending: FAMILY MEDICINE
Payer: COMMERCIAL

## 2019-06-26 ENCOUNTER — OFFICE VISIT (OUTPATIENT)
Dept: MEDICAL GROUP | Facility: CLINIC | Age: 64
End: 2019-06-26
Payer: COMMERCIAL

## 2019-06-26 VITALS
DIASTOLIC BLOOD PRESSURE: 64 MMHG | HEIGHT: 67 IN | BODY MASS INDEX: 32.49 KG/M2 | RESPIRATION RATE: 14 BRPM | WEIGHT: 207 LBS | SYSTOLIC BLOOD PRESSURE: 106 MMHG | HEART RATE: 80 BPM | TEMPERATURE: 98.4 F

## 2019-06-26 DIAGNOSIS — N39.46 MIXED STRESS AND URGE URINARY INCONTINENCE: ICD-10-CM

## 2019-06-26 DIAGNOSIS — M54.50 LUMBAR BACK PAIN: ICD-10-CM

## 2019-06-26 DIAGNOSIS — M25.551 GREATER TROCHANTERIC PAIN SYNDROME OF RIGHT LOWER EXTREMITY: ICD-10-CM

## 2019-06-26 LAB
APPEARANCE UR: CLEAR
BILIRUB UR STRIP-MCNC: NEGATIVE MG/DL
COLOR UR AUTO: YELLOW
GLUCOSE UR STRIP.AUTO-MCNC: NORMAL MG/DL
KETONES UR STRIP.AUTO-MCNC: NEGATIVE MG/DL
LEUKOCYTE ESTERASE UR QL STRIP.AUTO: NEGATIVE
NITRITE UR QL STRIP.AUTO: NEGATIVE
PH UR STRIP.AUTO: 7 [PH] (ref 5–8)
PROT UR QL STRIP: NEGATIVE MG/DL
RBC UR QL AUTO: NEGATIVE
SP GR UR STRIP.AUTO: 1.01
UROBILINOGEN UR STRIP-MCNC: 0.2 MG/DL

## 2019-06-26 PROCEDURE — 81002 URINALYSIS NONAUTO W/O SCOPE: CPT | Performed by: FAMILY MEDICINE

## 2019-06-26 PROCEDURE — 97112 NEUROMUSCULAR REEDUCATION: CPT

## 2019-06-26 PROCEDURE — 99214 OFFICE O/P EST MOD 30 MIN: CPT | Performed by: FAMILY MEDICINE

## 2019-06-26 PROCEDURE — 97014 ELECTRIC STIMULATION THERAPY: CPT

## 2019-06-26 PROCEDURE — 97140 MANUAL THERAPY 1/> REGIONS: CPT

## 2019-06-26 ASSESSMENT — ENCOUNTER SYMPTOMS
FEVER: 0
VOMITING: 0
FOCAL WEAKNESS: 0
SHORTNESS OF BREATH: 0

## 2019-06-26 NOTE — PROGRESS NOTES
Subjective:     Shirley Unger is a 64 y.o. female who presents for Hip Pain (Right hip follow up and doing PT/home exercises. Improved, almost pain free since last visit.)    HPI  Pt presents for follow-up right hip pain and low back pain   Pt has been following with PT and feels she is making good progress   Pain is down to 1-2/10   Pain mainly in the groin area lately  Continues to have some intermittent shooting down into right leg  Has good days and bad days, today is a pretty good day   Patient having intermittent episodes of incontinence which she has not brought up in the past  Incontinence is not always with coughing or exertion  Occasionally will happen randomly, or will have continued dripping after she thinks she is done urinating  No saddle anesthesia  Bowel function normal  No fevers    Review of Systems   Constitutional: Negative for fever.   Respiratory: Negative for shortness of breath.    Cardiovascular: Negative for chest pain.   Gastrointestinal: Negative for vomiting.   Skin: Negative for rash.   Neurological: Negative for focal weakness.     PMH:  has a past medical history of DM (diabetes mellitus) ([facility]) (2004); Herpes genitalia (3/22/2013); Hyperlipidemia LDL goal < 100; Nephrolithiasis (3/22/2013); and Vitamin d deficiency (7/8/2013).  MEDS:   Current Outpatient Prescriptions:   •  lisinopril (PRINIVIL) 5 MG Tab, TAKE 1 TABLET BY MOUTH ONCE DAILY AT BEDTIME, Disp: 100 Tab, Rfl: 0  •  Exenatide ER (BYDUREON) 2 MG Pen-injector, Inject 2 mg as instructed every 7 days., Disp: 12 Each, Rfl: 0  •  lisinopril (PRINIVIL) 5 MG Tab, Take 0.5 Tabs by mouth every day., Disp: 90 Tab, Rfl: 0  •  INVOKANA 300 MG Tab, TAKE ONE TABLET BY MOUTH ONCE DAILY, Disp: 90 Tab, Rfl: 3  •  glucose blood (FREESTYLE LITE) strip, 1 Strip by Other route as needed., Disp: 100 Strip, Rfl: 3  •  metformin (GLUCOPHAGE) 1000 MG tablet, Take 1 Tab by mouth 2 times a day, with meals., Disp: 180 Tab, Rfl: 3  •   colesevelam (WELCHOL) 625 MG Tab, TAKE ONE TABLET BY MOUTH ONCE DAILY IN THE MORNING, THEN TAKE TWO TABLETS DAILY IN THE EVENING WITH  DINNER, Disp: 270 Tab, Rfl: 3  •  atorvastatin (LIPITOR) 20 MG Tab, TAKE ONE TABLET BY MOUTH ONCE DAILY, Disp: 90 Tab, Rfl: 3  •  glimepiride (AMARYL) 4 MG Tab, TAKE ONE TABLET BY MOUTH ONCE DAILY IN THE MORNING, Disp: 180 Tab, Rfl: 3  •  triamcinolone acetonide (KENALOG) 0.1 % Ointment, Apply  to affected area(s) 2 times a day., Disp: , Rfl:   •  mupirocin calcium (BACTROBAN) 2 % Cream, Apply to affected area(s) 2 times a day., Disp: 15 g, Rfl: 2  •  cetirizine (ZYRTEC) 10 MG Tab, Take 1 Tab by mouth every day., Disp: 30 Tab, Rfl: 3  •  fluticasone (FLONASE) 50 MCG/ACT nasal spray, Spray 2 Sprays in nose every day., Disp: 16 g, Rfl: 0  •  trolamine salicylate (ASPERCREME/ALOE) 10 % cream, Apply 1 Squirt to affected area(s) 4 times a day., Disp: 1 Tube, Rfl: 3  •  Lancets MISC, Lancets order: Lancets for Abbott Freestyle Lite meter. Sig: use BID and prn ssx high or low sugar. #100 RF x 3, Disp: 100 Each, Rfl: 3  •  Blood Glucose Monitoring Suppl SUPPLIES MISC, Test strips order: Test strips for Abbott Freestyle Lite meter. Sig: use BID and prn ssx high or low sugar #100 RF x 3, Disp: 100 Each, Rfl: 3  •  aspirin (ASA) 81 MG CHEW, Take 81 mg by mouth every day.  , Disp: , Rfl:   •  POTASSIUM CITRATE PO, Take 2 Tabs by mouth 3 times a day., Disp: , Rfl:   ALLERGIES:   Allergies   Allergen Reactions   • Macrobid [Nitrofurantoin Monohydrate Macrocrystals] Hives   • Pcn [Penicillins] Hives     SURGHX:   Past Surgical History:   Procedure Laterality Date   • GANGLION EXCISION  2014    Performed by Efra Ramirez M.D. at [facility]   • HYSTERECTOMY, TOTAL ABDOMINAL  2004    For non cancerous reasons   • CHOLECYSTECTOMY     • CARPAL TUNNEL RELEASE      Right hand   • CYSTOSCOPY STENT PLACEMENT      several, stent removed in MD office   • PB  DELIVERY ONLY   "1980/1983     SOCHX:  reports that she has never smoked. She has never used smokeless tobacco. She reports that she does not drink alcohol or use drugs.     Objective:   /64 (BP Location: Left arm, Patient Position: Sitting, BP Cuff Size: Adult)   Pulse 80   Temp 36.9 °C (98.4 °F) (Temporal)   Resp 14   Ht 1.702 m (5' 7\")   Wt 93.9 kg (207 lb)   LMP 11/29/2004   BMI 32.42 kg/m²     Physical Exam   Constitutional: She is oriented to person, place, and time. She appears well-developed and well-nourished. No distress.   HENT:   Head: Normocephalic and atraumatic.   Eyes: Conjunctivae and EOM are normal.   Pulmonary/Chest: Effort normal.   Musculoskeletal:   Right hip:  General: No gross deformity  ROM: flexion 120 degrees, extension 10, ER 60, IR 40, abduction 50, adduction 30   Palpation: +TTP over the hip flexors in groin, no TTP over greater trochanter, pubis, gluteus max, gluteus min  Strength: 5/5 abduction, 5/5 adduction, 4.5/5 hip flexion (compared to 5/5 on left), 5/5 extension  Special tests: Cecilia -, Fadir -, Labral shear -, Straight leg raise -  Neuro: Sensation intact and equal bilaterally in LE's    Back:  General: No asymmetry, bruising, or erythema appreciated  ROM: FROM  Palpation: +Mild TTP in lumbar paraspinal muscles.  No tenderness to palpation of spinous processes, no step-offs appreciated, no significant scoliosis appreciated   Neurological: She is alert and oriented to person, place, and time.   Skin: Skin is warm and dry. She is not diaphoretic. No erythema.   Psychiatric: She has a normal mood and affect. Her behavior is normal. Judgment and thought content normal.     Assessment/Plan:   Assessment    1. Mixed stress and urge urinary incontinence  2. Greater trochanteric pain syndrome of right lower extremity  3. Lumbar back pain  Patient is a 64-year-old female with lumbar pain, greater trochanteric pain syndrome, and new complaint of urinary incontinence intermittently.  " Incontinence appears to be mixed stress and urge.  Patient has made some lifestyle modifications which have helped her symptoms, however the symptoms are concerning in presence of her chronic low back and hip pain.  Point-of-care urine in office does not show simple explanation such as urinary tract infection.  She does have 500 glucose in urine which could be contributing, however still concern for nerve impingement in lumbar area.  Will further evaluate with lumbar MRI to make sure no need for surgical consultation.  - POCT Urinalysis  - MR-LUMBAR SPINE-W/O; Future

## 2019-06-26 NOTE — OP THERAPY DAILY TREATMENT
Outpatient Physical Therapy  DAILY TREATMENT     [facility] Outpatient Physical Therapy [address] [facility], [address]  [place] [address]  Phone:  [phone]    Date: 06/26/2019    Patient: Shirley Unger  YOB: 1955  MRN: [id number]     Time Calculation  Start time: 1630  Stop time: 1725 Time Calculation (min): 55 minutes     Chief Complaint: Back Problem and Hip Problem    Visit #: 5    SUBJECTIVE:  Had R hip pain yesterday but stretched it out and it is gone now.  Saw Dr. Esqueda and he is going to order an MRI. - UTI.  No pain in back or leg.  Only occ R hip weakness.    OBJECTIVE:  Current objective measures: Continues to stand with about 20 degrees of forward trunk lean.          Therapeutic Treatments and Modalities:     1. Manual Therapy (CPT 48776), FDN For B L5-S1,  R glut med, glut min, IDN for R inf glut.  R quad stretch in prone on pillows, R hip IR/ER mobs gr 3-4, // no R hip pain    2. Neuromuscular Re-education (CPT 87230), B shoulder flex with pink TB x 20, Sahrmann wall taps x 20, pelvic rocking in sitting, gentle T/L junction rotation in sitting, hooklying lateral lumbar rocking.    3. E Stim Unattended (CPT 71457), IFC with MHP to L/S x 15'    Time-based treatments/modalities:  Manual therapy minutes (CPT 17317): 20 minutes  Neuromusc re-ed, balance, coor, post minutes (CPT 39697): 15 minutes       Pain rating before treatment: 0  Pain rating after treatment: 0    ASSESSMENT:   Response to treatment: Pain and symptoms continue to improve.  However, pt continues to have poor generalized lumbar movement with most AROM coming from sagittal plane with significant limits in frontal and transverse planes.    PLAN/RECOMMENDATIONS:   Plan for treatment: therapy treatment to continue next visit. Going out of town for 1 week.  Cont upon return.    Planned interventions for next visit: continue with current treatment. Progress lumbar rotation and hip/lumbar dissociation.

## 2019-07-08 ENCOUNTER — PHYSICAL THERAPY (OUTPATIENT)
Dept: PHYSICAL THERAPY | Facility: REHABILITATION | Age: 64
End: 2019-07-08
Attending: FAMILY MEDICINE
Payer: COMMERCIAL

## 2019-07-08 DIAGNOSIS — M25.551 GREATER TROCHANTERIC PAIN SYNDROME OF RIGHT LOWER EXTREMITY: ICD-10-CM

## 2019-07-08 DIAGNOSIS — M54.16 LUMBAR BACK PAIN WITH RADICULOPATHY AFFECTING LOWER EXTREMITY: ICD-10-CM

## 2019-07-08 PROCEDURE — 97014 ELECTRIC STIMULATION THERAPY: CPT

## 2019-07-08 PROCEDURE — 97140 MANUAL THERAPY 1/> REGIONS: CPT

## 2019-07-08 NOTE — OP THERAPY DAILY TREATMENT
Outpatient Physical Therapy  DAILY TREATMENT     [facility] Outpatient Physical Therapy [address] [facility], [address]  [place] [address]  Phone:  [phone]    Date: 07/08/2019    Patient: Shirley Unger  YOB: 1955  MRN: [id number]     Time Calculation  Start time: 1600  Stop time: 1655 Time Calculation (min): 55 minutes     Chief Complaint: Leg Pain    Visit #: 6    SUBJECTIVE:  Went camping for a week and had 2 bad days including today and the rest were better.      OBJECTIVE:  Current objective measures: B hip flex PROM:  90, R hip IR: 5 degrees pain          Therapeutic Exercises (CPT 70032):     1. R hip repeated ext in standing x 20    2. Ball wall squats x 20    Therapeutic Treatments and Modalities:     1. Manual Therapy (CPT 42391), L SL L/S rot, facet gapping mobs gr 3-4, manual hooklying and R leg traction, R hip hip inf glide with flex, lateral glides and ext mobs.  PROM for R hip IR/ER.  MFR to sacrum and R lumbar paraspinals.     2. E Stim Unattended (CPT 41670), IFC to R hip with MHP x 15' in sitting    Time-based treatments/modalities:  Manual therapy minutes (CPT 70006): 25 minutes  Therapeutic exercise minutes (CPT 22894): 5 minutes         ASSESSMENT:   Response to treatment: R hip stiffness with exercise limited additional ther ex progression.  Poor B hip joint mobility noted with limited ROM.  Contributes to c/o.    PLAN/RECOMMENDATIONS:   Plan for treatment: therapy treatment to continue next visit.  Planned interventions for next visit: continue with current treatment.  Progress lumbar and hip mobility.

## 2019-07-09 ENCOUNTER — HOSPITAL ENCOUNTER (OUTPATIENT)
Dept: RADIOLOGY | Facility: MEDICAL CENTER | Age: 64
End: 2019-07-09
Attending: FAMILY MEDICINE
Payer: COMMERCIAL

## 2019-07-09 DIAGNOSIS — M54.50 LUMBAR BACK PAIN: ICD-10-CM

## 2019-07-09 DIAGNOSIS — M25.551 GREATER TROCHANTERIC PAIN SYNDROME OF RIGHT LOWER EXTREMITY: ICD-10-CM

## 2019-07-09 DIAGNOSIS — N39.46 MIXED STRESS AND URGE URINARY INCONTINENCE: ICD-10-CM

## 2019-07-10 ENCOUNTER — PATIENT MESSAGE (OUTPATIENT)
Dept: MEDICAL GROUP | Facility: CLINIC | Age: 64
End: 2019-07-10

## 2019-07-10 ENCOUNTER — PHYSICAL THERAPY (OUTPATIENT)
Dept: PHYSICAL THERAPY | Facility: REHABILITATION | Age: 64
End: 2019-07-10
Attending: FAMILY MEDICINE
Payer: COMMERCIAL

## 2019-07-10 DIAGNOSIS — M54.16 LUMBAR BACK PAIN WITH RADICULOPATHY AFFECTING LOWER EXTREMITY: ICD-10-CM

## 2019-07-10 DIAGNOSIS — M25.551 GREATER TROCHANTERIC PAIN SYNDROME OF RIGHT LOWER EXTREMITY: ICD-10-CM

## 2019-07-10 DIAGNOSIS — M54.50 LUMBAR BACK PAIN: ICD-10-CM

## 2019-07-10 DIAGNOSIS — N39.46 MIXED STRESS AND URGE URINARY INCONTINENCE: ICD-10-CM

## 2019-07-10 PROCEDURE — 97140 MANUAL THERAPY 1/> REGIONS: CPT

## 2019-07-10 PROCEDURE — 97014 ELECTRIC STIMULATION THERAPY: CPT

## 2019-07-10 PROCEDURE — 97110 THERAPEUTIC EXERCISES: CPT

## 2019-07-10 NOTE — OP THERAPY DAILY TREATMENT
Outpatient Physical Therapy  DAILY TREATMENT     [facility] Outpatient Physical Therapy [place] [address] [address] [facility], [address]  [place] [address]  Phone:  [phone]    Date: 07/10/2019    Patient: Shirley Unger  YOB: 1955  MRN: [id number]     Time Calculation  Start time: 1435  Stop time: 1535 Time Calculation (min): 60 minutes     Chief Complaint: Hip Problem and Back Problem    Visit #: 7    SUBJECTIVE:  Feels better over last 2 days bur r hip stiffness persists.  Couldn't do MRI due to claustrophobia.      OBJECTIVE:  Current objective measures: R ant hip pain with R hip ext on bike.           Therapeutic Exercises (CPT 44554):     1. R hip ext stretches, R hip ext with IR and lateral SB for hip mobility    2. Tried bike with no resistance.  Stopped after 3' due to ant hip pain with ext.      Therapeutic Treatments and Modalities:     1. Manual Therapy (CPT 60884), R hip lateral glides, distraction glides with hip IR/ext and abd for loose bodies, inf glide with flex.  MFR to R psoas, quads, adductors.  , FDN for B L5-S1 paraspinals and R glut med    3. E Stim Unattended (CPT 72134), IFC with MHP to L/S x 15'    Time-based treatments/modalities:  Manual therapy minutes (CPT 78318): 25 minutes  Therapeutic exercise minutes (CPT 54583): 10 minutes       Pain rating before treatment: 8  Pain rating after treatment: 0    ASSESSMENT:   Response to treatment: R hip AROM and gait improved after above.  Most likely has lumbar and R hip involvement that contribute to continued c/o pain.  Groin pain not in region of inguinal ligament.      PLAN/RECOMMENDATIONS:   Plan for treatment: therapy treatment to continue next visit.  Planned interventions for next visit: continue with current treatment. Progress lumbar core strength

## 2019-07-16 ENCOUNTER — TELEPHONE (OUTPATIENT)
Dept: MEDICAL GROUP | Facility: CLINIC | Age: 64
End: 2019-07-16

## 2019-07-16 NOTE — TELEPHONE ENCOUNTER
Imaging called today and they stated the patient is requesting IV sedation for her MRI. They need you to fix the order to state with IV sedation before they are able to schedule the patient. If you can fix this at your earliest convenience that would be great.    Please advise.  Thank you.

## 2019-07-18 ENCOUNTER — APPOINTMENT (OUTPATIENT)
Dept: PHYSICAL THERAPY | Facility: REHABILITATION | Age: 64
End: 2019-07-18
Attending: FAMILY MEDICINE
Payer: COMMERCIAL

## 2019-07-19 ENCOUNTER — TELEPHONE (OUTPATIENT)
Dept: MEDICAL GROUP | Facility: CLINIC | Age: 64
End: 2019-07-19

## 2019-07-19 DIAGNOSIS — F40.240 CLAUSTROPHOBIA: ICD-10-CM

## 2019-07-19 NOTE — TELEPHONE ENCOUNTER
The imaging department called again and still needs you to place the order for IV sedation for the patient's MRI. This is the second request.    Please advise.   Thank you.

## 2019-07-22 ENCOUNTER — OFFICE VISIT (OUTPATIENT)
Dept: ENDOCRINOLOGY | Facility: MEDICAL CENTER | Age: 64
End: 2019-07-22
Payer: COMMERCIAL

## 2019-07-22 ENCOUNTER — HOSPITAL ENCOUNTER (OUTPATIENT)
Dept: RADIOLOGY | Facility: MEDICAL CENTER | Age: 64
End: 2019-07-22
Attending: FAMILY MEDICINE
Payer: COMMERCIAL

## 2019-07-22 VITALS
DIASTOLIC BLOOD PRESSURE: 60 MMHG | BODY MASS INDEX: 31.86 KG/M2 | HEART RATE: 106 BPM | OXYGEN SATURATION: 96 % | WEIGHT: 203 LBS | HEIGHT: 67 IN | SYSTOLIC BLOOD PRESSURE: 106 MMHG

## 2019-07-22 DIAGNOSIS — Z12.31 VISIT FOR SCREENING MAMMOGRAM: ICD-10-CM

## 2019-07-22 DIAGNOSIS — E55.9 VITAMIN D DEFICIENCY: ICD-10-CM

## 2019-07-22 DIAGNOSIS — E11.65 UNCONTROLLED TYPE 2 DIABETES MELLITUS WITH HYPERGLYCEMIA (HCC): ICD-10-CM

## 2019-07-22 DIAGNOSIS — I10 ESSENTIAL HYPERTENSION: ICD-10-CM

## 2019-07-22 DIAGNOSIS — E53.8 VITAMIN B 12 DEFICIENCY: ICD-10-CM

## 2019-07-22 DIAGNOSIS — E78.2 MIXED HYPERLIPIDEMIA: ICD-10-CM

## 2019-07-22 DIAGNOSIS — E03.9 HYPOTHYROIDISM, UNSPECIFIED TYPE: ICD-10-CM

## 2019-07-22 LAB
HBA1C MFR BLD: 7.1 % (ref 0–5.6)
INT CON NEG: ABNORMAL
INT CON POS: ABNORMAL

## 2019-07-22 PROCEDURE — 99214 OFFICE O/P EST MOD 30 MIN: CPT | Performed by: INTERNAL MEDICINE

## 2019-07-22 PROCEDURE — 77063 BREAST TOMOSYNTHESIS BI: CPT

## 2019-07-22 PROCEDURE — 83036 HEMOGLOBIN GLYCOSYLATED A1C: CPT | Performed by: INTERNAL MEDICINE

## 2019-07-22 RX ORDER — LORAZEPAM 1 MG/1
1 TABLET ORAL
Qty: 2 TAB | Refills: 0 | Status: SHIPPED | OUTPATIENT
Start: 2019-07-22 | End: 2019-07-29

## 2019-07-22 NOTE — LETTER
July 22, 2019        Shirley Rader Ute    To [employer] [facility]:    Shirley has been able to maintain her HBA1C to around 7.  This is an appropriate level for her age and health status. She has consistently good blood pressure.  She has been losing weight steadily over the last 7 years and is almost at her goal weight.  She is working hard to improve and maintain her good health.      Please call with any questions to [phone].      Thank You,    Dr. Joey Coyne

## 2019-07-22 NOTE — PROGRESS NOTES
Endocrinology Clinic Progress Note  PCP: Pcp Pt States None    HPI:  Shirley Unger is a 64 y.o. old patient who comes in today for routine follow up of Management of Uncontrolled Type 2 Diabetes, Obesity, Vitamin D Deficiency, and Hyperlipidemia.  She is having right hip and back pain.  He last steroid injection was in January.    HPI:  Shirley Unger is a 64 y.o. old patient who comes in today for evaluation of above stated problem.    Most Recent HbA1c:   Lab Results   Component Value Date/Time    HBA1C 7.1 (A) 07/22/2019 04:43 PM        Current Diabetes Regimen:  GLP-1 Agent: Exenatide once weekly (2mg)   SGLT-2 Inhibitor:  Canagliflozin 300 mg once daily   Other: Metformin 1000 mg BID and Glimepiride 4 mg Daily  Can't find meter for the last 3 weeks.  Testing twice weekly. .    Hypoglycemia:  None    ROS:  Constitutional: She has lost over 100 pounds in the last 20 years.  Cardiac: No palpitations or racing heart  Resp: No shortness of breath  Neuro: No numbness or tinging in feet  Endo: No heat or cold intolerance, no polyuria or polydipsia  All other systems were reviewed and were negative.    Past Medical History:  Patient Active Problem List    Diagnosis Date Noted   • Huynh's neuroma of left foot 02/27/2019   • Primary insomnia 06/12/2018   • Obesity (BMI 30-39.9) 09/28/2017   • Seasonal allergic rhinitis due to pollen 03/07/2017   • Herpes dermatitis 11/29/2016   • Uncontrolled type 2 diabetes mellitus without complication, without long-term current use of insulin (HCC) 08/04/2015   • Vitamin D deficiency disease 07/08/2013   • Dyslipidemia    • Recurrent nephrolithiasis 03/22/2013       Past Surgical History:  Past Surgical History:   Procedure Laterality Date   • GANGLION EXCISION  2/25/2014    Performed by Efra [facility] M[facility]D. at [facility]   • HYSTERECTOMY, TOTAL ABDOMINAL  2004    For non cancerous reasons   • CHOLECYSTECTOMY  2004   • CARPAL TUNNEL RELEASE       Right hand   • CYSTOSCOPY STENT PLACEMENT      several, stent removed in MD office   • PB  DELIVERY ONLY         Allergies:  Macrobid [nitrofurantoin monohydrate macrocrystals] and Pcn [penicillins]    Social History:  Social History     Social History   • Marital status:      Spouse name: N/A   • Number of children: N/A   • Years of education: N/A     Occupational History   •  [employer]     Social History Main Topics   • Smoking status: Never Smoker   • Smokeless tobacco: Never Used   • Alcohol use No      Comment: Rare Useage   • Drug use: No   • Sexual activity: Yes     Partners: Male     Other Topics Concern   • Not on file     Social History Narrative   • No narrative on file       Family History:  Family History   Problem Relation Age of Onset   • Cancer Mother         Lung   • Heart Disease Father          at 71, No MI? May have been electrical   • Diabetes Maternal Aunt    • Diabetes Maternal Grandfather        Medications:    Current Outpatient Prescriptions:   •  LORazepam (ATIVAN) 1 MG Tab, Take 1 Tab by mouth Once PRN for Anxiety for up to 2 doses., Disp: 2 Tab, Rfl: 0  •  lisinopril (PRINIVIL) 5 MG Tab, TAKE 1 TABLET BY MOUTH ONCE DAILY AT BEDTIME, Disp: 100 Tab, Rfl: 0  •  Exenatide ER (BYDUREON) 2 MG Pen-injector, Inject 2 mg as instructed every 7 days., Disp: 12 Each, Rfl: 0  •  lisinopril (PRINIVIL) 5 MG Tab, Take 0.5 Tabs by mouth every day., Disp: 90 Tab, Rfl: 0  •  INVOKANA 300 MG Tab, TAKE ONE TABLET BY MOUTH ONCE DAILY, Disp: 90 Tab, Rfl: 3  •  glucose blood (FREESTYLE LITE) strip, 1 Strip by Other route as needed., Disp: 100 Strip, Rfl: 3  •  metformin (GLUCOPHAGE) 1000 MG tablet, Take 1 Tab by mouth 2 times a day, with meals., Disp: 180 Tab, Rfl: 3  •  colesevelam (WELCHOL) 625 MG Tab, TAKE ONE TABLET BY MOUTH ONCE DAILY IN THE MORNING, THEN TAKE TWO TABLETS DAILY IN THE EVENING WITH  DINNER, Disp: 270 Tab, Rfl: 3  •  atorvastatin (LIPITOR) 20 MG  "Tab, TAKE ONE TABLET BY MOUTH ONCE DAILY, Disp: 90 Tab, Rfl: 3  •  glimepiride (AMARYL) 4 MG Tab, TAKE ONE TABLET BY MOUTH ONCE DAILY IN THE MORNING, Disp: 180 Tab, Rfl: 3  •  Lancets MISC, Lancets order: Lancets for Abbott Freestyle Lite meter. Sig: use BID and prn ssx high or low sugar. #100 RF x 3, Disp: 100 Each, Rfl: 3  •  Blood Glucose Monitoring Suppl SUPPLIES MISC, Test strips order: Test strips for Abbott Freestyle Lite meter. Sig: use BID and prn ssx high or low sugar #100 RF x 3, Disp: 100 Each, Rfl: 3  •  aspirin (ASA) 81 MG CHEW, Take 81 mg by mouth every day.  , Disp: , Rfl:   •  POTASSIUM CITRATE PO, Take 2 Tabs by mouth 3 times a day., Disp: , Rfl:   •  triamcinolone acetonide (KENALOG) 0.1 % Ointment, Apply  to affected area(s) 2 times a day., Disp: , Rfl:   •  mupirocin calcium (BACTROBAN) 2 % Cream, Apply to affected area(s) 2 times a day., Disp: 15 g, Rfl: 2  •  cetirizine (ZYRTEC) 10 MG Tab, Take 1 Tab by mouth every day., Disp: 30 Tab, Rfl: 3  •  fluticasone (FLONASE) 50 MCG/ACT nasal spray, Spray 2 Sprays in nose every day., Disp: 16 g, Rfl: 0  •  trolamine salicylate (ASPERCREME/ALOE) 10 % cream, Apply 1 Squirt to affected area(s) 4 times a day., Disp: 1 Tube, Rfl: 3    Labs: Reviewed    Physical Examination:  Vital signs: /60   Pulse (!) 106   Ht 1.702 m (5' 7\")   Wt 92.1 kg (203 lb)   LMP 11/29/2004   SpO2 96%   BMI 31.79 kg/m²  Body mass index is 31.79 kg/m². Patient's body mass index is 31.79 kg/m². Exercise and nutrition counseling were performed at this visit.  General: No apparent distress, cooperative  Eyes: No scleral icterus or discharge  ENMT: Normal on external inspection of nose, lips, normal thyroid exam  Neck: No abnormal masses on inspection  Resp: Normal effort, clear to auscultation bilaterally   CVS: Regular rate and rhythm, S1 S2 normal, no murmur   Extremities: No edema  Abdomen: abdominal obesity present  Neuro: Alert and oriented  Skin: No rash  Psych: " Normal mood and affect, intact memory and able to make informed decisions    Foot Exam:  Monofilament: done  Monofilament testing with a 10 gram force: sensation intact: intact bilaterally  Visual Inspection: Feet without maceration, ulcers, fissures.  Pedal pulses: intact bilaterally    Assessment and Plan:    1. Uncontrolled type 2 diabetes mellitus with hyperglycemia (HCC)  Continue current regimen  The following was reviewed  - Discussed diabetic diet discussed in detail-plate method.  - She will test before meals and log .  - She will walk for 20-30 minutes daily.  - Reviewed medications and advised how to take   - Discussed importance of immunizations and yearly eye exams. She had an eye exam at [facility] on 8/14/18.  - Encouraged patient to attend diabetes education program.   - Advised daily foot exams. Educated on signs of infection.   - Educated on need to stay well hydrated with water.  - Educated to call with any questions or problems.    2. Mixed hyperlipidemia  Cont atorvastatin 20 mg daily.     3. Essential hypertension  Controlled.     Return in about 3 months (around 10/22/2019).    Thank you for allowing me to participate in the care of this patient.    Dr. Joey Coyne  This note was scribed by Josseline Guido RN, CDE  07/22/19    CC:   Pcp Pt States None    This note was created using voice recognition software (Dragon). The accuracy of the dictation is limited by the abilities of the software. I have reviewed the note prior to signing, however some errors in grammar and context are still possible. If you have any questions related to this note please do not hesitate to contact our office.

## 2019-07-22 NOTE — LETTER
July 22, 2019        Shirley [place]      To Whom It may concern:  Shirley has Type 2 Diabetes and will need to carry her diabetes testing supplies and medications with her.                    Thank you,    Dr. Joey Coyne

## 2019-07-24 ENCOUNTER — APPOINTMENT (OUTPATIENT)
Dept: PHYSICAL THERAPY | Facility: REHABILITATION | Age: 64
End: 2019-07-24
Attending: FAMILY MEDICINE
Payer: COMMERCIAL

## 2019-07-29 ENCOUNTER — HOSPITAL ENCOUNTER (OUTPATIENT)
Dept: RADIOLOGY | Facility: MEDICAL CENTER | Age: 64
End: 2019-07-29
Attending: FAMILY MEDICINE
Payer: COMMERCIAL

## 2019-07-29 DIAGNOSIS — M54.50 LUMBAR BACK PAIN: ICD-10-CM

## 2019-07-29 DIAGNOSIS — M25.551 GREATER TROCHANTERIC PAIN SYNDROME OF RIGHT LOWER EXTREMITY: ICD-10-CM

## 2019-07-29 DIAGNOSIS — N39.46 MIXED STRESS AND URGE URINARY INCONTINENCE: ICD-10-CM

## 2019-07-29 PROCEDURE — 72148 MRI LUMBAR SPINE W/O DYE: CPT

## 2019-07-31 ENCOUNTER — OFFICE VISIT (OUTPATIENT)
Dept: MEDICAL GROUP | Facility: CLINIC | Age: 64
End: 2019-07-31
Payer: COMMERCIAL

## 2019-07-31 VITALS
WEIGHT: 203 LBS | OXYGEN SATURATION: 94 % | HEART RATE: 84 BPM | TEMPERATURE: 98.4 F | RESPIRATION RATE: 14 BRPM | SYSTOLIC BLOOD PRESSURE: 106 MMHG | DIASTOLIC BLOOD PRESSURE: 58 MMHG | HEIGHT: 67 IN | BODY MASS INDEX: 31.86 KG/M2

## 2019-07-31 DIAGNOSIS — M25.551 GREATER TROCHANTERIC PAIN SYNDROME OF RIGHT LOWER EXTREMITY: ICD-10-CM

## 2019-07-31 DIAGNOSIS — M54.16 LUMBAR RADICULOPATHY: ICD-10-CM

## 2019-07-31 PROCEDURE — 99213 OFFICE O/P EST LOW 20 MIN: CPT | Performed by: FAMILY MEDICINE

## 2019-07-31 RX ORDER — PREDNISONE 20 MG/1
TABLET ORAL
Qty: 11 TAB | Refills: 0 | Status: SHIPPED | OUTPATIENT
Start: 2019-07-31 | End: 2019-10-28

## 2019-08-01 NOTE — PROGRESS NOTES
Subjective:     Shirley Unger is a 64 y.o. female who presents for Results (The patient is here for MRI of the lumbar spine results.) and Back Pain    HPI  Pt presents for follow-up MRI results and back\hip pain  Mentioned some stress/urge incontinence symptoms which prompted immediate evaluation with lumbar MRI to rule out cauda equina syndrome  MRI shows the followin.  Multilevel degenerative disc disease and facet degeneration. There are varying degrees of central canal and neural foraminal narrowing at levels as specifically described above.  2.  Central disc protrusion at the T10-11 level which results in underlying cord contact and deformity. This is incompletely evaluated on this exam.    Unfortunately, though patient was initially making good progress at physical therapy, started to feel she was worsening the past few weeks  Patient claims that physical therapy started making her more sore than she was initially  Starting to have new radiating pain down right lower extremity  Still able to walk normally and does not feel she is limping    Review of Systems   Constitutional: Negative for fever.   Respiratory: Negative for shortness of breath.    Cardiovascular: Negative for chest pain.   Gastrointestinal: Negative for vomiting.   Skin: Negative for rash.   Neurological: Negative for focal weakness.       PMH:  has a past medical history of DM (diabetes mellitus) ([facility]) (), Herpes genitalia (3/22/2013), Hyperlipidemia LDL goal < 100, Nephrolithiasis (3/22/2013), and Vitamin d deficiency (2013).  MEDS:   Current Outpatient Medications:   •  lisinopril (PRINIVIL) 5 MG Tab, TAKE 1 TABLET BY MOUTH ONCE DAILY AT BEDTIME, Disp: 100 Tab, Rfl: 0  •  Exenatide ER (BYDUREON) 2 MG Pen-injector, Inject 2 mg as instructed every 7 days., Disp: 12 Each, Rfl: 0  •  lisinopril (PRINIVIL) 5 MG Tab, Take 0.5 Tabs by mouth every day., Disp: 90 Tab, Rfl: 0  •  INVOKANA 300 MG Tab, TAKE ONE TABLET BY MOUTH  ONCE DAILY, Disp: 90 Tab, Rfl: 3  •  glucose blood (FREESTYLE LITE) strip, 1 Strip by Other route as needed., Disp: 100 Strip, Rfl: 3  •  metformin (GLUCOPHAGE) 1000 MG tablet, Take 1 Tab by mouth 2 times a day, with meals., Disp: 180 Tab, Rfl: 3  •  colesevelam (WELCHOL) 625 MG Tab, TAKE ONE TABLET BY MOUTH ONCE DAILY IN THE MORNING, THEN TAKE TWO TABLETS DAILY IN THE EVENING WITH  DINNER, Disp: 270 Tab, Rfl: 3  •  atorvastatin (LIPITOR) 20 MG Tab, TAKE ONE TABLET BY MOUTH ONCE DAILY, Disp: 90 Tab, Rfl: 3  •  glimepiride (AMARYL) 4 MG Tab, TAKE ONE TABLET BY MOUTH ONCE DAILY IN THE MORNING, Disp: 180 Tab, Rfl: 3  •  triamcinolone acetonide (KENALOG) 0.1 % Ointment, Apply  to affected area(s) 2 times a day., Disp: , Rfl:   •  mupirocin calcium (BACTROBAN) 2 % Cream, Apply to affected area(s) 2 times a day., Disp: 15 g, Rfl: 2  •  cetirizine (ZYRTEC) 10 MG Tab, Take 1 Tab by mouth every day., Disp: 30 Tab, Rfl: 3  •  fluticasone (FLONASE) 50 MCG/ACT nasal spray, Spray 2 Sprays in nose every day., Disp: 16 g, Rfl: 0  •  trolamine salicylate (ASPERCREME/ALOE) 10 % cream, Apply 1 Squirt to affected area(s) 4 times a day., Disp: 1 Tube, Rfl: 3  •  Lancets MISC, Lancets order: Lancets for Abbott Freestyle Lite meter. Sig: use BID and prn ssx high or low sugar. #100 RF x 3, Disp: 100 Each, Rfl: 3  •  Blood Glucose Monitoring Suppl SUPPLIES MISC, Test strips order: Test strips for Abbott Freestyle Lite meter. Sig: use BID and prn ssx high or low sugar #100 RF x 3, Disp: 100 Each, Rfl: 3  •  aspirin (ASA) 81 MG CHEW, Take 81 mg by mouth every day.  , Disp: , Rfl:   •  POTASSIUM CITRATE PO, Take 2 Tabs by mouth 3 times a day., Disp: , Rfl:   ALLERGIES:   Allergies   Allergen Reactions   • Macrobid [Nitrofurantoin Monohydrate Macrocrystals] Hives   • Pcn [Penicillins] Hives     SURGHX:   Past Surgical History:   Procedure Laterality Date   • GANGLION EXCISION  2/25/2014    Performed by Efra Ramirez M.D. at [facility]  "[facility]   • HYSTERECTOMY, TOTAL ABDOMINAL      For non cancerous reasons   • CHOLECYSTECTOMY     • CARPAL TUNNEL RELEASE      Right hand   • CYSTOSCOPY STENT PLACEMENT      several, stent removed in MD office   • PB  DELIVERY ONLY       SOCHX:  reports that she has never smoked. She has never used smokeless tobacco. She reports that she does not drink alcohol or use drugs.       Objective:   /58 (BP Location: Left arm, Patient Position: Sitting, BP Cuff Size: Adult)   Pulse 84   Temp 36.9 °C (98.4 °F) (Temporal)   Resp 14   Ht 1.702 m (5' 7\")   Wt 92.1 kg (203 lb)   LMP 2004   SpO2 94%   BMI 31.79 kg/m²     Physical Exam   Constitutional: She is oriented to person, place, and time. She appears well-developed and well-nourished. No distress.   HENT:   Head: Normocephalic and atraumatic.   Musculoskeletal:   Back:  General: No asymmetry, bruising, or erythema appreciated  ROM: flexion 90°, extension 30°, lateral bend 30°, lateral twist 30°  Palpation: +TTP along bilateral lumbar perispinal muscles and SI joints.  No tenderness to palpation of spinous processes, no step-offs appreciated, no significant scoliosis appreciated  Strength: 5/5 hip flexion/extension  Special tests: Straight leg raise -   Neuro: Sensation intact and equal bilaterally in LE's   Neurological: She is alert and oriented to person, place, and time.   Skin: Skin is warm and dry. She is not diaphoretic. No erythema.   Psychiatric: She has a normal mood and affect. Her behavior is normal. Judgment and thought content normal.     Assessment/Plan:   Assessment    1. Lumbar radiculopathy  2. Greater trochanteric pain syndrome of right lower extremity  Patient is a 64-year-old female with lumbar radiculopathy and greater trochanteric pain syndrome.  Though she was initially doing well with physical therapy, has not started to worsen again.  Radicular symptoms are new since starting therapy.  Patient would " like to discuss risks and benefits of injections into the back and will be referred to PM&R for this.  - REFERRAL TO PHYSIATRY (PMR)  - predniSONE (DELTASONE) 20 MG Tab; Take 2 tabs (40mg) daily x 3 days, then take 1 tab (20mg) daily x 3 days, then take 1/2 tab (10mg) daily x 4 days  Dispense: 11 Tab; Refill: 0

## 2019-08-07 ASSESSMENT — ENCOUNTER SYMPTOMS
SHORTNESS OF BREATH: 0
FOCAL WEAKNESS: 0
VOMITING: 0
FEVER: 0

## 2019-08-15 ENCOUNTER — HOSPITAL ENCOUNTER (OUTPATIENT)
Dept: RADIOLOGY | Facility: MEDICAL CENTER | Age: 64
End: 2019-08-15
Attending: CHIROPRACTOR
Payer: COMMERCIAL

## 2019-08-15 DIAGNOSIS — M54.5 LOW BACK PAIN, UNSPECIFIED BACK PAIN LATERALITY, UNSPECIFIED CHRONICITY, WITH SCIATICA PRESENCE UNSPECIFIED: ICD-10-CM

## 2019-08-15 PROCEDURE — 72170 X-RAY EXAM OF PELVIS: CPT

## 2019-08-16 ENCOUNTER — OFFICE VISIT (OUTPATIENT)
Dept: URGENT CARE | Facility: CLINIC | Age: 64
End: 2019-08-16
Payer: COMMERCIAL

## 2019-08-16 ENCOUNTER — OFFICE VISIT (OUTPATIENT)
Dept: PHYSICAL MEDICINE AND REHAB | Facility: MEDICAL CENTER | Age: 64
End: 2019-08-16
Payer: COMMERCIAL

## 2019-08-16 VITALS
HEART RATE: 97 BPM | TEMPERATURE: 97.1 F | HEIGHT: 68 IN | WEIGHT: 204 LBS | SYSTOLIC BLOOD PRESSURE: 112 MMHG | RESPIRATION RATE: 16 BRPM | DIASTOLIC BLOOD PRESSURE: 78 MMHG | OXYGEN SATURATION: 94 % | BODY MASS INDEX: 30.92 KG/M2

## 2019-08-16 VITALS
TEMPERATURE: 97.4 F | HEIGHT: 68 IN | BODY MASS INDEX: 31.01 KG/M2 | DIASTOLIC BLOOD PRESSURE: 68 MMHG | SYSTOLIC BLOOD PRESSURE: 118 MMHG | WEIGHT: 204.59 LBS | OXYGEN SATURATION: 94 % | HEART RATE: 100 BPM

## 2019-08-16 DIAGNOSIS — M51.36 BULGING LUMBAR DISC: ICD-10-CM

## 2019-08-16 DIAGNOSIS — M48.04 SPINAL STENOSIS OF THORACIC REGION: ICD-10-CM

## 2019-08-16 DIAGNOSIS — M16.12 PRIMARY OSTEOARTHRITIS OF LEFT HIP: ICD-10-CM

## 2019-08-16 DIAGNOSIS — B00.89 HERPES DERMATITIS: ICD-10-CM

## 2019-08-16 DIAGNOSIS — M16.11 PRIMARY OSTEOARTHRITIS OF RIGHT HIP: ICD-10-CM

## 2019-08-16 DIAGNOSIS — M51.24 THORACIC DISC HERNIATION: ICD-10-CM

## 2019-08-16 DIAGNOSIS — M48.061 LUMBAR FORAMINAL STENOSIS: ICD-10-CM

## 2019-08-16 PROCEDURE — 99205 OFFICE O/P NEW HI 60 MIN: CPT | Performed by: PHYSICAL MEDICINE & REHABILITATION

## 2019-08-16 PROCEDURE — 99214 OFFICE O/P EST MOD 30 MIN: CPT | Performed by: NURSE PRACTITIONER

## 2019-08-16 RX ORDER — VALACYCLOVIR HYDROCHLORIDE 1 G/1
1000 TABLET, FILM COATED ORAL 3 TIMES DAILY
Qty: 21 TAB | Refills: 1 | Status: SHIPPED | OUTPATIENT
Start: 2019-08-16 | End: 2019-10-10 | Stop reason: SDUPTHER

## 2019-08-16 RX ORDER — ACYCLOVIR 50 MG/G
1 CREAM TOPICAL
Qty: 1 TUBE | Refills: 6 | Status: SHIPPED | OUTPATIENT
Start: 2019-08-16 | End: 2021-08-09

## 2019-08-16 RX ORDER — TRAMADOL HYDROCHLORIDE 50 MG/1
50 TABLET ORAL EVERY 6 HOURS PRN
Qty: 28 TAB | Refills: 0 | Status: SHIPPED | OUTPATIENT
Start: 2019-08-16 | End: 2019-08-16

## 2019-08-16 ASSESSMENT — PATIENT HEALTH QUESTIONNAIRE - PHQ9: CLINICAL INTERPRETATION OF PHQ2 SCORE: 0

## 2019-08-16 ASSESSMENT — PAIN SCALES - GENERAL: PAINLEVEL: 10=SEVERE PAIN

## 2019-08-16 NOTE — PROGRESS NOTES
New patient note    Physiatry (physical medicine and  Rehabilitation), interventional spine and sports medicine    Date of Service: 8/16/2019    Chief complaint: Low back and right groin pain    HISTORY    HPI: Shirley Unger 64 y.o. female who presents today for evaluation of low back and right leg pain.  She reports that she started having pain November 2018.  It sounds like she has been having difficulty with bladder leakage that she notes with severity of pain.    She reports that she has had pain in her right groin pain.  This is a sharp pain.  It is difficult to put weight on the hip.  She does not feel like she has back pain, but she does seem to have some stiffness in her hips.  She also has had episodes of popping in the right hip that seem to relieve the pain.  She also had right knee pain that seemed to improve after a kenalog injection last fall.    Also, she had pain in the bottom of her right foot last year.  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    Coughing and sneezing does not seem to bother her.       Medical records review:  I reviewed the note from the referring provider Jose Esqueda M* dated 07/31/2019.  She was given prednisone.  This seemed to help a little bit.  She was sent here for evaluation.    Previous treatments:    Physical Therapy: Yes    Medications the patient is tried:     Previous surgeries to relieve the above pain:  none      ROS:   Red Flags ROS:   Fever, Chills, Sweats: Denies  Involuntary Weight Loss: Denies  Bladder Incontinence: Denies  Bowel Incontinence: denies  Saddle Anesthesia: Denies    All other systems reviewed and negative.       PMHx:   Past Medical History:   Diagnosis Date   • DM (diabetes mellitus) (Formerly [facility]) 2004   • Herpes genitalia 3/22/2013   • Hyperlipidemia LDL goal < 100    • Nephrolithiasis 3/22/2013    Recurrent Dr Monge - Calcium stones,    • Vitamin d deficiency 7/8/2013       PSHx:   Past Surgical  History:   Procedure Laterality Date   • GANGLION EXCISION  2014    Performed by Efra Ramirez M.D. at SURGERY [facility] ORS   • HYSTERECTOMY, TOTAL ABDOMINAL      For non cancerous reasons   • CHOLECYSTECTOMY     • ABDOMINAL HYSTERECTOMY TOTAL     • CARPAL TUNNEL RELEASE      Right hand   • CYSTOSCOPY STENT PLACEMENT      several, stent removed in MD office   • PB  DELIVERY ONLY         Family history   Family History   Problem Relation Age of Onset   • Cancer Mother         Lung   • Heart Disease Father          at 71, No MI? May have been electrical   • Diabetes Maternal Aunt    • Diabetes Maternal Grandfather          Medications:   Current Outpatient Medications   Medication   • Exenatide ER (BYDUREON) 2 MG Pen-injector   • lisinopril (PRINIVIL) 5 MG Tab   • INVOKANA 300 MG Tab   • metformin (GLUCOPHAGE) 1000 MG tablet   • colesevelam (WELCHOL) 625 MG Tab   • atorvastatin (LIPITOR) 20 MG Tab   • glimepiride (AMARYL) 4 MG Tab   • triamcinolone acetonide (KENALOG) 0.1 % Ointment   • mupirocin calcium (BACTROBAN) 2 % Cream   • cetirizine (ZYRTEC) 10 MG Tab   • fluticasone (FLONASE) 50 MCG/ACT nasal spray   • trolamine salicylate (ASPERCREME/ALOE) 10 % cream   • aspirin (ASA) 81 MG CHEW   • POTASSIUM CITRATE PO   • valacyclovir (VALTREX) 1 GM Tab   • acyclovir (ZOVIRAX) 5 % Cream   • predniSONE (DELTASONE) 20 MG Tab   • lisinopril (PRINIVIL) 5 MG Tab   • glucose blood (FREESTYLE LITE) strip   • Lancets MISC   • Blood Glucose Monitoring Suppl SUPPLIES MISC     No current facility-administered medications for this visit.        Allergies:   Allergies   Allergen Reactions   • Macrobid [Nitrofurantoin Monohydrate Macrocrystals] Hives   • Pcn [Penicillins] Hives       Social Hx:   Social History     Socioeconomic History   • Marital status:      Spouse name: Not on file   • Number of children: Not on file   • Years of education: Not on file   • Highest education level: Not  "on file   Occupational History   • Occupation:      Employer: [employer]   Social Needs   • Financial resource strain: Not on file   • Food insecurity:     Worry: Not on file     Inability: Not on file   • Transportation needs:     Medical: Not on file     Non-medical: Not on file   Tobacco Use   • Smoking status: Never Smoker   • Smokeless tobacco: Never Used   Substance and Sexual Activity   • Alcohol use: No     Alcohol/week: 0.0 oz     Comment: Rare Useage   • Drug use: No   • Sexual activity: Yes     Partners: Male   Lifestyle   • Physical activity:     Days per week: Not on file     Minutes per session: Not on file   • Stress: Not on file   Relationships   • Social connections:     Talks on phone: Not on file     Gets together: Not on file     Attends Adventism service: Not on file     Active member of club or organization: Not on file     Attends meetings of clubs or organizations: Not on file     Relationship status: Not on file   • Intimate partner violence:     Fear of current or ex partner: Not on file     Emotionally abused: Not on file     Physically abused: Not on file     Forced sexual activity: Not on file   Other Topics Concern   • Not on file   Social History Narrative   • Not on file         EXAMINATION     Physical Exam:   Vitals: /68 (BP Location: Left arm, Patient Position: Sitting, BP Cuff Size: Adult long)   Pulse 100   Temp 36.3 °C (97.4 °F) (Temporal)   Ht 1.727 m (5' 8\")   Wt 92.8 kg (204 lb 9.4 oz)   SpO2 94%     Constitutional:   Body Habitus: Body mass index is 31.11 kg/m².  Cooperation: Fully cooperates with exam  Appearance: Well-groomed, well-nourished, not disheveled, in no acute distress    Eyes: No scleral icterus, no proptosis     ENT -no obvious auditory deficits, no facial droop     Skin -no rashes or lesions noted     Respiratory-  breathing comfortable on room air, no audible wheezing    Cardiovascular- capillary refills less than 2 seconds. No lower " extremity edema is noted.     Gastrointestinal - no obvious abdominal masses, No tenderness to palpation in the abdomen    Psychiatric- alert and oriented ×3. Normal affect.     Gait - Trendelenberg gait, no use of ambulatory device, antalgic with decreased weight-bearing on the right. The patient has difficulty with heel walking.  The patient has difficulty with toe walking.    Musculoskeletal -   Cervical spine   Inspection: No deformities of the skin over the cervical spine. No rashes or lesions.    full  A/P ROM in all directions, without  pain      Spurling’s sign: negative bilaterally    No signs of muscular atrophy in bilateral upper extremities     Thoracic/Lumbar Spine/Sacral Spine/Hips   Inspection: No evidence of atrophy in bilateral lower extremities throughout     ROM: decreased AROM with flexion, extension, lateral flexion, and rotation bilaterally, with pain     Palpation:   No tenderness to palpation in midline at T1-T12 levels. No tenderness to palpation in the left and right of the midline T1-L5  palpation over SI joint: negative bilaterally    palpation over buttock: negative bilaterally    palpation in hip or over the greater trochanters: negative bilaterally      Lumbar spine Special tests  Neuro tension  Straight leg test negative bilaterally      HIP  Range of motion in the hips with decreased ROM of the hips.  Pain with ROM in internal rotation on the right    SI joint tests  Observation patient sits on one buttocks: Negative  GEOVANNY test negative bilaterally for posterior pain and positive for pain on the right      Neuro       Key points for the international standards for neurological classification of spinal cord injury (ISNCSCI) to light touch.     Dermatome R L   C4 2 2   C5 2 2   C6 2 2   C7 2 2   C8 2 2   T1 2 2   T2 2 2   L2 2 2   L3 2 2   L4 2 2   L5 2 1   S1 2 1   S2 2 2           Motor Exam Upper Extremities   ? Myotome R L   Shoulder flexion C5 5 5   Elbow flexion C5 5 5   Wrist  extension C6 5 5   Elbow extension C7 5 5   Finger flexion C8 5 5   Finger abduction T1 5 5         Motor Exam Lower Extremities    ? Myotome R L   Hip flexion L2 5 5   Knee extension L3 5 5   Ankle dorsiflexion L4 5 5   Toe extension L5 5 5   Ankle plantarflexion S1 5 5       Dye’s sign negative bilaterally   Babinski sign negative bilaterally   Clonus of the ankle negative bilaterally     Reflexes  ?  R L   Biceps  2+ 2+   Brachioradialis  2+ 2+   Patella  1+ 1+   Achilles   1+ 1+       MEDICAL DECISION MAKING    Medical records review: see under HPI section.     DATA    Labs:   Lab Results   Component Value Date/Time    SODIUM 140 03/02/2019 07:57 AM    POTASSIUM 3.9 03/02/2019 07:57 AM    CHLORIDE 104 03/02/2019 07:57 AM    CO2 28 03/02/2019 07:57 AM    ANION 8.0 03/02/2019 07:57 AM    GLUCOSE 147 (H) 03/02/2019 07:57 AM    BUN 19 03/02/2019 07:57 AM    CREATININE 0.72 03/02/2019 07:57 AM    CREATININE 0.9 04/07/2005 09:15 PM    CALCIUM 9.7 03/02/2019 07:57 AM    ASTSGOT 11 (L) 03/02/2019 07:57 AM    ALTSGPT 10 03/02/2019 07:57 AM    TBILIRUBIN 0.6 03/02/2019 07:57 AM    ALBUMIN 4.3 03/02/2019 07:57 AM    TOTPROTEIN 6.6 03/02/2019 07:57 AM    GLOBULIN 2.3 03/02/2019 07:57 AM    AGRATIO 1.9 03/02/2019 07:57 AM       No results found for: PROTHROMBTM, INR     Lab Results   Component Value Date/Time    WBC 6.1 03/02/2019 07:57 AM    RBC 4.98 03/02/2019 07:57 AM    HEMOGLOBIN 14.6 03/02/2019 07:57 AM    HEMATOCRIT 46.9 03/02/2019 07:57 AM    MCV 94.2 03/02/2019 07:57 AM    MCH 29.3 03/02/2019 07:57 AM    MCHC 31.1 (L) 03/02/2019 07:57 AM    MPV 9.9 03/02/2019 07:57 AM    NEUTSPOLYS 55.00 03/02/2019 07:57 AM    LYMPHOCYTES 33.30 03/02/2019 07:57 AM    MONOCYTES 9.00 03/02/2019 07:57 AM    EOSINOPHILS 2.00 03/02/2019 07:57 AM    BASOPHILS 0.50 03/02/2019 07:57 AM        Lab Results   Component Value Date/Time    HBA1C 7.1 (A) 07/22/2019 04:43 PM        Imaging: I personally reviewed following images, these are my  reads  Xray pelvis 08/15/2019  There is severe OA of the left hip joint.  Mild right hip OA    MRI lumbar 07/29/2019  There is note of disc protrusion at T10-11 with deformation of the spinal cord.  There is an annular disc bulge at L2-3 and no central foraminal canal stenosis  At L3-4, there is mild central canal stenosis second to disc bulge and facet arthropathy.  Mild right foraminal and moderate left foraminal stenosis  At L4-5, there is a disc extrusion, paracentral right with disc bulge and facet arthropathy. There is moderate foraminal left narrowing and severe right foraminal narrowing.  At L5-S1, there is an annular disc bulge with left paracentral disc extrusion.  Moderate left and severe right foraminal stenosis.      IMAGING radiology reads. I reviewed the following radiology reads                                          Xray pelvis 08/15/2019  1.  Severe left hip joint osteoarthritis    2.  Probable mild right hip joint osteoarthritis    3.  Dystrophic calcification adjacent to the right hip joint    4.  osteoarthritis of both sacroiliac joint and lumbar spine facet joint    Results for orders placed during the hospital encounter of 07/29/19   MR-LUMBAR SPINE-W/O    Impression 1.  Multilevel degenerative disc disease and facet degeneration. There are varying degrees of central canal and neural foraminal narrowing at levels as specifically described above.    2.  Central disc protrusion at the T10-11 level which results in underlying cord contact and deformity. This is incompletely evaluated on this exam.                         Results for orders placed in visit on 04/17/16   DX-CHEST-2 VIEWS    Impression Mild left basilar atelectasis favored over scarring                                          Results for orders placed in visit on 12/09/18   DX-KNEE COMPLETE 4+ RIGHT    Impression 1.  No evidence of fracture or dislocation.    2.  Small joint effusion appears to be present.    3.  Moderate  osteoarthritis.          Results for orders placed during the hospital encounter of 08/15/19   DX-PELVIS-1 OR 2 VIEWS    Impression 1.  Severe left hip joint osteoarthritis    2.  Probable mild right hip joint osteoarthritis    3.  Dystrophic calcification adjacent to the right hip joint    4.  osteoarthritis of both sacroiliac joint and lumbar spine facet joint                                       Diagnosis   Visit Diagnoses     ICD-10-CM   1. Primary osteoarthritis of right hip M16.11   2. Spinal stenosis of thoracic region M48.04   3. Thoracic disc herniation M51.24   4. Bulging lumbar disc M51.26   5. Primary osteoarthritis of left hip M16.12   6. Lumbar foraminal stenosis M99.83       ASSESSMENT:  Shirley Taylorp 64 y.o. female with right groin pain     Shirley was seen today for new patient.    Diagnoses and all orders for this visit:    Primary osteoarthritis of right hip  -     Discontinue: tramadol (ULTRAM) 50 MG Tab; Take 1 Tab by mouth every 6 hours as needed for Severe Pain for up to 7 days.  -     Cancel: Consent for Opiate Prescription  -     Cancel: REFERRAL TO PHYSIATRY (PMR)  -     REFERRAL TO PHYSIATRY (PMR)    Spinal stenosis of thoracic region  -     MR-THORACIC SPINE-W/O; Future    Thoracic disc herniation  -     MR-THORACIC SPINE-W/O; Future    Bulging lumbar disc    Primary osteoarthritis of left hip    Lumbar foraminal stenosis      1. We discussed right hip OA and her current symptoms. Discussed left intra-articular hip injection. The risks benefits and alternatives to this procedure were discussed and the patient wishes to proceed with the procedure. Risks include but are not limited to damage to surrounding structures, infection, bleeding, worsening of pain which can be permanent, weakness which can be permanent. Benefits include pain relief, improved function. Alternatives includes not doing the procedure.  Discussed that right hip OA and diagnostic injection.  2. Encouraged her to  continue with weight loss.  3. Discussed trial of acetaminophen 500mg po q6h for pain.  4. MRI thoracic spine ordered for finding of disc herniation.      Follow-up: Return for Hospital injection.      Please note that this dictation was created using voice recognition software. I have made every reasonable attempt to correct obvious errors but there may be errors of grammar and content that I may have overlooked prior to finalization of this note.      Jamaal Villatoro MD  Physical Medicine and Rehabilitation  Interventional Spine and Sports Physiatry  [facility]

## 2019-08-19 ENCOUNTER — HOSPITAL ENCOUNTER (OUTPATIENT)
Dept: LAB | Facility: MEDICAL CENTER | Age: 64
End: 2019-08-19
Payer: COMMERCIAL

## 2019-08-19 DIAGNOSIS — M25.551 CHRONIC HIP PAIN, RIGHT: ICD-10-CM

## 2019-08-19 DIAGNOSIS — G89.29 CHRONIC HIP PAIN, RIGHT: ICD-10-CM

## 2019-08-19 LAB
BDY FAT % MEASURED: 38.2 %
BP DIAS: 65 MMHG
BP SYS: 108 MMHG
CHOLEST SERPL-MCNC: 140 MG/DL (ref 100–199)
DIABETES HTDIA: YES
EST. AVERAGE GLUCOSE BLD GHB EST-MCNC: 163 MG/DL
EVENT NAME HTEVT: NORMAL
FASTING HTFAS: YES
GLUCOSE SERPL-MCNC: 147 MG/DL (ref 65–99)
HBA1C MFR BLD: 7.3 % (ref 0–5.6)
HDLC SERPL-MCNC: 34 MG/DL
HYPERTENSION HTHYP: NO
LDLC SERPL CALC-MCNC: 55 MG/DL
SCREENING LOC CITY HTCIT: NORMAL
SCREENING LOC STATE HTSTA: NORMAL
SCREENING LOCATION HTLOC: NORMAL
SMOKING HTSMO: NO
SUBSCRIBER ID HTSID: NORMAL
TRIGL SERPL-MCNC: 255 MG/DL (ref 0–149)

## 2019-08-19 PROCEDURE — 80061 LIPID PANEL: CPT

## 2019-08-19 PROCEDURE — 82947 ASSAY GLUCOSE BLOOD QUANT: CPT

## 2019-08-19 PROCEDURE — 83036 HEMOGLOBIN GLYCOSYLATED A1C: CPT

## 2019-08-19 PROCEDURE — S5190 WELLNESS ASSESSMENT BY NONPH: HCPCS

## 2019-08-19 PROCEDURE — 36415 COLL VENOUS BLD VENIPUNCTURE: CPT

## 2019-08-19 ASSESSMENT — ENCOUNTER SYMPTOMS
SENSORY CHANGE: 1
HEADACHES: 0
CHILLS: 0
TINGLING: 1
NAUSEA: 0
FEVER: 0
MYALGIAS: 0
DIARRHEA: 0

## 2019-08-19 NOTE — PROGRESS NOTES
Subjective:      Shirley Unger is a 64 y.o. female who presents with Rash (x 2 wk, rash on lower back.  Patient is requestiong Bactroban cream)            HPI New. 64 year old female with rash for 2 weeks on lower back. She has history of herpetic dermatitis for some time with these flares. She is due for back inject soon and spine specialist requesting treatment. The rash is somewhat uncomfortable with itching and burning. Patient is not using any medication for this. She denies any fever, chills, myalgia or headache with this flare.  Macrobid [nitrofurantoin monohydrate macrocrystals] and Pcn [penicillins]  Current Outpatient Medications on File Prior to Visit   Medication Sig Dispense Refill   • Exenatide ER (BYDUREON) 2 MG Pen-injector Inject 2 mg as instructed every 7 days. 12 Each 0   • lisinopril (PRINIVIL) 5 MG Tab Take 0.5 Tabs by mouth every day. 90 Tab 0   • INVOKANA 300 MG Tab TAKE ONE TABLET BY MOUTH ONCE DAILY 90 Tab 3   • glucose blood (FREESTYLE LITE) strip 1 Strip by Other route as needed. 100 Strip 3   • metformin (GLUCOPHAGE) 1000 MG tablet Take 1 Tab by mouth 2 times a day, with meals. 180 Tab 3   • colesevelam (WELCHOL) 625 MG Tab TAKE ONE TABLET BY MOUTH ONCE DAILY IN THE MORNING, THEN TAKE TWO TABLETS DAILY IN THE EVENING WITH  DINNER 270 Tab 3   • atorvastatin (LIPITOR) 20 MG Tab TAKE ONE TABLET BY MOUTH ONCE DAILY 90 Tab 3   • glimepiride (AMARYL) 4 MG Tab TAKE ONE TABLET BY MOUTH ONCE DAILY IN THE MORNING 180 Tab 3   • triamcinolone acetonide (KENALOG) 0.1 % Ointment Apply  to affected area(s) 2 times a day.     • cetirizine (ZYRTEC) 10 MG Tab Take 1 Tab by mouth every day. 30 Tab 3   • fluticasone (FLONASE) 50 MCG/ACT nasal spray Spray 2 Sprays in nose every day. 16 g 0   • Lancets MISC Lancets order: Lancets for Abbott Freestyle Lite meter. Sig: use BID and prn ssx high or low sugar. #100 RF x 3 100 Each 3   • Blood Glucose Monitoring Suppl SUPPLIES MISC Test strips order: Test  strips for Abbott Freestyle Lite meter. Sig: use BID and prn ssx high or low sugar #100 RF x 3 100 Each 3   • aspirin (ASA) 81 MG CHEW Take 81 mg by mouth every day.       • POTASSIUM CITRATE PO Take 2 Tabs by mouth 3 times a day.     • predniSONE (DELTASONE) 20 MG Tab Take 2 tabs (40mg) daily x 3 days, then take 1 tab (20mg) daily x 3 days, then take 1/2 tab (10mg) daily x 4 days (Patient not taking: Reported on 8/16/2019) 11 Tab 0   • lisinopril (PRINIVIL) 5 MG Tab TAKE 1 TABLET BY MOUTH ONCE DAILY AT BEDTIME 100 Tab 0   • mupirocin calcium (BACTROBAN) 2 % Cream Apply to affected area(s) 2 times a day. (Patient not taking: Reported on 8/16/2019) 15 g 2   • trolamine salicylate (ASPERCREME/ALOE) 10 % cream Apply 1 Squirt to affected area(s) 4 times a day. 1 Tube 3     No current facility-administered medications on file prior to visit.      Social History     Socioeconomic History   • Marital status:      Spouse name: Not on file   • Number of children: Not on file   • Years of education: Not on file   • Highest education level: Not on file   Occupational History   • Occupation:      Employer: [employer]   Social Needs   • Financial resource strain: Not on file   • Food insecurity:     Worry: Not on file     Inability: Not on file   • Transportation needs:     Medical: Not on file     Non-medical: Not on file   Tobacco Use   • Smoking status: Never Smoker   • Smokeless tobacco: Never Used   Substance and Sexual Activity   • Alcohol use: No     Alcohol/week: 0.0 oz     Comment: Rare Useage   • Drug use: No   • Sexual activity: Yes     Partners: Male   Lifestyle   • Physical activity:     Days per week: Not on file     Minutes per session: Not on file   • Stress: Not on file   Relationships   • Social connections:     Talks on phone: Not on file     Gets together: Not on file     Attends Anabaptist service: Not on file     Active member of club or organization: Not on file     Attends meetings of  "clubs or organizations: Not on file     Relationship status: Not on file   • Intimate partner violence:     Fear of current or ex partner: Not on file     Emotionally abused: Not on file     Physically abused: Not on file     Forced sexual activity: Not on file   Other Topics Concern   • Not on file   Social History Narrative   • Not on file     Breast Cancer-related family history is not on file.      Review of Systems   Constitutional: Negative for chills, fever and malaise/fatigue.   Gastrointestinal: Negative for diarrhea and nausea.   Musculoskeletal: Negative for myalgias.   Skin: Positive for itching and rash.   Neurological: Positive for tingling and sensory change. Negative for headaches.          Objective:     /78 (BP Location: Left arm, Patient Position: Sitting, BP Cuff Size: Large adult)   Pulse 97   Temp 36.2 °C (97.1 °F) (Temporal)   Resp 16   Ht 1.727 m (5' 8\")   Wt 92.5 kg (204 lb)   LMP 11/29/2004   SpO2 94%   BMI 31.02 kg/m²      Physical Exam   Constitutional: She is oriented to person, place, and time. She appears well-developed and well-nourished. No distress.   Cardiovascular: Normal rate, regular rhythm and normal heart sounds.   No murmur heard.  Pulmonary/Chest: Effort normal and breath sounds normal.   Musculoskeletal: Normal range of motion.   Neurological: She is alert and oriented to person, place, and time.   Skin: Skin is warm and dry. Rash noted.   Herpetic rash noted in area near gluteal cleft. Minimal scabbing noted on exam.   Psychiatric: She has a normal mood and affect. Her behavior is normal. Thought content normal.   Nursing note and vitals reviewed.              Assessment/Plan:     1. Herpes dermatitis  valacyclovir (VALTREX) 1 GM Tab    acyclovir (ZOVIRAX) 5 % Cream     Differential diagnosis, natural history, supportive care, and indications for immediate follow-up discussed at length.     "

## 2019-08-23 ENCOUNTER — TELEPHONE (OUTPATIENT)
Dept: PHYSICAL MEDICINE AND REHAB | Facility: MEDICAL CENTER | Age: 64
End: 2019-08-23

## 2019-08-23 NOTE — TELEPHONE ENCOUNTER
Pt called because she wants to speak to someone about her special procedure, she said she had a sudden rash that wont go away. Please advise.

## 2019-08-27 NOTE — TELEPHONE ENCOUNTER
Was the patient seen in the last year in this department? Yes    Does patient have an active prescription for medications requested? No     Received Request Via: Patient     Linh, patient will establish with you on 10/10/19.  She is requesting refill.  Thank you.

## 2019-09-04 ENCOUNTER — APPOINTMENT (OUTPATIENT)
Dept: PAIN MANAGEMENT | Facility: REHABILITATION | Age: 64
End: 2019-09-04
Attending: PHYSICAL MEDICINE & REHABILITATION
Payer: COMMERCIAL

## 2019-09-09 DIAGNOSIS — E11.29 TYPE 2 DIABETES MELLITUS WITH OTHER DIABETIC KIDNEY COMPLICATION (HCC): ICD-10-CM

## 2019-09-09 DIAGNOSIS — E78.5 DYSLIPIDEMIA: ICD-10-CM

## 2019-09-09 NOTE — TELEPHONE ENCOUNTER
Was the patient seen in the last year in this department? Yes    Does patient have an active prescription for medications requested? No     Received Request Via: Pharmacy     Linh, patient will establish with you on 10/10/19.  She is requesting refill.  Thank you.

## 2019-09-10 RX ORDER — COLESEVELAM 180 1/1
TABLET ORAL
Qty: 270 TAB | Refills: 2 | Status: SHIPPED | OUTPATIENT
Start: 2019-09-10 | End: 2020-05-28 | Stop reason: SDUPTHER

## 2019-09-10 RX ORDER — ATORVASTATIN CALCIUM 20 MG/1
TABLET, FILM COATED ORAL
Qty: 90 TAB | Refills: 21 | Status: SHIPPED | OUTPATIENT
Start: 2019-09-10 | End: 2020-06-10 | Stop reason: SDUPTHER

## 2019-09-25 ENCOUNTER — IMMUNIZATION (OUTPATIENT)
Dept: OCCUPATIONAL MEDICINE | Facility: CLINIC | Age: 64
End: 2019-09-25

## 2019-09-25 DIAGNOSIS — Z23 NEED FOR VACCINATION: ICD-10-CM

## 2019-09-30 DIAGNOSIS — E11.9 DIABETES MELLITUS WITHOUT COMPLICATION (HCC): ICD-10-CM

## 2019-09-30 NOTE — TELEPHONE ENCOUNTER
Was the patient seen in the last year in this department? Yes    Does patient have an active prescription for medications requested? No     Received Request Via: Patient     Pt has appt to establish with [facility] on 10/10 will run out before.

## 2019-10-06 PROCEDURE — 90662 IIV NO PRSV INCREASED AG IM: CPT | Performed by: NURSE PRACTITIONER

## 2019-10-10 ENCOUNTER — OFFICE VISIT (OUTPATIENT)
Dept: MEDICAL GROUP | Facility: PHYSICIAN GROUP | Age: 64
End: 2019-10-10
Payer: COMMERCIAL

## 2019-10-10 VITALS
WEIGHT: 203 LBS | DIASTOLIC BLOOD PRESSURE: 60 MMHG | SYSTOLIC BLOOD PRESSURE: 110 MMHG | OXYGEN SATURATION: 95 % | HEART RATE: 83 BPM | TEMPERATURE: 97.9 F | HEIGHT: 68 IN | RESPIRATION RATE: 18 BRPM | BODY MASS INDEX: 30.77 KG/M2

## 2019-10-10 DIAGNOSIS — J30.1 SEASONAL ALLERGIC RHINITIS DUE TO POLLEN: ICD-10-CM

## 2019-10-10 DIAGNOSIS — E66.9 OBESITY (BMI 30-39.9): ICD-10-CM

## 2019-10-10 DIAGNOSIS — M48.04 SPINAL STENOSIS OF THORACIC REGION: ICD-10-CM

## 2019-10-10 DIAGNOSIS — M16.12 PRIMARY OSTEOARTHRITIS OF LEFT HIP: ICD-10-CM

## 2019-10-10 DIAGNOSIS — G57.62 MORTON'S NEUROMA OF LEFT FOOT: ICD-10-CM

## 2019-10-10 DIAGNOSIS — M53.9 MULTILEVEL DEGENERATIVE DISC DISEASE: ICD-10-CM

## 2019-10-10 DIAGNOSIS — B00.89 HERPES DERMATITIS: ICD-10-CM

## 2019-10-10 DIAGNOSIS — Z23 NEED FOR VACCINATION: ICD-10-CM

## 2019-10-10 DIAGNOSIS — M16.11 PRIMARY OSTEOARTHRITIS OF RIGHT HIP: ICD-10-CM

## 2019-10-10 DIAGNOSIS — G89.29 CHRONIC RIGHT HIP PAIN: ICD-10-CM

## 2019-10-10 DIAGNOSIS — M48.061 LUMBAR FORAMINAL STENOSIS: ICD-10-CM

## 2019-10-10 DIAGNOSIS — E78.5 DYSLIPIDEMIA: ICD-10-CM

## 2019-10-10 DIAGNOSIS — M51.24 THORACIC DISC HERNIATION: ICD-10-CM

## 2019-10-10 DIAGNOSIS — M25.551 CHRONIC RIGHT HIP PAIN: ICD-10-CM

## 2019-10-10 PROCEDURE — 99214 OFFICE O/P EST MOD 30 MIN: CPT | Mod: 25 | Performed by: PHYSICIAN ASSISTANT

## 2019-10-10 PROCEDURE — 90746 HEPB VACCINE 3 DOSE ADULT IM: CPT | Performed by: PHYSICIAN ASSISTANT

## 2019-10-10 PROCEDURE — 90471 IMMUNIZATION ADMIN: CPT | Performed by: PHYSICIAN ASSISTANT

## 2019-10-10 RX ORDER — VALACYCLOVIR HYDROCHLORIDE 1 G/1
1000 TABLET, FILM COATED ORAL 3 TIMES DAILY
Qty: 21 TAB | Refills: 6 | Status: SHIPPED | OUTPATIENT
Start: 2019-10-10 | End: 2020-01-24

## 2019-10-15 NOTE — PROGRESS NOTES
Chief Complaint   Patient presents with   • Establish Care     prior pcp     • Hip Pain     1 year has been having R hip pain- has gone to other places for help but nothing has worked        HISTORY OF PRESENT ILLNESS: Shirley Unger is an established 64 y.o. female here to discuss the evaluation and management of:    Chronic right hip pain  Primary osteoarthritis of right hip  Primary osteoarthritis of left hip  Spinal stenosis of thoracic region  Lumbar foraminal stenosis  Thoracic disc herniation  Multilevel degenerative disc disease  Chronic but worsening problem.  Patient states for the past year she had a chronic right hip pain.  She tells me she seen several providers for hip pain symptoms.  States she is also followed up with physical therapy with no improvement in symptoms.  States she felt physical therapy worsen symptoms.  She tells me she is follow-up with a chiropractor with some improvement of symptoms.  Patient states she is followed up with physiatry as well.  She tells me due to chronic pain symptoms she is now wetting on herself.  He tells me when pain symptoms are exacerbated she will urinate on herself.  Patient denies doing Kegel exercises.  Denies having vaginal births.  She denies urgency, frequency, hematuria, dysuria.  States pain is a constant aching pain that can develop into a deep aching pain.  Positive for stiffness.  She tells me that it hurts to bend over.  States she takes Tylenol for stiffness with minimal improvement of symptoms.  Patient would like to follow with orthopedic provider.  Referral will be placed.  Denies saddle paresthesia.  Positive for antalgic gait due to chronic pain symptoms.    Imaging results are as follows:                                             Xray pelvis 08/15/2019 results are as follows:      1.  Severe left hip joint osteoarthritis    2.  Probable mild right hip joint osteoarthritis    3.  Dystrophic calcification adjacent to the right  hip joint    4.  osteoarthritis of both sacroiliac joint and lumbar spine facet joint          Results for orders placed during the hospital encounter of 07/29/19   MR-LUMBAR SPINE-W/O     Impression 1.  Multilevel degenerative disc disease and facet degeneration. There are varying degrees of central canal and neural foraminal narrowing at levels as specifically described above.     2.  Central disc protrusion at the T10-11 level which results in underlying cord contact and deformity. This is incompletely evaluated on this exam.                              Results for orders placed in visit on 04/17/16   DX-CHEST-2 VIEWS     Impression Mild left basilar atelectasis favored over scarring                                               Results for orders placed in visit on 12/09/18   DX-KNEE COMPLETE 4+ RIGHT     Impression 1.  No evidence of fracture or dislocation.     2.  Small joint effusion appears to be present.     3.  Moderate osteoarthritis.               Results for orders placed during the hospital encounter of 08/15/19   DX-PELVIS-1 OR 2 VIEWS     Impression 1.  Severe left hip joint osteoarthritis     2.  Probable mild right hip joint osteoarthritis     3.  Dystrophic calcification adjacent to the right hip joint     4.  osteoarthritis of both sacroiliac joint and lumbar spine facet joint                                                Uncontrolled type 2 diabetes mellitus without complication, without long-term current use of insulin (HCC)  Uncontrolled.  Hemoglobin A1c from 8/19/2019 was 7.3.  Patient is currently taking metformin thousand milligrams twice daily, glyburide 4 mg tab once daily, Exenatide ER (BYDUREON) 2 MG Pen-injector every 7 days, lisinopril 2.5 mg tab once daily, atorvastatin 20 mg tab once daily, WelChol 625 mg tab once in the a.m. and 2 tablets by mouth in the p.m., and 81 mg aspirin.  She tells me she is compliant with medications and experiences no side effects or complications of  medications.  States due to chronic pain symptoms she is on as active as she would like.  Admits that her diet could improve.    Herpes dermatitis  Chronic problem.  Patient states she takes Valtrex on an as-needed basis when she is symptomatic.  States she recently had a flareup.  Patient is requesting a refill.  Continue to monitor.    Lino's neuroma of left foot  Chronic problem.  States she has been following up with a podiatrist regularly.  She tells me she is wearing shoe inserts with improvement of symptoms.  Continue to monitor.    Dyslipidemia  She tells me that she has been working on diet.  Currently taking atorvastatin welchol.  States she is compliant with medications and expenses no side effects or complications medications.    Obesity (BMI 30-39.9)  See above.    Seasonal allergic rhinitis due to pollen  Chronic with stable problem.  Takes over-the-counter Zyrtec and uses Flonase.  Symptoms are managed with medication.  No further work-up.    Patient Active Problem List    Diagnosis Date Noted   • Lino's neuroma of left foot 02/27/2019   • Primary insomnia 06/12/2018   • Obesity (BMI 30-39.9) 09/28/2017   • Seasonal allergic rhinitis due to pollen 03/07/2017   • Herpes dermatitis 11/29/2016   • Uncontrolled type 2 diabetes mellitus without complication, without long-term current use of insulin (HCC) 08/04/2015   • Vitamin D deficiency disease 07/08/2013   • Dyslipidemia    • Recurrent nephrolithiasis 03/22/2013       Allergies:Macrobid [nitrofurantoin monohydrate macrocrystals] and Pcn [penicillins]    Current Outpatient Medications   Medication Sig Dispense Refill   • valacyclovir (VALTREX) 1 GM Tab Take 1 Tab by mouth 3 times a day. 21 Tab 6   • metformin (GLUCOPHAGE) 1000 MG tablet Take 1 Tab by mouth 2 times a day, with meals. 180 Tab 0   • atorvastatin (LIPITOR) 20 MG Tab TAKE ONE TABLET BY MOUTH ONCE DAILY 90 Tab 21   • colesevelam (WELCHOL) 625 MG Tab TAKE ONE TABLET BY MOUTH ONCE DAILY IN  THE MORNING, THEN TAKE TWO TABLETS DAILY IN THE EVENING WITH  DINNER 270 Tab 2   • Exenatide ER (BYDUREON) 2 MG Pen-injector Inject 2 mg as instructed every 7 days. 12 Each 0   • acyclovir (ZOVIRAX) 5 % Cream Apply 1 Application to affected area(s) every 3 hours. 1 Tube 6   • predniSONE (DELTASONE) 20 MG Tab Take 2 tabs (40mg) daily x 3 days, then take 1 tab (20mg) daily x 3 days, then take 1/2 tab (10mg) daily x 4 days 11 Tab 0   • lisinopril (PRINIVIL) 5 MG Tab TAKE 1 TABLET BY MOUTH ONCE DAILY AT BEDTIME 100 Tab 0   • lisinopril (PRINIVIL) 5 MG Tab Take 0.5 Tabs by mouth every day. 90 Tab 0   • INVOKANA 300 MG Tab TAKE ONE TABLET BY MOUTH ONCE DAILY 90 Tab 3   • glucose blood (FREESTYLE LITE) strip 1 Strip by Other route as needed. 100 Strip 3   • glimepiride (AMARYL) 4 MG Tab TAKE ONE TABLET BY MOUTH ONCE DAILY IN THE MORNING 180 Tab 3   • triamcinolone acetonide (KENALOG) 0.1 % Ointment Apply  to affected area(s) 2 times a day.     • mupirocin calcium (BACTROBAN) 2 % Cream Apply to affected area(s) 2 times a day. 15 g 2   • cetirizine (ZYRTEC) 10 MG Tab Take 1 Tab by mouth every day. 30 Tab 3   • fluticasone (FLONASE) 50 MCG/ACT nasal spray Spray 2 Sprays in nose every day. 16 g 0   • trolamine salicylate (ASPERCREME/ALOE) 10 % cream Apply 1 Squirt to affected area(s) 4 times a day. 1 Tube 3   • Lancets MISC Lancets order: Lancets for Abbott Freestyle Lite meter. Sig: use BID and prn ssx high or low sugar. #100 RF x 3 100 Each 3   • Blood Glucose Monitoring Suppl SUPPLIES MISC Test strips order: Test strips for Abbott Freestyle Lite meter. Sig: use BID and prn ssx high or low sugar #100 RF x 3 100 Each 3   • aspirin (ASA) 81 MG CHEW Take 81 mg by mouth every day.       • POTASSIUM CITRATE PO Take 2 Tabs by mouth 3 times a day.       No current facility-administered medications for this visit.        Social History     Tobacco Use   • Smoking status: Never Smoker   • Smokeless tobacco: Never Used   Substance Use  "Topics   • Alcohol use: No     Alcohol/week: 0.0 oz     Comment: Rare Useage   • Drug use: No       Family Status   Relation Name Status   • Mo   at age 78        Lung CA   • Fa   at age 71        MI   • Sis  Alive   • Bro  Alive   • Bro  Alive   • MAunt  (Not Specified)   • MGFa  (Not Specified)     Family History   Problem Relation Age of Onset   • Cancer Mother         Lung   • Heart Disease Father          at 71, No MI? May have been electrical   • Diabetes Maternal Aunt    • Diabetes Maternal Grandfather        ROS:  Review of Systems   Constitutional: Negative for fever, chills, weight loss and malaise/fatigue.   HENT: Negative for ear pain, nosebleeds, congestion, sore throat and neck pain.    Eyes: Negative for blurred vision.   Respiratory: Negative for cough, sputum production, shortness of breath and wheezing.    Cardiovascular: Negative for chest pain, palpitations, orthopnea and leg swelling.   Gastrointestinal: Negative for heartburn, nausea, vomiting and abdominal pain.   Genitourinary: Negative for dysuria, urgency and frequency.  Positive for incontinence.  Musculoskeletal: + for myalgias, back pain and joint pain.   Skin: Negative for rash and itching.   Neurological: Negative for dizziness, tingling, tremors, sensory change, focal weakness and headaches.   Endo/Heme/Allergies: Does not bruise/bleed easily.   Psychiatric/Behavioral: Negative for depression, suicidal ideas and memory loss.  The patient is not nervous/anxious and does not have insomnia.    All other systems reviewed and are negative except as in HPI.    Exam: /60 (BP Location: Left arm, Patient Position: Sitting, BP Cuff Size: Adult)   Pulse 83   Temp 36.6 °C (97.9 °F) (Temporal)   Resp 18   Ht 1.727 m (5' 8\")   Wt 92.1 kg (203 lb)   SpO2 95%  Body mass index is 30.87 kg/m².  General: Normal appearing. No distress.  HEENT: Normocephalic. Eyes conjunctiva clear lids without ptosis, pupils equal and " reactive to light accommodation, ears normal shape and contour, canals are clear bilaterally, tympanic membranes are benign, nasal mucosa benign, oropharynx is without erythema, edema or exudates.   Neck: Supple without JVD. Thyroid is not enlarged.  Pulmonary: Clear to ausculation.  Normal effort. No rales, ronchi, or wheezing.  Cardiovascular: Regular rate and rhythm without murmur.   Abdomen: Soft, nontender, nondistended. Normal bowel sounds. Liver and spleen are not palpable  Neurologic: Grossly nonfocal.  Cranial nerves are normal.  Lymph: No cervical, supraclavicular or axillary lymph nodes are palpable  Skin: Warm and dry.  No rashes or suspicious skin lesions.  Musculoskeletal:  No extremity cyanosis, clubbing, or edema.  Positive for antalgic gait.  Psych: Normal mood and affect. Alert and oriented x3. Judgment and insight is normal.    Medical decision-making and discussion:  1. Chronic right hip pain  2. Primary osteoarthritis of right hip  3. Primary osteoarthritis of left hip  4. Spinal stenosis of thoracic region  5. Lumbar foraminal stenosis  6. Thoracic disc herniation  7. Multilevel degenerative disc disease  Chronic but worsening problem.  Patient has followed up with several providers in regards to pain symptoms.  Patient would like to follow-up with an orthopedic provider.  Referral has been placed.  Advised patient to do low impact exercise.  Continue stretching.  Take over-the-counter Tylenol as needed.  Do not exceed more than 3000 mg of Tylenol in 24-hour span.  Patient does not want to follow-up with physical therapy in regards to incontinence.  Advised patient to do Kegel exercises.  Discussed with patient that pelvic floor muscles are the muscles that are used to stop urination during midstream. Discussed with patient that Kegel exercises are when you tighten pelvic floor muscles, hold the contraction for 5 seconds, and then relax for 5 seconds.  Advised patient to try to do exercises 4-5  times in row. Advised patient to work up to josiah for 10 seconds at a time and relaxing for 10 seconds between contractions.  Advised patient to aim for at least 3 sets of 10 repetitions a day.    Continue to monitor.    Follow-up for worsening symptoms,lack of expected recovery, or should new symptoms or problems arise.        - REFERRAL TO ORTHOPEDICS    8. Uncontrolled type 2 diabetes mellitus without complication, without long-term current use of insulin (HCC)  Uncontrolled.  Hemoglobin A1c from 8/19/2019 was 7.3.  Continue current medication regimen.  Continue to monitor.  Continue working on diet and exercise.  - Hep B Adult 20+    9. Herpes dermatitis  Chronic problem.  Continue Valtrex as prescribed.  Continue to monitor.    - valacyclovir (VALTREX) 1 GM Tab; Take 1 Tab by mouth 3 times a day.  Dispense: 21 Tab; Refill: 6    10. Huynh's neuroma of left foot  Continue shoe inserts.  Continue wearing wide toe box shoes.  Continue following up with podiatry.  Advised patient to wear supportive shoes.  Continue to monitor.    11. Dyslipidemia  Chronic but stable problem.  Continue current medication regimen.  Continue work on diet and exercise.    12. Obesity (BMI 30-39.9)  Continue working on diet and exercise.    13. Need for vaccination  Hep B vaccination was administered to patient without complication's.  VIS form was provided to patient.  - Hep B Adult 20+    14. Seasonal allergic rhinitis due to pollen  Chronic with stable problem.  Continue Flonase and Zyrtec as prescribed.  Avoid known triggers.  Continue to monitor.      Please note that this dictation was created using voice recognition software. I have made every reasonable attempt to correct obvious errors, but I expect that there are errors of grammar and possibly content that I did not discover before finalizing the note.    Assessment/Plan:  1. Chronic right hip pain     2. Uncontrolled type 2 diabetes mellitus without complication,  without long-term current use of insulin (HCC)  Hep B Adult 20+   3. Need for vaccination  Hep B Adult 20+   4. Herpes dermatitis  valacyclovir (VALTREX) 1 GM Tab   5. Huynh's neuroma of left foot     6. Dyslipidemia     7. Obesity (BMI 30-39.9)     8. Seasonal allergic rhinitis due to pollen         No follow-ups on file.

## 2019-10-19 ENCOUNTER — HOSPITAL ENCOUNTER (OUTPATIENT)
Dept: LAB | Facility: MEDICAL CENTER | Age: 64
End: 2019-10-19
Attending: INTERNAL MEDICINE
Payer: COMMERCIAL

## 2019-10-19 DIAGNOSIS — E03.9 HYPOTHYROIDISM, UNSPECIFIED TYPE: ICD-10-CM

## 2019-10-19 DIAGNOSIS — E78.2 MIXED HYPERLIPIDEMIA: ICD-10-CM

## 2019-10-19 DIAGNOSIS — E55.9 VITAMIN D DEFICIENCY: ICD-10-CM

## 2019-10-19 DIAGNOSIS — E53.8 VITAMIN B 12 DEFICIENCY: ICD-10-CM

## 2019-10-19 LAB
25(OH)D3 SERPL-MCNC: 50 NG/ML (ref 30–100)
CHOLEST SERPL-MCNC: 125 MG/DL (ref 100–199)
HDLC SERPL-MCNC: 36 MG/DL
LDLC SERPL CALC-MCNC: 54 MG/DL
T3 SERPL-MCNC: 101.8 NG/DL (ref 60–181)
T3FREE SERPL-MCNC: 2.97 PG/ML (ref 2.4–4.2)
T4 FREE SERPL-MCNC: 0.99 NG/DL (ref 0.53–1.43)
THYROPEROXIDASE AB SERPL-ACNC: 2 IU/ML (ref 0–9)
TRIGL SERPL-MCNC: 177 MG/DL (ref 0–149)
TSH SERPL DL<=0.005 MIU/L-ACNC: 0.87 UIU/ML (ref 0.38–5.33)
VIT B12 SERPL-MCNC: 72 PG/ML (ref 211–911)

## 2019-10-19 PROCEDURE — 82306 VITAMIN D 25 HYDROXY: CPT

## 2019-10-19 PROCEDURE — 84480 ASSAY TRIIODOTHYRONINE (T3): CPT

## 2019-10-19 PROCEDURE — 84439 ASSAY OF FREE THYROXINE: CPT

## 2019-10-19 PROCEDURE — 84481 FREE ASSAY (FT-3): CPT

## 2019-10-19 PROCEDURE — 84443 ASSAY THYROID STIM HORMONE: CPT

## 2019-10-19 PROCEDURE — 82607 VITAMIN B-12: CPT

## 2019-10-19 PROCEDURE — 86376 MICROSOMAL ANTIBODY EACH: CPT

## 2019-10-19 PROCEDURE — 36415 COLL VENOUS BLD VENIPUNCTURE: CPT

## 2019-10-19 PROCEDURE — 80061 LIPID PANEL: CPT

## 2019-10-27 NOTE — PROGRESS NOTES
Endocrinology Clinic Progress Note  PCP: Linh Miranda P.A.-C.    HPI:  Shirley Villar is a 64 y.o. old patient who comes in today for routine follow up of  Management of Uncontrolled Type 2 Diabetes, Obesity, Vitamin D Deficiency, and Hyperlipidemia.  She is still having right hip pain.  Complains of severe fatigue.  Vitamin B 12 level low.  Results for SHIRLEY VILLAR (MRN [id number]) as of 10/28/2019 11:30   Ref. Range 10/19/2019 07:31   Vitamin B12 -True Cobalamin Latest Ref Range: 211 - 911 pg/mL 72 (L)       HPI:  Shirley Villar is a 64 y.o. old patient who comes in today for evaluation of above stated problem.    Most Recent HbA1c:   Lab Results   Component Value Date/Time    HBA1C 7.3 (H) 08/19/2019 10:38 AM        Current Diabetes Regimen:  GLP-1 Agent: Exenatide once weekly (2mg)   SGLT-2 Inhibitor:  Canagliflozin 300 mg once daily   Other: Metformin 1000 mg BID and Glimepiride 4 mg daily.  Testing blood sugars randomly.  's.    Hypoglycemia:  None    ROS:  Constitutional: No weight loss  Cardiac: No palpitations or racing heart  Resp: No shortness of breath  Neuro: No numbness or tinging in feet  Endo: No heat or cold intolerance, no polyuria or polydipsia  All other systems were reviewed and were negative.    Past Medical History:  Patient Active Problem List    Diagnosis Date Noted   • Huynh's neuroma of left foot 02/27/2019   • Primary insomnia 06/12/2018   • Obesity (BMI 30-39.9) 09/28/2017   • Seasonal allergic rhinitis due to pollen 03/07/2017   • Herpes dermatitis 11/29/2016   • Uncontrolled type 2 diabetes mellitus without complication, without long-term current use of insulin (HCC) 08/04/2015   • Vitamin D deficiency disease 07/08/2013   • Dyslipidemia    • Recurrent nephrolithiasis 03/22/2013       Past Surgical History:  Past Surgical History:   Procedure Laterality Date   • GANGLION EXCISION  2/25/2014    Performed by Efra Ramirez M.D. at [facility]    • HYSTERECTOMY, TOTAL ABDOMINAL      For non cancerous reasons   • CHOLECYSTECTOMY     • ABDOMINAL HYSTERECTOMY TOTAL     • CARPAL TUNNEL RELEASE      Right hand   • CYSTOSCOPY STENT PLACEMENT      several, stent removed in MD office   • PB  DELIVERY ONLY         Allergies:  Macrobid [nitrofurantoin monohydrate macrocrystals] and Pcn [penicillins]    Social History:  Social History     Socioeconomic History   • Marital status:      Spouse name: Not on file   • Number of children: Not on file   • Years of education: Not on file   • Highest education level: Not on file   Occupational History   • Occupation:      Employer: [employer]   Social Needs   • Financial resource strain: Not on file   • Food insecurity:     Worry: Not on file     Inability: Not on file   • Transportation needs:     Medical: Not on file     Non-medical: Not on file   Tobacco Use   • Smoking status: Never Smoker   • Smokeless tobacco: Never Used   Substance and Sexual Activity   • Alcohol use: No     Alcohol/week: 0.0 oz     Comment: Rare Useage   • Drug use: No   • Sexual activity: Yes     Partners: Male     Comment: .    Lifestyle   • Physical activity:     Days per week: Not on file     Minutes per session: Not on file   • Stress: Not on file   Relationships   • Social connections:     Talks on phone: Not on file     Gets together: Not on file     Attends Buddhism service: Not on file     Active member of club or organization: Not on file     Attends meetings of clubs or organizations: Not on file     Relationship status: Not on file   • Intimate partner violence:     Fear of current or ex partner: Not on file     Emotionally abused: Not on file     Physically abused: Not on file     Forced sexual activity: Not on file   Other Topics Concern   • Not on file   Social History Narrative   • Not on file       Family History:  Family History   Problem Relation Age of Onset   • Cancer Mother          Lung   • Heart Disease Father          at 71, No MI? May have been electrical   • Diabetes Maternal Aunt    • Diabetes Maternal Grandfather        Medications:    Current Outpatient Medications:   •  Blood Glucose Test Strips, Test strips order: Test strips for Song Contour Next meter. Sig: use daily and prn ssx high or low sugars., Disp: 100 Strip, Rfl: 3  •  Lancets, Lancets order: Lancets for Song Contour Next meter. Sig: use daily and prn ssx high or low sugar., Disp: 100 Each, Rfl: 3  •  cyanocobalamin (VITAMIN B-12) 1000 MCG/ML Solution, 1 mL by Intramuscular route every 14 days., Disp: 6 mL, Rfl: 3  •  valacyclovir (VALTREX) 1 GM Tab, Take 1 Tab by mouth 3 times a day., Disp: 21 Tab, Rfl: 6  •  metformin (GLUCOPHAGE) 1000 MG tablet, Take 1 Tab by mouth 2 times a day, with meals., Disp: 180 Tab, Rfl: 0  •  atorvastatin (LIPITOR) 20 MG Tab, TAKE ONE TABLET BY MOUTH ONCE DAILY, Disp: 90 Tab, Rfl: 21  •  colesevelam (WELCHOL) 625 MG Tab, TAKE ONE TABLET BY MOUTH ONCE DAILY IN THE MORNING, THEN TAKE TWO TABLETS DAILY IN THE EVENING WITH  DINNER, Disp: 270 Tab, Rfl: 2  •  Exenatide ER (BYDUREON) 2 MG Pen-injector, Inject 2 mg as instructed every 7 days., Disp: 12 Each, Rfl: 0  •  acyclovir (ZOVIRAX) 5 % Cream, Apply 1 Application to affected area(s) every 3 hours., Disp: 1 Tube, Rfl: 6  •  lisinopril (PRINIVIL) 5 MG Tab, Take 0.5 Tabs by mouth every day., Disp: 90 Tab, Rfl: 0  •  INVOKANA 300 MG Tab, TAKE ONE TABLET BY MOUTH ONCE DAILY, Disp: 90 Tab, Rfl: 3  •  glucose blood (FREESTYLE LITE) strip, 1 Strip by Other route as needed., Disp: 100 Strip, Rfl: 3  •  glimepiride (AMARYL) 4 MG Tab, TAKE ONE TABLET BY MOUTH ONCE DAILY IN THE MORNING, Disp: 180 Tab, Rfl: 3  •  triamcinolone acetonide (KENALOG) 0.1 % Ointment, Apply  to affected area(s) 2 times a day., Disp: , Rfl:   •  mupirocin calcium (BACTROBAN) 2 % Cream, Apply to affected area(s) 2 times a day., Disp: 15 g, Rfl: 2  •  cetirizine (ZYRTEC) 10  "MG Tab, Take 1 Tab by mouth every day., Disp: 30 Tab, Rfl: 3  •  fluticasone (FLONASE) 50 MCG/ACT nasal spray, Spray 2 Sprays in nose every day., Disp: 16 g, Rfl: 0  •  trolamine salicylate (ASPERCREME/ALOE) 10 % cream, Apply 1 Squirt to affected area(s) 4 times a day., Disp: 1 Tube, Rfl: 3  •  Lancets MISC, Lancets order: Lancets for Abbott Freestyle Lite meter. Sig: use BID and prn ssx high or low sugar. #100 RF x 3, Disp: 100 Each, Rfl: 3  •  Blood Glucose Monitoring Suppl SUPPLIES MISC, Test strips order: Test strips for Abbott Freestyle Lite meter. Sig: use BID and prn ssx high or low sugar #100 RF x 3, Disp: 100 Each, Rfl: 3  •  aspirin (ASA) 81 MG CHEW, Take 81 mg by mouth every day.  , Disp: , Rfl:   •  POTASSIUM CITRATE PO, Take 2 Tabs by mouth 3 times a day., Disp: , Rfl:     Current Facility-Administered Medications:   •  cyanocobalamin (VITAMIN B-12) injection 1,000 mcg, 1,000 mcg, Intramuscular, Q14 DAYS, Joey Coyne M.D.    Labs: Reviewed    Physical Examination:  Vital signs: /70   Pulse 84   Ht 1.702 m (5' 7\")   Wt 92.1 kg (203 lb)   LMP 11/29/2004   SpO2 94%   BMI 31.79 kg/m²  Body mass index is 31.79 kg/m². Patient's body mass index is 31.79 kg/m². Exercise and nutrition counseling were performed at this visit.  General: No apparent distress, cooperative  Eyes: No scleral icterus or discharge  ENMT: Normal on external inspection of nose, lips, normal thyroid exam  Neck: No abnormal masses on inspection  Resp: Normal effort, clear to auscultation bilaterally   CVS: Regular rate and rhythm, S1 S2 normal, no murmur   Extremities: No edema  Abdomen: abdominal obesity present  Neuro: Alert and oriented  Skin: No rash  Psych: Normal mood and affect, intact memory and able to make informed decisions    Foot Exam:  Monofilament: done  Monofilament testing with a 10 gram force: sensation intact: intact bilaterally  Visual Inspection: Feet without maceration, ulcers, fissures.  Pedal " pulses: intact bilaterally    Assessment and Plan:    1. Uncontrolled type 2 diabetes mellitus with hyperglycemia (HCC)  Continue current regimen  The following was reviewed  - Discussed diabetic diet discussed in detail-plate method.  - She will test before meals and log .  - She will walk for 20-30 minutes daily.  - Reviewed medications and advised how to take   - Discussed importance of immunizations and yearly eye exams. She had it in August with [facility].   She is going to get a copy of the report and send it to us.  - Advised daily foot exams. Educated on signs of infection.   - Educated on need to stay well hydrated with water.  - Educated to call with any questions or problems.    2. Obesity (BMI 30-39.9)  Continue dietary exercise measures    3. Mixed hyperlipidemia  Continue atorvastatin.     4. Vitamin D deficiency  Cont vit D.     5. Vit B 12 def:  Vitamin B12 thousand micrograms intramuscular every 14 days.  First dose given in the office today.    Return in about 4 months (around 2/28/2020).    Thank you for allowing me to participate in the care of this patient.    Dr. Joey Coyne  This note was scribed by Josseline Guido RN, CDE  10/28/19    CC:   Linh Miranda P.A.-C.    This note was created using voice recognition software (Dragon). The accuracy of the dictation is limited by the abilities of the software. I have reviewed the note prior to signing, however some errors in grammar and context are still possible. If you have any questions related to this note please do not hesitate to contact our office.

## 2019-10-28 ENCOUNTER — OFFICE VISIT (OUTPATIENT)
Dept: ENDOCRINOLOGY | Facility: MEDICAL CENTER | Age: 64
End: 2019-10-28
Payer: COMMERCIAL

## 2019-10-28 VITALS
DIASTOLIC BLOOD PRESSURE: 70 MMHG | WEIGHT: 203 LBS | HEIGHT: 67 IN | BODY MASS INDEX: 31.86 KG/M2 | OXYGEN SATURATION: 94 % | SYSTOLIC BLOOD PRESSURE: 112 MMHG | HEART RATE: 84 BPM

## 2019-10-28 DIAGNOSIS — E53.8 VITAMIN B 12 DEFICIENCY: ICD-10-CM

## 2019-10-28 DIAGNOSIS — E55.9 VITAMIN D DEFICIENCY: ICD-10-CM

## 2019-10-28 DIAGNOSIS — E78.2 MIXED HYPERLIPIDEMIA: ICD-10-CM

## 2019-10-28 DIAGNOSIS — E11.65 UNCONTROLLED TYPE 2 DIABETES MELLITUS WITH HYPERGLYCEMIA (HCC): ICD-10-CM

## 2019-10-28 DIAGNOSIS — E66.9 OBESITY (BMI 30-39.9): ICD-10-CM

## 2019-10-28 PROCEDURE — 99214 OFFICE O/P EST MOD 30 MIN: CPT | Mod: 25 | Performed by: INTERNAL MEDICINE

## 2019-10-28 RX ORDER — CYANOCOBALAMIN 1000 UG/ML
1000 INJECTION, SOLUTION INTRAMUSCULAR; SUBCUTANEOUS
Qty: 6 ML | Refills: 3 | Status: SHIPPED
Start: 2019-10-28 | End: 2020-02-26

## 2019-10-28 RX ORDER — LANCETS 30 GAUGE
EACH MISCELLANEOUS
Qty: 100 EACH | Refills: 3 | Status: SHIPPED | OUTPATIENT
Start: 2019-10-28

## 2019-10-28 RX ORDER — CYANOCOBALAMIN 1000 UG/ML
1000 INJECTION, SOLUTION INTRAMUSCULAR; SUBCUTANEOUS
Status: DISCONTINUED | OUTPATIENT
Start: 2019-10-28 | End: 2020-02-26

## 2019-10-28 RX ADMIN — CYANOCOBALAMIN 1000 MCG: 1000 INJECTION, SOLUTION INTRAMUSCULAR; SUBCUTANEOUS at 14:31

## 2019-11-04 ENCOUNTER — TELEPHONE (OUTPATIENT)
Dept: PHYSICAL THERAPY | Facility: REHABILITATION | Age: 64
End: 2019-11-04

## 2019-11-04 NOTE — OP THERAPY DISCHARGE SUMMARY
Outpatient Physical Therapy  DISCHARGE SUMMARY NOTE      [facility] [address], [address]  [place] [address]  Phone:  [phone]    Date of Visit: 11/04/2019    Patient: Shirley Unger  YOB: 1955  MRN: [id number]     Referring Provider: No referring provider defined for this encounter.   Referring Diagnosis No admission diagnoses are documented for this encounter.     Physical Therapy Occurrence Codes    Date of onset of impairment:  11/13/18   Date physical therapy care plan established or reviewed:  6/12/19   Date physical therapy treatment started:  6/12/19          Functional Assessment Used        Your patient is being discharged from Physical Therapy with the following comments:   · Goals not met    Comments:  Pt was last seen 7/10/2019 for her 7th visit of PT.  She was making inconsistent gains at that time having periods of great relief in her R hip followed by periods of immobility due to pain in lumbar and hip.  IShe was to have an MRI to rule out lumbar vs hip involvement but was unable to due to claustrophobia.  An open MRI was pending.  She was contacted a few weeks later since she had not returned for additional follow ups.  She was going to seek additional medical work up from an ortho/spine MD.  She has not returned since then.      Limitations Remaining:  Unknown at this point.     Recommendations:  Will d/c PT as pt has not been seen in several months.  Thank yo for this referral.     Alexandra Warren, PT, MPT, OCS    Date: 11/4/2019

## 2019-11-19 RX ORDER — EXENATIDE 2 MG/.65ML
INJECTION, SUSPENSION, EXTENDED RELEASE SUBCUTANEOUS
Qty: 12 EACH | Refills: 0 | Status: SHIPPED
Start: 2019-11-19 | End: 2020-02-26

## 2019-12-04 ENCOUNTER — OFFICE VISIT (OUTPATIENT)
Dept: MEDICAL GROUP | Facility: CLINIC | Age: 64
End: 2019-12-04
Payer: COMMERCIAL

## 2019-12-04 VITALS
SYSTOLIC BLOOD PRESSURE: 100 MMHG | RESPIRATION RATE: 16 BRPM | BODY MASS INDEX: 31.86 KG/M2 | HEIGHT: 67 IN | HEART RATE: 110 BPM | OXYGEN SATURATION: 94 % | TEMPERATURE: 98.6 F | DIASTOLIC BLOOD PRESSURE: 64 MMHG | WEIGHT: 203 LBS

## 2019-12-04 DIAGNOSIS — M48.04 SPINAL STENOSIS OF THORACIC REGION: ICD-10-CM

## 2019-12-04 DIAGNOSIS — M48.061 LUMBAR FORAMINAL STENOSIS: ICD-10-CM

## 2019-12-04 DIAGNOSIS — G89.29 CHRONIC RIGHT HIP PAIN: ICD-10-CM

## 2019-12-04 DIAGNOSIS — M25.551 CHRONIC RIGHT HIP PAIN: ICD-10-CM

## 2019-12-04 DIAGNOSIS — M16.11 PRIMARY OSTEOARTHRITIS OF RIGHT HIP: ICD-10-CM

## 2019-12-04 DIAGNOSIS — N39.46 MIXED STRESS AND URGE URINARY INCONTINENCE: ICD-10-CM

## 2019-12-04 PROCEDURE — 99213 OFFICE O/P EST LOW 20 MIN: CPT | Performed by: FAMILY MEDICINE

## 2019-12-04 RX ORDER — LISINOPRIL 5 MG/1
2.5 TABLET ORAL DAILY
Qty: 90 TAB | Refills: 0 | Status: SHIPPED | OUTPATIENT
Start: 2019-12-04 | End: 2020-07-31 | Stop reason: SDUPTHER

## 2019-12-04 ASSESSMENT — ENCOUNTER SYMPTOMS
SHORTNESS OF BREATH: 0
VOMITING: 0
FEVER: 0
FOCAL WEAKNESS: 0

## 2019-12-05 NOTE — PROGRESS NOTES
Subjective:     Shirley Unger is a 64 y.o. female who presents for Hip Pain (Hip pain, RT > LT. The patient needs FMLA paperwork to be filled out.); Paperwork; and Enuresis    HPI  Pt presents for follow-up right hip pain  Her main reason for coming in today is to discuss possible work restrictions and to have paperwork filled out for her job  Patient with chronic right hip pain which radiates into the right lower extremity.  On her exam, pain has been felt to be combination of greater trochanteric pain syndrome, primary osteoarthritis, and degenerative disc disease with spinal stenosis  Patient was treated with physical therapy which initially helped greatly, and then pain returned  Pt seen by PM&R who offered intra-articular hip injection and MRI of thoracic spine, however patient did not have either of these done  Also made an appointment with an orthopedist to discuss hip surgery, but insurance wouldn't cover it and patient is currently working on getting on Medicare prior to seeing orthopedics  Pain today is similar to last appointment, still has moderate to severe pain when trying to stand and walk for long periods of time  Patient needs to stop and take rests frequently  Pain is worse when going from seated to standing position  Pain is more in the groin area and radiates down to foot at times    Pt normally works from home   Pt's work has told her that she needs to start coming in for training a few times per week   Pt would need to stand for long periods of time   Pt worries that she wouldn't be able to stand very long due to hip pain   Pt also worries about being at the office rather than at home since she needs to use restroom at least once per hour due to incontinence issues     Review of Systems   Constitutional: Negative for fever.   Respiratory: Negative for shortness of breath.    Cardiovascular: Negative for chest pain.   Gastrointestinal: Negative for vomiting.   Skin: Negative for rash.    Neurological: Negative for focal weakness.       PMH:  has a past medical history of DM (diabetes mellitus) (HCC) (2004), Herpes genitalia (3/22/2013), Hyperlipidemia LDL goal < 100, Nephrolithiasis (3/22/2013), and Vitamin d deficiency (7/8/2013).  MEDS:   Current Outpatient Medications:   •  lisinopril (PRINIVIL) 5 MG Tab, Take 0.5 Tabs by mouth every day., Disp: 90 Tab, Rfl: 0  •  BYDUREON 2 MG Pen-injector, INJECT 2MG AS INSTRUCTED EVERY 7 DAYS, Disp: 12 Each, Rfl: 0  •  BYDUREON 2 MG Pen-injector, INJECT 2MG AS INSTRUCTED EVERY 7 DAYS, Disp: 12 Each, Rfl: 0  •  Blood Glucose Test Strips, Test strips order: Test strips for Song Contour Next meter. Sig: use daily and prn ssx high or low sugars., Disp: 100 Strip, Rfl: 3  •  Lancets, Lancets order: Lancets for Song Contour Next meter. Sig: use daily and prn ssx high or low sugar., Disp: 100 Each, Rfl: 3  •  cyanocobalamin (VITAMIN B-12) 1000 MCG/ML Solution, 1 mL by Intramuscular route every 14 days., Disp: 6 mL, Rfl: 3  •  valacyclovir (VALTREX) 1 GM Tab, Take 1 Tab by mouth 3 times a day., Disp: 21 Tab, Rfl: 6  •  metformin (GLUCOPHAGE) 1000 MG tablet, Take 1 Tab by mouth 2 times a day, with meals., Disp: 180 Tab, Rfl: 0  •  atorvastatin (LIPITOR) 20 MG Tab, TAKE ONE TABLET BY MOUTH ONCE DAILY, Disp: 90 Tab, Rfl: 21  •  colesevelam (WELCHOL) 625 MG Tab, TAKE ONE TABLET BY MOUTH ONCE DAILY IN THE MORNING, THEN TAKE TWO TABLETS DAILY IN THE EVENING WITH  DINNER, Disp: 270 Tab, Rfl: 2  •  acyclovir (ZOVIRAX) 5 % Cream, Apply 1 Application to affected area(s) every 3 hours., Disp: 1 Tube, Rfl: 6  •  INVOKANA 300 MG Tab, TAKE ONE TABLET BY MOUTH ONCE DAILY, Disp: 90 Tab, Rfl: 3  •  glucose blood (FREESTYLE LITE) strip, 1 Strip by Other route as needed., Disp: 100 Strip, Rfl: 3  •  glimepiride (AMARYL) 4 MG Tab, TAKE ONE TABLET BY MOUTH ONCE DAILY IN THE MORNING, Disp: 180 Tab, Rfl: 3  •  triamcinolone acetonide (KENALOG) 0.1 % Ointment, Apply  to affected area(s)  2 times a day., Disp: , Rfl:   •  mupirocin calcium (BACTROBAN) 2 % Cream, Apply to affected area(s) 2 times a day., Disp: 15 g, Rfl: 2  •  cetirizine (ZYRTEC) 10 MG Tab, Take 1 Tab by mouth every day., Disp: 30 Tab, Rfl: 3  •  fluticasone (FLONASE) 50 MCG/ACT nasal spray, Spray 2 Sprays in nose every day., Disp: 16 g, Rfl: 0  •  trolamine salicylate (ASPERCREME/ALOE) 10 % cream, Apply 1 Squirt to affected area(s) 4 times a day., Disp: 1 Tube, Rfl: 3  •  Lancets MISC, Lancets order: Lancets for Abbott Freestyle Lite meter. Sig: use BID and prn ssx high or low sugar. #100 RF x 3, Disp: 100 Each, Rfl: 3  •  Blood Glucose Monitoring Suppl SUPPLIES MISC, Test strips order: Test strips for Abbott Freestyle Lite meter. Sig: use BID and prn ssx high or low sugar #100 RF x 3, Disp: 100 Each, Rfl: 3  •  aspirin (ASA) 81 MG CHEW, Take 81 mg by mouth every day.  , Disp: , Rfl:   •  POTASSIUM CITRATE PO, Take 2 Tabs by mouth 3 times a day., Disp: , Rfl:     Current Facility-Administered Medications:   •  cyanocobalamin (VITAMIN B-12) injection 1,000 mcg, 1,000 mcg, Intramuscular, Q14 DAYS, Joey Coyne M.D., 1,000 mcg at 10/28/19 1431  ALLERGIES:   Allergies   Allergen Reactions   • Macrobid [Nitrofurantoin Monohydrate Macrocrystals] Hives   • Pcn [Penicillins] Hives     SURGHX:   Past Surgical History:   Procedure Laterality Date   • GANGLION EXCISION  2014    Performed by Efra Ramirez M.D. at [facility]   • HYSTERECTOMY, TOTAL ABDOMINAL      For non cancerous reasons   • CHOLECYSTECTOMY     • ABDOMINAL HYSTERECTOMY TOTAL     • CARPAL TUNNEL RELEASE      Right hand   • CYSTOSCOPY STENT PLACEMENT      several, stent removed in MD office   • PB  DELIVERY ONLY       SOCHX:  reports that she has never smoked. She has never used smokeless tobacco. She reports that she does not drink alcohol or use drugs.     Objective:   /64 (BP Location: Left arm, Patient Position:  "Sitting, BP Cuff Size: Adult)   Pulse (!) 110 Comment: Manually checked it.  Temp 37 °C (98.6 °F) (Temporal)   Resp 16   Ht 1.702 m (5' 7\")   Wt 92.1 kg (203 lb)   LMP 11/29/2004   SpO2 94%   BMI 31.79 kg/m²     Physical Exam  Constitutional:       General: She is not in acute distress.     Appearance: She is well-developed. She is not diaphoretic.   HENT:      Head: Normocephalic and atraumatic.   Skin:     General: Skin is warm and dry.      Findings: No erythema.   Neurological:      Mental Status: She is alert and oriented to person, place, and time. Mental status is at baseline.   Psychiatric:         Mood and Affect: Mood normal.         Behavior: Behavior normal.         Thought Content: Thought content normal.         Judgment: Judgment normal.         Assessment/Plan:   Assessment    1. Chronic right hip pain  2. Primary osteoarthritis of right hip  3. Spinal stenosis of thoracic region  4. Lumbar foraminal stenosis  5. Mixed stress and urge urinary incontinence    Patient is a 64-year-old female who comes in for follow-up of chronic right hip pain which is suspected to have multiple etiologies.  She also has mixed stress and urge incontinence.  Patient following with PM&R and also about to start seeing orthopedist.  She is currently in the middle of changing her insurance and putting all of her medical care on hold until this change is done.  Once insurance is changed, patient plans to see orthopedist and would be willing to discuss seeing a urologist about incontinence issues.  For now, paperwork filled out for her job stating that she is not able to stand for long periods of time due to her pain and that she will need to use the restroom frequently and should be allowed to do so.  Patient plans to return to clinic for follow-up on an as-needed basis as she will be following mainly with orthopedics and PM&R.    "

## 2019-12-10 ENCOUNTER — APPOINTMENT (OUTPATIENT)
Dept: MEDICAL GROUP | Facility: PHYSICIAN GROUP | Age: 64
End: 2019-12-10
Payer: COMMERCIAL

## 2019-12-10 DIAGNOSIS — E11.29 TYPE 2 DIABETES MELLITUS WITH OTHER DIABETIC KIDNEY COMPLICATION (HCC): ICD-10-CM

## 2019-12-10 RX ORDER — GLIMEPIRIDE 4 MG/1
TABLET ORAL
Qty: 90 TAB | Refills: 0 | Status: SHIPPED | OUTPATIENT
Start: 2019-12-10 | End: 2020-03-09

## 2019-12-16 DIAGNOSIS — E11.9 DIABETES MELLITUS WITHOUT COMPLICATION (HCC): ICD-10-CM

## 2019-12-17 RX ORDER — CANAGLIFLOZIN 300 MG/1
TABLET, FILM COATED ORAL
Qty: 90 TAB | Refills: 3 | Status: SHIPPED | OUTPATIENT
Start: 2019-12-17 | End: 2020-06-10 | Stop reason: SDUPTHER

## 2019-12-29 DIAGNOSIS — E11.9 DIABETES MELLITUS WITHOUT COMPLICATION (HCC): ICD-10-CM

## 2020-01-02 DIAGNOSIS — E11.9 DIABETES MELLITUS WITHOUT COMPLICATION (HCC): ICD-10-CM

## 2020-01-02 DIAGNOSIS — E53.8 VITAMIN B12 DEFICIENCY: ICD-10-CM

## 2020-01-02 DIAGNOSIS — E55.9 VITAMIN D DEFICIENCY DISEASE: ICD-10-CM

## 2020-01-04 ENCOUNTER — HOSPITAL ENCOUNTER (OUTPATIENT)
Dept: LAB | Facility: MEDICAL CENTER | Age: 65
End: 2020-01-04
Attending: INTERNAL MEDICINE
Payer: COMMERCIAL

## 2020-01-04 DIAGNOSIS — E55.9 VITAMIN D DEFICIENCY DISEASE: ICD-10-CM

## 2020-01-04 DIAGNOSIS — E53.8 VITAMIN B12 DEFICIENCY: ICD-10-CM

## 2020-01-04 LAB
25(OH)D3 SERPL-MCNC: 54 NG/ML (ref 30–100)
VIT B12 SERPL-MCNC: 231 PG/ML (ref 211–911)

## 2020-01-04 PROCEDURE — 82607 VITAMIN B-12: CPT

## 2020-01-04 PROCEDURE — 36415 COLL VENOUS BLD VENIPUNCTURE: CPT

## 2020-01-04 PROCEDURE — 82306 VITAMIN D 25 HYDROXY: CPT

## 2020-01-16 ENCOUNTER — OFFICE VISIT (OUTPATIENT)
Dept: MEDICAL GROUP | Facility: PHYSICIAN GROUP | Age: 65
End: 2020-01-16
Payer: COMMERCIAL

## 2020-01-16 VITALS
TEMPERATURE: 97.2 F | RESPIRATION RATE: 16 BRPM | HEIGHT: 67 IN | OXYGEN SATURATION: 98 % | BODY MASS INDEX: 31.71 KG/M2 | HEART RATE: 92 BPM | WEIGHT: 202 LBS | DIASTOLIC BLOOD PRESSURE: 62 MMHG | SYSTOLIC BLOOD PRESSURE: 114 MMHG

## 2020-01-16 DIAGNOSIS — M16.0 PRIMARY OSTEOARTHRITIS OF BOTH HIPS: ICD-10-CM

## 2020-01-16 DIAGNOSIS — E53.8 B12 DEFICIENCY: ICD-10-CM

## 2020-01-16 PROCEDURE — 99214 OFFICE O/P EST MOD 30 MIN: CPT | Performed by: PHYSICIAN ASSISTANT

## 2020-01-16 ASSESSMENT — PATIENT HEALTH QUESTIONNAIRE - PHQ9: CLINICAL INTERPRETATION OF PHQ2 SCORE: 0

## 2020-01-16 NOTE — LETTER
January 20, 2020    To Whom It May Concern:         This is confirmation that Shirley Rader Ute attended her scheduled appointment with Linh Miranda P.A.-C. on 1/16/20.  Please excuse Shirley from work on 1/16/2020.         If you have any questions please do not hesitate to call me at the phone number listed below.    Sincerely,          Linh Miranda P.A.-C.  [phone]

## 2020-01-17 ENCOUNTER — TELEPHONE (OUTPATIENT)
Dept: MEDICAL GROUP | Facility: PHYSICIAN GROUP | Age: 65
End: 2020-01-17

## 2020-01-20 PROBLEM — M16.0 PRIMARY OSTEOARTHRITIS OF BOTH HIPS: Status: ACTIVE | Noted: 2020-01-20

## 2020-01-20 PROBLEM — E53.8 B12 DEFICIENCY: Status: ACTIVE | Noted: 2020-01-20

## 2020-01-20 NOTE — PROGRESS NOTES
Chief Complaint   Patient presents with   • Diabetes     follow up       HISTORY OF PRESENT ILLNESS: Shirley Unger is an established 65 y.o. female here to discuss the evaluation and management of:    Primary osteoarthritis of both hips  Chronic but worsening problem.  Patient is following up with renal orthopedic clinic for further evaluation.  She tells me that she has an appointment with renal orthopedic clinic this week to discuss bilateral hip replacement.  Due to pain symptoms patient has difficulty with daily living activities.  Patient is hopeful that surgery will help alleviate pain symptoms and she will be able tolive a more active lifestyle.    Uncontrolled type 2 diabetes mellitus without complication, without long-term current use of insulin (HCC)  Uncontrolled.  Patient is following up with endocrinology closely.  She tells me that she is taking her diabetic medications as prescribed.  She does not need refills at this time.  She mentions that she will be seeing her endocrinologist on 3/9/2020 for further evaluation.  Last hemoglobin A1c from 7/22/2019 was 7.1.  Discussed with patient hemoglobin A1c lab work is past due.  Patient denies polyuria, polydipsia, poor wound healing, unintentional weight loss.  She admits to intermittent episodes of neuropathy but feels neuropathy is due to B12 deficiency.    B12 deficiency  Chronic problem.  Patient has been self injecting B12 once every 2 weeks for the past few months.  She tells me she is completed injections and is now taking over-the-counter 1000 MCG B12 once daily.  She tells me neuropathy symptoms have improved since taking B12.  Following up with endocrinology closely.  Endocrinology is managing B12 deficiency.  Continue to monitor.        Patient Active Problem List    Diagnosis Date Noted   • Primary osteoarthritis of both hips 01/20/2020   • B12 deficiency 01/20/2020   • Huynh's neuroma of left foot 02/27/2019   • Primary insomnia  06/12/2018   • Obesity (BMI 30-39.9) 09/28/2017   • Seasonal allergic rhinitis due to pollen 03/07/2017   • Herpes dermatitis 11/29/2016   • Uncontrolled type 2 diabetes mellitus without complication, without long-term current use of insulin (HCC) 08/04/2015   • Vitamin D deficiency disease 07/08/2013   • Dyslipidemia    • Recurrent nephrolithiasis 03/22/2013       Allergies:Macrobid [nitrofurantoin monohydrate macrocrystals] and Pcn [penicillins]    Current Outpatient Medications   Medication Sig Dispense Refill   • metformin (GLUCOPHAGE) 1000 MG tablet TAKE 1 TABLET BY MOUTH TWICE DAILY WITH MEALS 180 Tab 0   • INVOKANA 300 MG Tab TAKE 1 TABLET BY MOUTH ONCE DAILY 90 Tab 3   • glimepiride (AMARYL) 4 MG Tab TAKE ONE TABLET BY MOUTH ONCE DAILY IN THE MORNING 90 Tab 0   • lisinopril (PRINIVIL) 5 MG Tab Take 0.5 Tabs by mouth every day. 90 Tab 0   • BYDUREON 2 MG Pen-injector INJECT 2MG AS INSTRUCTED EVERY 7 DAYS 12 Each 0   • BYDUREON 2 MG Pen-injector INJECT 2MG AS INSTRUCTED EVERY 7 DAYS 12 Each 0   • Blood Glucose Test Strips Test strips order: Test strips for Song Contour Next meter. Sig: use daily and prn ssx high or low sugars. 100 Strip 3   • Lancets Lancets order: Lancets for Song Contour Next meter. Sig: use daily and prn ssx high or low sugar. 100 Each 3   • cyanocobalamin (VITAMIN B-12) 1000 MCG/ML Solution 1 mL by Intramuscular route every 14 days. 6 mL 3   • valacyclovir (VALTREX) 1 GM Tab Take 1 Tab by mouth 3 times a day. 21 Tab 6   • atorvastatin (LIPITOR) 20 MG Tab TAKE ONE TABLET BY MOUTH ONCE DAILY 90 Tab 21   • colesevelam (WELCHOL) 625 MG Tab TAKE ONE TABLET BY MOUTH ONCE DAILY IN THE MORNING, THEN TAKE TWO TABLETS DAILY IN THE EVENING WITH  DINNER 270 Tab 2   • acyclovir (ZOVIRAX) 5 % Cream Apply 1 Application to affected area(s) every 3 hours. 1 Tube 6   • glucose blood (FREESTYLE LITE) strip 1 Strip by Other route as needed. 100 Strip 3   • triamcinolone acetonide (KENALOG) 0.1 % Ointment  Apply  to affected area(s) 2 times a day.     • mupirocin calcium (BACTROBAN) 2 % Cream Apply to affected area(s) 2 times a day. 15 g 2   • cetirizine (ZYRTEC) 10 MG Tab Take 1 Tab by mouth every day. 30 Tab 3   • fluticasone (FLONASE) 50 MCG/ACT nasal spray Spray 2 Sprays in nose every day. 16 g 0   • trolamine salicylate (ASPERCREME/ALOE) 10 % cream Apply 1 Squirt to affected area(s) 4 times a day. 1 Tube 3   • Lancets MISC Lancets order: Lancets for Abbott Freestyle Lite meter. Sig: use BID and prn ssx high or low sugar. #100 RF x 3 100 Each 3   • Blood Glucose Monitoring Suppl SUPPLIES MISC Test strips order: Test strips for Abbott Freestyle Lite meter. Sig: use BID and prn ssx high or low sugar #100 RF x 3 100 Each 3   • aspirin (ASA) 81 MG CHEW Take 81 mg by mouth every day.       • POTASSIUM CITRATE PO Take 2 Tabs by mouth 3 times a day.       Current Facility-Administered Medications   Medication Dose Route Frequency Provider Last Rate Last Dose   • cyanocobalamin (VITAMIN B-12) injection 1,000 mcg  1,000 mcg Intramuscular Q14 DAYS Joey Coyne M.D.   1,000 mcg at 10/28/19 1431       Social History     Tobacco Use   • Smoking status: Never Smoker   • Smokeless tobacco: Never Used   Substance Use Topics   • Alcohol use: No     Alcohol/week: 0.0 oz     Comment: Rare Useage   • Drug use: No       Family Status   Relation Name Status   • Mo   at age 78        Lung CA   • Fa   at age 71        MI   • Sis  Alive   • Bro  Alive   • Bro  Alive   • MAunt  (Not Specified)   • MGFa  (Not Specified)     Family History   Problem Relation Age of Onset   • Cancer Mother         Lung   • Heart Disease Father          at 71, No MI? May have been electrical   • Diabetes Maternal Aunt    • Diabetes Maternal Grandfather        ROS:  Review of Systems   Constitutional: Negative for fever, chills, weight loss and malaise/fatigue.   HENT: Negative for ear pain, nosebleeds, congestion, sore throat and neck  "pain.    Eyes: Negative for blurred vision.   Respiratory: Negative for cough, sputum production, shortness of breath and wheezing.    Cardiovascular: Negative for chest pain, palpitations, orthopnea and leg swelling.   Gastrointestinal: Negative for heartburn, nausea, vomiting and abdominal pain.   Genitourinary: Negative for dysuria, urgency and frequency.   Musculoskeletal: Negative for myalgias, back pain.  Positive for bilateral hip pain.  Skin: Negative for rash and itching.   Neurological: Negative for dizziness, tremors, sensory change, focal weakness and headaches.  Positive for intermittent tingling of the lateral lower extremities.  Endo/Heme/Allergies: Does not bruise/bleed easily.   Psychiatric/Behavioral: Negative for depression, suicidal ideas and memory loss.  The patient is not nervous/anxious and does not have insomnia.    All other systems reviewed and are negative except as in HPI.    Exam: /62 (BP Location: Left arm, Patient Position: Sitting)   Pulse 92   Temp 36.2 °C (97.2 °F) (Temporal)   Resp 16   Ht 1.702 m (5' 7\")   Wt 91.6 kg (202 lb)   SpO2 98%  Body mass index is 31.64 kg/m².  General: Normal appearing. No distress.  HEENT: Normocephalic. Eyes conjunctiva clear lids without ptosis,ears normal shape and contour.  Neck: Supple without JVD. Thyroid is not enlarged.  Pulmonary: Clear to ausculation.  Normal effort. No rales, ronchi, or wheezing.  Cardiovascular: Regular rate and rhythm without murmur.   Abdomen: Soft, nontender, nondistended.   Neurologic: Grossly nonfocal.  Cranial nerves are normal.   Lymph: No cervical or supraclavicular  lymph nodes are palpable  Skin: Warm and dry.  No rashes or suspicious skin lesions.  Musculoskeletal: Normal gait. No extremity cyanosis, clubbing, or edema.  Psych: Normal mood and affect. Alert and oriented x3. Judgment and insight is normal.    Medical decision-making and discussion:  1. Primary osteoarthritis of both hips  Chronic but " worsening problem.  Patient is following up with [facility] closely.  Continue using proper body mechanics.  Continue working on range of motion.  Suggested patient take over-the-counter Tylenol as needed.  Do not exceed more than 3000 mg in a 24-hour span.  Continue to monitor closely.    2. Uncontrolled type 2 diabetes mellitus without complication, without long-term current use of insulin (HCC)  Uncontrolled but improving.  Hemoglobin A1c from 7/22/2019 was 7.1.  Continue current medication regimen.  Continue work on diet exercise.  Continue following up with endocrinology.  Continue to monitor.    3. B12 deficiency  Chronic but stable problem. She is following up with endocrinology on 3/9/2020 for further evaluation.  Continue over-the-counter B12 supplementation.  Continue to monitor.      Please note that this dictation was created using voice recognition software. I have made every reasonable attempt to correct obvious errors, but I expect that there are errors of grammar and possibly content that I did not discover before finalizing the note.    Assessment/Plan:  1. Primary osteoarthritis of both hips     2. Uncontrolled type 2 diabetes mellitus without complication, without long-term current use of insulin (HCC)     3. B12 deficiency         No follow-ups on file.

## 2020-01-24 ENCOUNTER — OFFICE VISIT (OUTPATIENT)
Dept: MEDICAL GROUP | Facility: PHYSICIAN GROUP | Age: 65
End: 2020-01-24
Payer: COMMERCIAL

## 2020-01-24 VITALS
TEMPERATURE: 98.3 F | SYSTOLIC BLOOD PRESSURE: 102 MMHG | BODY MASS INDEX: 32.02 KG/M2 | HEART RATE: 95 BPM | WEIGHT: 204 LBS | OXYGEN SATURATION: 94 % | HEIGHT: 67 IN | DIASTOLIC BLOOD PRESSURE: 68 MMHG | RESPIRATION RATE: 16 BRPM

## 2020-01-24 DIAGNOSIS — Z13.6 SCREENING FOR CARDIOVASCULAR CONDITION: ICD-10-CM

## 2020-01-24 DIAGNOSIS — B00.89 HERPES DERMATITIS: ICD-10-CM

## 2020-01-24 DIAGNOSIS — M25.551 CHRONIC RIGHT HIP PAIN: ICD-10-CM

## 2020-01-24 DIAGNOSIS — Z01.818 PREOP TESTING: ICD-10-CM

## 2020-01-24 DIAGNOSIS — G89.29 CHRONIC RIGHT HIP PAIN: ICD-10-CM

## 2020-01-24 DIAGNOSIS — M16.0 PRIMARY OSTEOARTHRITIS OF BOTH HIPS: ICD-10-CM

## 2020-01-24 PROCEDURE — 99214 OFFICE O/P EST MOD 30 MIN: CPT | Performed by: PHYSICIAN ASSISTANT

## 2020-01-24 RX ORDER — VALACYCLOVIR HYDROCHLORIDE 500 MG/1
500 TABLET, FILM COATED ORAL DAILY
Qty: 90 TAB | Refills: 3 | Status: SHIPPED | OUTPATIENT
Start: 2020-01-24 | End: 2020-07-20 | Stop reason: SDUPTHER

## 2020-01-25 NOTE — PROGRESS NOTES
REASON FOR VISIT: Pre-Op Consultation  Consultation Requested by: [facility]. [facility].  Procedure date and type: Total right hip replacement.  Surgery date to be determined.    History of condition for which surgery is planned:  Patient is a pleasant 65-year-old female here today for preop consultation.  She tells me she is a candidate for right hip surgery and plans on proceeding with surgery if she is surgically cleared.  Patient does not want to treat chronic pain symptoms with pain medication.  States due to pain symptoms she is having to use a cane.  Describes pain as a constant aching pain that can develop into a sharp pain.  Positive for antalgic gait.       Current chronic conditions: has Dyslipidemia; Recurrent nephrolithiasis; Vitamin D deficiency disease; Uncontrolled type 2 diabetes mellitus without complication, without long-term current use of insulin ([facility]); Herpes dermatitis; Seasonal allergic rhinitis due to pollen; Obesity (BMI 30-39.9); Primary insomnia; Huynh's neuroma of left foot; Primary osteoarthritis of both hips; and B12 deficiency on their problem list.  No problem-specific Assessment & Plan notes found for this encounter.    Past medical history:  has a past medical history of DM (diabetes mellitus) ([facility]) (), Herpes genitalia (3/22/2013), Hyperlipidemia LDL goal < 100, Nephrolithiasis (3/22/2013), and Vitamin d deficiency (2013).. Negative for: CAD, SBE, CVA, TIA, DVT, PE, bleeding requiring transfusion, intubation.  Surgical and anesthetic history:  has a past surgical history that includes hysterectomy, total abdominal (); cholecystectomy (); carpal tunnel release; pr  delivery only (); cystoscopy stent placement; ganglion excision (2014); and abdominal hysterectomy total. Prior surgery without complication, bleeding, reaction to anesthetic, prolonged recovery  Habits:   Social History     Tobacco Use   • Smoking status: Never Smoker    • Smokeless tobacco: Never Used   Substance Use Topics   • Alcohol use: No     Alcohol/week: 0.0 oz     Comment: Rare Useage   • Drug use: No     Allergies: Macrobid [nitrofurantoin monohydrate macrocrystals] and Pcn [penicillins] No known allergy to Anesthetic, or Latex.     Current medicines:   Current Outpatient Medications   Medication Sig Dispense Refill   • valACYclovir (VALTREX) 500 MG Tab Take 1 Tab by mouth every day. 90 Tab 3   • metformin (GLUCOPHAGE) 1000 MG tablet TAKE 1 TABLET BY MOUTH TWICE DAILY WITH MEALS 180 Tab 0   • INVOKANA 300 MG Tab TAKE 1 TABLET BY MOUTH ONCE DAILY 90 Tab 3   • glimepiride (AMARYL) 4 MG Tab TAKE ONE TABLET BY MOUTH ONCE DAILY IN THE MORNING 90 Tab 0   • lisinopril (PRINIVIL) 5 MG Tab Take 0.5 Tabs by mouth every day. 90 Tab 0   • BYDUREON 2 MG Pen-injector INJECT 2MG AS INSTRUCTED EVERY 7 DAYS 12 Each 0   • BYDUREON 2 MG Pen-injector INJECT 2MG AS INSTRUCTED EVERY 7 DAYS 12 Each 0   • Blood Glucose Test Strips Test strips order: Test strips for Song Contour Next meter. Sig: use daily and prn ssx high or low sugars. 100 Strip 3   • Lancets Lancets order: Lancets for Song Contour Next meter. Sig: use daily and prn ssx high or low sugar. 100 Each 3   • cyanocobalamin (VITAMIN B-12) 1000 MCG/ML Solution 1 mL by Intramuscular route every 14 days. 6 mL 3   • atorvastatin (LIPITOR) 20 MG Tab TAKE ONE TABLET BY MOUTH ONCE DAILY 90 Tab 21   • colesevelam (WELCHOL) 625 MG Tab TAKE ONE TABLET BY MOUTH ONCE DAILY IN THE MORNING, THEN TAKE TWO TABLETS DAILY IN THE EVENING WITH  DINNER 270 Tab 2   • acyclovir (ZOVIRAX) 5 % Cream Apply 1 Application to affected area(s) every 3 hours. 1 Tube 6   • glucose blood (FREESTYLE LITE) strip 1 Strip by Other route as needed. 100 Strip 3   • triamcinolone acetonide (KENALOG) 0.1 % Ointment Apply  to affected area(s) 2 times a day.     • mupirocin calcium (BACTROBAN) 2 % Cream Apply to affected area(s) 2 times a day. 15 g 2   • cetirizine (ZYRTEC)  "10 MG Tab Take 1 Tab by mouth every day. 30 Tab 3   • fluticasone (FLONASE) 50 MCG/ACT nasal spray Spray 2 Sprays in nose every day. 16 g 0   • trolamine salicylate (ASPERCREME/ALOE) 10 % cream Apply 1 Squirt to affected area(s) 4 times a day. 1 Tube 3   • Lancets MISC Lancets order: Lancets for Abbott Freestyle Lite meter. Sig: use BID and prn ssx high or low sugar. #100 RF x 3 100 Each 3   • Blood Glucose Monitoring Suppl SUPPLIES MISC Test strips order: Test strips for Abbott Freestyle Lite meter. Sig: use BID and prn ssx high or low sugar #100 RF x 3 100 Each 3   • aspirin (ASA) 81 MG CHEW Take 81 mg by mouth every day.       • POTASSIUM CITRATE PO Take 2 Tabs by mouth 3 times a day.       Current Facility-Administered Medications   Medication Dose Route Frequency Provider Last Rate Last Dose   • cyanocobalamin (VITAMIN B-12) injection 1,000 mcg  1,000 mcg Intramuscular Q14 DAYS Joey Coyne M.D.   1,000 mcg at 10/28/19 1431     Anticoagulant: ASA, NSAIDs    Herbals: Black Cohash, Echinacea, Garlic, Melissa, Ginkgo Biloba, Ginseng, Kava, Guicho’s Wort,  Saw [place], Valerian.             ROS: negative for: CP, SOB, DAVIES, Orthopnea, wheezing, leg edema, polydipsia, polyuria, fevers, chills, sweats, cough, cold, congestion, abd pain, reflux, black or bloody stools, weight loss/gain.  Functional Status: ambulatory but in when in the public uses a cane as an assistive device, lives with , daughter and grandchild.     PHYSICAL EXAMINATION:  VITAL SIGNS: /68 (BP Location: Left arm, Patient Position: Sitting)   Pulse 95   Temp 36.8 °C (98.3 °F) (Temporal)   Resp 16   Ht 1.702 m (5' 7\")   Wt 92.5 kg (204 lb)   SpO2 94%  Body mass index is 31.95 kg/m².  HEENT: EOMI, PERRL. Oropharynx pink, moist. Normal airway. Neck supple, no cervical lymphadenopathy.  LUNGS: CTAB good excursion.   HEART: RRR no murmur.  ABDOMEN: soft, nondistended, nontender normal BS. No HSM.  LOWER EXTREMITIES: warm and well " perfused with no edema.  EKG interpretation by me: NSR. HR is 89 . Axis is normal.  Positive for AV block.  No T-wave inversions. Quality of EKG is good.        Labs: Pending.    IMPRESSION:  Diagnoses of Primary osteoarthritis of both hips, Screening for cardiovascular condition, Preop testing, and Herpes dermatitis were pertinent to this visit.  1. Planned surgery:To be determined.   2. High risk medical conditions: negative for Cardiac, Pulmonary, Bleeding, Poor healing, Thrombosis, Debility    PLAN:  1. Chronic medical conditions: Stable and controlled. Continue current medicines.   2. Avoid drugs that potentiate bleeding as advised by surgeon  3. Discontinue all herbal supplements 2 weeks prior to surgery.  4. Need for SBE prophylaxis: No  5. Labs are pending.  Will clear patient for surgery when lab work is completed.  I expect this patient is low risk for cardiopulmonary complications for this planned surgery.      6. Herpes dermatitis  Positive for HSV-2.  Chronic problem.  Unstable.  Patient is experiencing an outbreak once monthly.  Patient has been prescribed prophylactic Valtrex.  Take 1 tablet by mouth once daily.  Discussed comments effects adverse reaction medication with patient.  Continue to monitor.  - valACYclovir (VALTREX) 500 MG Tab; Take 1 Tab by mouth every day.  Dispense: 90 Tab; Refill: 3      Michael Index for assessing perioperative cardiovascular risk [Circulation 1999;100:1047]:   (one point each for * high-risk surgery [ intrathoracic, suprainguinal vascular, intraperitoneal ],* Hx ischemic heart dz, * Hx CHF, *Hx TIA or CVA, *IDDM, *Serum Cr >2.0)   Interpretation of risk: (Complication = MI, Pulm edema, v-fib or cardiac arrest, complete heart block)  Very Low: 0 points = 0.4 % complication. Low: 1 point = 0.9 %, Moderate: 2 points = 6.6 %, High: 3+ points = 11%.

## 2020-02-06 ENCOUNTER — TELEPHONE (OUTPATIENT)
Dept: MEDICAL GROUP | Facility: PHYSICIAN GROUP | Age: 65
End: 2020-02-06

## 2020-02-06 NOTE — TELEPHONE ENCOUNTER
Received a msg from  office at [facility] requesting to have clearance letter sent so that Pt may schedule her appt. Called to inform Pt Key would like her to just go ahead and get her labs and xray done now so that we can just send everything to  as requested. Pt voiced understanding.

## 2020-02-08 ENCOUNTER — HOSPITAL ENCOUNTER (OUTPATIENT)
Dept: LAB | Facility: MEDICAL CENTER | Age: 65
End: 2020-02-08
Attending: PHYSICIAN ASSISTANT
Payer: COMMERCIAL

## 2020-02-08 ENCOUNTER — HOSPITAL ENCOUNTER (OUTPATIENT)
Dept: RADIOLOGY | Facility: MEDICAL CENTER | Age: 65
End: 2020-02-08
Attending: PHYSICIAN ASSISTANT
Payer: COMMERCIAL

## 2020-02-08 DIAGNOSIS — Z01.818 PREOP TESTING: ICD-10-CM

## 2020-02-08 DIAGNOSIS — Z13.6 SCREENING FOR CARDIOVASCULAR CONDITION: ICD-10-CM

## 2020-02-08 LAB
ANION GAP SERPL CALC-SCNC: 11 MMOL/L (ref 0–11.9)
BUN SERPL-MCNC: 17 MG/DL (ref 8–22)
CALCIUM SERPL-MCNC: 9.5 MG/DL (ref 8.5–10.5)
CHLORIDE SERPL-SCNC: 100 MMOL/L (ref 96–112)
CO2 SERPL-SCNC: 27 MMOL/L (ref 20–33)
CREAT SERPL-MCNC: 0.64 MG/DL (ref 0.5–1.4)
ERYTHROCYTE [DISTWIDTH] IN BLOOD BY AUTOMATED COUNT: 45.4 FL (ref 35.9–50)
GLUCOSE SERPL-MCNC: 125 MG/DL (ref 65–99)
HCT VFR BLD AUTO: 48.1 % (ref 37–47)
HGB BLD-MCNC: 15.2 G/DL (ref 12–16)
INR PPP: 0.88 (ref 0.87–1.13)
MCH RBC QN AUTO: 29 PG (ref 27–33)
MCHC RBC AUTO-ENTMCNC: 31.6 G/DL (ref 33.6–35)
MCV RBC AUTO: 91.8 FL (ref 81.4–97.8)
PLATELET # BLD AUTO: 268 K/UL (ref 164–446)
PMV BLD AUTO: 9.3 FL (ref 9–12.9)
POTASSIUM SERPL-SCNC: 3.9 MMOL/L (ref 3.6–5.5)
PROTHROMBIN TIME: 12.1 SEC (ref 12–14.6)
RBC # BLD AUTO: 5.24 M/UL (ref 4.2–5.4)
SODIUM SERPL-SCNC: 138 MMOL/L (ref 135–145)
WBC # BLD AUTO: 6.2 K/UL (ref 4.8–10.8)

## 2020-02-08 PROCEDURE — 36415 COLL VENOUS BLD VENIPUNCTURE: CPT

## 2020-02-08 PROCEDURE — 85027 COMPLETE CBC AUTOMATED: CPT

## 2020-02-08 PROCEDURE — 71046 X-RAY EXAM CHEST 2 VIEWS: CPT

## 2020-02-08 PROCEDURE — 85610 PROTHROMBIN TIME: CPT | Mod: GA

## 2020-02-08 PROCEDURE — 80048 BASIC METABOLIC PNL TOTAL CA: CPT

## 2020-02-11 ENCOUNTER — PATIENT MESSAGE (OUTPATIENT)
Dept: MEDICAL GROUP | Facility: PHYSICIAN GROUP | Age: 65
End: 2020-02-11

## 2020-02-11 NOTE — TELEPHONE ENCOUNTER
From: Shirley Unger  To: Linh Miranda P.A.-C.  Sent: 2/11/2020 3:08 PM PST  Subject: Test Result Question    Janet Miranda,  [facility] just called to tell me you can’t release me for surgery yet because of missing lab work. I did every thing you gave me. Is there something else I can do?  Please call me  Thank you  Shirley Unger  1955  [phone]

## 2020-02-26 DIAGNOSIS — Z01.812 PRE-OPERATIVE LABORATORY EXAMINATION: ICD-10-CM

## 2020-02-26 LAB
ANION GAP SERPL CALC-SCNC: 7 MMOL/L (ref 0–11.9)
APTT PPP: 26 SEC (ref 24.7–36)
BUN SERPL-MCNC: 22 MG/DL (ref 8–22)
CALCIUM SERPL-MCNC: 9.6 MG/DL (ref 8.5–10.5)
CHLORIDE SERPL-SCNC: 102 MMOL/L (ref 96–112)
CO2 SERPL-SCNC: 27 MMOL/L (ref 20–33)
CREAT SERPL-MCNC: 0.63 MG/DL (ref 0.5–1.4)
ERYTHROCYTE [DISTWIDTH] IN BLOOD BY AUTOMATED COUNT: 45.9 FL (ref 35.9–50)
EST. AVERAGE GLUCOSE BLD GHB EST-MCNC: 169 MG/DL
GLUCOSE SERPL-MCNC: 113 MG/DL (ref 65–99)
HBA1C MFR BLD: 7.5 % (ref 0–5.6)
HCT VFR BLD AUTO: 45.8 % (ref 37–47)
HGB BLD-MCNC: 14.8 G/DL (ref 12–16)
INR PPP: 0.91 (ref 0.87–1.13)
MCH RBC QN AUTO: 30 PG (ref 27–33)
MCHC RBC AUTO-ENTMCNC: 32.3 G/DL (ref 33.6–35)
MCV RBC AUTO: 92.9 FL (ref 81.4–97.8)
PLATELET # BLD AUTO: 269 K/UL (ref 164–446)
PMV BLD AUTO: 9.7 FL (ref 9–12.9)
POTASSIUM SERPL-SCNC: 4.2 MMOL/L (ref 3.6–5.5)
PROTHROMBIN TIME: 12.4 SEC (ref 12–14.6)
RBC # BLD AUTO: 4.93 M/UL (ref 4.2–5.4)
SCCMEC + MECA PNL NOSE NAA+PROBE: NEGATIVE
SCCMEC + MECA PNL NOSE NAA+PROBE: NEGATIVE
SODIUM SERPL-SCNC: 136 MMOL/L (ref 135–145)
WBC # BLD AUTO: 6.7 K/UL (ref 4.8–10.8)

## 2020-02-26 PROCEDURE — 80048 BASIC METABOLIC PNL TOTAL CA: CPT

## 2020-02-26 PROCEDURE — 83036 HEMOGLOBIN GLYCOSYLATED A1C: CPT

## 2020-02-26 PROCEDURE — 85027 COMPLETE CBC AUTOMATED: CPT

## 2020-02-26 PROCEDURE — 36415 COLL VENOUS BLD VENIPUNCTURE: CPT

## 2020-02-26 PROCEDURE — 87641 MR-STAPH DNA AMP PROBE: CPT

## 2020-02-26 PROCEDURE — 87389 HIV-1 AG W/HIV-1&-2 AB AG IA: CPT

## 2020-02-26 PROCEDURE — 85610 PROTHROMBIN TIME: CPT

## 2020-02-26 PROCEDURE — 87640 STAPH A DNA AMP PROBE: CPT

## 2020-02-26 PROCEDURE — 85730 THROMBOPLASTIN TIME PARTIAL: CPT

## 2020-02-26 NOTE — DISCHARGE PLANNING
DISCHARGE PLANNING NOTE - TOTAL JOINT    Procedure: RTHA  Procedure Date: 3/11/2020  Insurance:    Equipment currently available at home? Grabbers, handled sponge, cane  Steps into the home? 0  Steps within the home? 1  Toilet height? Short, getting riser  Type of shower? Walk in  Who will be with you during your recovery? Spouse and daughter  Is Outpatient Physical Therapy set up after surgery? No  is scheduling soon.  Did you take the Total Joint Class and where? Yes  Planning same day discharge?No     Plan: Given equipment hand outs

## 2020-02-26 NOTE — OR NURSING
Hx and meds reviewed, pre op instructions given.  Anesthesia fasting guidelines reviewed with pt   TOTAL JOINT REPLACEMENT SURGERY   PreAdmit Appointment  Pre-Operative Education Note       1) Did you take a Total Joint Replacement Pre-Operative Education class?  Where?  Answer: yes at the [facility] office    2) If you did not take a class, did you receive pre-op education in the form of a book or through an online class?    Answer: Yes    3) Have you had the same joint replacement procedure within the last 3 years?   Answer: No

## 2020-02-27 LAB — HIV 1+2 AB+HIV1 P24 AG SERPL QL IA: NON REACTIVE

## 2020-03-09 DIAGNOSIS — E11.29 TYPE 2 DIABETES MELLITUS WITH OTHER DIABETIC KIDNEY COMPLICATION (HCC): ICD-10-CM

## 2020-03-09 RX ORDER — GLIMEPIRIDE 4 MG/1
TABLET ORAL
Qty: 90 TAB | Refills: 0 | Status: SHIPPED | OUTPATIENT
Start: 2020-03-09 | End: 2020-06-10 | Stop reason: SDUPTHER

## 2020-03-10 NOTE — OR NURSING
COVID-19 Pre-surgery screenin. Do you have an undiagnosed respiratory illness or symptoms such as coughing or sneezing? NO      2. Do you have an unexplained fever greater than 100.4 degrees Fahrenheit or 38 degrees Celsius?     NO     3. Have you had direct exposure to a patient who tested positive for Covid-19?    NO    4. Have you traveled within the last 14 days to [place], [place], [place], [place], or [place]?    NO

## 2020-03-11 ENCOUNTER — HOSPITAL ENCOUNTER (OUTPATIENT)
Facility: MEDICAL CENTER | Age: 65
End: 2020-03-12
Attending: ORTHOPAEDIC SURGERY | Admitting: ORTHOPAEDIC SURGERY
Payer: COMMERCIAL

## 2020-03-11 ENCOUNTER — ANESTHESIA EVENT (OUTPATIENT)
Dept: SURGERY | Facility: MEDICAL CENTER | Age: 65
End: 2020-03-11
Payer: COMMERCIAL

## 2020-03-11 ENCOUNTER — ANESTHESIA (OUTPATIENT)
Dept: SURGERY | Facility: MEDICAL CENTER | Age: 65
End: 2020-03-11
Payer: COMMERCIAL

## 2020-03-11 ENCOUNTER — APPOINTMENT (OUTPATIENT)
Dept: RADIOLOGY | Facility: MEDICAL CENTER | Age: 65
End: 2020-03-11
Attending: ORTHOPAEDIC SURGERY
Payer: COMMERCIAL

## 2020-03-11 DIAGNOSIS — M16.0 PRIMARY OSTEOARTHRITIS OF BOTH HIPS: ICD-10-CM

## 2020-03-11 LAB — GLUCOSE BLD-MCNC: 156 MG/DL (ref 65–99)

## 2020-03-11 PROCEDURE — 501411 HCHG SPONGE, BABY LAP W/O RINGS: Performed by: ORTHOPAEDIC SURGERY

## 2020-03-11 PROCEDURE — 700101 HCHG RX REV CODE 250: Performed by: PHYSICIAN ASSISTANT

## 2020-03-11 PROCEDURE — 96376 TX/PRO/DX INJ SAME DRUG ADON: CPT

## 2020-03-11 PROCEDURE — 96365 THER/PROPH/DIAG IV INF INIT: CPT

## 2020-03-11 PROCEDURE — 700102 HCHG RX REV CODE 250 W/ 637 OVERRIDE(OP): Performed by: ANESTHESIOLOGY

## 2020-03-11 PROCEDURE — 700105 HCHG RX REV CODE 258: Performed by: PHYSICIAN ASSISTANT

## 2020-03-11 PROCEDURE — 160002 HCHG RECOVERY MINUTES (STAT): Performed by: ORTHOPAEDIC SURGERY

## 2020-03-11 PROCEDURE — G0378 HOSPITAL OBSERVATION PER HR: HCPCS

## 2020-03-11 PROCEDURE — 160036 HCHG PACU - EA ADDL 30 MINS PHASE I: Performed by: ORTHOPAEDIC SURGERY

## 2020-03-11 PROCEDURE — 700105 HCHG RX REV CODE 258: Performed by: ORTHOPAEDIC SURGERY

## 2020-03-11 PROCEDURE — 502000 HCHG MISC OR IMPLANTS RC 0278: Performed by: ORTHOPAEDIC SURGERY

## 2020-03-11 PROCEDURE — 160035 HCHG PACU - 1ST 60 MINS PHASE I: Performed by: ORTHOPAEDIC SURGERY

## 2020-03-11 PROCEDURE — 700112 HCHG RX REV CODE 229: Performed by: PHYSICIAN ASSISTANT

## 2020-03-11 PROCEDURE — A9270 NON-COVERED ITEM OR SERVICE: HCPCS | Performed by: ANESTHESIOLOGY

## 2020-03-11 PROCEDURE — 502578 HCHG PACK, TOTAL HIP: Performed by: ORTHOPAEDIC SURGERY

## 2020-03-11 PROCEDURE — 82962 GLUCOSE BLOOD TEST: CPT

## 2020-03-11 PROCEDURE — 302135 SEQUENTIAL COMPRESSION MACHINE: Performed by: ORTHOPAEDIC SURGERY

## 2020-03-11 PROCEDURE — 160042 HCHG SURGERY MINUTES - EA ADDL 1 MIN LEVEL 5: Performed by: ORTHOPAEDIC SURGERY

## 2020-03-11 PROCEDURE — 72170 X-RAY EXAM OF PELVIS: CPT

## 2020-03-11 PROCEDURE — 501445 HCHG STAPLER, SKIN DISP: Performed by: ORTHOPAEDIC SURGERY

## 2020-03-11 PROCEDURE — A9270 NON-COVERED ITEM OR SERVICE: HCPCS | Performed by: PHYSICIAN ASSISTANT

## 2020-03-11 PROCEDURE — 700111 HCHG RX REV CODE 636 W/ 250 OVERRIDE (IP): Performed by: PHYSICIAN ASSISTANT

## 2020-03-11 PROCEDURE — 501838 HCHG SUTURE GENERAL: Performed by: ORTHOPAEDIC SURGERY

## 2020-03-11 PROCEDURE — 700111 HCHG RX REV CODE 636 W/ 250 OVERRIDE (IP): Performed by: ANESTHESIOLOGY

## 2020-03-11 PROCEDURE — 94760 N-INVAS EAR/PLS OXIMETRY 1: CPT

## 2020-03-11 PROCEDURE — 700111 HCHG RX REV CODE 636 W/ 250 OVERRIDE (IP): Performed by: ORTHOPAEDIC SURGERY

## 2020-03-11 PROCEDURE — 160048 HCHG OR STATISTICAL LEVEL 1-5: Performed by: ORTHOPAEDIC SURGERY

## 2020-03-11 PROCEDURE — 160009 HCHG ANES TIME/MIN: Performed by: ORTHOPAEDIC SURGERY

## 2020-03-11 PROCEDURE — 700101 HCHG RX REV CODE 250: Performed by: ORTHOPAEDIC SURGERY

## 2020-03-11 PROCEDURE — 96375 TX/PRO/DX INJ NEW DRUG ADDON: CPT

## 2020-03-11 PROCEDURE — A6454 SELF-ADHER BAND W>=3" <5"/YD: HCPCS | Performed by: ORTHOPAEDIC SURGERY

## 2020-03-11 PROCEDURE — 700101 HCHG RX REV CODE 250: Performed by: ANESTHESIOLOGY

## 2020-03-11 PROCEDURE — 700102 HCHG RX REV CODE 250 W/ 637 OVERRIDE(OP): Performed by: PHYSICIAN ASSISTANT

## 2020-03-11 PROCEDURE — 160031 HCHG SURGERY MINUTES - 1ST 30 MINS LEVEL 5: Performed by: ORTHOPAEDIC SURGERY

## 2020-03-11 DEVICE — IMPLANTABLE DEVICE: Type: IMPLANTABLE DEVICE | Site: HIP | Status: FUNCTIONAL

## 2020-03-11 DEVICE — IMPLANT REF SPHER HEAD SCREW 25MM (1EA): Type: IMPLANTABLE DEVICE | Site: HIP | Status: FUNCTIONAL

## 2020-03-11 DEVICE — IMPLANT R3 0 DEG XLPE ACET LNR 36MM X 52MM (1EA): Type: IMPLANTABLE DEVICE | Site: HIP | Status: FUNCTIONAL

## 2020-03-11 DEVICE — IMPLANT OXINIUM FEM HD 12/14 36 MM +0 (1EA): Type: IMPLANTABLE DEVICE | Site: HIP | Status: FUNCTIONAL

## 2020-03-11 DEVICE — IMPLANT R3 3 HOLE ACET SHELL 52MM (1EA): Type: IMPLANTABLE DEVICE | Site: HIP | Status: FUNCTIONAL

## 2020-03-11 DEVICE — IMPLANT REF SPHER HEAD SCREW 30MM (1EA): Type: IMPLANTABLE DEVICE | Site: HIP | Status: FUNCTIONAL

## 2020-03-11 DEVICE — STEM POLAR CEMENTLESS WITH COLLAR 6: Type: IMPLANTABLE DEVICE | Site: HIP | Status: FUNCTIONAL

## 2020-03-11 RX ORDER — DIPHENHYDRAMINE HYDROCHLORIDE 50 MG/ML
12.5 INJECTION INTRAMUSCULAR; INTRAVENOUS
Status: DISCONTINUED | OUTPATIENT
Start: 2020-03-11 | End: 2020-03-12 | Stop reason: HOSPADM

## 2020-03-11 RX ORDER — CHLORPROMAZINE HYDROCHLORIDE 25 MG/1
25 TABLET, FILM COATED ORAL EVERY 6 HOURS PRN
Status: DISCONTINUED | OUTPATIENT
Start: 2020-03-11 | End: 2020-03-12 | Stop reason: HOSPADM

## 2020-03-11 RX ORDER — TAMSULOSIN HYDROCHLORIDE 0.4 MG/1
0.4 CAPSULE ORAL
Status: DISCONTINUED | OUTPATIENT
Start: 2020-03-11 | End: 2020-03-12 | Stop reason: HOSPADM

## 2020-03-11 RX ORDER — GABAPENTIN 300 MG/1
300 CAPSULE ORAL ONCE
Status: COMPLETED | OUTPATIENT
Start: 2020-03-11 | End: 2020-03-11

## 2020-03-11 RX ORDER — AMOXICILLIN 250 MG
1 CAPSULE ORAL
Status: DISCONTINUED | OUTPATIENT
Start: 2020-03-11 | End: 2020-03-12 | Stop reason: HOSPADM

## 2020-03-11 RX ORDER — BUPIVACAINE HYDROCHLORIDE AND EPINEPHRINE 2.5; 5 MG/ML; UG/ML
INJECTION, SOLUTION EPIDURAL; INFILTRATION; INTRACAUDAL; PERINEURAL
Status: DISCONTINUED | OUTPATIENT
Start: 2020-03-11 | End: 2020-03-11 | Stop reason: HOSPADM

## 2020-03-11 RX ORDER — HALOPERIDOL 5 MG/ML
1 INJECTION INTRAMUSCULAR
Status: DISCONTINUED | OUTPATIENT
Start: 2020-03-11 | End: 2020-03-12 | Stop reason: HOSPADM

## 2020-03-11 RX ORDER — ONDANSETRON 4 MG/1
4 TABLET, ORALLY DISINTEGRATING ORAL EVERY 4 HOURS PRN
Status: DISCONTINUED | OUTPATIENT
Start: 2020-03-11 | End: 2020-03-12 | Stop reason: HOSPADM

## 2020-03-11 RX ORDER — HYDROMORPHONE HYDROCHLORIDE 1 MG/ML
0.4 INJECTION, SOLUTION INTRAMUSCULAR; INTRAVENOUS; SUBCUTANEOUS
Status: DISCONTINUED | OUTPATIENT
Start: 2020-03-11 | End: 2020-03-12 | Stop reason: HOSPADM

## 2020-03-11 RX ORDER — OXYCODONE HYDROCHLORIDE 10 MG/1
10 TABLET ORAL
Status: DISCONTINUED | OUTPATIENT
Start: 2020-03-11 | End: 2020-03-12 | Stop reason: HOSPADM

## 2020-03-11 RX ORDER — TRANEXAMIC ACID 100 MG/ML
INJECTION, SOLUTION INTRAVENOUS PRN
Status: DISCONTINUED | OUTPATIENT
Start: 2020-03-11 | End: 2020-03-11 | Stop reason: SURG

## 2020-03-11 RX ORDER — BISACODYL 10 MG
10 SUPPOSITORY, RECTAL RECTAL
Status: DISCONTINUED | OUTPATIENT
Start: 2020-03-11 | End: 2020-03-12 | Stop reason: HOSPADM

## 2020-03-11 RX ORDER — COLESEVELAM 180 1/1
625 TABLET ORAL DAILY
Status: DISCONTINUED | OUTPATIENT
Start: 2020-03-11 | End: 2020-03-12 | Stop reason: HOSPADM

## 2020-03-11 RX ORDER — SCOLOPAMINE TRANSDERMAL SYSTEM 1 MG/1
1 PATCH, EXTENDED RELEASE TRANSDERMAL
Status: DISCONTINUED | OUTPATIENT
Start: 2020-03-11 | End: 2020-03-12 | Stop reason: HOSPADM

## 2020-03-11 RX ORDER — MAGNESIUM SULFATE HEPTAHYDRATE 40 MG/ML
INJECTION, SOLUTION INTRAVENOUS PRN
Status: DISCONTINUED | OUTPATIENT
Start: 2020-03-11 | End: 2020-03-11 | Stop reason: SURG

## 2020-03-11 RX ORDER — CEFAZOLIN SODIUM 1 G/3ML
INJECTION, POWDER, FOR SOLUTION INTRAMUSCULAR; INTRAVENOUS PRN
Status: DISCONTINUED | OUTPATIENT
Start: 2020-03-11 | End: 2020-03-11 | Stop reason: SURG

## 2020-03-11 RX ORDER — LIDOCAINE HYDROCHLORIDE 20 MG/ML
INJECTION, SOLUTION EPIDURAL; INFILTRATION; INTRACAUDAL; PERINEURAL PRN
Status: DISCONTINUED | OUTPATIENT
Start: 2020-03-11 | End: 2020-03-11 | Stop reason: SURG

## 2020-03-11 RX ORDER — DIPHENHYDRAMINE HCL 25 MG
25 TABLET ORAL EVERY 6 HOURS PRN
Status: DISCONTINUED | OUTPATIENT
Start: 2020-03-11 | End: 2020-03-12 | Stop reason: HOSPADM

## 2020-03-11 RX ORDER — DIPHENHYDRAMINE HCL 25 MG
25 TABLET ORAL NIGHTLY PRN
Status: DISCONTINUED | OUTPATIENT
Start: 2020-03-12 | End: 2020-03-12 | Stop reason: HOSPADM

## 2020-03-11 RX ORDER — DEXAMETHASONE SODIUM PHOSPHATE 4 MG/ML
INJECTION, SOLUTION INTRA-ARTICULAR; INTRALESIONAL; INTRAMUSCULAR; INTRAVENOUS; SOFT TISSUE PRN
Status: DISCONTINUED | OUTPATIENT
Start: 2020-03-11 | End: 2020-03-11 | Stop reason: HOSPADM

## 2020-03-11 RX ORDER — KETOROLAC TROMETHAMINE 30 MG/ML
INJECTION, SOLUTION INTRAMUSCULAR; INTRAVENOUS
Status: DISCONTINUED | OUTPATIENT
Start: 2020-03-11 | End: 2020-03-11 | Stop reason: HOSPADM

## 2020-03-11 RX ORDER — HYDRALAZINE HYDROCHLORIDE 20 MG/ML
5 INJECTION INTRAMUSCULAR; INTRAVENOUS
Status: DISCONTINUED | OUTPATIENT
Start: 2020-03-11 | End: 2020-03-12 | Stop reason: HOSPADM

## 2020-03-11 RX ORDER — SODIUM CHLORIDE, SODIUM LACTATE, POTASSIUM CHLORIDE, CALCIUM CHLORIDE 600; 310; 30; 20 MG/100ML; MG/100ML; MG/100ML; MG/100ML
INJECTION, SOLUTION INTRAVENOUS CONTINUOUS
Status: ACTIVE | OUTPATIENT
Start: 2020-03-11 | End: 2020-03-11

## 2020-03-11 RX ORDER — MIDAZOLAM HYDROCHLORIDE 1 MG/ML
INJECTION INTRAMUSCULAR; INTRAVENOUS PRN
Status: DISCONTINUED | OUTPATIENT
Start: 2020-03-11 | End: 2020-03-11 | Stop reason: SURG

## 2020-03-11 RX ORDER — HYDROMORPHONE HYDROCHLORIDE 1 MG/ML
0.2 INJECTION, SOLUTION INTRAMUSCULAR; INTRAVENOUS; SUBCUTANEOUS
Status: DISCONTINUED | OUTPATIENT
Start: 2020-03-11 | End: 2020-03-12 | Stop reason: HOSPADM

## 2020-03-11 RX ORDER — PHENYLEPHRINE HCL IN 0.9% NACL 0.5 MG/5ML
SYRINGE (ML) INTRAVENOUS PRN
Status: DISCONTINUED | OUTPATIENT
Start: 2020-03-11 | End: 2020-03-11 | Stop reason: SURG

## 2020-03-11 RX ORDER — DEXTROSE MONOHYDRATE, SODIUM CHLORIDE, AND POTASSIUM CHLORIDE 50; 1.49; 4.5 G/1000ML; G/1000ML; G/1000ML
INJECTION, SOLUTION INTRAVENOUS CONTINUOUS
Status: DISPENSED | OUTPATIENT
Start: 2020-03-11 | End: 2020-03-11

## 2020-03-11 RX ORDER — OXYCODONE HCL 5 MG/5 ML
5 SOLUTION, ORAL ORAL
Status: COMPLETED | OUTPATIENT
Start: 2020-03-11 | End: 2020-03-12

## 2020-03-11 RX ORDER — CELECOXIB 200 MG/1
200 CAPSULE ORAL ONCE
Status: COMPLETED | OUTPATIENT
Start: 2020-03-11 | End: 2020-03-11

## 2020-03-11 RX ORDER — OXYCODONE HYDROCHLORIDE 5 MG/1
5 TABLET ORAL
Status: DISCONTINUED | OUTPATIENT
Start: 2020-03-11 | End: 2020-03-12 | Stop reason: HOSPADM

## 2020-03-11 RX ORDER — KETAMINE HYDROCHLORIDE 50 MG/ML
INJECTION, SOLUTION INTRAMUSCULAR; INTRAVENOUS PRN
Status: DISCONTINUED | OUTPATIENT
Start: 2020-03-11 | End: 2020-03-11 | Stop reason: SURG

## 2020-03-11 RX ORDER — CELECOXIB 200 MG/1
200 CAPSULE ORAL 2 TIMES DAILY
Status: DISCONTINUED | OUTPATIENT
Start: 2020-03-12 | End: 2020-03-12 | Stop reason: HOSPADM

## 2020-03-11 RX ORDER — ONDANSETRON 2 MG/ML
4 INJECTION INTRAMUSCULAR; INTRAVENOUS
Status: DISCONTINUED | OUTPATIENT
Start: 2020-03-11 | End: 2020-03-12 | Stop reason: HOSPADM

## 2020-03-11 RX ORDER — DIPHENHYDRAMINE HYDROCHLORIDE 50 MG/ML
25 INJECTION INTRAMUSCULAR; INTRAVENOUS EVERY 6 HOURS PRN
Status: DISCONTINUED | OUTPATIENT
Start: 2020-03-11 | End: 2020-03-12 | Stop reason: HOSPADM

## 2020-03-11 RX ORDER — HYDROMORPHONE HYDROCHLORIDE 1 MG/ML
0.5 INJECTION, SOLUTION INTRAMUSCULAR; INTRAVENOUS; SUBCUTANEOUS
Status: DISCONTINUED | OUTPATIENT
Start: 2020-03-11 | End: 2020-03-12 | Stop reason: HOSPADM

## 2020-03-11 RX ORDER — ENEMA 19; 7 G/133ML; G/133ML
1 ENEMA RECTAL
Status: DISCONTINUED | OUTPATIENT
Start: 2020-03-11 | End: 2020-03-12 | Stop reason: HOSPADM

## 2020-03-11 RX ORDER — ONDANSETRON 2 MG/ML
4 INJECTION INTRAMUSCULAR; INTRAVENOUS EVERY 4 HOURS PRN
Status: DISCONTINUED | OUTPATIENT
Start: 2020-03-11 | End: 2020-03-12 | Stop reason: HOSPADM

## 2020-03-11 RX ORDER — HYDROMORPHONE HYDROCHLORIDE 2 MG/ML
INJECTION, SOLUTION INTRAMUSCULAR; INTRAVENOUS; SUBCUTANEOUS PRN
Status: DISCONTINUED | OUTPATIENT
Start: 2020-03-11 | End: 2020-03-11 | Stop reason: SURG

## 2020-03-11 RX ORDER — CHLORPROMAZINE HYDROCHLORIDE 25 MG/ML
25 INJECTION INTRAMUSCULAR EVERY 6 HOURS PRN
Status: DISCONTINUED | OUTPATIENT
Start: 2020-03-11 | End: 2020-03-12 | Stop reason: HOSPADM

## 2020-03-11 RX ORDER — LISINOPRIL 2.5 MG/1
2.5 TABLET ORAL DAILY
Status: DISCONTINUED | OUTPATIENT
Start: 2020-03-11 | End: 2020-03-12 | Stop reason: HOSPADM

## 2020-03-11 RX ORDER — ROCURONIUM BROMIDE 10 MG/ML
INJECTION, SOLUTION INTRAVENOUS PRN
Status: DISCONTINUED | OUTPATIENT
Start: 2020-03-11 | End: 2020-03-11 | Stop reason: SURG

## 2020-03-11 RX ORDER — DEXAMETHASONE SODIUM PHOSPHATE 4 MG/ML
6 INJECTION, SOLUTION INTRA-ARTICULAR; INTRALESIONAL; INTRAMUSCULAR; INTRAVENOUS; SOFT TISSUE ONCE
Status: COMPLETED | OUTPATIENT
Start: 2020-03-12 | End: 2020-03-12

## 2020-03-11 RX ORDER — VALACYCLOVIR HYDROCHLORIDE 1 G/1
500 TABLET, FILM COATED ORAL DAILY
Status: COMPLETED | OUTPATIENT
Start: 2020-03-11 | End: 2020-03-11

## 2020-03-11 RX ORDER — GLIMEPIRIDE 2 MG/1
4 TABLET ORAL
Status: DISCONTINUED | OUTPATIENT
Start: 2020-03-11 | End: 2020-03-12 | Stop reason: HOSPADM

## 2020-03-11 RX ORDER — DEXAMETHASONE SODIUM PHOSPHATE 4 MG/ML
4 INJECTION, SOLUTION INTRA-ARTICULAR; INTRALESIONAL; INTRAMUSCULAR; INTRAVENOUS; SOFT TISSUE
Status: DISCONTINUED | OUTPATIENT
Start: 2020-03-11 | End: 2020-03-12 | Stop reason: HOSPADM

## 2020-03-11 RX ORDER — MEPERIDINE HYDROCHLORIDE 25 MG/ML
12.5 INJECTION INTRAMUSCULAR; INTRAVENOUS; SUBCUTANEOUS
Status: DISCONTINUED | OUTPATIENT
Start: 2020-03-11 | End: 2020-03-12 | Stop reason: HOSPADM

## 2020-03-11 RX ORDER — ATORVASTATIN CALCIUM 20 MG/1
20 TABLET, FILM COATED ORAL DAILY
Status: DISCONTINUED | OUTPATIENT
Start: 2020-03-11 | End: 2020-03-12 | Stop reason: HOSPADM

## 2020-03-11 RX ORDER — KETOROLAC TROMETHAMINE 30 MG/ML
15 INJECTION, SOLUTION INTRAMUSCULAR; INTRAVENOUS EVERY 6 HOURS
Status: DISPENSED | OUTPATIENT
Start: 2020-03-11 | End: 2020-03-12

## 2020-03-11 RX ORDER — DOCUSATE SODIUM 100 MG/1
100 CAPSULE, LIQUID FILLED ORAL 2 TIMES DAILY
Status: DISCONTINUED | OUTPATIENT
Start: 2020-03-11 | End: 2020-03-12 | Stop reason: HOSPADM

## 2020-03-11 RX ORDER — ONDANSETRON 2 MG/ML
INJECTION INTRAMUSCULAR; INTRAVENOUS PRN
Status: DISCONTINUED | OUTPATIENT
Start: 2020-03-11 | End: 2020-03-11 | Stop reason: SURG

## 2020-03-11 RX ORDER — AMOXICILLIN 250 MG
1 CAPSULE ORAL NIGHTLY
Status: DISCONTINUED | OUTPATIENT
Start: 2020-03-11 | End: 2020-03-12 | Stop reason: HOSPADM

## 2020-03-11 RX ORDER — HALOPERIDOL 5 MG/ML
1 INJECTION INTRAMUSCULAR EVERY 6 HOURS PRN
Status: DISCONTINUED | OUTPATIENT
Start: 2020-03-11 | End: 2020-03-12 | Stop reason: HOSPADM

## 2020-03-11 RX ORDER — POLYETHYLENE GLYCOL 3350 17 G/17G
1 POWDER, FOR SOLUTION ORAL 2 TIMES DAILY PRN
Status: DISCONTINUED | OUTPATIENT
Start: 2020-03-11 | End: 2020-03-12 | Stop reason: HOSPADM

## 2020-03-11 RX ORDER — TRAMADOL HYDROCHLORIDE 50 MG/1
50 TABLET ORAL EVERY 4 HOURS PRN
Status: DISCONTINUED | OUTPATIENT
Start: 2020-03-11 | End: 2020-03-12 | Stop reason: HOSPADM

## 2020-03-11 RX ORDER — OXYCODONE HCL 5 MG/5 ML
10 SOLUTION, ORAL ORAL
Status: COMPLETED | OUTPATIENT
Start: 2020-03-11 | End: 2020-03-12

## 2020-03-11 RX ORDER — SODIUM CHLORIDE, SODIUM LACTATE, POTASSIUM CHLORIDE, CALCIUM CHLORIDE 600; 310; 30; 20 MG/100ML; MG/100ML; MG/100ML; MG/100ML
INJECTION, SOLUTION INTRAVENOUS CONTINUOUS
Status: DISCONTINUED | OUTPATIENT
Start: 2020-03-11 | End: 2020-03-12 | Stop reason: HOSPADM

## 2020-03-11 RX ORDER — SODIUM CHLORIDE 9 MG/ML
INJECTION, SOLUTION INTRAMUSCULAR; INTRAVENOUS; SUBCUTANEOUS
Status: DISCONTINUED | OUTPATIENT
Start: 2020-03-11 | End: 2020-03-11 | Stop reason: HOSPADM

## 2020-03-11 RX ORDER — ACETAMINOPHEN 500 MG
1000 TABLET ORAL ONCE
Status: COMPLETED | OUTPATIENT
Start: 2020-03-11 | End: 2020-03-11

## 2020-03-11 RX ORDER — ACETAMINOPHEN 500 MG
500 TABLET ORAL EVERY 6 HOURS
Status: DISCONTINUED | OUTPATIENT
Start: 2020-03-11 | End: 2020-03-12 | Stop reason: HOSPADM

## 2020-03-11 RX ADMIN — SODIUM CHLORIDE 2 G: 9 INJECTION, SOLUTION INTRAVENOUS at 17:21

## 2020-03-11 RX ADMIN — KETOROLAC TROMETHAMINE 15 MG: 30 INJECTION, SOLUTION INTRAMUSCULAR at 14:09

## 2020-03-11 RX ADMIN — KETAMINE HYDROCHLORIDE 25 MG: 50 INJECTION, SOLUTION, CONCENTRATE INTRAMUSCULAR; INTRAVENOUS at 09:11

## 2020-03-11 RX ADMIN — MIDAZOLAM HYDROCHLORIDE 2 MG: 1 INJECTION, SOLUTION INTRAMUSCULAR; INTRAVENOUS at 08:48

## 2020-03-11 RX ADMIN — GLIMEPIRIDE 4 MG: 2 TABLET ORAL at 14:10

## 2020-03-11 RX ADMIN — GABAPENTIN 300 MG: 300 CAPSULE ORAL at 07:47

## 2020-03-11 RX ADMIN — Medication 100 MCG: at 09:48

## 2020-03-11 RX ADMIN — TRANEXAMIC ACID 1000 MG: 100 INJECTION, SOLUTION INTRAVENOUS at 08:51

## 2020-03-11 RX ADMIN — HYDROMORPHONE HYDROCHLORIDE 1 MG: 2 INJECTION, SOLUTION INTRAMUSCULAR; INTRAVENOUS; SUBCUTANEOUS at 08:51

## 2020-03-11 RX ADMIN — LIDOCAINE HYDROCHLORIDE 0.5 ML: 10 INJECTION, SOLUTION INFILTRATION; PERINEURAL at 07:48

## 2020-03-11 RX ADMIN — MAGNESIUM SULFATE IN WATER 4 G: 40 INJECTION, SOLUTION INTRAVENOUS at 09:15

## 2020-03-11 RX ADMIN — DOCUSATE SODIUM 100 MG: 100 CAPSULE, LIQUID FILLED ORAL at 17:28

## 2020-03-11 RX ADMIN — KETAMINE HYDROCHLORIDE 50 MG: 50 INJECTION, SOLUTION, CONCENTRATE INTRAMUSCULAR; INTRAVENOUS at 08:51

## 2020-03-11 RX ADMIN — ROCURONIUM BROMIDE 50 MG: 10 INJECTION, SOLUTION INTRAVENOUS at 08:51

## 2020-03-11 RX ADMIN — POTASSIUM CHLORIDE, DEXTROSE MONOHYDRATE AND SODIUM CHLORIDE: 150; 5; 450 INJECTION, SOLUTION INTRAVENOUS at 13:59

## 2020-03-11 RX ADMIN — ACETAMINOPHEN 500 MG: 500 TABLET, FILM COATED ORAL at 23:30

## 2020-03-11 RX ADMIN — ONDANSETRON 4 MG: 2 INJECTION INTRAMUSCULAR; INTRAVENOUS at 14:28

## 2020-03-11 RX ADMIN — COLESEVELAM HYDROCHLORIDE 625 MG: 625 TABLET, FILM COATED ORAL at 17:30

## 2020-03-11 RX ADMIN — SODIUM CHLORIDE, POTASSIUM CHLORIDE, SODIUM LACTATE AND CALCIUM CHLORIDE: 600; 310; 30; 20 INJECTION, SOLUTION INTRAVENOUS at 09:44

## 2020-03-11 RX ADMIN — LIDOCAINE HYDROCHLORIDE 80 MG: 20 INJECTION, SOLUTION EPIDURAL; INFILTRATION; INTRACAUDAL; PERINEURAL at 08:51

## 2020-03-11 RX ADMIN — METFORMIN HYDROCHLORIDE 1000 MG: 500 TABLET ORAL at 17:28

## 2020-03-11 RX ADMIN — ONDANSETRON 4 MG: 2 INJECTION INTRAMUSCULAR; INTRAVENOUS at 09:47

## 2020-03-11 RX ADMIN — CEFAZOLIN 2 G: 1 INJECTION, POWDER, FOR SOLUTION INTRAVENOUS at 08:48

## 2020-03-11 RX ADMIN — SODIUM CHLORIDE 2 G: 9 INJECTION, SOLUTION INTRAVENOUS at 23:29

## 2020-03-11 RX ADMIN — ACETAMINOPHEN 500 MG: 500 TABLET, FILM COATED ORAL at 14:10

## 2020-03-11 RX ADMIN — TRAMADOL HYDROCHLORIDE 50 MG: 50 TABLET, FILM COATED ORAL at 21:28

## 2020-03-11 RX ADMIN — ACETAMINOPHEN 500 MG: 500 TABLET, FILM COATED ORAL at 17:37

## 2020-03-11 RX ADMIN — Medication 100 MCG: at 09:03

## 2020-03-11 RX ADMIN — CELECOXIB 200 MG: 200 CAPSULE ORAL at 07:47

## 2020-03-11 RX ADMIN — TAMSULOSIN HYDROCHLORIDE 0.4 MG: 0.4 CAPSULE ORAL at 14:10

## 2020-03-11 RX ADMIN — SUGAMMADEX 200 MG: 100 INJECTION, SOLUTION INTRAVENOUS at 09:54

## 2020-03-11 RX ADMIN — PROPOFOL 150 MG: 10 INJECTION, EMULSION INTRAVENOUS at 08:51

## 2020-03-11 RX ADMIN — ASPIRIN 81 MG: 81 TABLET, COATED ORAL at 17:30

## 2020-03-11 RX ADMIN — DEXAMETHASONE SODIUM PHOSPHATE 8 MG: 4 INJECTION, SOLUTION INTRAMUSCULAR; INTRAVENOUS at 08:56

## 2020-03-11 RX ADMIN — VALACYCLOVIR HYDROCHLORIDE 500 MG: 500 TABLET, FILM COATED ORAL at 17:28

## 2020-03-11 RX ADMIN — KETOROLAC TROMETHAMINE 15 MG: 30 INJECTION, SOLUTION INTRAMUSCULAR at 23:29

## 2020-03-11 RX ADMIN — ACETAMINOPHEN 1000 MG: 500 TABLET, FILM COATED ORAL at 07:47

## 2020-03-11 RX ADMIN — TRANEXAMIC ACID 1000 MG: 100 INJECTION, SOLUTION INTRAVENOUS at 10:28

## 2020-03-11 RX ADMIN — SODIUM CHLORIDE, POTASSIUM CHLORIDE, SODIUM LACTATE AND CALCIUM CHLORIDE: 600; 310; 30; 20 INJECTION, SOLUTION INTRAVENOUS at 07:48

## 2020-03-11 SDOH — ECONOMIC STABILITY: FOOD INSECURITY: WITHIN THE PAST 12 MONTHS, THE FOOD YOU BOUGHT JUST DIDN'T LAST AND YOU DIDN'T HAVE MONEY TO GET MORE.: PATIENT DECLINED

## 2020-03-11 SDOH — ECONOMIC STABILITY: TRANSPORTATION INSECURITY
IN THE PAST 12 MONTHS, HAS LACK OF TRANSPORTATION KEPT YOU FROM MEETINGS, WORK, OR FROM GETTING THINGS NEEDED FOR DAILY LIVING?: PATIENT DECLINED

## 2020-03-11 SDOH — ECONOMIC STABILITY: TRANSPORTATION INSECURITY
IN THE PAST 12 MONTHS, HAS THE LACK OF TRANSPORTATION KEPT YOU FROM MEDICAL APPOINTMENTS OR FROM GETTING MEDICATIONS?: PATIENT DECLINED

## 2020-03-11 SDOH — ECONOMIC STABILITY: FOOD INSECURITY: WITHIN THE PAST 12 MONTHS, YOU WORRIED THAT YOUR FOOD WOULD RUN OUT BEFORE YOU GOT MONEY TO BUY MORE.: PATIENT DECLINED

## 2020-03-11 ASSESSMENT — LIFESTYLE VARIABLES
ON A TYPICAL DAY WHEN YOU DRINK ALCOHOL HOW MANY DRINKS DO YOU HAVE: 0
HOW MANY TIMES IN THE PAST YEAR HAVE YOU HAD 5 OR MORE DRINKS IN A DAY: 0
ALCOHOL_USE: NO
CONSUMPTION TOTAL: NEGATIVE
TOTAL SCORE: 0
EVER_SMOKED: NEVER
AVERAGE NUMBER OF DAYS PER WEEK YOU HAVE A DRINK CONTAINING ALCOHOL: 0
EVER FELT BAD OR GUILTY ABOUT YOUR DRINKING: NO
EVER HAD A DRINK FIRST THING IN THE MORNING TO STEADY YOUR NERVES TO GET RID OF A HANGOVER: NO
TOTAL SCORE: 0
TOTAL SCORE: 0
HAVE YOU EVER FELT YOU SHOULD CUT DOWN ON YOUR DRINKING: NO
HAVE PEOPLE ANNOYED YOU BY CRITICIZING YOUR DRINKING: NO

## 2020-03-11 ASSESSMENT — COGNITIVE AND FUNCTIONAL STATUS - GENERAL
SUGGESTED CMS G CODE MODIFIER DAILY ACTIVITY: CK
SUGGESTED CMS G CODE MODIFIER MOBILITY: CK
HELP NEEDED FOR BATHING: A LITTLE
STANDING UP FROM CHAIR USING ARMS: A LITTLE
CLIMB 3 TO 5 STEPS WITH RAILING: A LITTLE
MOVING FROM LYING ON BACK TO SITTING ON SIDE OF FLAT BED: A LITTLE
DRESSING REGULAR UPPER BODY CLOTHING: A LITTLE
MOBILITY SCORE: 18
TURNING FROM BACK TO SIDE WHILE IN FLAT BAD: A LITTLE
TOILETING: A LITTLE
DRESSING REGULAR LOWER BODY CLOTHING: A LOT
WALKING IN HOSPITAL ROOM: A LITTLE
MOVING TO AND FROM BED TO CHAIR: A LITTLE
DAILY ACTIVITIY SCORE: 19

## 2020-03-11 ASSESSMENT — PATIENT HEALTH QUESTIONNAIRE - PHQ9
SUM OF ALL RESPONSES TO PHQ9 QUESTIONS 1 AND 2: 0
2. FEELING DOWN, DEPRESSED, IRRITABLE, OR HOPELESS: NOT AT ALL
1. LITTLE INTEREST OR PLEASURE IN DOING THINGS: NOT AT ALL

## 2020-03-11 ASSESSMENT — COPD QUESTIONNAIRES
IN THE PAST 12 MONTHS DO YOU DO LESS THAN YOU USED TO BECAUSE OF YOUR BREATHING PROBLEMS: DISAGREE/UNSURE
COPD SCREENING SCORE: 2
HAVE YOU SMOKED AT LEAST 100 CIGARETTES IN YOUR ENTIRE LIFE: NO/DON'T KNOW
DURING THE PAST 4 WEEKS HOW MUCH DID YOU FEEL SHORT OF BREATH: NONE/LITTLE OF THE TIME
DO YOU EVER COUGH UP ANY MUCUS OR PHLEGM?: NO/ONLY WITH OCCASIONAL COLDS OR INFECTIONS

## 2020-03-11 ASSESSMENT — PAIN SCALES - GENERAL: PAIN_LEVEL: 0

## 2020-03-11 NOTE — ANESTHESIA PROCEDURE NOTES
Airway  Date/Time: 3/11/2020 8:52 AM  Performed by: Dillon Dyson M.D.  Authorized by: Dillon Dyson M.D.     Location:  OR  Urgency:  Elective  Difficult Airway: No    Indications for Airway Management:  Anesthesia  Spontaneous Ventilation: absent    Sedation Level:  Deep  Preoxygenated: Yes    Patient Position:  Sniffing  Mask Difficulty Assessment:  1 - vent by mask  Final Airway Type:  Endotracheal airway  Final Endotracheal Airway:  ETT  Cuffed: Yes    Technique Used for Successful ETT Placement:  Direct laryngoscopy  Insertion Site:  Oral  Blade Type:  Ocampo  Laryngoscope Blade/Videolaryngoscope Blade Size:  2  ETT Size (mm):  7.0  Measured from:  Lips  ETT to Lips (cm):  21  Placement Verified by: auscultation and capnometry    Cormack-Lehane Classification:  Grade IIa - partial view of glottis  Number of Attempts at Approach:  1

## 2020-03-11 NOTE — OR NURSING
1045 Received report from Marnie ESCAMILLA, assumed care of pt. Pt arousable on calling, denies nausea and pain with non-labored and spontaneous respirations via 10L oxymask, VSS.  1055 Pt still denies pain and nausea.   1103 Report to ANDI Dye.

## 2020-03-11 NOTE — OR NURSING
1100: Returned from break. Resumed care of patient.   1110: Patient tolerating juice. Denies pain. States shes tired and wants to sleep.   1115: Family to be updated via telephone.   1125: Patient appropriate for transfer to [facility]. Waiting for room  1145: Patient resting comfortably. No changes. Vitals stable. Denies pain and nausea.   1215: No changes to assessment or patient status.   1245: Patient resting comfortably. No pain or nausea. Daughter updated on room number.   1315: Waiting for room to be clean for transfer.   1325: Report given to Tiana ESCAMILLA. Transport called at this time.   1339: Patient leaving PACU with transport. No changes to surgical site assessment.

## 2020-03-11 NOTE — PROGRESS NOTES
Pt alert and oriented, reports 0-2 pain to R hip  Prevena in place  Denies numbness or tingling  SCDs on  Polar pad in place  Pt c/o nausea, medicated per mar, nausea resolved  Safety precautions in place  Pt able to stand w 2 assist FWW  Pt made aware of DTV by 1950  IVF infusing, tolerating snacks and fluids  Post op hip precaution hand out given and explained to patient

## 2020-03-11 NOTE — ANESTHESIA POSTPROCEDURE EVALUATION
Patient: Shirley Rader Ute    Procedure Summary     Date:  03/11/20 Room / Location:   OR 04 / SURGERY [facility]    Anesthesia Start:  0848 Anesthesia Stop:  1019    Procedure:  ARTHROPLASTY, HIP, TOTAL (Right Hip) Diagnosis:  (UNILATERAL PRIMARY OSTEOARTHRITIS RIGHT HIP)    Surgeon:  Miguel Madden M.D. Responsible Provider:  Dillon Dyson M.D.    Anesthesia Type:  general ASA Status:  2          Final Anesthesia Type: general  Last vitals  BP   Blood Pressure : 119/71    Temp   36.4 °C (97.5 °F)    Pulse   Pulse: 91   Resp   12    SpO2   98 %      Anesthesia Post Evaluation    Patient location during evaluation: PACU  Patient participation: complete - patient participated  Level of consciousness: awake and alert  Pain score: 0    Airway patency: patent  Anesthetic complications: no  Cardiovascular status: hemodynamically stable  Respiratory status: acceptable  Hydration status: euvolemic    PONV: none           Nurse Pain Score: 0 (NPRS)

## 2020-03-11 NOTE — OP REPORT
Date of Surgery:  3-11-20  Pre-operative Diagnosis:  right hip arthritis    Post-operative Diagnosis:  right hip arthritis    Procedure:  right hip replacement    Implants used:  A Smith Nephew total hip arthroplasty system with the following components  Acetabulum: 52 mm R3  Femur: Size 6 standard PolarStem  Bearing: Neutral XLPE  Head: 36 mm +0 Oxinium  Screws: 2    Surgeon:  Miguel Madden MD    1st Assistant:   GENET Ellington    Anesthesiologist:   DUSTIN Dyson    EBL:  200 cc    Specimen:  none    Findings:  Severe arthritic changes, native anteversion    Indications for procedure:  This patient is a 65 y.o. female with a long standing history of right hip arthritis. The patient had failed conservative therapy including anti-inflammatory medications, activity modifications, injections, physical therapy and assistive devices. The patient was indicated for a right total hip replacement. The patient expressed an understanding of the risks of pain, bleeding, infection, dislocation,  need for further surgery, damage to surrounding structures, neurovascular compromise, blood clots, leg-length discrepancy both apparent and actual, anesthetic complications, and serious medical consequences including but not limited to heart attack, stroke or even death. It was explained that the implants are man-made products and thus subject to possible failure.  The patient expressed an understanding and wished to proceed. Specific risks based on the patient's medical history were addressed. A clearance was obtained by the medical physicians and the patient was taken to the operating room on the day of surgery for the above named procedure.     Description of procedure:  The patient was met in the pre-operative holding area. The right hip was marked as the appropriate surgical site with indelible ink. They were taken to the operating room where the anesthesia department started a GETA for intraoperative and post-operative anesthesia  and pain control. The patient was turned with the operative hip facing up. All bony prominences were padded appropriately. The right hip was prepped and draped in a standard sterile surgical fashion. A time out procedure was called to verify the side and site of surgery, the proposed surgical procedure, and the administration of pre-operative antibiotics. After successful completion of the time out procedure, our attention was turned to the right hip.     A straight incision was made centered on the greater trochanter. This was carried down through the subcutaneous tissue with the assistance of the Bovie electrocautery and the self-retaining retractors. The fascia was identified and cleared with a Rayo elevator. This was incised in line with its fibers and the Charnley self-retaining retractor was placed. The hip was internally rotated and the external rotators were taken down. The piriformis was identified and tagged for later repair. The abductor were retracted anteriorly and protected. The posterior capsule was identified, cleared, and incised in an L-capsulotomy fashion. The corner of the L was tagged for later repair. The hip was able to be dislocated. We noted evidence of severe arthritic changes including cartilage loss and osteophytic overgrowth. The femoral neck was cleared and our neck cut was made in line with our previously templated images using a femoral neck cutting guide. The femoral head was removed, the femur was elevated out of the wound and we began our preparation of the femur, first with a box osteotome followed by a canal finder by hand then a lateralizing reamer on power. The femur was then broached to a size 6 at which point we noted excellent calcar fit and rotational stability. The broach was removed and the femoral canal was packed with a baby lap sponge. The femur was retracted anteriorly and our attention was turned to the acetabulum.    The acetabulum was exposed and soft tissue was  removed from its rim. The straight reamer was used to obtain proper medialization and then the offset reamer was used to ream up to a size 52 mm at which point we noted an appropriate bony bed for implantation. The acetabulum was irrigated.  A size 52 mm R3 acetabular component was then opened in a sterile fashion and impacted into place in the proper amount of abduction and anteversion. 2 acetabular bone screws were placed for adjunctive fixation. The Neutral XLPE bearing surface was inserted and impacted into place. This was tested for proper seating and the retractors were removed and our attention was turned back to the femur.    The femur was elevated and exposed properly. The baby lap sponge was removed and the femoral canal was thoroughly irrigated. A trial stem was assembled, and we trialed both the +0 and the minus 3 heads.  The minus 3 offered an inappropriate stability profile and a recreation of her preoperative leg length discrepancy. With the +0 head in place we noted excellent restoration of leg length, offset and stability. The hip was stable past 90 degrees of flexion and past 60 degrees of internal rotation. It did not dislocate with extension and external rotation. Abductor tension was appropriate. At this point the hip was dislocated, the trial components were removed and the femur was irrigated. The real stem was opened in a sterile fashion on the back table and then impacted into the femur. Another trial reduction was made and again we noted excellent restoration of leg length, offset and stability. At this point the real head was opened and impacted onto the taper, which had been cleaned and dried thoroughly. Another reduction was made and again we noted excellent restoration of leg length, offset and stability.    Therefore a dilute betadine solution with 3 grams of tranexamic acid was instilled for 3 minutes before being pulse-lavaged out. The posterior capsule was closed using the  previously placed tag stitches. The piriformis was closed as a separate structure. The fascia was closed with #1 Vicryl in an interrupted figure of 8 fashion and then oversewn with a running #1 PDS. The deep subcutaneous tissue was closed with a running #1 PDS. The deep dermal layer was closed with 2-0 Vicryl in a buried interrupted fashion. The skin was closed with running 3-0 Monocryl and a sterile dressing was applied. The patient is currently in the operating room awaiting reversal of anesthesia. Appropriate DVT prophylaxis and perioperative antibiotics will be ordered. All sponge and instrument counts were correct prior to final wound closure.

## 2020-03-11 NOTE — ANESTHESIA PREPROCEDURE EVALUATION
DM type 2 (injectables and oral), obesity, prior GA without issues. EKGs reviewed, no significant changes. Exertional tolerance acceptable. Denies: MI/CHF/smoking/CVA/CKD          Physical Exam    Airway   Mallampati: II  TM distance: >3 FB  Neck ROM: full       Cardiovascular - normal exam  Rhythm: regular  Rate: normal  (-) murmur     Dental - normal exam         Pulmonary - normal exam  Breath sounds clear to auscultation     Abdominal    Neurological - normal exam                 Anesthesia Plan    ASA 2       Plan - general       Airway plan will be ETT        Induction: intravenous    Postoperative Plan: Postoperative administration of opioids is intended.    Pertinent diagnostic labs and testing reviewed    Informed Consent:    Anesthetic plan and risks discussed with patient.    Use of blood products discussed with: patient whom consented to blood products.

## 2020-03-11 NOTE — ANESTHESIA TIME REPORT
Anesthesia Start and Stop Event Times     Date Time Event    3/11/2020 0830 Ready for Procedure     0848 Anesthesia Start     1019 Anesthesia Stop        Responsible Staff  03/11/20    Name Role Begin End    Dillon Dyson M.D. Anesth 0848 1019        Preop Diagnosis (Free Text):  Pre-op Diagnosis     UNILATERAL PRIMARY OSTEOARTHRITIS RIGHT HIP        Preop Diagnosis (Codes):    Post op Diagnosis  Osteoarthritis of right hip      Premium Reason  Non-Premium    Comments:

## 2020-03-11 NOTE — CARE PLAN
Problem: Communication  Goal: The ability to communicate needs accurately and effectively will improve  Outcome: PROGRESSING AS EXPECTED  Note: Plan of care discussed, patient verbalizes understanding      Problem: Safety  Goal: Will remain free from injury  Outcome: PROGRESSING AS EXPECTED  Note: Treaded socks in place, call light within reach, bed locked in low position, room near nursing station, hourly rounding      Problem: Venous Thromboembolism (VTW)/Deep Vein Thrombosis (DVT) Prevention:  Goal: Patient will participate in Venous Thrombosis (VTE)/Deep Vein Thrombosis (DVT)Prevention Measures  Outcome: PROGRESSING AS EXPECTED  Flowsheets (Taken 3/11/2020 1535)  SCDs, Sequential Compression Device: On     Problem: Pain Management  Goal: Pain level will decrease to patient's comfort goal  Outcome: PROGRESSING AS EXPECTED

## 2020-03-11 NOTE — OR NURSING
1015: Patient arrived via gurney. Sleeping. Placed on 10L oxymask per eras orders. VSS  1030: Patient rousable but drifts back to sleep.   1045: Report given to ANDI Amezcua

## 2020-03-12 VITALS
DIASTOLIC BLOOD PRESSURE: 50 MMHG | RESPIRATION RATE: 18 BRPM | TEMPERATURE: 98.4 F | BODY MASS INDEX: 29.84 KG/M2 | HEIGHT: 68 IN | SYSTOLIC BLOOD PRESSURE: 99 MMHG | WEIGHT: 196.87 LBS | HEART RATE: 86 BPM | OXYGEN SATURATION: 98 %

## 2020-03-12 LAB
HCT VFR BLD AUTO: 37.7 % (ref 37–47)
HGB BLD-MCNC: 11.8 G/DL (ref 12–16)

## 2020-03-12 PROCEDURE — 96376 TX/PRO/DX INJ SAME DRUG ADON: CPT

## 2020-03-12 PROCEDURE — 85014 HEMATOCRIT: CPT

## 2020-03-12 PROCEDURE — 85018 HEMOGLOBIN: CPT

## 2020-03-12 PROCEDURE — 36415 COLL VENOUS BLD VENIPUNCTURE: CPT

## 2020-03-12 PROCEDURE — 97535 SELF CARE MNGMENT TRAINING: CPT

## 2020-03-12 PROCEDURE — 700102 HCHG RX REV CODE 250 W/ 637 OVERRIDE(OP): Performed by: PHYSICIAN ASSISTANT

## 2020-03-12 PROCEDURE — 97530 THERAPEUTIC ACTIVITIES: CPT

## 2020-03-12 PROCEDURE — 97165 OT EVAL LOW COMPLEX 30 MIN: CPT

## 2020-03-12 PROCEDURE — G0378 HOSPITAL OBSERVATION PER HR: HCPCS

## 2020-03-12 PROCEDURE — A9270 NON-COVERED ITEM OR SERVICE: HCPCS | Performed by: PHYSICIAN ASSISTANT

## 2020-03-12 PROCEDURE — 96375 TX/PRO/DX INJ NEW DRUG ADDON: CPT

## 2020-03-12 PROCEDURE — 700105 HCHG RX REV CODE 258: Performed by: ANESTHESIOLOGY

## 2020-03-12 PROCEDURE — 97161 PT EVAL LOW COMPLEX 20 MIN: CPT

## 2020-03-12 PROCEDURE — 700111 HCHG RX REV CODE 636 W/ 250 OVERRIDE (IP): Performed by: PHYSICIAN ASSISTANT

## 2020-03-12 RX ORDER — PSEUDOEPHEDRINE HCL 30 MG
100 TABLET ORAL 2 TIMES DAILY
Qty: 60 CAP | Refills: 0 | Status: SHIPPED | OUTPATIENT
Start: 2020-03-12 | End: 2020-05-11

## 2020-03-12 RX ORDER — ACETAMINOPHEN 500 MG
500 TABLET ORAL EVERY 6 HOURS
Qty: 30 TAB | Refills: 0 | Status: SHIPPED | OUTPATIENT
Start: 2020-03-12 | End: 2020-05-11

## 2020-03-12 RX ORDER — ONDANSETRON 4 MG/1
4 TABLET, ORALLY DISINTEGRATING ORAL EVERY 4 HOURS PRN
Qty: 10 TAB | Refills: 0 | Status: SHIPPED | OUTPATIENT
Start: 2020-03-12 | End: 2020-05-11

## 2020-03-12 RX ORDER — OXYCODONE HYDROCHLORIDE 5 MG/1
5 TABLET ORAL EVERY 6 HOURS PRN
Qty: 40 TAB | Refills: 0
Start: 2020-03-12 | End: 2020-03-22

## 2020-03-12 RX ORDER — ASPIRIN 81 MG/1
81 TABLET ORAL 2 TIMES DAILY
Qty: 30 TAB | Refills: 0 | Status: SHIPPED | OUTPATIENT
Start: 2020-03-12 | End: 2021-01-27

## 2020-03-12 RX ORDER — TRAMADOL HYDROCHLORIDE 50 MG/1
50 TABLET ORAL EVERY 6 HOURS PRN
Qty: 40 TAB | Refills: 0
Start: 2020-03-12 | End: 2020-03-22

## 2020-03-12 RX ADMIN — COLESEVELAM HYDROCHLORIDE 625 MG: 625 TABLET, FILM COATED ORAL at 06:00

## 2020-03-12 RX ADMIN — ASPIRIN 81 MG: 81 TABLET, COATED ORAL at 05:15

## 2020-03-12 RX ADMIN — OXYCODONE HYDROCHLORIDE 5 MG: 5 TABLET ORAL at 01:22

## 2020-03-12 RX ADMIN — OXYCODONE HYDROCHLORIDE 10 MG: 10 TABLET ORAL at 10:05

## 2020-03-12 RX ADMIN — OXYCODONE HYDROCHLORIDE 5 MG: 5 TABLET ORAL at 02:08

## 2020-03-12 RX ADMIN — DEXAMETHASONE SODIUM PHOSPHATE 6 MG: 4 INJECTION, SOLUTION INTRAMUSCULAR; INTRAVENOUS at 05:17

## 2020-03-12 RX ADMIN — METFORMIN HYDROCHLORIDE 1000 MG: 500 TABLET ORAL at 09:16

## 2020-03-12 RX ADMIN — GLIMEPIRIDE 4 MG: 2 TABLET ORAL at 10:05

## 2020-03-12 RX ADMIN — KETOROLAC TROMETHAMINE 15 MG: 30 INJECTION, SOLUTION INTRAMUSCULAR at 05:18

## 2020-03-12 RX ADMIN — ACETAMINOPHEN 500 MG: 500 TABLET, FILM COATED ORAL at 05:15

## 2020-03-12 RX ADMIN — SODIUM CHLORIDE, POTASSIUM CHLORIDE, SODIUM LACTATE AND CALCIUM CHLORIDE: 600; 310; 30; 20 INJECTION, SOLUTION INTRAVENOUS at 05:21

## 2020-03-12 ASSESSMENT — COGNITIVE AND FUNCTIONAL STATUS - GENERAL
CLIMB 3 TO 5 STEPS WITH RAILING: A LITTLE
TOILETING: A LITTLE
DRESSING REGULAR LOWER BODY CLOTHING: A LITTLE
DRESSING REGULAR UPPER BODY CLOTHING: A LITTLE
SUGGESTED CMS G CODE MODIFIER DAILY ACTIVITY: CK
HELP NEEDED FOR BATHING: A LITTLE
MOBILITY SCORE: 22
PERSONAL GROOMING: A LITTLE
SUGGESTED CMS G CODE MODIFIER MOBILITY: CJ
DAILY ACTIVITIY SCORE: 19
MOVING FROM LYING ON BACK TO SITTING ON SIDE OF FLAT BED: A LITTLE

## 2020-03-12 ASSESSMENT — GAIT ASSESSMENTS
DISTANCE (FEET): 250
DEVIATION: ANTALGIC;STEP TO
ASSISTIVE DEVICE: FRONT WHEEL WALKER
GAIT LEVEL OF ASSIST: SUPERVISED

## 2020-03-12 ASSESSMENT — ACTIVITIES OF DAILY LIVING (ADL): TOILETING: INDEPENDENT

## 2020-03-12 NOTE — THERAPY
Occupational Therapy Evaluation completed.   Functional Status:  66 yo female admit for R RAMOS, post prec, WBAT.  Pt initially anxious about precautions but after working through ADL's, appeared more confident.  Pt will have help for the next 4- 5 days from adults, then 8 yo grandson will be helping her on his first week of Spring Break.  Pt req Wallace for supine to sit, and CGA for sit to stand due to difficulty sequencing the position of her RLE.  Pt able to dress LB with Wallace and AE, will get better with practice.  Spouse has been helping with ADL's recently due to severe pain for pt.  Pt able to walk to BR and toilet with close SBA.  Lengthy discussion with pt re: modifying her work station if she returns to work while still under hip precautions.  Extra education given re: mgmt of Prevena vac with ADL's.  Pt appears safe for home with Assist from family.  Educated pt on role of OT and Posterior Total Hip Precautions with ADL's. Reviewed application of precautions and use of Adaptive Equipment for dressing, bathing, toileting, grooming, kitchen activities and general functional mobility in the home. Reviewed the use of reacher, sockaide, long handled shoe horn and long handled sponge, as well as shower chair and raised toilet seat to assist with ADL's.  Reviewed stall shower transfers. Stressed importance of following posterior hip precautions with all IADL's, and having help to bring all commonly used items to counter reach level so that no bending is required through the course of a normal day.  Educated on covering incision with plastic wrap and skin tape for protection when showering. Pt verbalized understanding of all education provided.    Plan of Care: Patient with no further skilled OT needs in the acute care setting at this time  Discharge Recommendations:  Equipment: No Equipment Needed. Post-acute therapy Discharge to home with outpatient or home health for additional skilled therapy services.    See  "\"Rehab Therapy-Acute\" Patient Summary Report for complete documentation.    "

## 2020-03-12 NOTE — DISCHARGE INSTRUCTIONS
Dr. Madden's Discharge Instructions:  The first week after your joint replacement is a time to recover from the surgery. We expect light exercise to keep you active and mobile. Dr. Madden requires a walker or cane for most patients in the first few days following surgery to try to prevent falls or complications.     Most patients are prescribed two medications for pain control: a narcotic such as Oxycodone or Hydrocodone and a milder medication called Tramadol. These can be alternated for pain control, and the priority is to decrease use of the stronger narcotic as soon as tolerated.   Take your pain medication as appropriate to ensure that your pain is not limiting your recovery. You'll be seen in clinic in 7-10 days (this appointment has already been made, call our office if you're unsure of the time). At that time, we'll prescribe your physical therapy to help with the recovery phase.     -Keep dressing clean and dry. Leave dressing in place until follow up. If you have an incisional vac dressing, the battery may die before your first post operative appointment, but you can leave the surgical dressing in place and it will be removed at your clinic visit. The battery of the dressing may die, and the sponge will inflate because it is no longer holding suction. You can still leave the dressing in place and it will be removed at the office. Call the office if you notice drainage from the surgical dressing.    -OK to shower, keep dressing in place. Pat dressing dry, do not rub incision    -Do not soak incision in bath, hot tub or pool    -Follow up with Dr. Madden at regularly scheduled time    -Call [facility] office at [phone] for questions    -Weight bearing status: as tolerated with walker/cane and posterior hip precautions    -Take medication as prescribed for DVT prophylaxis    Discharge Instructions    Discharged to home by car with relative. Discharged via wheelchair, hospital escort: Yes.  Special equipment  needed: Walker    Be sure to schedule a follow-up appointment with your primary care doctor or any specialists as instructed.     Discharge Plan:   Influenza Vaccine Indication: Not indicated: Previously immunized this influenza season and > 8 years of age    I understand that a diet low in cholesterol, fat, and sodium is recommended for good health. Unless I have been given specific instructions below for another diet, I accept this instruction as my diet prescription.   Other diet: Regular as tolerated    Special Instructions: Discharge instructions for the Orthopedic Patient    Follow up with Primary Care Physician within 2 weeks of discharge to home, regarding:  Review of medications and diagnostic testing.  Surveillance for medical complications.  Workup and treatment of osteoporosis, if appropriate.     -Is this a Hip/Knee/Shoulder Joint Replacement patient? Yes Total Hip Replacement, After-Care Guidelines      These instructions provide you with information on caring for yourself and your hip after surgery. Your health care provider may also give you instructions that are more specific. Your treatment was planned and performed according to current medical practices but problems sometimes occur. Call your health care provider if you have any problems or questions.     WHAT TO EXPECT AFTER THE PROCEDURE  After your procedure, your hip will typically be stiff, sore, and bruised. This will improve over time.      Pain  - Follow your home pain management plan as discussed with your nurse and as directed by your provider.  - It is important to follow any scheduled pain medications for maximal pain relief.  - If prescribed opioid medication, the goal is to use opioids only as needed and to wean off prescription pain medicine as soon as possible.  - Ice use for pain control.  o Put ice in a plastic bag.  o Place a towel between your skin and the bag.  o Leave the ice on for 20 minutes, 2-3 times a day at a  minimum.  - Most patients are off the pain pills by 3 weeks.  If your pain continues to be severe, follow up with your provider.    Infection  Deep hip joint infections that require removal of the prostheses occur in less than 1.0% of patients. Lesser infections in the skin (cellulites) are more common and much more easily treated.  - Keep the incision as clean and dry as possible.  - Always wash your hands before touching your incision.  - Avoid dental care for 3 months after surgery. Your provider may recommend taking a dose of antibiotics an hour prior to any dental procedure. After 2 years, most providers recommend antibiotics only before an extensive procedure.  Ask your provider what they recommend.  - Signs and symptoms of infection include low-grade fever, redness, pain, swelling and drainage from your incision. Notify your provider IMMEDIATELY if you develop ANY of these symptoms.    Post op Disturbances  - Bowel habits - constipation is extremely common and caused by a combination of anesthesia, lack of mobility, dehydration and pain medicine. Use stool softeners or laxatives if necessary. It is important not to ignore this problem as bowel obstructions can be a serious complication after joint replacement surgery.  - Mood/Energy Level - Many patients experience a lack of energy and endurance for up to 2-3 months after surgery. Some people feel down and can even become depressed. This is likely due to postoperative anemia, change in activity level, lack of sleep, pain medicine and just the emotional reaction to the surgery itself that is a big disruption in a person’s life.  This usually passes.  If symptoms persist, follow up with your primary care provider.  - Returning to work - Your provider will give you specific instructions based on your profession.  Generally, if you work a sedentary job requiring little standing or walking, most patients may return within 2-6 weeks.  Manual labor jobs involving  walking, lifting and standing may take 3-4 months.  Your provider’s office can provide a release to part-time or light duty work early on in your recovery and progress you to full duty as able.  - Driving - You can begin driving once cleared by your provider, provided you are no longer taking narcotic pain medication or any other medications that impair driving. Discuss the length of time with your doctor as returning to driving will depend on things such as your vehicle, which hip was replaced (right or left) and if you do or do not have post-operative movement precautions.  - Avoiding falls - A fall during the first few weeks after surgery can damage your new hip and may result in a need for further surgery.   throw rugs and tack down loose carpeting.  Be aware of floor hazards such as pets, small objects or uneven surfaces. Notify your provider of any falls.   - Airport Metal Detectors - The sensitivity of metal detectors varies and it is likely that your prosthesis will cause an alarm. Inform the  of your artificial joint.    Diet  - Resume your normal diet as tolerated.  - It is important to achieve a healthy nutritional status by eating a well-balanced diet on a regular basis.  - Your provider may recommend that you take iron and vitamin supplements.   - Continue to drink plenty of fluids.    Shower/Bathing  - You may shower as soon as you get home from the hospital unless otherwise instructed.  - Keep your incision out of water to prevent infection. To keep the incision dry when showering, cover it with a plastic bag or plastic wrap. If your bandage is waterproof, this may not be necessary.  o Pat incision dry if it gets wet. Do not rub. Notify your provider.  - Do not submerge in a bath until cleared by your provider.  Your staples must be out and the incision completely healed.    Dressing Changes:  Only change your dressing if directed by your provider.  - Wash hands.  - Open all  dressing change materials.  - Remove old dressing and discard.  - Inspect incision for signs of irritation or infection including redness, increase in clear drainage, yellow/green drainage, odor and surrounding skin hot to touch.  Notify your provider if present.  -  the new dressing by one corner and lay over the incision.  Be careful not to touch the inside of the dressing that will lay over the incision.  - Secure in place as instructed.    Swelling/Bruising  - Swelling is normal after hip replacement and can involve the thigh, knee, calf and foot.  - Swelling can last from 3-6 months.  - To reduce swelling, elevate your leg higher than your heart while reclining.  The first week you are home you should elevate your leg an equal amount of time as you are active.    - The swelling is usually worse after you go home since you are upright for longer periods of time.  - Bruising often does not appear until after you arrive home and can be quite dramatic- appearing purple, black, or green.  Bruising is typically not concerning and will subside without any treatment.      Blood Clot Prevention  Your treatment plan includes multiple preventative measures to decrease the risk of blood clots in the legs (DVTs) and the less common, but serious, clots that travel to the lungs (pulmonary emboli). Most patients are at standard risk for them, but people who are at higher risk include those who have had previous clots, a family history of clotting, smoking, diabetes, obesity, advanced age, use estrogen and/or live a sedentary lifestyle.    - Signs of blood clots in legs include - Swelling in thigh, calf or ankle that does not go down with elevation.  Pain, heat and tenderness in calf, back of calf or groin area.  NOTE: blood clots can occur in either leg.  - Signs of blood clots in lungs include - Sudden increased shortness of breath, sudden onset of chest pain, and localized chest pain with coughing.  - If you  experience any of the above symptoms, notify your provider and seek medical attention immediately.  - You received anticoagulant therapy (blood thinners) in the hospital.  Continue the prescribed blood-thinning medication at home, as directed by your provider.   - Your risk for developing a clot continues for up to 2-3 months after surgery.  Avoid prolonged sitting and dehydration (long air trips and car trips).  If you do take a trip during this time, please get up, move around every 1-1.5 hours, and discuss all travel plans with your provider.      Activity  Once you get home, stay active. The key is not to overdo it.  While you can expect some good days and some bad days, you should notice a gradual improvement over time and a gradual increase in endurance over the next 6 to 12 months.    - Weight Bearing - You can begin to bear weight as tolerated unless otherwise directed by your provider. Use a walker, crutches or cane to assist with walking until you can walk smoothly (minimal or no limp) without assistance.   - Physical Precautions - To assure proper recovery and tissue healing, you may be asked to take special precautions when moving (sitting, bending or sleeping) - usually for the first 6 weeks after surgery. Precautions vary from patient to patient depending on surgical approach used by your provider. Follow any specific precautions provided by your provider and physical therapist until cleared by your provider.   - Sitting - Early on it is easier to get up from a tall chair or a chair with arms. The physical therapist will show you how to sit and stand from a chair keeping your affected leg out in front of you. Get up and move around on a regular basis--at least once every hour.  - Walking - Walk as much as you like once your doctor gives permission to proceed, but remember that walking is no substitute for your prescribed exercises.   o Follow the home exercise program prescribed during your hospital  stay as directed by your physical therapist or provider.  - Continued physical therapy may be prescribed after hospital discharge to strengthen your muscles and improve your gait/walking pattern. The decision to have therapy or not after the hospital stay is made by you and your provider prior to surgery or at your follow up appointment.    - Swimming is an excellent low impact activity; you can begin as soon as the wound is healed and your provider clears you. Using a pair of training fins may make swimming a more enjoyable and effective exercise.  - Sexual activity - Your provider can tell you when it is safe to resume sexual activity.   - Other activities - Low impact activities are preferred. If you have specific questions, consult your provider.      When to Call the Doctor   Call the provider if you experience:   - Fever over 100.5° F  - Increased pain, drainage, redness, odor or heat around the incision area  - Shaking chills  - Increased knee pain with activity and rest  - Increased pain in calf, tenderness or redness above or below the knee  - Increased swelling of calf, ankle, foot  - Sudden increased shortness of breath, sudden onset of chest pain, localized chest pain with coughing  - Incision opening  Or, if there are any questions or concerns about medications or care.      Infection statistic resource:  https://www.[employer]/contents/prosthetic-joint-infection-epidemiology-microbiology-clinical-manifestations-and-diagnosis    -Is this patient being discharged with medication to prevent blood clots?  Yes, Aspirin Aspirin, ASA oral tablets  What is this medicine?  ASPIRIN (AS pir in) is a pain reliever. It is used to treat mild pain and fever. This medicine is also used as directed by a doctor to prevent and to treat heart attacks, to prevent strokes, and to treat arthritis or inflammation.  This medicine may be used for other purposes; ask your health care provider or pharmacist if you have  questions.  COMMON BRAND NAME(S): Aspir-Low, Aspir-Aaliyah, Aspirtab, Song Advanced Aspirin, Song Aspirin, Song Aspirin Extra Strength, Song Aspirin Plus, Song Extra Strength, Song Extra Strength Plus, Song Genuine Aspirin, Song Womens Aspirin, Bufferin, Bufferin Extra Strength, Bufferin Low Dose  What should I tell my health care provider before I take this medicine?  They need to know if you have any of these conditions:  -anemia  -asthma  -bleeding problems  -child with chickenpox, the flu, or other viral infection  -diabetes  -gout  -if you frequently drink alcohol containing drinks  -kidney disease  -liver disease  -low level of vitamin K  -lupus  -smoke tobacco  -stomach ulcers or other problems  -an unusual or allergic reaction to aspirin, tartrazine dye, other medicines, dyes, or preservatives  -pregnant or trying to get pregnant  -breast-feeding  How should I use this medicine?  Take this medicine by mouth with a glass of water. Follow the directions on the package or prescription label. You can take this medicine with or without food. If it upsets your stomach, take it with food. Do not take your medicine more often than directed.  Talk to your pediatrician regarding the use of this medicine in children. While this drug may be prescribed for children as young as 12 years of age for selected conditions, precautions do apply. Children and teenagers should not use this medicine to treat chicken pox or flu symptoms unless directed by a doctor.  Patients over 65 years old may have a stronger reaction and need a smaller dose.  Overdosage: If you think you have taken too much of this medicine contact a poison control center or emergency room at once.  NOTE: This medicine is only for you. Do not share this medicine with others.  What if I miss a dose?  If you are taking this medicine on a regular schedule and miss a dose, take it as soon as you can. If it is almost time for your next dose, take only that  dose. Do not take double or extra doses.  What may interact with this medicine?  Do not take this medicine with any of the following medications:  -cidofovir  -ketorolac  -probenecid  This medicine may also interact with the following medications:  -alcohol  -alendronate  -bismuth subsalicylate  -flavocoxid  -herbal supplements like feverfew, garlic, debbie, ginkgo biloba, horse chestnut  -medicines for diabetes or glaucoma like acetazolamide, methazolamide  -medicines for gout  -medicines that treat or prevent blood clots like enoxaparin, heparin, ticlopidine, warfarin  -other aspirin and aspirin-like medicines  -NSAIDs, medicines for pain and inflammation, like ibuprofen or naproxen  -pemetrexed  -sulfinpyrazone  -varicella live vaccine  This list may not describe all possible interactions. Give your health care provider a list of all the medicines, herbs, non-prescription drugs, or dietary supplements you use. Also tell them if you smoke, drink alcohol, or use illegal drugs. Some items may interact with your medicine.  What should I watch for while using this medicine?  If you are treating yourself for pain, tell your doctor or health care professional if the pain lasts more than 10 days, if it gets worse, or if there is a new or different kind of pain. Tell your doctor if you see redness or swelling. Also, check with your doctor if you have a fever that lasts for more than 3 days. Only take this medicine to prevent heart attacks or blood clotting if prescribed by your doctor or health care professional.  Do not take aspirin or aspirin-like medicines with this medicine. Too much aspirin can be dangerous. Always read the labels carefully.  This medicine can irritate your stomach or cause bleeding problems. Do not smoke cigarettes or drink alcohol while taking this medicine. Do not lie down for 30 minutes after taking this medicine to prevent irritation to your throat.  If you are scheduled for any medical or  dental procedure, tell your healthcare provider that you are taking this medicine. You may need to stop taking this medicine before the procedure.  This medicine may be used to treat migraines. If you take migraine medicines for 10 or more days a month, your migraines may get worse. Keep a diary of headache days and medicine use. Contact your healthcare professional if your migraine attacks occur more frequently.  What side effects may I notice from receiving this medicine?  Side effects that you should report to your doctor or health care professional as soon as possible:  -allergic reactions like skin rash, itching or hives, swelling of the face, lips, or tongue  -breathing problems  -changes in hearing, ringing in the ears  -confusion  -general ill feeling or flu-like symptoms  -pain on swallowing  -redness, blistering, peeling or loosening of the skin, including inside the mouth or nose  -signs and symptoms of bleeding such as bloody or black, tarry stools; red or dark-brown urine; spitting up blood or brown material that looks like coffee grounds; red spots on the skin; unusual bruising or bleeding from the eye, gums, or nose  -trouble passing urine or change in the amount of urine  -unusually weak or tired  -yellowing of the eyes or skin  Side effects that usually do not require medical attention (report to your doctor or health care professional if they continue or are bothersome):  -diarrhea or constipation  -headache  -nausea, vomiting  -stomach gas, heartburn  This list may not describe all possible side effects. Call your doctor for medical advice about side effects. You may report side effects to FDA at 4-760-FDA-6389.  Where should I keep my medicine?  Keep out of the reach of children.  Store at room temperature between 15 and 30 degrees C (59 and 86 degrees F). Protect from heat and moisture. Do not use this medicine if it has a strong vinegar smell. Throw away any unused medicine after the expiration  date.  NOTE: This sheet is a summary. It may not cover all possible information. If you have questions about this medicine, talk to your doctor, pharmacist, or health care provider.  © 2018 [employer] (2014-08-19 11:30:31)      · Is patient discharged on Warfarin / Coumadin?   No     Depression / Suicide Risk    As you are discharged from this [facility] facility, it is important to learn how to keep safe from harming yourself.    Recognize the warning signs:  · Abrupt changes in personality, positive or negative- including increase in energy   · Giving away possessions  · Change in eating patterns- significant weight changes-  positive or negative  · Change in sleeping patterns- unable to sleep or sleeping all the time   · Unwillingness or inability to communicate  · Depression  · Unusual sadness, discouragement and loneliness  · Talk of wanting to die  · Neglect of personal appearance   · Rebelliousness- reckless behavior  · Withdrawal from people/activities they love  · Confusion- inability to concentrate     If you or a loved one observes any of these behaviors or has concerns about self-harm, here's what you can do:  · Talk about it- your feelings and reasons for harming yourself  · Remove any means that you might use to hurt yourself (examples: pills, rope, extension cords, firearm)  · Get professional help from the community (Mental Health, Substance Abuse, psychological counseling)  · Do not be alone:Call your Safe Contact- someone whom you trust who will be there for you.  · Call your local CRISIS HOTLINE 875-9417 or 878-673-8821  · Call your local Children's Mobile Crisis Response Team [place] (836) 430-3285 or www.[employer] Online Ordering  · Call the toll free National Suicide Prevention Hotlines   · National Suicide Prevention Lifeline 239-336-DNCQ (8362)  · National Hope Line Network 800-SUICIDE (713-1606)

## 2020-03-12 NOTE — DISCHARGE SUMMARY
Shirley Unger was admitted on 3/11/2020 for UNILATERAL PRIMARY OSTEOARTHRITIS RIGHT HIP  Primary osteoarthritis of right hip  Primary osteoarthritis of right hip  Patient was diagnosed with severe degenerative arthritis in the right hip and underwent a right total hip arthroplasty by Dr. Miguel Madden on the date of admission. Please see dictated operative note for further information.    Hospital course: standard    The patient has done well, with no complications.  Patient denies chest pain, calf pain or shortness of breath.   Pain is well-controlled at present.  Patient is ambulating well with the use of an assistive device, and progressing in physical therapy.   Patient is neurologically and vascularly intact with palpable pedal pulses bilaterally.   Narcotic dosages were chosen by taking into account the the patient's previous history of opoid use and to ensure proper pain control after surgery    Discharge date: 3-12-20    Patient is being discharged to home after am physical therapy.     Allergies:  Macrobid [nitrofurantoin monohydrate macrocrystals] and Pcn [penicillins]       Medication List      START taking these medications      Instructions   acetaminophen 500 MG Tabs  Commonly known as:  TYLENOL   Take 1 Tab by mouth every 6 hours.  Dose:  500 mg     aspirin 81 MG EC tablet   Take 1 Tab by mouth 2 Times a Day.  Dose:  81 mg     docusate sodium 100 MG Caps   Take 100 mg by mouth 2 Times a Day.  Dose:  100 mg     ondansetron 4 MG Tbdp  Commonly known as:  ZOFRAN ODT   Take 1 Tab by mouth every four hours as needed for Nausea.  Dose:  4 mg     oxyCODONE immediate-release 5 MG Tabs  Commonly known as:  ROXICODONE   Take 1 Tab by mouth every 6 hours as needed for Severe Pain for up to 10 days.  Dose:  5 mg     tramadol 50 MG Tabs  Commonly known as:  ULTRAM   Take 1 Tab by mouth every 6 hours as needed (Moderate Pain (NRS Pain Scale 4-6; CPOT Pain Scale 3-5) if opiates not ordered or tolerated) for  up to 10 days.  Dose:  50 mg        CHANGE how you take these medications      Instructions   mupirocin calcium 2 % Crea  What changed:    · how much to take  · how to take this  · when to take this  · reasons to take this  Commonly known as:  BACTROBAN   Apply to affected area(s) 2 times a day.     trolamine salicylate 10 % cream  What changed:    · when to take this  · reasons to take this  Commonly known as:  Aspercreme/Aloe   Apply 1 Squirt to affected area(s) 4 times a day.  Dose:  1 Squirt        CONTINUE taking these medications      Instructions   acyclovir 5 % Crea  Commonly known as:  Zovirax   Apply 1 Application to affected area(s) every 3 hours.  Dose:  1 Application     atorvastatin 20 MG Tabs  Commonly known as:  LIPITOR   TAKE ONE TABLET BY MOUTH ONCE DAILY     * Blood Glucose Test Strips   Test strips order: Test strips for Abbott Freestyle Lite meter. Sig: use BID and prn ssx high or low sugar #100 RF x 3     * Blood Glucose Test Strips   Test strips order: Test strips for Digital Perception Contour Next meter. Sig: use daily and prn ssx high or low sugars.     Bydureon 2 MG Pen  Generic drug:  Exenatide ER   Doctor's comments:  Please consider 90 day supplies to promote better adherence  INJECT 2MG AS INSTRUCTED EVERY 7 DAYS     Cholecalciferol 125 MCG (5000 UT) Tabs   Take 10,000 Units by mouth every day.  Dose:  10,000 Units     colesevelam 625 MG Tabs  Commonly known as:  WELCHOL   TAKE ONE TABLET BY MOUTH ONCE DAILY IN THE MORNING, THEN TAKE TWO TABLETS DAILY IN THE EVENING WITH  DINNER     glimepiride 4 MG Tabs  Commonly known as:  AMARYL   TAKE 1 TABLET BY MOUTH ONCE DAILY IN THE MORNING     glucose blood strip  Commonly known as:  FREESTYLE LITE   1 Strip by Other route as needed.  Dose:  1 Each     Invokana 300 MG Tabs  Generic drug:  Canagliflozin   TAKE 1 TABLET BY MOUTH ONCE DAILY     * Lancets   Lancets order: Lancets for Abbott Freestyle Lite meter. Sig: use BID and prn ssx high or low sugar. #100  RF x 3     * Lancets   Lancets order: Lancets for Song Contour Next meter. Sig: use daily and prn ssx high or low sugar.     lisinopril 5 MG Tabs  Commonly known as:  PRINIVIL   Take 0.5 Tabs by mouth every day.  Dose:  2.5 mg     metformin 1000 MG tablet  Commonly known as:  GLUCOPHAGE   TAKE 1 TABLET BY MOUTH TWICE DAILY WITH MEALS     POTASSIUM CITRATE PO   Take 2 Tabs by mouth 3 times a day.  Dose:  2 Tab     triamcinolone acetonide 0.1 % Oint  Commonly known as:  KENALOG   Apply 1 Application to affected area(s) 2 times a day as needed.  Dose:  1 Application     valACYclovir 500 MG Tabs  Commonly known as:  VALTREX   Take 1 Tab by mouth every day.  Dose:  500 mg     VITAMIN B 12 PO   Take 1 Tab by mouth every day.  Dose:  1 Tab         * This list has 4 medication(s) that are the same as other medications prescribed for you. Read the directions carefully, and ask your doctor or other care provider to review them with you.                Discharge Instructions:     Patient is instructed to ambulate and weight bear as tolerated with the use of an assistive device, and to continue physical therapy exercises given during this hospital stay. Strict posterior hip precautions are to be observed for patients who underwent a total hip replacement.   Patient is to ice and elevate the surgical leg regularly, with pillows under the ankle, nothing is to be placed under the knee.   Patient was given detailed wound care instructions, and will leave the Incisional vac or silver dressing on until first post-op visit.   ASA twice daily for DVT prophylaxis.  Patient is to follow up with Dr. Madden's office in 1-2 weeks.

## 2020-03-12 NOTE — CARE PLAN
Problem: Safety  Goal: Will remain free from injury  Outcome: PROGRESSING AS EXPECTED  Note: Pt educated to call for assistance prior to ambulating to bathroom or hallways.      Problem: Venous Thromboembolism (VTW)/Deep Vein Thrombosis (DVT) Prevention:  Goal: Patient will participate in Venous Thrombosis (VTE)/Deep Vein Thrombosis (DVT)Prevention Measures  Outcome: PROGRESSING AS EXPECTED  Flowsheets  Taken 3/12/2020 0402  Mechanical Prophylaxis: SCDs, Sequential Compression Device  SCDs, Sequential Compression Device: On  Taken 3/11/2020 2100  Pharmacologic Prophylaxis Used: (ASA) --

## 2020-04-30 ENCOUNTER — TELEPHONE (OUTPATIENT)
Dept: MEDICAL GROUP | Facility: PHYSICIAN GROUP | Age: 65
End: 2020-04-30

## 2020-04-30 RX ORDER — EXENATIDE 2 MG/.65ML
INJECTION, SUSPENSION, EXTENDED RELEASE SUBCUTANEOUS
Qty: 12 EACH | Refills: 0 | Status: SHIPPED | OUTPATIENT
Start: 2020-04-30 | End: 2020-07-31 | Stop reason: SDUPTHER

## 2020-04-30 NOTE — TELEPHONE ENCOUNTER
1. Caller Name: [email or web address]                          Call Back Number: [phone] (home)       How would the patient prefer to be contacted with a response: Phone call OK to leave a detailed message    Pt called requesting to change pharmacies from [facility] to [facility]. She also wanted to confirm that her rx for bydureon was sent.     KeyPt wanted to let you know that she had her hip surgery done and she is going great!

## 2020-05-10 NOTE — PROGRESS NOTES
Endocrinology Clinic Progress Note  PCP: Linh Miranda P.A.-C.    HPI:  This encounter was conducted via Zoom.  Verbal consent was obtained. Patient's identity was verified.  Shirley Unger is a 65 y.o. old patient who comes in today for routine follow up of Management of Uncontrolled Type 2 Diabetes, Obesity, Vitamin D Deficiency, Vitamin B 12 Deficiency, and Hyperlipidemia.  She had total right hip surgery on 3/11/2020.    Vitamin D Deficiency:  Currently taking Vitamin D 10,000 units daily.  Results for SHIRLEY UNGER (MRN [id number]) as of 5/9/2020 18:58   Ref. Range 1/4/2020 10:03   25-Hydroxy   Vitamin D 25 Latest Ref Range: 30 - 100 ng/mL 54     Vitamin B 12 Deficiency:  Currently taking Vitamin B 12 1000 mcg daily.  Results for SHIRLEY UNGER (MRN [id number]) as of 5/9/2020 18:58   Ref. Range 1/4/2020 10:03   Vitamin B12 -True Cobalamin Latest Ref Range: 211 - 911 pg/mL 231   Hyperlipidemia:  Currently taking Lipitor 20 mg daily.  Results for SHIRLEY UNGER (MRN [id number]) as of 5/9/2020 18:58   Ref. Range 10/19/2019 07:31   Cholesterol,Tot Latest Ref Range: 100 - 199 mg/dL 125   Triglycerides Latest Ref Range: 0 - 149 mg/dL 177 (H)   HDL Latest Ref Range: >=40 mg/dL 36 (A)   LDL Latest Ref Range: <100 mg/dL 54     Obesity:  She has lost around 10 pounds.  Just now starting to do more walking since her hip surgery.    HPI:  Shirley Unger is a 65 y.o. old patient who comes in today for evaluation of above stated problem.    Most Recent HbA1c:   Lab Results   Component Value Date/Time    HBA1C 7.5 (H) 02/26/2020 11:10 AM        Current Diabetes Regimen:  Bydureon 2 mg weekly.  SGLT-2 Inhibitor:  Canagliflozin 300 mg once daily   Other: Metformin 1000 mg BID and Glimepiride 4 mg daily.    Testing blood sugars randomly.       Hypoglycemia:  None     ROS:  Constitutional: No weight loss  Cardiac: No palpitations or racing heart  Resp: No shortness of breath  Neuro: No numbness  or tinging in feet  Endo: No heat or cold intolerance, no polyuria or polydipsia  All other systems were reviewed and were negative.    Past Medical History:  Patient Active Problem List    Diagnosis Date Noted   • Primary osteoarthritis of both hips 2020   • B12 deficiency 2020   • Huynh's neuroma of left foot 2019   • Primary insomnia 2018   • Obesity (BMI 30-39.9) 2017   • Seasonal allergic rhinitis due to pollen 2017   • Herpes dermatitis 2016   • Uncontrolled type 2 diabetes mellitus without complication, without long-term current use of insulin 2015   • Vitamin D deficiency disease 2013   • Dyslipidemia    • Recurrent nephrolithiasis 2013       Past Surgical History:  Past Surgical History:   Procedure Laterality Date   • PB TOTAL HIP ARTHROPLASTY Right 3/11/2020    Procedure: ARTHROPLASTY, HIP, TOTAL;  Surgeon: Miguel Madden M.D.;  Location: [facility];  Service: Orthopedics   • GANGLION EXCISION  2014    Performed by Efra Ramirez M.D. at [facility]   • HYSTERECTOMY, TOTAL ABDOMINAL      For non cancerous reasons   • CHOLECYSTECTOMY     • CARPAL TUNNEL RELEASE      Right hand   • CYSTOSCOPY STENT PLACEMENT      several, stent removed in MD office   • PB  DELIVERY ONLY         Allergies:  Macrobid [nitrofurantoin monohydrate macrocrystals] and Pcn [penicillins]    Social History:  Social History     Socioeconomic History   • Marital status:      Spouse name: Not on file   • Number of children: Not on file   • Years of education: Not on file   • Highest education level: Not on file   Occupational History   • Occupation:      Employer: [employer]   Social Needs   • Financial resource strain: Not on file   • Food insecurity     Worry: Patient refused     Inability: Patient refused   • Transportation needs     Medical: Patient refused     Non-medical: Patient refused    Tobacco Use   • Smoking status: Never Smoker   • Smokeless tobacco: Never Used   Substance and Sexual Activity   • Alcohol use: No     Alcohol/week: 0.0 oz     Comment: Rare Useage   • Drug use: No   • Sexual activity: Yes     Partners: Male     Comment: .    Lifestyle   • Physical activity     Days per week: Not on file     Minutes per session: Not on file   • Stress: Not on file   Relationships   • Social connections     Talks on phone: Not on file     Gets together: Not on file     Attends [employer] service: Not on file     Active member of club or organization: Not on file     Attends meetings of clubs or organizations: Not on file     Relationship status: Not on file   • Intimate partner violence     Fear of current or ex partner: Not on file     Emotionally abused: Not on file     Physically abused: Not on file     Forced sexual activity: Not on file   Other Topics Concern   • Not on file   Social History Narrative   • Not on file       Family History:  Family History   Problem Relation Age of Onset   • Cancer Mother         Lung   • Heart Disease Father          at 71, No MI? May have been electrical   • Diabetes Maternal Aunt    • Diabetes Maternal Grandfather        Medications:    Current Outpatient Medications:   •  BYDUREON 2 MG Pen-injector, INJECT 1 SYRINGE SUBCUTANEOUSLY ONCE A WEEK, Disp: 12 Each, Rfl: 0  •  aspirin EC 81 MG EC tablet, Take 1 Tab by mouth 2 Times a Day., Disp: 30 Tab, Rfl: 0  •  glimepiride (AMARYL) 4 MG Tab, TAKE 1 TABLET BY MOUTH ONCE DAILY IN THE MORNING, Disp: 90 Tab, Rfl: 0  •  Cholecalciferol 125 MCG (5000 UT) Tab, Take 10,000 Units by mouth every day., Disp: , Rfl:   •  Cyanocobalamin (VITAMIN B 12 PO), Take 1,000 mcg by mouth every day., Disp: , Rfl:   •  valACYclovir (VALTREX) 500 MG Tab, Take 1 Tab by mouth every day., Disp: 90 Tab, Rfl: 3  •  metformin (GLUCOPHAGE) 1000 MG tablet, TAKE 1 TABLET BY MOUTH TWICE DAILY WITH MEALS, Disp: 180 Tab, Rfl: 0  •   INVOKANA 300 MG Tab, TAKE 1 TABLET BY MOUTH ONCE DAILY, Disp: 90 Tab, Rfl: 3  •  lisinopril (PRINIVIL) 5 MG Tab, Take 0.5 Tabs by mouth every day., Disp: 90 Tab, Rfl: 0  •  Lancets, Lancets order: Lancets for Song Contour Next meter. Sig: use daily and prn ssx high or low sugar., Disp: 100 Each, Rfl: 3  •  atorvastatin (LIPITOR) 20 MG Tab, TAKE ONE TABLET BY MOUTH ONCE DAILY, Disp: 90 Tab, Rfl: 21  •  colesevelam (WELCHOL) 625 MG Tab, TAKE ONE TABLET BY MOUTH ONCE DAILY IN THE MORNING, THEN TAKE TWO TABLETS DAILY IN THE EVENING WITH  DINNER, Disp: 270 Tab, Rfl: 2  •  acyclovir (ZOVIRAX) 5 % Cream, Apply 1 Application to affected area(s) every 3 hours., Disp: 1 Tube, Rfl: 6  •  glucose blood (FREESTYLE LITE) strip, 1 Strip by Other route as needed., Disp: 100 Strip, Rfl: 3  •  triamcinolone acetonide (KENALOG) 0.1 % Ointment, Apply 1 Application to affected area(s) 2 times a day as needed., Disp: , Rfl:   •  mupirocin calcium (BACTROBAN) 2 % Cream, Apply to affected area(s) 2 times a day. (Patient taking differently: Apply 1 Application to affected area(s) as needed. Apply to affected area(s) 2 times a day.), Disp: 15 g, Rfl: 2  •  trolamine salicylate (ASPERCREME/ALOE) 10 % cream, Apply 1 Squirt to affected area(s) 4 times a day. (Patient taking differently: Apply 1 Squirt to affected area(s) 4 times a day as needed.), Disp: 1 Tube, Rfl: 3  •  Blood Glucose Monitoring Suppl SUPPLIES MISC, Test strips order: Test strips for Abbott Freestyle Lite meter. Sig: use BID and prn ssx high or low sugar #100 RF x 3, Disp: 100 Each, Rfl: 3  •  POTASSIUM CITRATE PO, Take 2 Tabs by mouth 3 times a day., Disp: , Rfl:     Labs: Reviewed    Physical Examination:  Vital signs: LMP 11/29/2004  There is no height or weight on file to calculate BMI. Patient's body mass index is unknown because there is no height or weight on file.   General: No apparent distress, cooperative  Eyes: No scleral icterus or discharge  ENMT: Normal on  external inspection of nose, lips, normal thyroid on inspection  Neck: No abnormal masses on inspection  Resp: Normal effort  Extremities: No visible edema  Neuro: Alert and oriented  Skin: No visible rash  Psych: Normal mood and affect, intact memory and able to make informed decisions      Assessment and Plan:    1. Uncontrolled type 2 diabetes mellitus with hyperglycemia (HCC)  - Discussed diabetic diet discussed in detail-plate method.  - She will walk for 20-30 minutes daily.  - Discussed importance of immunizations and yearly eye exams. She states she had an eye exam in August 2020 at [facility].  - Advised daily foot exams. Educated on signs of infection.   - Educated on need to stay well hydrated with water.  - Educated to call with any questions or problems.    2. Obesity (BMI 30-39.9)  Good success with weight loss.     3. Vitamin D deficiency  Cont vitd with food as per hPI    4. Vitamin B 12 deficiency  Increase B 12 to 2000 mcg sublingual daily.     Return in about 3 months (around 8/11/2020).    Thank you for allowing me to participate in the care of this patient.    Dr. Joey Coyne    CC:   Linh Miranda PLiamA.-JAVIER.    This note was created using voice recognition software (Dragon). The accuracy of the dictation is limited by the abilities of the software. I have reviewed the note prior to signing, however some errors in grammar and context are still possible. If you have any questions related to this note please do not hesitate to contact our office.

## 2020-05-11 ENCOUNTER — TELEMEDICINE (OUTPATIENT)
Dept: ENDOCRINOLOGY | Facility: MEDICAL CENTER | Age: 65
End: 2020-05-11
Payer: COMMERCIAL

## 2020-05-11 DIAGNOSIS — E11.65 UNCONTROLLED TYPE 2 DIABETES MELLITUS WITH HYPERGLYCEMIA (HCC): ICD-10-CM

## 2020-05-11 DIAGNOSIS — E66.9 OBESITY (BMI 30-39.9): ICD-10-CM

## 2020-05-11 DIAGNOSIS — E53.8 VITAMIN B 12 DEFICIENCY: ICD-10-CM

## 2020-05-11 DIAGNOSIS — E78.2 MIXED HYPERLIPIDEMIA: ICD-10-CM

## 2020-05-11 DIAGNOSIS — E55.9 VITAMIN D DEFICIENCY: ICD-10-CM

## 2020-05-11 DIAGNOSIS — E03.9 HYPOTHYROIDISM, UNSPECIFIED TYPE: ICD-10-CM

## 2020-05-11 PROCEDURE — 99214 OFFICE O/P EST MOD 30 MIN: CPT | Mod: 95,CR | Performed by: INTERNAL MEDICINE

## 2020-05-28 ENCOUNTER — PATIENT MESSAGE (OUTPATIENT)
Dept: MEDICAL GROUP | Facility: PHYSICIAN GROUP | Age: 65
End: 2020-05-28

## 2020-05-28 DIAGNOSIS — E78.5 DYSLIPIDEMIA: ICD-10-CM

## 2020-05-28 RX ORDER — COLESEVELAM 180 1/1
TABLET ORAL
Qty: 270 TAB | Refills: 2 | Status: SHIPPED | OUTPATIENT
Start: 2020-05-28 | End: 2021-03-03 | Stop reason: SDUPTHER

## 2020-05-28 NOTE — PATIENT COMMUNICATION
Received request via: Patient    Was the patient seen in the last year in this department? Yes    Does the patient have an active prescription (recently filled or refills available) for medication(s) requested? No     Future Appointments       Provider Department Center    7/21/2020 10:15 AM MARTIR  [facility] IMAGING - ULTRASOUND - [address] [facility]    8/24/2020 10:10 AM Josseline Guido R.N.; Joey Coyne M.D. [facility] [place] [place]

## 2020-05-28 NOTE — TELEPHONE ENCOUNTER
From: Shirley Unger  To: Linh Miranda P.A.-C.  Sent: 5/28/2020 10:25 AM PDT  Subject: Non-Urgent Medical Question    Hello Ms. Miranda and Amando,  I have changed my Pharmacy to [facility] at [address] [place], [place]    I need to have a medication refilled. There are no refills left. 90 days is what we refill it for.  Colesevelam Hydrochloride Tablets 625 MG    thank you,  Shirley Unger  1955  [phone]

## 2020-06-10 DIAGNOSIS — E78.5 DYSLIPIDEMIA: ICD-10-CM

## 2020-06-10 DIAGNOSIS — E11.29 TYPE 2 DIABETES MELLITUS WITH OTHER DIABETIC KIDNEY COMPLICATION (HCC): ICD-10-CM

## 2020-06-10 DIAGNOSIS — E11.9 DIABETES MELLITUS WITHOUT COMPLICATION (HCC): ICD-10-CM

## 2020-06-10 RX ORDER — ATORVASTATIN CALCIUM 20 MG/1
TABLET, FILM COATED ORAL
Qty: 90 TAB | Refills: 0 | Status: SHIPPED | OUTPATIENT
Start: 2020-06-10 | End: 2020-09-10

## 2020-06-10 RX ORDER — GLIMEPIRIDE 4 MG/1
TABLET ORAL
Qty: 90 TAB | Refills: 0 | Status: SHIPPED | OUTPATIENT
Start: 2020-06-10 | End: 2020-09-10

## 2020-06-10 RX ORDER — CANAGLIFLOZIN 300 MG/1
1 TABLET, FILM COATED ORAL DAILY
Qty: 90 TAB | Refills: 0 | Status: SHIPPED | OUTPATIENT
Start: 2020-06-10 | End: 2020-09-10

## 2020-06-11 ENCOUNTER — PATIENT MESSAGE (OUTPATIENT)
Dept: MEDICAL GROUP | Facility: PHYSICIAN GROUP | Age: 65
End: 2020-06-11

## 2020-06-11 ENCOUNTER — OFFICE VISIT (OUTPATIENT)
Dept: MEDICAL GROUP | Facility: PHYSICIAN GROUP | Age: 65
End: 2020-06-11
Payer: COMMERCIAL

## 2020-06-11 VITALS
HEART RATE: 108 BPM | SYSTOLIC BLOOD PRESSURE: 118 MMHG | BODY MASS INDEX: 30.92 KG/M2 | HEIGHT: 67 IN | DIASTOLIC BLOOD PRESSURE: 72 MMHG | TEMPERATURE: 97.6 F | OXYGEN SATURATION: 94 % | RESPIRATION RATE: 16 BRPM | WEIGHT: 197 LBS
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
## 2020-06-11 PROCEDURE — 99213 OFFICE O/P EST LOW 20 MIN: CPT | Performed by: PHYSICIAN ASSISTANT

## 2020-06-11 ASSESSMENT — FIBROSIS 4 INDEX: FIB4 SCORE: 0.85

## 2020-06-11 NOTE — PROGRESS NOTES
Chief Complaint   Patient presents with   • Hives     possible        HISTORY OF PRESENT ILLNESS: Shirley Unger is an established 65 y.o. female here to discuss the evaluation and management of:    Patient is a pleasant 65-year-old female here today discuss hives.  She tells me 6 days ago she developed 2 red circular hives on right buttock cheek.  States since then she has developed more hives on right arm.  States she has one on her neck.  States they are intermittently itchy.  No other symptoms.  She admits that she has 2 dogs in the home and one cat.  She is unsure if they have fleas.  States there is 3 other adults and one child in the home and they are all asymptomatic.  States dogs do go outside and 1 of the dogs does sleep in her room.  States she notices the bites usually after waking up.  Has not been using over-the-counter at home treatments alleviate symptoms.  Mouth, hands, feet and genitalia are spared. No blisters or pustules. Rash is not painful. Not precipitated by sun exposure.   No new lotion, soap, detergent, sheets, towels, topical or oral medicine.       Patient Active Problem List    Diagnosis Date Noted   • Primary osteoarthritis of both hips 01/20/2020   • B12 deficiency 01/20/2020   • Huynh's neuroma of left foot 02/27/2019   • Primary insomnia 06/12/2018   • Obesity (BMI 30-39.9) 09/28/2017   • Seasonal allergic rhinitis due to pollen 03/07/2017   • Herpes dermatitis 11/29/2016   • Uncontrolled type 2 diabetes mellitus without complication, without long-term current use of insulin 08/04/2015   • Vitamin D deficiency disease 07/08/2013   • Dyslipidemia    • Recurrent nephrolithiasis 03/22/2013       Allergies:Macrobid [nitrofurantoin monohydrate macrocrystals] and Pcn [penicillins]    Current Outpatient Medications   Medication Sig Dispense Refill   • atorvastatin (LIPITOR) 20 MG Tab TAKE ONE TABLET BY MOUTH ONCE DAILY 90 Tab 0   • Canagliflozin (INVOKANA) 300 MG Tab Take 1 Tab by  mouth every day. 90 Tab 0   • glimepiride (AMARYL) 4 MG Tab TAKE 1 TABLET BY MOUTH ONCE DAILY IN THE MORNING 90 Tab 0   • colesevelam (WELCHOL) 625 MG Tab TAKE ONE TABLET BY MOUTH ONCE DAILY IN THE MORNING, THEN TAKE TWO TABLETS DAILY IN THE EVENING WITH  DINNER 270 Tab 2   • BYDUREON 2 MG Pen-injector INJECT 1 SYRINGE SUBCUTANEOUSLY ONCE A WEEK 12 Each 0   • aspirin EC 81 MG EC tablet Take 1 Tab by mouth 2 Times a Day. 30 Tab 0   • Cholecalciferol 125 MCG (5000 UT) Tab Take 10,000 Units by mouth every day.     • Cyanocobalamin (VITAMIN B 12 PO) Take 1,000 mcg by mouth every day.     • valACYclovir (VALTREX) 500 MG Tab Take 1 Tab by mouth every day. 90 Tab 3   • metformin (GLUCOPHAGE) 1000 MG tablet TAKE 1 TABLET BY MOUTH TWICE DAILY WITH MEALS 180 Tab 0   • lisinopril (PRINIVIL) 5 MG Tab Take 0.5 Tabs by mouth every day. 90 Tab 0   • Lancets Lancets order: Lancets for [employer] Contour Next meter. Sig: use daily and prn ssx high or low sugar. 100 Each 3   • acyclovir (ZOVIRAX) 5 % Cream Apply 1 Application to affected area(s) every 3 hours. 1 Tube 6   • glucose blood (FREESTYLE LITE) strip 1 Strip by Other route as needed. 100 Strip 3   • triamcinolone acetonide (KENALOG) 0.1 % Ointment Apply 1 Application to affected area(s) 2 times a day as needed.     • mupirocin calcium (BACTROBAN) 2 % Cream Apply to affected area(s) 2 times a day. (Patient taking differently: Apply 1 Application to affected area(s) as needed. Apply to affected area(s) 2 times a day.) 15 g 2   • trolamine salicylate (ASPERCREME/ALOE) 10 % cream Apply 1 Squirt to affected area(s) 4 times a day. (Patient taking differently: Apply 1 Squirt to affected area(s) 4 times a day as needed.) 1 Tube 3   • Blood Glucose Monitoring Suppl SUPPLIES MISC Test strips order: Test strips for [employer] Freestyle Lite meter. Sig: use BID and prn ssx high or low sugar #100 RF x 3 100 Each 3   • POTASSIUM CITRATE PO Take 2 Tabs by mouth 3 times a day.       No current  "facility-administered medications for this visit.        Social History     Tobacco Use   • Smoking status: Never Smoker   • Smokeless tobacco: Never Used   Substance Use Topics   • Alcohol use: No     Alcohol/week: 0.0 oz     Comment: Rare Useage   • Drug use: No       Family Status   Relation Name Status   • Mo   at age 78        Lung CA   • Fa   at age 71        MI   • Sis  Alive   • Bro  Alive   • Bro  Alive   • MAunt  (Not Specified)   • MGFa  (Not Specified)     Family History   Problem Relation Age of Onset   • Cancer Mother         Lung   • Heart Disease Father          at 71, No MI? May have been electrical   • Diabetes Maternal Aunt    • Diabetes Maternal Grandfather        ROS:  Review of Systems   Constitutional: Negative for fever, chills, weight loss and malaise/fatigue.   HENT: Negative for ear pain, nosebleeds, congestion, sore throat and neck pain.    Eyes: Negative for blurred vision.   Respiratory: Negative for cough, sputum production, shortness of breath and wheezing.    Cardiovascular: Negative for chest pain, palpitations, orthopnea and leg swelling.   Gastrointestinal: Negative for heartburn, nausea, vomiting and abdominal pain.   Genitourinary: Negative for dysuria, urgency and frequency.   Musculoskeletal: Negative for myalgias, back pain and joint pain.   Skin: + for rash and itching.   Neurological: Negative for dizziness, tingling, tremors, sensory change, focal weakness and headaches.   Endo/Heme/Allergies: Does not bruise/bleed easily.   Psychiatric/Behavioral: Negative for depression, suicidal ideas and memory loss.  The patient is not nervous/anxious and does not have insomnia.    All other systems reviewed and are negative except as in HPI.    Exam: /72 (BP Location: Left arm, Patient Position: Sitting)   Pulse (!) 108   Temp 36.4 °C (97.6 °F) (Temporal)   Resp 16   Ht 1.702 m (5' 7\")   Wt 89.4 kg (197 lb)   SpO2 94%  Body mass index is 30.85 " kg/m².  General: Normal appearing. No distress.  HEENT: Normocephalic. Eyes conjunctiva clear lids without ptosis, ears normal shape and contour.  Neck: Supple without JVD. Thyroid is not enlarged.  Pulmonary: Clear to ausculation.  Normal effort. No rales, ronchi, or wheezing.  Cardiovascular: Regular rate and rhythm without murmur.   Abdomen: Soft, nontender, nondistended.   Neurologic: Grossly nonfocal.  Cranial nerves are normal.  Skin: Warm and dry.  No suspicious skin lesions.  Positive for 8 erythematous pea-sized 2 dime sized macules on patient's right upper arm, left lower buttock, and neck.  Negative for excoriation.  Negative for signs of infection.  Negative for warmth or discharge.  No signs of cellulitis.  Musculoskeletal: Normal gait. No extremity cyanosis, clubbing, or edema.  Psych: Normal mood and affect. Alert and oriented x3. Judgment and insight is normal.    Medical decision-making and discussion:  1. Bug bite, initial encounter  Suspecting fleabites.  Advised patient to wash all linens and to inspect her animals for fleas.  Animals for fleas if needed.  Advised patient to use over-the-counter hydrocortisone cream if areas of concern become itchy.    Follow-up for worsening symptoms,lack of expected recovery, or should new symptoms or problems arise.          Please note that this dictation was created using voice recognition software. I have made every reasonable attempt to correct obvious errors, but I expect that there are errors of grammar and possibly content that I did not discover before finalizing the note.    Assessment/Plan:  1. Bug bite, initial encounter         No follow-ups on file.

## 2020-06-11 NOTE — TELEPHONE ENCOUNTER
Future Appointments       Provider Department Center    6/11/2020 2:40 PM Linh Miranda P.A.-C. [facility] - [address]    7/21/2020 10:15 AM [address] [facility] - ULTRASOUND - [address]    8/24/2020 10:10 AM Josseline Guido R.N.; Joey Coyne M.D. [facility]

## 2020-06-15 ENCOUNTER — OFFICE VISIT (OUTPATIENT)
Dept: URGENT CARE | Facility: CLINIC | Age: 65
End: 2020-06-15
Payer: COMMERCIAL

## 2020-06-15 ENCOUNTER — APPOINTMENT (OUTPATIENT)
Dept: RADIOLOGY | Facility: IMAGING CENTER | Age: 65
End: 2020-06-15
Attending: PHYSICIAN ASSISTANT
Payer: COMMERCIAL

## 2020-06-15 VITALS
RESPIRATION RATE: 18 BRPM | OXYGEN SATURATION: 95 % | WEIGHT: 197 LBS | SYSTOLIC BLOOD PRESSURE: 122 MMHG | BODY MASS INDEX: 30.92 KG/M2 | HEIGHT: 67 IN | DIASTOLIC BLOOD PRESSURE: 60 MMHG | HEART RATE: 101 BPM | TEMPERATURE: 97.8 F

## 2020-06-15 DIAGNOSIS — S43.402A SPRAIN OF LEFT SHOULDER, UNSPECIFIED SHOULDER SPRAIN TYPE, INITIAL ENCOUNTER: ICD-10-CM

## 2020-06-15 DIAGNOSIS — S16.1XXA STRAIN OF NECK MUSCLE, INITIAL ENCOUNTER: ICD-10-CM

## 2020-06-15 DIAGNOSIS — M25.512 ACUTE PAIN OF LEFT SHOULDER: ICD-10-CM

## 2020-06-15 PROCEDURE — 99213 OFFICE O/P EST LOW 20 MIN: CPT | Performed by: PHYSICIAN ASSISTANT

## 2020-06-15 PROCEDURE — 73030 X-RAY EXAM OF SHOULDER: CPT | Mod: TC,LT | Performed by: PHYSICIAN ASSISTANT

## 2020-06-15 ASSESSMENT — ENCOUNTER SYMPTOMS
DIZZINESS: 0
WHEEZING: 0
FOCAL WEAKNESS: 0
EYES NEGATIVE: 1
BACK PAIN: 0
HEADACHES: 0
CHILLS: 0
SHORTNESS OF BREATH: 0
PALPITATIONS: 0
FEVER: 0
SENSORY CHANGE: 0
FALLS: 1
NECK PAIN: 1
COUGH: 0

## 2020-06-15 ASSESSMENT — FIBROSIS 4 INDEX: FIB4 SCORE: 0.85

## 2020-06-15 NOTE — PROGRESS NOTES
Subjective:   Shirley Unger is a 65 y.o. female who presents for Fall (x2 days, fell and landed on wind chime on left shoulder) and Shoulder Injury      Fall   Pertinent negatives include no fever or headaches.   Shoulder Injury      Patient is a pleasant 65-year-old female who presents with a fall onset yesterday.  She states she was carrying wind chimes at an awkward position with her left arm and may have tripped over a rock and fell to her left side injuring her left shoulder.  She denies any dizziness or lightheadedness or loss of consciousness before or after the fall.  She also mentions mild left muscular pain to her neck.  Neck pain is reproduced with movements of her neck side to side.  Denies any midline pain.   denies any head injury, nausea, vomiting, vision changes, dizziness, lightheadedness.  Sustained moderate left shoulder pain exacerbated with movement.  Her arm is held in abducted position flexed at the elbow.  She states she may have heard a pop later on.  Associated weakness due to pain per patient.  She denies any numbness, tingling, weakness.      Review of Systems   Constitutional: Negative for chills and fever.   Eyes: Negative.    Respiratory: Negative for cough, shortness of breath and wheezing.    Cardiovascular: Negative for palpitations.   Musculoskeletal: Positive for falls, joint pain and neck pain. Negative for back pain.   Neurological: Negative for dizziness, sensory change, focal weakness and headaches.       Medications:    • acyclovir Crea  • aspirin  • atorvastatin Tabs  • Blood Glucose Test Strips  • Bydureon Pen  • Cholecalciferol Tabs  • colesevelam Tabs  • glimepiride Tabs  • glucose blood  • Invokana Tabs  • Lancets  • lisinopril Tabs  • metformin  • mupirocin calcium Crea  • POTASSIUM CITRATE PO  • triamcinolone acetonide Oint  • trolamine salicylate  • valACYclovir Tabs  • VITAMIN B 12 PO    Allergies: Macrobid [nitrofurantoin monohydrate macrocrystals] and Pcn  "[penicillins]    Problem List: Shirley Unger has Dyslipidemia; Recurrent nephrolithiasis; Vitamin D deficiency disease; Uncontrolled type 2 diabetes mellitus without complication, without long-term current use of insulin; Herpes dermatitis; Seasonal allergic rhinitis due to pollen; Obesity (BMI 30-39.9); Primary insomnia; Huynh's neuroma of left foot; Primary osteoarthritis of both hips; and B12 deficiency on their problem list.    Surgical History:  Past Surgical History:   Procedure Laterality Date   • PB TOTAL HIP ARTHROPLASTY Right 3/11/2020    Procedure: ARTHROPLASTY, HIP, TOTAL;  Surgeon: Miguel Madden M.D.;  Location: SURGERY [facility];  Service: Orthopedics   • GANGLION EXCISION  2014    Performed by Efra Ramirez M.D. at SURGERY [facility]   • HYSTERECTOMY, TOTAL ABDOMINAL      For non cancerous reasons   • CHOLECYSTECTOMY     • CARPAL TUNNEL RELEASE      Right hand   • CYSTOSCOPY STENT PLACEMENT      several, stent removed in MD office   • PB  DELIVERY ONLY         Past Social Hx: Shirley Unger  reports that she has never smoked. She has never used smokeless tobacco. She reports that she does not drink alcohol or use drugs.     Past Family Hx:  Shirley Unger family history includes Cancer in her mother; Diabetes in her maternal aunt and maternal grandfather; Heart Disease in her father.     Problem list, medications, and allergies reviewed by myself today in Epic.     Objective:     /60   Pulse (!) 101   Temp 36.6 °C (97.8 °F) (Temporal)   Resp 18   Ht 1.702 m (5' 7\")   Wt 89.4 kg (197 lb)   LMP 2004   SpO2 95%   BMI 30.85 kg/m²     Physical Exam  Vitals signs reviewed.   Constitutional:       General: She is not in acute distress.     Appearance: Normal appearance. She is not ill-appearing or toxic-appearing.   HENT:      Right Ear: Tympanic membrane, ear canal and external ear normal.      Left Ear: Tympanic " membrane, ear canal and external ear normal.      Nose: Nose normal.      Mouth/Throat:      Mouth: Mucous membranes are dry.      Pharynx: Oropharynx is clear. No oropharyngeal exudate or posterior oropharyngeal erythema.   Eyes:      Extraocular Movements: Extraocular movements intact.      Conjunctiva/sclera: Conjunctivae normal.      Pupils: Pupils are equal, round, and reactive to light.   Neck:      Musculoskeletal: Normal range of motion and neck supple. Normal range of motion. Muscular tenderness present. No edema, erythema, neck rigidity, crepitus, torticollis or spinous process tenderness.      Trachea: Trachea normal.     Cardiovascular:      Rate and Rhythm: Normal rate and regular rhythm.      Heart sounds: Normal heart sounds.   Pulmonary:      Effort: Pulmonary effort is normal. No respiratory distress.      Breath sounds: Normal breath sounds. No wheezing, rhonchi or rales.   Musculoskeletal:      Comments: Left shoulder: Limited active external rotation and abduction due to pain.   Full passive ROM.    Tenderness to palpation over anterior shoulder joint/proximal humerus.   No significant bony tenderness, crepitus, deformity, edema, or overlying skin changes.   Strength 5/5 with flexion, extension, abduction and adduction.  Sensation intact.  Capillary Refill < 2 sec.   Distal pulses intact.      Skin:     Capillary Refill: Capillary refill takes less than 2 seconds.   Neurological:      General: No focal deficit present.      Mental Status: She is alert and oriented to person, place, and time.   Psychiatric:         Mood and Affect: Mood normal.         Behavior: Behavior normal.       DX: Shoulder  COMPARISON: None     FINDINGS:  Diffuse osseous demineralization.  No acute fracture or dislocation.  Moderate osteoarthritis of the AC joint and glenohumeral joint.        IMPRESSION:        1. No definite fracture is seen but evaluation is limited due to osseous demineralization.    Assessment/Plan:        Diagnosis and associated orders:     1. Sprain of left shoulder, unspecified shoulder sprain type, initial encounter  DX-SHOULDER 2+ LEFT   2. Strain of neck muscle, initial encounter        Comments/MDM:     • X-ray shoulder results discussed per radiologist interpretation.  I reviewed x-ray results and did not see any definite acute fracture or dislocation.  Discussed most likely shoulder sprain, versus bursitis, versus contusion.  • She will be placed in a sling for temporary symptomatic support.   • Discussed treatment of ibuprofen alternating with Tylenol, ice application, rest, massage.  As pain is tolerated encourage range of motion exercises and stretching.  Discharge instructions handed to patient.   • Offered referral to sports medicine for follow-up appointment, however, patient declined and she may follow-up with her primary care physician and watch for improvement of symptoms.   • Discussed with patient she may send a [employer] message if symptoms are not improving and we will provide appropriate further instructions.  • Instructed to return to the urgent care or follow-up with PCP if any worsening symptoms or symptoms are not improving or any numbness, tingling, weakness or any other concerns.         Red flags discussed and indications to present to the Emergency Department.   Supportive care, differential diagnoses, and indications for immediate follow-up discussed with patient.    Pathogenesis of diagnosis discussed including typical length and natural progression. Patient expresses understanding and agrees to plan.    Advised the patient to follow-up with the primary care physician for recheck, reevaluation, and consideration of further management.    Please note that this dictation was created using voice recognition software. I have made a reasonable attempt to correct obvious errors, but I expect that there are errors of grammar and possibly content that I did not discover before finalizing  the note.    This note was electronically signed by Andrew Montes De Oca PA-C

## 2020-06-29 DIAGNOSIS — E11.9 DIABETES MELLITUS WITHOUT COMPLICATION (HCC): ICD-10-CM

## 2020-07-20 DIAGNOSIS — B00.89 HERPES DERMATITIS: ICD-10-CM

## 2020-07-20 RX ORDER — VALACYCLOVIR HYDROCHLORIDE 500 MG/1
500 TABLET, FILM COATED ORAL DAILY
Qty: 90 TAB | Refills: 0 | Status: SHIPPED | OUTPATIENT
Start: 2020-07-20 | End: 2020-10-21

## 2020-07-21 ENCOUNTER — HOSPITAL ENCOUNTER (OUTPATIENT)
Dept: RADIOLOGY | Facility: MEDICAL CENTER | Age: 65
End: 2020-07-21
Attending: UROLOGY
Payer: COMMERCIAL

## 2020-07-21 DIAGNOSIS — N20.0 CALCULUS OF KIDNEY: ICD-10-CM

## 2020-07-21 PROCEDURE — 76775 US EXAM ABDO BACK WALL LIM: CPT

## 2020-07-31 RX ORDER — LISINOPRIL 5 MG/1
2.5 TABLET ORAL DAILY
Qty: 90 TAB | Refills: 1 | Status: SHIPPED | OUTPATIENT
Start: 2020-07-31 | End: 2021-03-29 | Stop reason: SDUPTHER

## 2020-07-31 RX ORDER — EXENATIDE 2 MG/.65ML
2 INJECTION, SUSPENSION, EXTENDED RELEASE SUBCUTANEOUS
Qty: 12 EACH | Refills: 1 | Status: SHIPPED | OUTPATIENT
Start: 2020-07-31 | End: 2021-01-14

## 2020-08-17 ENCOUNTER — HOSPITAL ENCOUNTER (OUTPATIENT)
Dept: LAB | Facility: MEDICAL CENTER | Age: 65
End: 2020-08-17
Payer: COMMERCIAL

## 2020-08-17 ENCOUNTER — HOSPITAL ENCOUNTER (OUTPATIENT)
Dept: LAB | Facility: MEDICAL CENTER | Age: 65
End: 2020-08-17
Attending: INTERNAL MEDICINE
Payer: COMMERCIAL

## 2020-08-17 DIAGNOSIS — E53.8 VITAMIN B 12 DEFICIENCY: ICD-10-CM

## 2020-08-17 DIAGNOSIS — E78.2 MIXED HYPERLIPIDEMIA: ICD-10-CM

## 2020-08-17 DIAGNOSIS — E66.9 OBESITY (BMI 30-39.9): ICD-10-CM

## 2020-08-17 DIAGNOSIS — E11.65 UNCONTROLLED TYPE 2 DIABETES MELLITUS WITH HYPERGLYCEMIA (HCC): ICD-10-CM

## 2020-08-17 DIAGNOSIS — E55.9 VITAMIN D DEFICIENCY: ICD-10-CM

## 2020-08-17 DIAGNOSIS — E03.9 HYPOTHYROIDISM, UNSPECIFIED TYPE: ICD-10-CM

## 2020-08-17 LAB
25(OH)D3 SERPL-MCNC: 79 NG/ML (ref 30–100)
ANION GAP SERPL CALC-SCNC: 12 MMOL/L (ref 7–16)
BDY FAT % MEASURED: 37.1 %
BP DIAS: 60 MMHG
BP SYS: 97 MMHG
BUN SERPL-MCNC: 21 MG/DL (ref 8–22)
CALCIUM SERPL-MCNC: 9.6 MG/DL (ref 8.5–10.5)
CHLORIDE SERPL-SCNC: 102 MMOL/L (ref 96–112)
CHOLEST SERPL-MCNC: 136 MG/DL (ref 100–199)
CHOLEST SERPL-MCNC: 137 MG/DL (ref 100–199)
CO2 SERPL-SCNC: 25 MMOL/L (ref 20–33)
CREAT SERPL-MCNC: 0.54 MG/DL (ref 0.5–1.4)
CREAT UR-MCNC: 60.39 MG/DL
DIABETES HTDIA: YES
EST. AVERAGE GLUCOSE BLD GHB EST-MCNC: 146 MG/DL
EST. AVERAGE GLUCOSE BLD GHB EST-MCNC: 151 MG/DL
EVENT NAME HTEVT: NORMAL
FASTING HTFAS: YES
FASTING STATUS PATIENT QL REPORTED: NORMAL
GLUCOSE SERPL-MCNC: 152 MG/DL (ref 65–99)
GLUCOSE SERPL-MCNC: 153 MG/DL (ref 65–99)
HBA1C MFR BLD: 6.7 % (ref 0–5.6)
HBA1C MFR BLD: 6.9 % (ref 0–5.6)
HDLC SERPL-MCNC: 39 MG/DL
HDLC SERPL-MCNC: 39 MG/DL
HYPERTENSION HTHYP: NO
LDLC SERPL CALC-MCNC: 59 MG/DL
LDLC SERPL CALC-MCNC: 59 MG/DL
MICROALBUMIN UR-MCNC: <1.2 MG/DL
MICROALBUMIN/CREAT UR: NORMAL MG/G (ref 0–30)
POTASSIUM SERPL-SCNC: 4 MMOL/L (ref 3.6–5.5)
SCREENING LOC CITY HTCIT: NORMAL
SCREENING LOC STATE HTSTA: NORMAL
SCREENING LOCATION HTLOC: NORMAL
SMOKING HTSMO: NO
SODIUM SERPL-SCNC: 139 MMOL/L (ref 135–145)
SUBSCRIBER ID HTSID: NORMAL
T3 SERPL-MCNC: 92.9 NG/DL (ref 60–181)
T3FREE SERPL-MCNC: 2.63 PG/ML (ref 2–4.4)
T4 FREE SERPL-MCNC: 1.23 NG/DL (ref 0.93–1.7)
TRIGL SERPL-MCNC: 191 MG/DL (ref 0–149)
TRIGL SERPL-MCNC: 193 MG/DL (ref 0–149)
TSH SERPL DL<=0.005 MIU/L-ACNC: 1.08 UIU/ML (ref 0.38–5.33)
VIT B12 SERPL-MCNC: >4000 PG/ML (ref 211–911)

## 2020-08-17 PROCEDURE — 82306 VITAMIN D 25 HYDROXY: CPT

## 2020-08-17 PROCEDURE — 84480 ASSAY TRIIODOTHYRONINE (T3): CPT

## 2020-08-17 PROCEDURE — 84439 ASSAY OF FREE THYROXINE: CPT

## 2020-08-17 PROCEDURE — 82570 ASSAY OF URINE CREATININE: CPT

## 2020-08-17 PROCEDURE — 80061 LIPID PANEL: CPT | Mod: 91

## 2020-08-17 PROCEDURE — 80048 BASIC METABOLIC PNL TOTAL CA: CPT

## 2020-08-17 PROCEDURE — 82043 UR ALBUMIN QUANTITATIVE: CPT

## 2020-08-17 PROCEDURE — 36415 COLL VENOUS BLD VENIPUNCTURE: CPT

## 2020-08-17 PROCEDURE — 84443 ASSAY THYROID STIM HORMONE: CPT

## 2020-08-17 PROCEDURE — 84481 FREE ASSAY (FT-3): CPT

## 2020-08-17 PROCEDURE — 83036 HEMOGLOBIN GLYCOSYLATED A1C: CPT | Mod: 91

## 2020-08-17 PROCEDURE — 82947 ASSAY GLUCOSE BLOOD QUANT: CPT

## 2020-08-17 PROCEDURE — S5190 WELLNESS ASSESSMENT BY NONPH: HCPCS

## 2020-08-17 PROCEDURE — 83036 HEMOGLOBIN GLYCOSYLATED A1C: CPT

## 2020-08-17 PROCEDURE — 82607 VITAMIN B-12: CPT

## 2020-08-17 PROCEDURE — 80061 LIPID PANEL: CPT

## 2020-08-23 NOTE — PROGRESS NOTES
Endocrinology Clinic Progress Note  PCP: Linh Miranda P.A.-C.    HPI:  Shirley Unger is a 65 y.o. old patient who comes in today for routine follow up of 65 y.o. old patient who comes in today for routine follow up of Management of Uncontrolled Type 2 Diabetes, Obesity, Vitamin D Deficiency, Vitamin B 12 Deficiency, and Hyperlipidemia.  She had total right hip surgery on 3/11/2020.     Vitamin D Deficiency:  Currently taking Vitamin D 10,000 units daily.  Results for SHIRLEY UNGER (MRN [id number]) as of 8/23/2020 11:21   Ref. Range 8/17/2020 06:27   25-Hydroxy   Vitamin D 25 Latest Ref Range: 30 - 100 ng/mL 79        Vitamin B 12 Deficiency:  Currently taking Vitamin B 12 6000 mcg daily for the last 3 weeks but will only take as needed now.  Results for SHIRLEY UNGER (MRN [id number]) as of 8/23/2020 11:21   Ref. Range 8/17/2020 06:27   Vitamin B12 -True Cobalamin Latest Ref Range: 211 - 911 pg/mL >4000 (H)       Hyperlipidemia:  Currently taking Lipitor 20 mg daily.  Results for SHIRLEY UNGER (MRN [id number]) as of 8/23/2020 11:21   Ref. Range 8/17/2020 06:31   Cholesterol,Tot Latest Ref Range: 100 - 199 mg/dL 137   Triglycerides Latest Ref Range: 0 - 149 mg/dL 193 (H)   HDL Latest Ref Range: >=40 mg/dL 39 (A)   LDL Latest Ref Range: <100 mg/dL 59        Obesity:   Just now starting to do more walking since her hip surgery.  She has lost 140 lbs in the last 20 years.     HPI:  Shirley Unger is a 65 y.o. old patient who comes in today for evaluation of above stated problem.    Most Recent HbA1c:   Lab Results   Component Value Date/Time    HBA1C 6.7 (H) 08/17/2020 06:31 AM        Current Diabetes Regimen:  Bydureon 2 mg weekly.  SGLT-2 Inhibitor:  Canagliflozin 300 mg once daily   Other: Metformin 1000 mg BID and Glimepiride 4 mg daily.     Testing blood sugars randomly.  .   Hypoglycemia:  None    She had right hip surgery in March and doing really well from  it.    ROS:  Constitutional:  weight loss  Cardiac: No palpitations or racing heart  Resp: No shortness of breath  Neuro: No numbness or tinging in feet  Endo: No heat or cold intolerance, no polyuria or polydipsia  All other systems were reviewed and were negative.    Past Medical History:  Patient Active Problem List    Diagnosis Date Noted   • Primary osteoarthritis of both hips 2020   • B12 deficiency 2020   • Huynh's neuroma of left foot 2019   • Primary insomnia 2018   • Obesity (BMI 30-39.9) 2017   • Seasonal allergic rhinitis due to pollen 2017   • Herpes dermatitis 2016   • Uncontrolled type 2 diabetes mellitus without complication, without long-term current use of insulin 2015   • Vitamin D deficiency disease 2013   • Dyslipidemia    • Recurrent nephrolithiasis 2013       Past Surgical History:  Past Surgical History:   Procedure Laterality Date   • PB TOTAL HIP ARTHROPLASTY Right 3/11/2020    Procedure: ARTHROPLASTY, HIP, TOTAL;  Surgeon: Miguel Madden M.D.;  Location: [facility];  Service: Orthopedics   • GANGLION EXCISION  2014    Performed by Efra Ramirez M.D. at [facility]   • HYSTERECTOMY, TOTAL ABDOMINAL      For non cancerous reasons   • CHOLECYSTECTOMY     • CARPAL TUNNEL RELEASE      Right hand   • CYSTOSCOPY STENT PLACEMENT      several, stent removed in MD office   • PB  DELIVERY ONLY         Allergies:  Macrobid [nitrofurantoin monohydrate macrocrystals] and Pcn [penicillins]    Social History:  Social History     Socioeconomic History   • Marital status:      Spouse name: Not on file   • Number of children: Not on file   • Years of education: Not on file   • Highest education level: Not on file   Occupational History   • Occupation:      Employer: [employer]   Social Needs   • Financial resource strain: Not on file   • Food insecurity     Worry:  Patient refused     Inability: Patient refused   • Transportation needs     Medical: Patient refused     Non-medical: Patient refused   Tobacco Use   • Smoking status: Never Smoker   • Smokeless tobacco: Never Used   Substance and Sexual Activity   • Alcohol use: No     Alcohol/week: 0.0 oz     Comment: Rare Useage   • Drug use: No   • Sexual activity: Yes     Partners: Male     Comment: .    Lifestyle   • Physical activity     Days per week: Not on file     Minutes per session: Not on file   • Stress: Not on file   Relationships   • Social connections     Talks on phone: Not on file     Gets together: Not on file     Attends [employer]ism service: Not on file     Active member of club or organization: Not on file     Attends meetings of clubs or organizations: Not on file     Relationship status: Not on file   • Intimate partner violence     Fear of current or ex partner: Not on file     Emotionally abused: Not on file     Physically abused: Not on file     Forced sexual activity: Not on file   Other Topics Concern   • Not on file   Social History Narrative   • Not on file       Family History:  Family History   Problem Relation Age of Onset   • Cancer Mother         Lung   • Heart Disease Father          at 71, No MI? May have been electrical   • Diabetes Maternal Aunt    • Diabetes Maternal Grandfather        Medications:    Current Outpatient Medications:   •  Exenatide ER (BYDUREON) 2 MG Pen-injector, 2 mg by Injection route every 7 days., Disp: 12 Each, Rfl: 1  •  lisinopril (PRINIVIL) 5 MG Tab, Take 0.5 Tabs by mouth every day. (Patient taking differently: Take 5 mg by mouth every day.), Disp: 90 Tab, Rfl: 1  •  valACYclovir (VALTREX) 500 MG Tab, Take 1 Tab by mouth every day., Disp: 90 Tab, Rfl: 0  •  metformin (GLUCOPHAGE) 1000 MG tablet, TAKE 1 TABLET BY MOUTH TWICE DAILY WITH MEALS, Disp: 180 Tab, Rfl: 0  •  atorvastatin (LIPITOR) 20 MG Tab, TAKE ONE TABLET BY MOUTH ONCE DAILY, Disp: 90 Tab, Rfl:  "0  •  Canagliflozin (INVOKANA) 300 MG Tab, Take 1 Tab by mouth every day., Disp: 90 Tab, Rfl: 0  •  glimepiride (AMARYL) 4 MG Tab, TAKE 1 TABLET BY MOUTH ONCE DAILY IN THE MORNING, Disp: 90 Tab, Rfl: 0  •  colesevelam (WELCHOL) 625 MG Tab, TAKE ONE TABLET BY MOUTH ONCE DAILY IN THE MORNING, THEN TAKE TWO TABLETS DAILY IN THE EVENING WITH  DINNER, Disp: 270 Tab, Rfl: 2  •  aspirin EC 81 MG EC tablet, Take 1 Tab by mouth 2 Times a Day., Disp: 30 Tab, Rfl: 0  •  Cholecalciferol 125 MCG (5000 UT) Tab, Take 10,000 Units by mouth every day., Disp: , Rfl:   •  Cyanocobalamin (VITAMIN B 12 PO), Take 1,000 mcg by mouth every day., Disp: , Rfl:   •  Lancets, Lancets order: Lancets for [employer] Contour Next meter. Sig: use daily and prn ssx high or low sugar., Disp: 100 Each, Rfl: 3  •  acyclovir (ZOVIRAX) 5 % Cream, Apply 1 Application to affected area(s) every 3 hours., Disp: 1 Tube, Rfl: 6  •  glucose blood (FREESTYLE LITE) strip, 1 Strip by Other route as needed., Disp: 100 Strip, Rfl: 3  •  triamcinolone acetonide (KENALOG) 0.1 % Ointment, Apply 1 Application to affected area(s) 2 times a day as needed., Disp: , Rfl:   •  mupirocin calcium (BACTROBAN) 2 % Cream, Apply to affected area(s) 2 times a day. (Patient taking differently: Apply 1 Application to affected area(s) as needed. Apply to affected area(s) 2 times a day.), Disp: 15 g, Rfl: 2  •  trolamine salicylate (ASPERCREME/ALOE) 10 % cream, Apply 1 Squirt to affected area(s) 4 times a day. (Patient taking differently: Apply 1 Squirt to affected area(s) 4 times a day as needed.), Disp: 1 Tube, Rfl: 3  •  Blood Glucose Monitoring Suppl SUPPLIES MISC, Test strips order: Test strips for Abbott Freestyle Lite meter. Sig: use BID and prn ssx high or low sugar #100 RF x 3, Disp: 100 Each, Rfl: 3  •  POTASSIUM CITRATE PO, Take 2 Tabs by mouth 3 times a day., Disp: , Rfl:     Labs: Reviewed    Physical Examination:  Vital signs: /60   Pulse 97   Ht 1.702 m (5' 7\")   Wt " 88 kg (194 lb)   LMP 11/29/2004   SpO2 96%   BMI 30.38 kg/m²  Body mass index is 30.38 kg/m².  General: No apparent distress, cooperative  Eyes: No scleral icterus or discharge  ENMT: Normal on external inspection of nose, lips, normal thyroid exam  Neck: No abnormal masses on inspection  Resp: Normal effort, clear to auscultation bilaterally   CVS: Regular rate and rhythm, S1 S2 normal, no murmur   Extremities: No edema  Abdomen: abdominal obesity present  Neuro: Alert and oriented, uses cane to walk  Skin: No rash  Psych: Normal mood and affect, intact memory and able to make informed decisions      Assessment and Plan:    1. Uncontrolled type 2 diabetes mellitus with hyperglycemia (HCC)  Continue current regimen.  Doing really well.  - Discussed diabetic diet discussed in detail-plate method.  - She will test blood sugars and log.  - She will walk for 20-30 minutes daily.  - Reviewed medications and advised how to take.  - Discussed importance of immunizations and yearly eye exams.   - Advised daily foot exams. Educated on signs of infection.   - Educated on need to stay well hydrated with water.  - Educated to call with any questions or problems.    2. Obesity (BMI 30-39.9)   lifestyle modification for weight loss    3. Mixed hyperlipidemia  Statin.     4. Vitamin D deficiency  Continue vit d with food as per HPI  educated on the immune benefits of vitamin D and protection from COVID-19 as per recent early evidence.    5. Vitamin B 12 deficiency  Her B12 levels are now adequate.  If she wants she can supplement maybe once a week or once every 2 weeks to maintain it above 500 range.    Return in about 6 months (around 2/24/2021).      Thank you for allowing me to participate in the care of this patient.    Dr. Joey Coyne    CC:   Linh Miranda, P.A.-C.    This note was created using voice recognition software (Dragon). The accuracy of the dictation is limited by the abilities of the software. I have  reviewed the note prior to signing, however some errors in grammar and context are still possible. If you have any questions related to this note please do not hesitate to contact our office.

## 2020-08-24 ENCOUNTER — OFFICE VISIT (OUTPATIENT)
Dept: ENDOCRINOLOGY | Facility: MEDICAL CENTER | Age: 65
End: 2020-08-24
Attending: INTERNAL MEDICINE
Payer: COMMERCIAL

## 2020-08-24 VITALS
WEIGHT: 194 LBS | OXYGEN SATURATION: 96 % | HEART RATE: 97 BPM | BODY MASS INDEX: 30.45 KG/M2 | DIASTOLIC BLOOD PRESSURE: 60 MMHG | HEIGHT: 67 IN | SYSTOLIC BLOOD PRESSURE: 122 MMHG

## 2020-08-24 DIAGNOSIS — E53.8 VITAMIN B 12 DEFICIENCY: ICD-10-CM

## 2020-08-24 DIAGNOSIS — E66.9 OBESITY (BMI 30-39.9): ICD-10-CM

## 2020-08-24 DIAGNOSIS — E55.9 VITAMIN D DEFICIENCY: ICD-10-CM

## 2020-08-24 DIAGNOSIS — E78.2 MIXED HYPERLIPIDEMIA: ICD-10-CM

## 2020-08-24 DIAGNOSIS — E11.65 UNCONTROLLED TYPE 2 DIABETES MELLITUS WITH HYPERGLYCEMIA (HCC): ICD-10-CM

## 2020-08-24 PROCEDURE — 99213 OFFICE O/P EST LOW 20 MIN: CPT | Performed by: INTERNAL MEDICINE

## 2020-08-24 PROCEDURE — 99214 OFFICE O/P EST MOD 30 MIN: CPT | Performed by: INTERNAL MEDICINE

## 2020-08-24 ASSESSMENT — FIBROSIS 4 INDEX: FIB4 SCORE: 0.85

## 2020-09-10 DIAGNOSIS — E11.9 DIABETES MELLITUS WITHOUT COMPLICATION (HCC): ICD-10-CM

## 2020-09-10 DIAGNOSIS — E78.5 DYSLIPIDEMIA: ICD-10-CM

## 2020-09-10 DIAGNOSIS — E11.29 TYPE 2 DIABETES MELLITUS WITH OTHER DIABETIC KIDNEY COMPLICATION (HCC): ICD-10-CM

## 2020-09-10 RX ORDER — CANAGLIFLOZIN 300 MG/1
TABLET, FILM COATED ORAL
Qty: 90 TAB | Refills: 0 | Status: SHIPPED | OUTPATIENT
Start: 2020-09-10 | End: 2020-12-10

## 2020-09-10 RX ORDER — ATORVASTATIN CALCIUM 20 MG/1
TABLET, FILM COATED ORAL
Qty: 90 TAB | Refills: 0 | Status: SHIPPED | OUTPATIENT
Start: 2020-09-10 | End: 2020-12-10

## 2020-09-10 RX ORDER — GLIMEPIRIDE 4 MG/1
TABLET ORAL
Qty: 90 TAB | Refills: 0 | Status: SHIPPED | OUTPATIENT
Start: 2020-09-10 | End: 2021-01-04

## 2020-09-30 DIAGNOSIS — E11.9 DIABETES MELLITUS WITHOUT COMPLICATION (HCC): ICD-10-CM

## 2020-10-05 ENCOUNTER — HOSPITAL ENCOUNTER (OUTPATIENT)
Dept: RADIOLOGY | Facility: MEDICAL CENTER | Age: 65
End: 2020-10-05
Attending: PHYSICIAN ASSISTANT
Payer: COMMERCIAL

## 2020-10-05 ENCOUNTER — IMMUNIZATION (OUTPATIENT)
Dept: OCCUPATIONAL MEDICINE | Facility: CLINIC | Age: 65
End: 2020-10-05
Payer: COMMERCIAL

## 2020-10-05 DIAGNOSIS — Z23 NEED FOR VACCINATION: ICD-10-CM

## 2020-10-05 DIAGNOSIS — Z12.31 VISIT FOR SCREENING MAMMOGRAM: ICD-10-CM

## 2020-10-05 PROCEDURE — 90662 IIV NO PRSV INCREASED AG IM: CPT | Performed by: NURSE PRACTITIONER

## 2020-10-05 PROCEDURE — 77067 SCR MAMMO BI INCL CAD: CPT

## 2020-10-19 ENCOUNTER — OFFICE VISIT (OUTPATIENT)
Dept: MEDICAL GROUP | Facility: PHYSICIAN GROUP | Age: 65
End: 2020-10-19
Payer: COMMERCIAL

## 2020-10-19 VITALS
BODY MASS INDEX: 30.92 KG/M2 | DIASTOLIC BLOOD PRESSURE: 66 MMHG | WEIGHT: 197 LBS | SYSTOLIC BLOOD PRESSURE: 100 MMHG | HEIGHT: 67 IN | OXYGEN SATURATION: 94 % | HEART RATE: 100 BPM | RESPIRATION RATE: 16 BRPM | TEMPERATURE: 98.6 F

## 2020-10-19 DIAGNOSIS — M54.2 NECK PAIN: ICD-10-CM

## 2020-10-19 PROBLEM — H92.02 LEFT EAR PAIN: Status: ACTIVE | Noted: 2020-10-19

## 2020-10-19 PROCEDURE — 99214 OFFICE O/P EST MOD 30 MIN: CPT | Performed by: FAMILY MEDICINE

## 2020-10-19 RX ORDER — CANAGLIFLOZIN 300 MG/1
TABLET, FILM COATED ORAL
COMMUNITY
End: 2021-01-27

## 2020-10-19 RX ORDER — EXENATIDE 2 MG/.65ML
INJECTION, SUSPENSION, EXTENDED RELEASE SUBCUTANEOUS
COMMUNITY
End: 2021-02-24 | Stop reason: CLARIF

## 2020-10-19 RX ORDER — CYCLOBENZAPRINE HCL 5 MG
5 TABLET ORAL 3 TIMES DAILY PRN
Qty: 30 TAB | Refills: 0 | Status: SHIPPED
Start: 2020-10-19 | End: 2021-04-28

## 2020-10-19 RX ORDER — POTASSIUM CITRATE 10 MEQ/1
TABLET, EXTENDED RELEASE ORAL
COMMUNITY
Start: 2020-10-14 | End: 2021-01-27

## 2020-10-19 RX ORDER — POTASSIUM CITRATE 10 MEQ/1
TABLET, EXTENDED RELEASE ORAL
COMMUNITY
End: 2021-12-06

## 2020-10-19 RX ORDER — MELOXICAM 7.5 MG/1
7.5 TABLET ORAL DAILY
Qty: 30 TAB | Refills: 0 | Status: SHIPPED
Start: 2020-10-19 | End: 2021-01-27

## 2020-10-19 ASSESSMENT — FIBROSIS 4 INDEX: FIB4 SCORE: 0.85

## 2020-10-19 NOTE — ASSESSMENT & PLAN NOTE
This is a new problem  Onset: The patient has been c/o left neck pain over the past 2 weeks  Current symptoms: Left-sided neck pain that radiates up into her ear and down  Modifying factors: She wonders if it is related to the way that she is sleeping  Treatments tried: Ibuprofen as needed  Associated symptoms: Denies any left arm weakness or numbness or tingling

## 2020-10-19 NOTE — PROGRESS NOTES
Subjective:     Chief Complaint   Patient presents with   • Neck Pain     L side x weeks    • Otalgia     x 3 days/ L side/ px 7       HPI:   Shirley presents today to discuss the following.  PCP: Linh Miranda PA-C    Neck pain  This is a new problem  Onset: The patient has been c/o left neck pain over the past 2 weeks  Current symptoms: Left-sided neck pain that radiates up into her ear and down  Modifying factors: She wonders if it is related to the way that she is sleeping  Treatments tried: Ibuprofen as needed  Associated symptoms: Denies any left arm weakness or numbness or tingling      Past Medical History:   Diagnosis Date   • DM (diabetes mellitus) ([facility]) 2004   • Herpes genitalia 3/22/2013   • High cholesterol    • Hyperlipidemia LDL goal < 100    • Nephrolithiasis 3/22/2013    Recurrent Dr Monge - Calcium stones,    • Urinary incontinence    • Vitamin d deficiency 7/8/2013       Social History     Tobacco Use   • Smoking status: Never Smoker   • Smokeless tobacco: Never Used   Substance Use Topics   • Alcohol use: No     Alcohol/week: 0.0 oz     Comment: Rare Useage   • Drug use: No       Current Outpatient Medications Ordered in Epic   Medication Sig Dispense Refill   • cyclobenzaprine (FLEXERIL) 5 mg tablet Take 1 Tab by mouth 3 times a day as needed. 30 Tab 0   • meloxicam (MOBIC) 7.5 MG Tab Take 1 Tab by mouth every day. 30 Tab 0   • metformin (GLUCOPHAGE) 1000 MG tablet TAKE ONE TABLET BY MOUTH TWICE A DAY WITH MEALS 180 Tab 0   • INVOKANA 300 MG Tab TAKE 1 TABLET ORALLY DAILY 90 Tab 0   • glimepiride (AMARYL) 4 MG Tab TAKE 1 TABLET ORALLY EVERY MORNING 90 Tab 0   • atorvastatin (LIPITOR) 20 MG Tab TAKE 1 TABLET ORALLY DAILY 90 Tab 0   • Exenatide ER (BYDUREON) 2 MG Pen-injector 2 mg by Injection route every 7 days. 12 Each 1   • lisinopril (PRINIVIL) 5 MG Tab Take 0.5 Tabs by mouth every day. (Patient taking differently: Take 5 mg by mouth every day.) 90 Tab 1   • valACYclovir (VALTREX) 500 MG Tab Take 1  Tab by mouth every day. 90 Tab 0   • colesevelam (WELCHOL) 625 MG Tab TAKE ONE TABLET BY MOUTH ONCE DAILY IN THE MORNING, THEN TAKE TWO TABLETS DAILY IN THE EVENING WITH  DINNER 270 Tab 2   • aspirin EC 81 MG EC tablet Take 1 Tab by mouth 2 Times a Day. 30 Tab 0   • Cholecalciferol 125 MCG (5000 UT) Tab Take 10,000 Units by mouth every day.     • Cyanocobalamin (VITAMIN B 12 PO) Take 1,000 mcg by mouth every day.     • Lancets Lancets order: Lancets for Song Contour Next meter. Sig: use daily and prn ssx high or low sugar. 100 Each 3   • acyclovir (ZOVIRAX) 5 % Cream Apply 1 Application to affected area(s) every 3 hours. 1 Tube 6   • glucose blood (FREESTYLE LITE) strip 1 Strip by Other route as needed. 100 Strip 3   • triamcinolone acetonide (KENALOG) 0.1 % Ointment Apply 1 Application to affected area(s) 2 times a day as needed.     • mupirocin calcium (BACTROBAN) 2 % Cream Apply to affected area(s) 2 times a day. (Patient taking differently: Apply 1 Application to affected area(s) as needed. Apply to affected area(s) 2 times a day.) 15 g 2   • trolamine salicylate (ASPERCREME/ALOE) 10 % cream Apply 1 Squirt to affected area(s) 4 times a day. (Patient taking differently: Apply 1 Squirt to affected area(s) 4 times a day as needed.) 1 Tube 3   • Blood Glucose Monitoring Suppl SUPPLIES MISC Test strips order: Test strips for Abbott Freestyle Lite meter. Sig: use BID and prn ssx high or low sugar #100 RF x 3 100 Each 3   • POTASSIUM CITRATE PO Take 2 Tabs by mouth 3 times a day.     • Canagliflozin (INVOKANA) 300 MG Tab Invokana 300 mg tablet     • Exenatide ER (BYDUREON) 2 MG Pen-injector Bydureon 2 mg/0.65 mL subcutaneous pen injector     • potassium citrate SR (UROCIT-K SR) 10 MEQ (1080 MG) Tab CR potassium citrate ER 10 mEq (1,080 mg) tablet,extended release     • potassium citrate SR (UROCIT-K SR) 10 MEQ (1080 MG) Tab CR        No current Epic-ordered facility-administered medications on file.   "      Allergies:  Macrobid [nitrofurantoin monohydrate macrocrystals] and Pcn [penicillins]    Health Maintenance: Completed    ROS:  Gen: no fevers/chills, no changes in weight  Eyes: no changes in vision  ENT: no sore throat, no hearing loss, no bloody nose  Pulm: no sob, no cough  CV: no chest pain, no palpitations  GI: no nausea/vomiting, no diarrhea    Objective:     Exam:  /66 (BP Location: Left arm, Patient Position: Sitting, BP Cuff Size: Adult)   Pulse 100   Temp 37 °C (98.6 °F) (Temporal)   Resp 16   Ht 1.702 m (5' 7\")   Wt 89.4 kg (197 lb)   LMP 11/29/2004   SpO2 94%   BMI 30.85 kg/m²  Body mass index is 30.85 kg/m².    Constitutional: Alert, no distress, well-groomed.  Skin: Warm, dry, good turgor, no rashes in visible areas.  Eye: Equal, round and reactive, conjunctiva clear, lids normal.  ENMT: Lips without lesions, good dentition, moist mucous membranes.  Ears: Negative for otitis  Neck: Trachea midline, no masses, no thyromegaly.  Respiratory: Unlabored respiratory effort, no cough.  MSK: Normal gait, moves all extremities.  Neuro: Grossly non-focal.   Psych: Alert and oriented x3, normal affect and mood.      Assessment & Plan:     65 y.o. female with the following -     1. Neck pain  This is a new problem.  The patient has been experiencing left-sided neck pain over the past 2 weeks.  Likely attributed to the fact that she has been sleeping differently due to a recent hip replacement.  Highly suspect a musculoskeletal problem.  Denies any jaw pain suggestive of TMJ.  She also has a normal ear exam and thus unlikely to be otitis.  At this time we will do a trial of cyclobenzaprine and NSAID for up to 14 days.  Return precautions were given today.  She is agreeable to plan.  - cyclobenzaprine (FLEXERIL) 5 mg tablet; Take 1 Tab by mouth 3 times a day as needed.  Dispense: 30 Tab; Refill: 0  - meloxicam (MOBIC) 7.5 MG Tab; Take 1 Tab by mouth every day.  Dispense: 30 Tab; Refill: " 0      Return if symptoms worsen or fail to improve.    Please note that this dictation was created using voice recognition software. I have made every reasonable attempt to correct obvious errors, but I expect that there are errors of grammar and possibly content that I did not discover before finalizing the note.

## 2020-10-21 DIAGNOSIS — B00.89 HERPES DERMATITIS: ICD-10-CM

## 2020-10-21 RX ORDER — VALACYCLOVIR HYDROCHLORIDE 500 MG/1
TABLET, FILM COATED ORAL
Qty: 90 TAB | Refills: 0 | Status: SHIPPED | OUTPATIENT
Start: 2020-10-21 | End: 2021-01-21

## 2020-10-26 NOTE — ASSESSMENT & PLAN NOTE
Bed: 09  Expected date:   Expected time:   Means of arrival:   Comments:  TRIAGE   Once in a while she has a rash on her lower back, seems herpes. mupirocin cream works for her. Refill provided

## 2020-12-10 DIAGNOSIS — E11.29 TYPE 2 DIABETES MELLITUS WITH OTHER DIABETIC KIDNEY COMPLICATION (HCC): ICD-10-CM

## 2020-12-10 DIAGNOSIS — E11.9 DIABETES MELLITUS WITHOUT COMPLICATION (HCC): ICD-10-CM

## 2020-12-10 DIAGNOSIS — E78.5 DYSLIPIDEMIA: ICD-10-CM

## 2020-12-10 RX ORDER — ATORVASTATIN CALCIUM 20 MG/1
TABLET, FILM COATED ORAL
Qty: 90 TAB | Refills: 0 | Status: SHIPPED | OUTPATIENT
Start: 2020-12-10 | End: 2021-03-17 | Stop reason: SDUPTHER

## 2020-12-10 RX ORDER — CANAGLIFLOZIN 300 MG/1
TABLET, FILM COATED ORAL
Qty: 90 TAB | Refills: 0 | Status: SHIPPED | OUTPATIENT
Start: 2020-12-10 | End: 2021-02-24 | Stop reason: SDUPTHER

## 2020-12-23 ENCOUNTER — TELEPHONE (OUTPATIENT)
Dept: ENDOCRINOLOGY | Facility: MEDICAL CENTER | Age: 65
End: 2020-12-23

## 2021-01-02 DIAGNOSIS — E11.9 DIABETES MELLITUS WITHOUT COMPLICATION (HCC): ICD-10-CM

## 2021-01-02 DIAGNOSIS — E11.29 TYPE 2 DIABETES MELLITUS WITH OTHER DIABETIC KIDNEY COMPLICATION (HCC): ICD-10-CM

## 2021-01-04 RX ORDER — GLIMEPIRIDE 4 MG/1
TABLET ORAL
Qty: 90 TAB | Refills: 0 | Status: SHIPPED | OUTPATIENT
Start: 2021-01-04 | End: 2021-03-26 | Stop reason: SDUPTHER

## 2021-01-14 RX ORDER — EXENATIDE 2 MG/.65ML
INJECTION, SUSPENSION, EXTENDED RELEASE SUBCUTANEOUS
Qty: 12 EACH | Refills: 0 | Status: SHIPPED
Start: 2021-01-14 | End: 2021-04-28 | Stop reason: CLARIF

## 2021-01-21 DIAGNOSIS — B00.89 HERPES DERMATITIS: ICD-10-CM

## 2021-01-21 RX ORDER — VALACYCLOVIR HYDROCHLORIDE 500 MG/1
TABLET, FILM COATED ORAL
Qty: 90 TAB | Refills: 0 | Status: SHIPPED | OUTPATIENT
Start: 2021-01-21 | End: 2021-03-26 | Stop reason: SDUPTHER

## 2021-01-25 ENCOUNTER — TELEPHONE (OUTPATIENT)
Dept: MEDICAL GROUP | Facility: PHYSICIAN GROUP | Age: 66
End: 2021-01-25

## 2021-01-25 NOTE — TELEPHONE ENCOUNTER
ESTABLISHED PATIENT PRE-VISIT PLANNING     Patient was NOT contacted to complete PVP.     Note: Patient will not be contacted if there is no indication to call.     1.  Reviewed notes from the last few office visits within the medical group: Yes    2.  If any orders were placed at last visit or intended to be done for this visit (i.e. 6 mos follow-up), do we have Results/Consult Notes?         •  Labs - Labs ordered, completed on 08/17/20 and results are in chart.  Note: If patient appointment is for lab review and patient did not complete labs, check with provider if OK to reschedule patient until labs completed.       •  Imaging - Imaging was not ordered at last office visit.       •  Referrals - No referrals were ordered at last office visit.    3. Is this appointment scheduled as a Hospital Follow-Up? No    4.  Immunizations were updated in Epic using Reconcile Outside Information activity? Yes    5.  Patient is due for the following Health Maintenance Topics:   Health Maintenance Due   Topic Date Due   • Annual Wellness Visit  1955   • HEPATITIS C SCREENING  1955   • RETINAL SCREENING  08/14/2019   • IMM HEP B VACCINE (2 of 3 - Risk 3-dose series) 11/07/2019   • IMM PNEUMOCOCCAL VACCINE: 65+ Years (2 of 2 - PPSV23) 03/12/2020   • DIABETES MONOFILAMENT / LE EXAM  10/28/2020   • A1C SCREENING  02/17/2021       - Patient plans to schedule appointment for Annual Wellness Visit (AWV), Immunizations: PNEUMOVAX (PPSV23) and Retinal Eye Exam.    6.  AHA (Pulse8) form printed for Provider? Email sent to [facility] requesting form

## 2021-01-27 ENCOUNTER — TELEMEDICINE (OUTPATIENT)
Dept: MEDICAL GROUP | Facility: PHYSICIAN GROUP | Age: 66
End: 2021-01-27
Payer: MEDICARE

## 2021-01-27 VITALS
BODY MASS INDEX: 27.58 KG/M2 | HEART RATE: 92 BPM | HEIGHT: 68 IN | SYSTOLIC BLOOD PRESSURE: 119 MMHG | OXYGEN SATURATION: 94 % | DIASTOLIC BLOOD PRESSURE: 77 MMHG | WEIGHT: 182 LBS

## 2021-01-27 DIAGNOSIS — Z72.89 OTHER PROBLEMS RELATED TO LIFESTYLE: ICD-10-CM

## 2021-01-27 DIAGNOSIS — E78.5 DYSLIPIDEMIA: ICD-10-CM

## 2021-01-27 DIAGNOSIS — N20.0 RECURRENT NEPHROLITHIASIS: ICD-10-CM

## 2021-01-27 DIAGNOSIS — Z76.89 ESTABLISHING CARE WITH NEW DOCTOR, ENCOUNTER FOR: ICD-10-CM

## 2021-01-27 DIAGNOSIS — E11.9 TYPE 2 DIABETES MELLITUS WITHOUT COMPLICATION, WITHOUT LONG-TERM CURRENT USE OF INSULIN (HCC): ICD-10-CM

## 2021-01-27 PROBLEM — H92.02 LEFT EAR PAIN: Status: RESOLVED | Noted: 2020-10-19 | Resolved: 2021-01-27

## 2021-01-27 PROBLEM — E66.9 OBESITY (BMI 30-39.9): Status: RESOLVED | Noted: 2017-09-28 | Resolved: 2021-01-27

## 2021-01-27 PROCEDURE — 99214 OFFICE O/P EST MOD 30 MIN: CPT | Mod: 95,CR | Performed by: FAMILY MEDICINE

## 2021-01-27 RX ORDER — ASPIRIN 81 MG/1
81 TABLET ORAL DAILY
Qty: 30 TAB | Refills: 0
Start: 2021-01-27 | End: 2021-09-01

## 2021-01-27 ASSESSMENT — PATIENT HEALTH QUESTIONNAIRE - PHQ9: CLINICAL INTERPRETATION OF PHQ2 SCORE: 0

## 2021-01-27 ASSESSMENT — FIBROSIS 4 INDEX: FIB4 SCORE: 0.87

## 2021-01-27 NOTE — PROGRESS NOTES
Virtual Visit: Established Patient   This visit was conducted via Zoom using secure and encrypted videoconferencing technology. The patient was in a private location in the state of [place].    The patient's identity was confirmed and verbal consent was obtained for this virtual visit.    Subjective:   CC:   Chief Complaint   Patient presents with   • New Patient       Shirley Unger is a 66 y.o. female presenting for evaluation and management of:  Linh EPSINOZA.     diabetes  chronic.  Has been a known diabetic for the last 17 years.  Will f/u Aleida Hiwot at [facility] next month. Currently taking ivokana, bydureon, metformin, amaryl 4mg.  August labs have hemoglobin A1c improved to 6.7%.  Continues to follow-up with [facility] annually. Also taking aspirin 81 and lisinopril.     Dyslipidemia  Chronic medical diagnosis.  Continues to take WelChol 625 mg, 3 tablets daily as well as Lipitor 20 mg daily.  Previous labs from August have total cholesterol stable at 137, triglycerides elevated at 193, HDL 39, and LDL 59.    Recurrent nephrolithiasis  Chronic. Last kidney stone was 2 yrs ago.  Follows up with Dr Monge. Continues to take potassium citrate daily. Denies any current urinary symptoms.       ROS   Denies any recent fevers or chills. No nausea or vomiting. No chest pains or shortness of breath.     Allergies   Allergen Reactions   • Macrobid [Nitrofurantoin Monohydrate Macrocrystals] Hives   • Pcn [Penicillins] Hives       Current medicines (including changes today)  Current Outpatient Medications   Medication Sig Dispense Refill   • aspirin 81 MG EC tablet Take 1 Tab by mouth every day. 30 Tab 0   • valACYclovir (VALTREX) 500 MG Tab TAKE ONE TABLET BY MOUTH EVERY DAY 90 Tab 0   • BYDUREON 2 MG Pen-injector INJECT CONTENTS OF ONE SYRINGE UNDER THE SKIN ONCE A WEEK 12 Each 0   • glimepiride (AMARYL) 4 MG Tab TAKE ONE TABLET BY MOUTH EVERY MORNING 90 Tab 0   • metformin (GLUCOPHAGE) 1000 MG  tablet TAKE ONE TABLET BY MOUTH TWICE A DAY WITH MEALS 180 Tab 0   • INVOKANA 300 MG Tab TAKE ONE TABLET BY MOUTH EVERY DAY 90 Tab 0   • atorvastatin (LIPITOR) 20 MG Tab TAKE ONE TABLET BY MOUTH EVERY DAY 90 Tab 0   • Exenatide ER (BYDUREON) 2 MG Pen-injector Bydureon 2 mg/0.65 mL subcutaneous pen injector     • potassium citrate SR (UROCIT-K SR) 10 MEQ (1080 MG) Tab CR potassium citrate ER 10 mEq (1,080 mg) tablet,extended release     • cyclobenzaprine (FLEXERIL) 5 mg tablet Take 1 Tab by mouth 3 times a day as needed. 30 Tab 0   • lisinopril (PRINIVIL) 5 MG Tab Take 0.5 Tabs by mouth every day. (Patient taking differently: Take 5 mg by mouth every day.) 90 Tab 1   • colesevelam (WELCHOL) 625 MG Tab TAKE ONE TABLET BY MOUTH ONCE DAILY IN THE MORNING, THEN TAKE TWO TABLETS DAILY IN THE EVENING WITH  DINNER 270 Tab 2   • Cholecalciferol 125 MCG (5000 UT) Tab Take 10,000 Units by mouth every day.     • Cyanocobalamin (VITAMIN B 12 PO) Take 1,000 mcg by mouth every day.     • Lancets Lancets order: Lancets for Song Contour Next meter. Sig: use daily and prn ssx high or low sugar. 100 Each 3   • acyclovir (ZOVIRAX) 5 % Cream Apply 1 Application to affected area(s) every 3 hours. 1 Tube 6   • glucose blood (FREESTYLE LITE) strip 1 Strip by Other route as needed. 100 Strip 3   • triamcinolone acetonide (KENALOG) 0.1 % Ointment Apply 1 Application to affected area(s) 2 times a day as needed.     • mupirocin calcium (BACTROBAN) 2 % Cream Apply to affected area(s) 2 times a day. (Patient taking differently: Apply 1 Application to affected area(s) as needed. Apply to affected area(s) 2 times a day.) 15 g 2   • trolamine salicylate (ASPERCREME/ALOE) 10 % cream Apply 1 Squirt to affected area(s) 4 times a day. (Patient taking differently: Apply 1 Squirt to affected area(s) 4 times a day as needed.) 1 Tube 3   • Blood Glucose Monitoring Suppl SUPPLIES MISC Test strips order: Test strips for Abbott Freestyle Lite meter. Sig: use  "BID and prn ssx high or low sugar #100 RF x 3 100 Each 3     No current facility-administered medications for this visit.        Patient Active Problem List    Diagnosis Date Noted   • Type 2 diabetes mellitus without complication, without long-term current use of insulin ([facility]) 2021   • Neck pain 10/19/2020   • Left ear pain 10/19/2020   • Primary osteoarthritis of both hips 2020   • B12 deficiency 2020   • Huynh's neuroma of left foot 2019   • Primary insomnia 2018   • Seasonal allergic rhinitis due to pollen 2017   • Herpes dermatitis 2016   • Vitamin D deficiency disease 2013   • Dyslipidemia    • Recurrent nephrolithiasis 2013       Family History   Problem Relation Age of Onset   • Cancer Mother         Lung   • Heart Disease Father          at 71, No MI? May have been electrical   • No Known Problems Brother    • No Known Problems Brother    • Diabetes Maternal Aunt    • Diabetes Maternal Grandfather    • No Known Problems Brother        She  has a past medical history of DM (diabetes mellitus) ([facility]) (), Herpes genitalia (3/22/2013), High cholesterol, Hyperlipidemia LDL goal < 100, Nephrolithiasis (3/22/2013), Urinary incontinence, and Vitamin d deficiency (2013).  She  has a past surgical history that includes hysterectomy, total abdominal (); cholecystectomy (); carpal tunnel release; pr  delivery only (); cystoscopy stent placement; ganglion excision (2014); pr total hip arthroplasty (Right, 3/11/2020); abdominal hysterectomy total; arthroplasty (Right, ); and primary c section.       Objective:   /77 (BP Location: Right arm, Patient Position: Sitting, BP Cuff Size: Adult)   Pulse 92   Ht 1.727 m (5' 8\")   Wt 82.6 kg (182 lb)   LMP 2004   SpO2 94%   BMI 27.67 kg/m²     Physical Exam:  Constitutional: Alert, no distress, well-groomed.  Skin: No rashes in visible areas.  Eye: Round. " Conjunctiva clear, lids normal. No icterus.   ENMT: Lips pink without lesions, good dentition, moist mucous membranes. Phonation normal.  Neck: No masses, no thyromegaly. Moves freely without pain.  Respiratory: Unlabored respiratory effort, no cough or audible wheeze  Psych: Alert and oriented x3, normal affect and mood.       Assessment and Plan:   The following treatment plan was discussed:     1. Establishing care with new doctor, encounter for  Transferring care over from [facility].    2. Dyslipidemia  Chronic.  Stable.  Continue with Lipitor and WelChol.    3. Type 2 diabetes mellitus without complication, without long-term current use of insulin (HCC)  Chronic.  Improving.  Continue with current diabetic meds.  Follow-up with new endocrinologist next month.- aspirin 81 MG EC tablet; Take 1 Tab by mouth every day.  Dispense: 30 Tab; Refill: 0  - CBC WITH DIFFERENTIAL; Future    4. Other problems related to lifestyle  - HCV Scrn ( [id number] 1xLife); Future    5. Recurrent nephrolithiasis  History of. Stable.  Continue with potassium citrate.    Follow-up: Return in about 3 months (around 2021) for Annual Wellness Visit.

## 2021-01-29 ENCOUNTER — TELEPHONE (OUTPATIENT)
Dept: ENDOCRINOLOGY | Facility: MEDICAL CENTER | Age: 66
End: 2021-01-29

## 2021-01-31 DIAGNOSIS — E78.5 DYSLIPIDEMIA: ICD-10-CM

## 2021-01-31 DIAGNOSIS — E55.9 VITAMIN D DEFICIENCY: ICD-10-CM

## 2021-01-31 DIAGNOSIS — E11.9 TYPE 2 DIABETES MELLITUS WITHOUT COMPLICATION, WITHOUT LONG-TERM CURRENT USE OF INSULIN (HCC): ICD-10-CM

## 2021-02-03 ENCOUNTER — HOSPITAL ENCOUNTER (OUTPATIENT)
Dept: LAB | Facility: MEDICAL CENTER | Age: 66
End: 2021-02-03
Attending: NURSE PRACTITIONER
Payer: MEDICARE

## 2021-02-03 ENCOUNTER — HOSPITAL ENCOUNTER (OUTPATIENT)
Dept: LAB | Facility: MEDICAL CENTER | Age: 66
End: 2021-02-03
Attending: FAMILY MEDICINE
Payer: MEDICARE

## 2021-02-03 DIAGNOSIS — Z72.89 OTHER PROBLEMS RELATED TO LIFESTYLE: ICD-10-CM

## 2021-02-03 DIAGNOSIS — E11.9 TYPE 2 DIABETES MELLITUS WITHOUT COMPLICATION, WITHOUT LONG-TERM CURRENT USE OF INSULIN (HCC): ICD-10-CM

## 2021-02-03 DIAGNOSIS — E78.5 DYSLIPIDEMIA: ICD-10-CM

## 2021-02-03 DIAGNOSIS — E55.9 VITAMIN D DEFICIENCY: ICD-10-CM

## 2021-02-03 LAB
ALBUMIN SERPL BCP-MCNC: 4.7 G/DL (ref 3.2–4.9)
ALBUMIN/GLOB SERPL: 1.8 G/DL
ALP SERPL-CCNC: 68 U/L (ref 30–99)
ALT SERPL-CCNC: 8 U/L (ref 2–50)
ANION GAP SERPL CALC-SCNC: 10 MMOL/L (ref 7–16)
AST SERPL-CCNC: 10 U/L (ref 12–45)
BASOPHILS # BLD AUTO: 0.6 % (ref 0–1.8)
BASOPHILS # BLD: 0.04 K/UL (ref 0–0.12)
BILIRUB SERPL-MCNC: 0.5 MG/DL (ref 0.1–1.5)
BUN SERPL-MCNC: 15 MG/DL (ref 8–22)
CALCIUM SERPL-MCNC: 9.5 MG/DL (ref 8.5–10.5)
CHLORIDE SERPL-SCNC: 101 MMOL/L (ref 96–112)
CHOLEST SERPL-MCNC: 139 MG/DL (ref 100–199)
CO2 SERPL-SCNC: 27 MMOL/L (ref 20–33)
CREAT SERPL-MCNC: 0.6 MG/DL (ref 0.5–1.4)
CREAT UR-MCNC: 58.64 MG/DL
EOSINOPHIL # BLD AUTO: 0.09 K/UL (ref 0–0.51)
EOSINOPHIL NFR BLD: 1.4 % (ref 0–6.9)
ERYTHROCYTE [DISTWIDTH] IN BLOOD BY AUTOMATED COUNT: 44.3 FL (ref 35.9–50)
EST. AVERAGE GLUCOSE BLD GHB EST-MCNC: 151 MG/DL
FASTING STATUS PATIENT QL REPORTED: NORMAL
GLOBULIN SER CALC-MCNC: 2.6 G/DL (ref 1.9–3.5)
GLUCOSE SERPL-MCNC: 142 MG/DL (ref 65–99)
HBA1C MFR BLD: 6.9 % (ref 0–5.6)
HCT VFR BLD AUTO: 47.1 % (ref 37–47)
HDLC SERPL-MCNC: 44 MG/DL
HGB BLD-MCNC: 15 G/DL (ref 12–16)
IMM GRANULOCYTES # BLD AUTO: 0.01 K/UL (ref 0–0.11)
IMM GRANULOCYTES NFR BLD AUTO: 0.2 % (ref 0–0.9)
LDLC SERPL CALC-MCNC: 60 MG/DL
LYMPHOCYTES # BLD AUTO: 2.16 K/UL (ref 1–4.8)
LYMPHOCYTES NFR BLD: 34.6 % (ref 22–41)
MCH RBC QN AUTO: 29.8 PG (ref 27–33)
MCHC RBC AUTO-ENTMCNC: 31.8 G/DL (ref 33.6–35)
MCV RBC AUTO: 93.5 FL (ref 81.4–97.8)
MICROALBUMIN UR-MCNC: <1.2 MG/DL
MICROALBUMIN/CREAT UR: NORMAL MG/G (ref 0–30)
MONOCYTES # BLD AUTO: 0.5 K/UL (ref 0–0.85)
MONOCYTES NFR BLD AUTO: 8 % (ref 0–13.4)
NEUTROPHILS # BLD AUTO: 3.45 K/UL (ref 2–7.15)
NEUTROPHILS NFR BLD: 55.2 % (ref 44–72)
NRBC # BLD AUTO: 0 K/UL
NRBC BLD-RTO: 0 /100 WBC
PLATELET # BLD AUTO: 287 K/UL (ref 164–446)
PMV BLD AUTO: 9.4 FL (ref 9–12.9)
POTASSIUM SERPL-SCNC: 4.1 MMOL/L (ref 3.6–5.5)
PROT SERPL-MCNC: 7.3 G/DL (ref 6–8.2)
RBC # BLD AUTO: 5.04 M/UL (ref 4.2–5.4)
SODIUM SERPL-SCNC: 138 MMOL/L (ref 135–145)
TRIGL SERPL-MCNC: 174 MG/DL (ref 0–149)
WBC # BLD AUTO: 6.3 K/UL (ref 4.8–10.8)

## 2021-02-03 PROCEDURE — 82570 ASSAY OF URINE CREATININE: CPT

## 2021-02-03 PROCEDURE — 80053 COMPREHEN METABOLIC PANEL: CPT

## 2021-02-03 PROCEDURE — 82607 VITAMIN B-12: CPT

## 2021-02-03 PROCEDURE — G0472 HEP C SCREEN HIGH RISK/OTHER: HCPCS

## 2021-02-03 PROCEDURE — 82043 UR ALBUMIN QUANTITATIVE: CPT

## 2021-02-03 PROCEDURE — 82306 VITAMIN D 25 HYDROXY: CPT

## 2021-02-03 PROCEDURE — 80061 LIPID PANEL: CPT

## 2021-02-03 PROCEDURE — 83036 HEMOGLOBIN GLYCOSYLATED A1C: CPT

## 2021-02-03 PROCEDURE — 85025 COMPLETE CBC W/AUTO DIFF WBC: CPT

## 2021-02-03 PROCEDURE — 84443 ASSAY THYROID STIM HORMONE: CPT

## 2021-02-03 PROCEDURE — 84439 ASSAY OF FREE THYROXINE: CPT

## 2021-02-03 PROCEDURE — 36415 COLL VENOUS BLD VENIPUNCTURE: CPT

## 2021-02-04 LAB
25(OH)D3 SERPL-MCNC: 93 NG/ML (ref 30–100)
HCV AB SER QL: NORMAL
T4 FREE SERPL-MCNC: 1.27 NG/DL (ref 0.93–1.7)
TSH SERPL DL<=0.005 MIU/L-ACNC: 1.12 UIU/ML (ref 0.38–5.33)
VIT B12 SERPL-MCNC: 3239 PG/ML (ref 211–911)

## 2021-02-24 ENCOUNTER — OFFICE VISIT (OUTPATIENT)
Dept: ENDOCRINOLOGY | Facility: MEDICAL CENTER | Age: 66
End: 2021-02-24
Attending: NURSE PRACTITIONER
Payer: MEDICARE

## 2021-02-24 VITALS
HEIGHT: 68 IN | SYSTOLIC BLOOD PRESSURE: 104 MMHG | BODY MASS INDEX: 27.58 KG/M2 | WEIGHT: 182 LBS | DIASTOLIC BLOOD PRESSURE: 68 MMHG

## 2021-02-24 DIAGNOSIS — E78.5 HYPERLIPIDEMIA, UNSPECIFIED HYPERLIPIDEMIA TYPE: ICD-10-CM

## 2021-02-24 DIAGNOSIS — E53.8 B12 DEFICIENCY: ICD-10-CM

## 2021-02-24 DIAGNOSIS — E11.9 DIABETES MELLITUS WITHOUT COMPLICATION (HCC): ICD-10-CM

## 2021-02-24 DIAGNOSIS — E55.9 VITAMIN D DEFICIENCY: ICD-10-CM

## 2021-02-24 DIAGNOSIS — E11.9 TYPE 2 DIABETES MELLITUS WITHOUT COMPLICATION, WITHOUT LONG-TERM CURRENT USE OF INSULIN (HCC): ICD-10-CM

## 2021-02-24 PROCEDURE — 99214 OFFICE O/P EST MOD 30 MIN: CPT | Performed by: NURSE PRACTITIONER

## 2021-02-24 PROCEDURE — 99211 OFF/OP EST MAY X REQ PHY/QHP: CPT | Performed by: NURSE PRACTITIONER

## 2021-02-24 RX ORDER — DULAGLUTIDE 0.75 MG/.5ML
0.5 INJECTION, SOLUTION SUBCUTANEOUS
Qty: 6 ML | Refills: 1 | Status: SHIPPED | OUTPATIENT
Start: 2021-02-24 | End: 2021-03-26 | Stop reason: SDUPTHER

## 2021-02-24 RX ORDER — CANAGLIFLOZIN 300 MG/1
1 TABLET, FILM COATED ORAL
Qty: 90 TABLET | Refills: 3 | Status: SHIPPED | OUTPATIENT
Start: 2021-02-24 | End: 2021-03-26 | Stop reason: SDUPTHER

## 2021-02-24 ASSESSMENT — FIBROSIS 4 INDEX: FIB4 SCORE: 0.83

## 2021-02-24 NOTE — PROGRESS NOTES
CHIEF COMPLAINT: Patient is here for follow up of Type 2 Diabetes Mellitus, hyperlipidemia, Vitamin D deficiency, and Vitamin B deficiency.  Previously seen by Dr. Coyne, last appointment 2020.  HPI:     Shirley Unger is a 66 y.o. female  for continued evaluation & treatment of the followin. Type 2 Diabetes Mellitus  Ms. Unger is a very pleasant 66-year-old female who states she has been doing well since her last visit in August.    Current Diabetes Regimen:  Bydureon 2 mg weekly.  Medication needs to be changed as it is not on the patient's formulary at this time  Canagliflozin 300 mg once daily   Metformin 1000 mg BID    Glimepiride 4 mg daily    Labs from 2021 HbA1c is 6.9  2020 A1c 6.7    BG Diary:21- testing twice daily.  Blood sugar log not available for this appointment.  Testing blood sugars randomly.  .   Hypoglycemia:  None.  Patient is hypoglycemic aware.     Weight unchanged from prior appointment.    Diabetes Complications   Retinopathy: No known retinopathy.  [facility]: [facility]  Diagnosed with drusen (degenerative) of macula bilaterally.  Neuropathy: Denies paresthesias or numbness in hands or feet. Denies any foot wounds.  Exercise: Minimal.  Diet: Fair.       Ref. Range 2/3/2021 09:10   Creatinine, Urine Latest Units: mg/dL 58.64   Microalbumin, Urine Random Latest Units: mg/dL <1.2       2.  Hyperlipidemia  Currently taking Lipitor 20 mg daily.  Denies muscle weakness and/or fatigue.     Ref. Range 2/3/2021 09:10   Cholesterol,Tot Latest Ref Range: 100 - 199 mg/dL 139   Triglycerides Latest Ref Range: 0 - 149 mg/dL 174 (H)   HDL Latest Ref Range: >=40 mg/dL 44   LDL Latest Ref Range: <100 mg/dL 60       3.  Vitamin D deficiency  Currently taking vitamin D 20,000 units/day.     Ref. Range 2/3/2021 09:10   25-Hydroxy   Vitamin D 25 Latest Ref Range: 30 - 100 ng/mL 93     4.  Vitamin B12 deficiency  Currently  taking vitamin B12 1000 mcg daily.     Ref. Range 2/3/2021 09:10   Vitamin B12 -True Cobalamin Latest Ref Range: 211 - 911 pg/mL 3239 (H)         Patient's medications, allergies, and social histories were reviewed and updated as appropriate.    ROS:     CONS:     No fever, no chills   EYES:     No diplopia, no blurry vision   CV:           No chest pain, no palpitations   PULM:     No SOB, no cough, no hemoptysis.   GI:            No nausea, no vomiting, no diarrhea, no constipation   ENDO:     No polyuria, no polydipsia, no heat intolerance, no cold intolerance       Past Medical History:  Problem List:  2021-01: Type 2 diabetes mellitus without complication, without long-  term current use of insulin (HCC)  2020-10: Neck pain  2020-10: Left ear pain  2020-01: Primary osteoarthritis of both hips  2020-01: B12 deficiency  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  2018-06: Skin cancer screening  2018-06: Primary insomnia  2018-03: Functional diarrhea  2017-09: Obesity (BMI 30-39.9)  2017-09: Numbness of left foot  2017-05: Arthralgia of left temporomandibular joint  2017-03: Seasonal allergic rhinitis due to pollen  2016-11: Acute mastitis of left breast  2016-11: Herpes dermatitis  2016-07: Primary osteoarthritis of left hip  2016-07: Trigger finger of right thumb  2016-05: Colon polyps  2015-08: Uncontrolled type 2 diabetes mellitus without complication,   without long-term current use of insulin  2014-07: Hand eczema  2014-02: Right hand pain  2013-07: Vitamin D deficiency disease  2013-03: Recurrent nephrolithiasis  Dyslipidemia  Type 2 DM, uncontrolled      Past Surgical History:  Past Surgical History:   Procedure Laterality Date   • PB TOTAL HIP ARTHROPLASTY Right 3/11/2020    Procedure: ARTHROPLASTY, HIP, TOTAL;  Surgeon: Miguel Madden M.D.;  Location: SURGERY [facility];  Service: Orthopedics   • ARTHROPLASTY Right 2020   • GANGLION EXCISION  2/25/2014    Performed by Efra Ramirez M.D. at SURGERY  "[facility]   • HYSTERECTOMY, TOTAL ABDOMINAL      For non cancerous reasons   • CHOLECYSTECTOMY     • ABDOMINAL HYSTERECTOMY TOTAL     • CARPAL TUNNEL RELEASE      Right hand   • CYSTOSCOPY STENT PLACEMENT      several, stent removed in MD office   • PB  DELIVERY ONLY     • PRIMARY C SECTION       x 2        Allergies:  Macrobid [nitrofurantoin monohydrate macrocrystals] and Pcn [penicillins]     Social History:  Social History     Tobacco Use   • Smoking status: Never Smoker   • Smokeless tobacco: Never Used   Substance Use Topics   • Alcohol use: No     Alcohol/week: 0.0 oz     Comment: Rare Useage   • Drug use: No        Family History:   family history includes Cancer in her mother; Diabetes in her maternal aunt and maternal grandfather; Heart Disease in her father; No Known Problems in her brother, brother, and brother.      PHYSICAL EXAM:   OBJECTIVE:  Vital signs: /68 (BP Location: Left arm, Patient Position: Sitting, BP Cuff Size: Adult)   Ht 1.727 m (5' 8\")   Wt 82.6 kg (182 lb)   LMP 2004   BMI 27.67 kg/m²   GENERAL: Well-developed, well-nourished in no apparent distress.   EYE:  No ocular asymmetry, PERRLA  HENT: Pink, moist mucous membranes.    NECK: No thyromegaly.   CARDIOVASCULAR:  No murmurs  LUNGS: Clear breath sounds  ABDOMEN: Soft, nontender   EXTREMITIES: No clubbing, cyanosis, or edema.   NEUROLOGICAL: No gross focal motor abnormalities   LYMPH: No cervical adenopathy palpated.   SKIN: No rashes, lesions.     Labs:  Lab Results   Component Value Date/Time    WBC 6.3 2021 09:11 AM    RBC 5.04 2021 09:11 AM    HEMOGLOBIN 15.0 2021 09:11 AM    MCV 93.5 2021 09:11 AM    MCH 29.8 2021 09:11 AM    MCHC 31.8 (L) 2021 09:11 AM    RDW 44.3 2021 09:11 AM    MPV 9.4 2021 09:11 AM       Lab Results   Component Value Date/Time    SODIUM 138 2021 09:10 AM    POTASSIUM 4.1 2021 09:10 AM    CHLORIDE 101 " 02/03/2021 09:10 AM    CO2 27 02/03/2021 09:10 AM    ANION 10.0 02/03/2021 09:10 AM    GLUCOSE 142 (H) 02/03/2021 09:10 AM    BUN 15 02/03/2021 09:10 AM    CREATININE 0.60 02/03/2021 09:10 AM    CREATININE 0.9 04/07/2005 09:15 PM    CALCIUM 9.5 02/03/2021 09:10 AM    ASTSGOT 10 (L) 02/03/2021 09:10 AM    ALTSGPT 8 02/03/2021 09:10 AM    TBILIRUBIN 0.5 02/03/2021 09:10 AM    ALBUMIN 4.7 02/03/2021 09:10 AM    TOTPROTEIN 7.3 02/03/2021 09:10 AM    GLOBULIN 2.6 02/03/2021 09:10 AM    AGRATIO 1.8 02/03/2021 09:10 AM       ASSESSMENT/PLAN:   1. Type 2 diabetes mellitus without complication, without long-term current use of insulin (HCC)  Stable  Patient has been managing her diabetes well since we last saw her in August 2020.  Continue diabetes Regimen:  Change to Trulicity 0.75 mg weekly.  Drug formulary will cover this medication and prior Auth will be handled by pharmacy [email or web address] in our office.  Rx will be sent to [facility] for mail order.  Canagliflozin 300 mg once daily   Metformin 1000 mg BID    Glimepiride 4 mg daily    Continue twice a day monitoring of home glucose numbers.  Recommend 2 to 3 L of water each day.  Recommend 150 minutes of exercise each week.  Continue balanced eating via my plate recommendations with a focus on portion control.  Daily foot inspection should be completed.  And dilated eye exam will be due by October 2021.    2. Diabetes mellitus without complication (HCC)  As per plan #1.    3. Hyperlipidemia, unspecified hyperlipidemia type  Stable  Continue Lipitor 20 mg daily.    4. Vitamin D deficiency disease  Unstable  Recommend decreasing vitamin D 15,000 units/day.   Explained to patient that the goal is to keep her between 40 and 70 safely.    5. B12 deficiency  Unstable  Reduce vitamin B 12 supplement to every other day.  Detailed discussion with patient that vitamin B12 is a water-soluble supplements however extreme highs of these supplements can lead to other side effects that are  to be avoided.  Patient is getting the same therapeutic response if vitamin B12 levels are in the low thousands or even under 1000.    We will do point-of-care A1c in the office at next appointment.  Next appointment should be scheduled for 4 months.    Thank you kindly for allowing me to participate in the diabetes care plan for this patient.    Aleida Mi, APRN  02/24/21    CC:   Rose Marie Gao M.D.

## 2021-02-24 NOTE — PROGRESS NOTES
"RN-CDE Note    Subjective:   Endocrinology Clinic Progress Note  PCP: Rose Marie Gao M.D.    HPI:  Shirley Unger is a 66 y.o. old patient who is seen today for review of her type 2 diabetes and Vitamin D deficiency.   Shirley is currently on Vitamin D supplementation of 19558 iu per day.   Most recent labs on 2/3/2021 show Vitamin D level of 93     DM:   Last A1c:   Lab Results   Component Value Date/Time    HBA1C 6.9 (H) 02/03/2021 09:10 AM      Previous A1c was 6.7 on 8/17/2020  A1C GOAL: < 7    Diabetes Medications:   Metformin 1000 mg bid  Invokana 300 mg daily  Glimepiride 4 mg in the morning  Bydureon 2 mg weekly  Taking above medications as prescribed: yes  Taking daily ASA: Yes    Exercise: sporadic irregular exercise, <half hour walking weekly.  Had hip replacement last March  Diet: \"healthy\" diet  in general  Patient's body mass index is 27.67 kg/m². Exercise and nutrition counseling were performed at this visit.    Glucose monitoring frequency: testing blood sugars about one time per week.     Hypoglycemic episodes: no  Last Retinal Exam: on file and up-to-date  Daily Foot Exam: Yes   Foot Exam:  Monofilament: done  Monofilament testing with a 10 gram force: sensation intact: intact bilaterally  Visual Inspection: Feet without maceration, ulcers, fissures.  Pedal pulses: intact bilaterally   Lab Results   Component Value Date/Time    MALBCRT see below 02/03/2021 09:10 AM    MICROALBUR <1.2 02/03/2021 09:10 AM      ACR Albumin/Creatinine Ratio goal <30   Currently Rx ACE/ARB: Yes  Lisinopril 5 mg daily  Dyslipidemia:  Lab Results   Component Value Date/Time    CHOLSTRLTOT 139 02/03/2021 09:10 AM    LDL 60 02/03/2021 09:10 AM    HDL 44 02/03/2021 09:10 AM    TRIGLYCERIDE 174 (H) 02/03/2021 09:10 AM       Currently Rx Statin: Yes  Atorvastatin 20 mg daily    She  reports that she has never smoked. She has never used smokeless tobacco.      Plan:     Discussed and educated on:   - Discussed diabetic " diet, encouraged portion control.   - Discussed importance of testing blood sugars and keeping logs.   - Discussed importance of daily exercise, recommended 30 minutes per day  - Reviewed medications and advised how to take.  - Discussed importance of immunizations and yearly eye exams.   - Advised daily foot  exams. Educated on signs of infection.   - Educated on need to stay well hydrated with water.  - Educated to call with any questions or problems.        Recommended medication changes: switch Bydureon to Trulicity per insurance formulary   Decrease Vitamin D to 64428 iu per day

## 2021-03-03 ENCOUNTER — PATIENT MESSAGE (OUTPATIENT)
Dept: MEDICAL GROUP | Facility: PHYSICIAN GROUP | Age: 66
End: 2021-03-03

## 2021-03-03 DIAGNOSIS — E78.5 DYSLIPIDEMIA: ICD-10-CM

## 2021-03-03 DIAGNOSIS — Z23 NEED FOR VACCINATION: ICD-10-CM

## 2021-03-03 RX ORDER — COLESEVELAM 180 1/1
TABLET ORAL
Qty: 270 TABLET | Refills: 2 | Status: SHIPPED | OUTPATIENT
Start: 2021-03-03 | End: 2021-03-26 | Stop reason: SDUPTHER

## 2021-03-04 NOTE — TELEPHONE ENCOUNTER
Requested Prescriptions     Pending Prescriptions Disp Refills   • colesevelam (WELCHOL) 625 MG Tab 270 tablet 2     Sig: TAKE ONE TABLET BY MOUTH ONCE DAILY IN THE MORNING, THEN TAKE TWO TABLETS DAILY IN THE EVENING WITH  DINNER   Rose Marie Gao M.D.

## 2021-03-10 DIAGNOSIS — M16.0 PRIMARY OSTEOARTHRITIS OF BOTH HIPS: ICD-10-CM

## 2021-03-11 ENCOUNTER — IMMUNIZATION (OUTPATIENT)
Dept: FAMILY PLANNING/WOMEN'S HEALTH CLINIC | Facility: IMMUNIZATION CENTER | Age: 66
End: 2021-03-11
Attending: INTERNAL MEDICINE
Payer: MEDICARE

## 2021-03-11 DIAGNOSIS — Z23 ENCOUNTER FOR VACCINATION: Primary | ICD-10-CM

## 2021-03-11 DIAGNOSIS — Z23 NEED FOR VACCINATION: ICD-10-CM

## 2021-03-11 PROCEDURE — 91300 PFIZER SARS-COV-2 VACCINE: CPT

## 2021-03-11 PROCEDURE — 0001A PFIZER SARS-COV-2 VACCINE: CPT

## 2021-03-17 ENCOUNTER — PATIENT MESSAGE (OUTPATIENT)
Dept: MEDICAL GROUP | Facility: PHYSICIAN GROUP | Age: 66
End: 2021-03-17

## 2021-03-17 DIAGNOSIS — E78.5 DYSLIPIDEMIA: ICD-10-CM

## 2021-03-17 DIAGNOSIS — E11.29 TYPE 2 DIABETES MELLITUS WITH OTHER DIABETIC KIDNEY COMPLICATION (HCC): ICD-10-CM

## 2021-03-17 RX ORDER — ATORVASTATIN CALCIUM 20 MG/1
TABLET, FILM COATED ORAL
Qty: 100 TABLET | Refills: 2 | Status: SHIPPED | OUTPATIENT
Start: 2021-03-17 | End: 2021-03-26 | Stop reason: SDUPTHER

## 2021-03-18 NOTE — TELEPHONE ENCOUNTER
Requested Prescriptions     Pending Prescriptions Disp Refills   • atorvastatin (LIPITOR) 20 MG Tab 100 tablet 2     Sig: TAKE ONE TABLET BY MOUTH EVERY DAY   Rose Marie Gao M.D.

## 2021-03-23 ENCOUNTER — PHYSICAL THERAPY (OUTPATIENT)
Dept: PHYSICAL THERAPY | Facility: REHABILITATION | Age: 66
End: 2021-03-23
Attending: FAMILY MEDICINE
Payer: MEDICARE

## 2021-03-23 DIAGNOSIS — M16.0 PRIMARY OSTEOARTHRITIS OF BOTH HIPS: ICD-10-CM

## 2021-03-23 PROCEDURE — 97110 THERAPEUTIC EXERCISES: CPT

## 2021-03-23 PROCEDURE — 97162 PT EVAL MOD COMPLEX 30 MIN: CPT

## 2021-03-23 SDOH — ECONOMIC STABILITY: GENERAL: QUALITY OF LIFE: GOOD

## 2021-03-23 ASSESSMENT — ENCOUNTER SYMPTOMS
PAIN SCALE: 0
ALLEVIATING FACTORS: PAIN MEDICATION
ALLEVIATING FACTORS: POSITIONING
PAIN SCALE AT LOWEST: 0
ALLEVIATING FACTORS: ACTIVITY MODIFICATION
QUALITY: ACHING
PAIN SCALE AT HIGHEST: 4
PAIN TIMING: INTERMITTENT
PAIN TIMING: OCCASIONAL
EXACERBATED BY: BENDING
EXACERBATED BY: PROLONGED SITTING

## 2021-03-23 NOTE — OP THERAPY EVALUATION
Outpatient Physical Therapy  INITIAL EVALUATION    [facility] [place] [id number] [facility], [address]  [place] [address]  Phone:  [phone]    Date of Evaluation: 2021    Patient: Shirley Unger  YOB: 1955  MRN: [id number]     Referring Provider: Rose Marie Gao M.D.  [address] [facility] [address]  [place] [address]   Referring Diagnosis Primary osteoarthritis of both hips [M16.0]     Time Calculation    Start time: 0230  Stop time: 0330 Time Calculation (min): 60 minutes         Chief Complaint: Hip Problem    Visit Diagnoses     ICD-10-CM   1. Primary osteoarthritis of both hips  M16.0       No data found  Subjective:   History of Present Illness:     Date of onset:  3/10/2021    Mechanism of injury:  The patient is a 66 year old female who reports bilateral hip pain. She has hx of (R) RAMOS done in 2020 just before [facility]. She went to physical Therapy but was unable to complete rehab. She now has pain to the (L) hip and needs a replacement. She still has pain to the (R) hip posteriorly while sitting and driving long periods. She cannot lie on her (R) side sleeping. She has trouble reaching toward the floor to  small items. She uses increased cushioning to sit with longer periods. She walks about 1/2 mile from home using walking sticks.    Quality of life:  Good  Headaches:  no headaches  Ear problems: none  Sleep disturbance:  Not disrupted  Pain:     Current pain ratin    At best pain ratin    At worst pain ratin    Quality:  Aching    Pain timing:  Intermittent and occasional    Relieving factors:  Activity modification, pain medication and positioning    Aggravating factors:  Bending and prolonged sitting  Social Support:     Lives in:  One-story house    Lives with:  Spouse  Hand dominance:  Right  Treatments:     Previous treatment:  Physical therapy  Patient Goals:     Patient goals for therapy:  Increased strength, increased motion and  decreased pain    Other patient goals:  Increase mobility and decrease pain to the hips and improve her strength       Past Medical History:   Diagnosis Date   • DM (diabetes mellitus) (HCC)    • Herpes genitalia 3/22/2013   • High cholesterol    • Hyperlipidemia LDL goal < 100    • Nephrolithiasis 3/22/2013    Recurrent Dr Monge - Calcium stones,    • Urinary incontinence    • Vitamin d deficiency 2013     Past Surgical History:   Procedure Laterality Date   • PB TOTAL HIP ARTHROPLASTY Right 3/11/2020    Procedure: ARTHROPLASTY, HIP, TOTAL;  Surgeon: Miguel Madden M.D.;  Location: SURGERY [facility];  Service: Orthopedics   • ARTHROPLASTY Right    • GANGLION EXCISION  2014    Performed by Efra Ramirez M.D. at SURGERY [facility]   • HYSTERECTOMY, TOTAL ABDOMINAL      For non cancerous reasons   • CHOLECYSTECTOMY     • ABDOMINAL HYSTERECTOMY TOTAL     • CARPAL TUNNEL RELEASE      Right hand   • CYSTOSCOPY STENT PLACEMENT      several, stent removed in MD office   • PB  DELIVERY ONLY     • PRIMARY C SECTION       x 2     Social History     Tobacco Use   • Smoking status: Never Smoker   • Smokeless tobacco: Never Used   Substance Use Topics   • Alcohol use: No     Alcohol/week: 0.0 oz     Comment: Rare Useage     Family and Occupational History     Socioeconomic History   • Marital status:      Spouse name: Not on file   • Number of children: Not on file   • Years of education: Not on file   • Highest education level: Not on file   Occupational History   • Occupation:      Employer: [employer]       Objective     Active Range of Motion   Left Hip   Flexion: 90 degrees   Extension: -10 (-10) degrees with pain  Abduction: 40 degrees     Right Hip   Flexion: 100 degrees   Extension: 5 degrees   Abduction: 50 degrees     Strength:      Left Hip   Planes of Motion   Flexion: 4+  Extension: 4  Abduction: 4+  Adduction: 4+  External rotation:  4+  Internal rotation: 5    Right Hip   Planes of Motion   Flexion: 4+  Extension: 5  Abduction: 5  Adduction: 5  External rotation: 4  Internal rotation: 4+    Tests     Left Hip   Felix: Positive.     Right Hip   Felix: Positive.   Ambulation     Observational Gait   Increased right stance time. Decreased walking speed, stride length, left stance time, left swing time, right swing time, left step length and right step length.   Left foot contact pattern: foot flat  Right foot contact pattern: foot flat  Left arm swing: decreased  Right arm swing: decreased    Functional Assessment   Squat   Pain and trunk lean right.     Single Leg Squat   Left Leg  Pain.     Single Leg Stance   Left: 5 seconds  Right: 5 seconds        Therapeutic Exercises (CPT 88986):     1. Nu Step bike     2. Squats    3. 3 way hip     4. Bridges, 10x     5. Supine Felix stretch (L) LE, 2', discomfort to (L) thigh; unable to rest (L) LE on table      Time-based treatments/modalities:    Physical Therapy Timed Treatment Charges  Therapeutic exercise minutes (CPT 08394): 10 minutes      Assessment, Response and Plan:   Assessment details:  The patient is a 66 year old female who reports having bilateral hip pain, (L) hip > (R) side. Patient has difficulty with bending at the (L) hip to retreive objects off the floor, putting on socks and stairclimbing. Patient has morning stiffness bilaterally in both hips. Patient would benefit from skilled physical therapy to address OA of the (L) hip and strengthening to both hips. Patient will work on AROM/PROM, mobility and gait and balance training, strength and conditioning and activity tolerance.   Barriers to therapy:  None  Goals:   Short Term Goals:   1) Indep with HEP  2) 25-50% less stiffness bilateral hip in the mornings  3) Retrieves objects off the floor 25% of the time  Short term goal time span:  2-4 weeks      Long Term Goals:    1) Progression /regression advancing HEP  2) Able to  transition from supine into (R) side lying and sit up 50% of the time or more   3) Able to put shoes and socks on 50% or more  4) Increase strength  In (L) hip flexion 4+/5 to 5/5  Long term goal time span:  4-6 weeks    Plan:   Therapy options:  Physical therapy treatment to continue  Planned therapy interventions:  Gait Training (CPT 88995), Neuromuscular Re-education (CPT 37069), Therapeutic Activities (CPT 15527) and Therapeutic Exercise (CPT 73848)  Frequency:  1x week  Duration in weeks:  6  Duration in visits:  6  Discussed with:  Patient      Functional Assessment Used        Referring provider co-signature:  I have reviewed this plan of care and my co-signature certifies the need for services.    Certification Period: 03/23/2021 to  05/04/21    Physician Signature: ________________________________ Date: ______________

## 2021-03-26 DIAGNOSIS — E11.29 TYPE 2 DIABETES MELLITUS WITH OTHER DIABETIC KIDNEY COMPLICATION (HCC): ICD-10-CM

## 2021-03-26 DIAGNOSIS — B00.89 HERPES DERMATITIS: ICD-10-CM

## 2021-03-26 DIAGNOSIS — E11.9 DIABETES MELLITUS WITHOUT COMPLICATION (HCC): ICD-10-CM

## 2021-03-26 DIAGNOSIS — E78.5 DYSLIPIDEMIA: ICD-10-CM

## 2021-03-26 DIAGNOSIS — E11.9 TYPE 2 DIABETES MELLITUS WITHOUT COMPLICATION, WITHOUT LONG-TERM CURRENT USE OF INSULIN (HCC): ICD-10-CM

## 2021-03-26 PROCEDURE — RXMED WILLOW AMBULATORY MEDICATION CHARGE: Performed by: FAMILY MEDICINE

## 2021-03-26 PROCEDURE — RXMED WILLOW AMBULATORY MEDICATION CHARGE: Performed by: INTERNAL MEDICINE

## 2021-03-26 RX ORDER — DULAGLUTIDE 0.75 MG/.5ML
0.5 INJECTION, SOLUTION SUBCUTANEOUS
Qty: 6 ML | Refills: 1 | Status: SHIPPED | OUTPATIENT
Start: 2021-03-26 | End: 2021-09-20 | Stop reason: SDUPTHER

## 2021-03-26 RX ORDER — VALACYCLOVIR HYDROCHLORIDE 500 MG/1
500 TABLET, FILM COATED ORAL
Qty: 100 TABLET | Refills: 0 | Status: SHIPPED | OUTPATIENT
Start: 2021-03-26 | End: 2021-06-29 | Stop reason: SDUPTHER

## 2021-03-26 RX ORDER — ATORVASTATIN CALCIUM 20 MG/1
TABLET, FILM COATED ORAL
Qty: 100 TABLET | Refills: 2 | Status: SHIPPED | OUTPATIENT
Start: 2021-03-26 | End: 2021-07-06 | Stop reason: SDUPTHER

## 2021-03-26 RX ORDER — GLIMEPIRIDE 4 MG/1
TABLET ORAL
Qty: 100 TABLET | Refills: 2 | Status: SHIPPED | OUTPATIENT
Start: 2021-03-26 | End: 2021-07-06 | Stop reason: SDUPTHER

## 2021-03-26 RX ORDER — COLESEVELAM 180 1/1
TABLET ORAL
Qty: 300 TABLET | Refills: 2 | Status: SHIPPED | OUTPATIENT
Start: 2021-03-26 | End: 2021-07-06 | Stop reason: SDUPTHER

## 2021-03-26 RX ORDER — BLOOD-GLUCOSE METER
1 KIT MISCELLANEOUS DAILY
Qty: 100 STRIP | Refills: 3 | Status: SHIPPED | OUTPATIENT
Start: 2021-03-26

## 2021-03-26 RX ORDER — CANAGLIFLOZIN 300 MG/1
1 TABLET, FILM COATED ORAL
Qty: 90 TABLET | Refills: 3 | Status: SHIPPED | OUTPATIENT
Start: 2021-03-26 | End: 2021-04-28

## 2021-03-26 NOTE — TELEPHONE ENCOUNTER
Requested Prescriptions     Pending Prescriptions Disp Refills   • lisinopril (PRINIVIL) 5 MG Tab       Sig: Take 0.5 Tablets by mouth every day.   • atorvastatin (LIPITOR) 20 MG Tab 100 tablet 2     Sig: TAKE ONE TABLET BY MOUTH EVERY DAY   • colesevelam (WELCHOL) 625 MG Tab 300 tablet 2     Sig: TAKE ONE TABLET BY MOUTH ONCE DAILY IN THE MORNING, THEN TAKE TWO TABLETS DAILY IN THE EVENING WITH  DINNER   • valACYclovir (VALTREX) 500 MG Tab 100 tablet 0     Sig: Take 1 tablet by mouth every day.   • glimepiride (AMARYL) 4 MG Tab 100 tablet 2     Sig: TAKE ONE TABLET BY MOUTH EVERY MORNING   Rose Marie Gao M.D.

## 2021-03-29 DIAGNOSIS — E11.29 TYPE 2 DIABETES MELLITUS WITH OTHER DIABETIC KIDNEY COMPLICATION (HCC): ICD-10-CM

## 2021-03-29 PROCEDURE — RXMED WILLOW AMBULATORY MEDICATION CHARGE: Performed by: FAMILY MEDICINE

## 2021-03-29 RX ORDER — LISINOPRIL 5 MG/1
2.5 TABLET ORAL DAILY
Qty: 90 TABLET | Refills: 1 | Status: SHIPPED | OUTPATIENT
Start: 2021-03-29 | End: 2021-07-06 | Stop reason: SDUPTHER

## 2021-03-29 NOTE — TELEPHONE ENCOUNTER
Phone Number Called: [phone] (home)       Call outcome: Spoke to patient regarding message below.    Message: Patient confirmed she is taking 2.5mg. Takes half of 5

## 2021-03-29 NOTE — PROGRESS NOTES
Requested Prescriptions     Signed Prescriptions Disp Refills   • lisinopril (PRINIVIL) 5 MG Tab 90 tablet 1     Sig: Take 0.5 Tablets by mouth every day.   Rose Marie Gao M.D.

## 2021-03-30 DIAGNOSIS — E11.9 DIABETES MELLITUS WITHOUT COMPLICATION (HCC): ICD-10-CM

## 2021-03-30 RX ORDER — CANAGLIFLOZIN 300 MG/1
1 TABLET, FILM COATED ORAL
Qty: 100 TABLET | Refills: 3 | Status: SHIPPED | OUTPATIENT
Start: 2021-03-30 | End: 2022-05-16 | Stop reason: SDUPTHER

## 2021-03-30 RX ORDER — LISINOPRIL 5 MG/1
2.5 TABLET ORAL DAILY
OUTPATIENT
Start: 2021-03-30

## 2021-03-31 ENCOUNTER — PHYSICAL THERAPY (OUTPATIENT)
Dept: PHYSICAL THERAPY | Facility: REHABILITATION | Age: 66
End: 2021-03-31
Attending: FAMILY MEDICINE
Payer: MEDICARE

## 2021-03-31 ENCOUNTER — PHARMACY VISIT (OUTPATIENT)
Dept: PHARMACY | Facility: MEDICAL CENTER | Age: 66
End: 2021-03-31
Payer: MEDICARE

## 2021-03-31 DIAGNOSIS — M25.551 GREATER TROCHANTERIC PAIN SYNDROME OF RIGHT LOWER EXTREMITY: ICD-10-CM

## 2021-03-31 DIAGNOSIS — M16.0 PRIMARY OSTEOARTHRITIS OF BOTH HIPS: ICD-10-CM

## 2021-03-31 PROCEDURE — 97140 MANUAL THERAPY 1/> REGIONS: CPT

## 2021-03-31 PROCEDURE — 97110 THERAPEUTIC EXERCISES: CPT

## 2021-03-31 NOTE — OP THERAPY DAILY TREATMENT
Outpatient Physical Therapy  DAILY TREATMENT     [facility] [address] [facility], [address]  [place] [address]  Phone:  [phone]    Date: 03/31/2021    Patient: Shirley Unger  YOB: 1955  MRN: [id number]     Time Calculation    Start time: 0930  Stop time: 1000 Time Calculation (min): 30 minutes         Chief Complaint: Hip Problem    Visit #: 2    SUBJECTIVE:  The patient reports having less pain to the front of the thighs; worked over the weekend gardening felt pain that evening into the next day.    OBJECTIVE:  Current objective measures:     increase (L) hip mobility in flexion       Therapeutic Exercises (CPT 23967):     1. Nu Step bike , painful to(R) hip flexion getting on bike     2. Squats    3. 3 way hip     4. Bridges, 2 x10 reps    5. Supine Felix stretch (L) LE, 2', discomfort to (L) thigh; unable to rest (L) LE on table    6. Prone hip extension, patient cannot lie in prone     Therapeutic Treatments and Modalities:     1. Manual Therapy (CPT 02796), (L) LE , (L) hip mobilization inferior and lateral grade 2-3 using blue mob belt    Time-based treatments/modalities:    Physical Therapy Timed Treatment Charges  Manual therapy minutes (CPT 23812): 15 minutes  Therapeutic exercise minutes (CPT 72756): 15 minutes      ASSESSMENT:   Response to treatment:   (L) hip mobilizations; performed grade 2-3 inferior and lateral mobs; patient tolerated with some anxiety about having pain; comforted patient explaining benefits of performing mobilizations to increase movement and establish better function and improved synovial fluid production. PROM in (L) hip flexion ~95 deg today following treatment    PLAN/RECOMMENDATIONS:   Plan for treatment: therapy treatment to continue next visit.  Planned interventions for next visit: continue with current treatment.

## 2021-04-02 ENCOUNTER — IMMUNIZATION (OUTPATIENT)
Dept: FAMILY PLANNING/WOMEN'S HEALTH CLINIC | Facility: IMMUNIZATION CENTER | Age: 66
End: 2021-04-02
Attending: INTERNAL MEDICINE
Payer: MEDICARE

## 2021-04-02 ENCOUNTER — PATIENT MESSAGE (OUTPATIENT)
Dept: HEALTH INFORMATION MANAGEMENT | Facility: OTHER | Age: 66
End: 2021-04-02

## 2021-04-02 DIAGNOSIS — Z23 ENCOUNTER FOR VACCINATION: Primary | ICD-10-CM

## 2021-04-02 PROCEDURE — 0002A PFIZER SARS-COV-2 VACCINE: CPT

## 2021-04-02 PROCEDURE — 91300 PFIZER SARS-COV-2 VACCINE: CPT

## 2021-04-07 ENCOUNTER — APPOINTMENT (OUTPATIENT)
Dept: PHYSICAL THERAPY | Facility: REHABILITATION | Age: 66
End: 2021-04-07
Attending: FAMILY MEDICINE
Payer: MEDICARE

## 2021-04-14 ENCOUNTER — PHYSICAL THERAPY (OUTPATIENT)
Dept: PHYSICAL THERAPY | Facility: REHABILITATION | Age: 66
End: 2021-04-14
Attending: FAMILY MEDICINE
Payer: MEDICARE

## 2021-04-14 DIAGNOSIS — M25.551 GREATER TROCHANTERIC PAIN SYNDROME OF RIGHT LOWER EXTREMITY: ICD-10-CM

## 2021-04-14 DIAGNOSIS — M16.0 PRIMARY OSTEOARTHRITIS OF BOTH HIPS: ICD-10-CM

## 2021-04-14 PROCEDURE — 97112 NEUROMUSCULAR REEDUCATION: CPT

## 2021-04-14 PROCEDURE — 97110 THERAPEUTIC EXERCISES: CPT

## 2021-04-14 NOTE — OP THERAPY DAILY TREATMENT
Outpatient Physical Therapy  DAILY TREATMENT     [facility] [place] [address] [facility], [address]  [place] [address]  Phone:  [phone]    Date: 04/14/2021    Patient: Shirley Unger  YOB: 1955  MRN: [id number]     Time Calculation    Start time: 1000  Stop time: 1030 Time Calculation (min): 30 minutes         Chief Complaint: Hip Problem    Visit #: 3    SUBJECTIVE:  The patient reports working on her feet a lot last week and felt more increased discomfort to the (L) hip and groin.    OBJECTIVE:  Current objective measures:       increase strength in hip flexion and abduction 4+/5 to 5/5    Therapeutic Exercises (CPT 02453):     1. Nu Step bike , seat 18; UE 7 , 7 minutes     2. Oralia squats    3. 3 way hip     4. Bridges with hip adduction, 2 x10 reps    5. Supine Felix stretch (L) LE, 2', pain to (L) thigh; unable to rest (L) LE on table    6. Prone hip extension, patient cannot lie in prone     7. Shuttle leg press, some discomfort to (L) hip at 80-85 deg    Therapeutic Treatments and Modalities:     1. Manual Therapy (CPT 52507), (L) LE , (L) hip mobilization inferior and lateral grade 2-3 using blue mob belt    Time-based treatments/modalities:    Physical Therapy Timed Treatment Charges  Manual therapy minutes (CPT 64597): 10 minutes  Neuromusc re-ed, balance, coor, post minutes (CPT 67931): 10 minutes  Therapeutic exercise minutes (CPT 24172): 25 minutes      ASSESSMENT:   Response to treatment:   (L) hip mobilizations in lateral and inferior glides grade 2-3; tolerated well; unable to lie prone or (R) side-lying due to pain. PROM to the (L) hip to ~85 deg before patient expressed pain in hip flexion. Self care techniques; getting up from lying on exam table to (L) side; patient needed support to transfer up to sitting. Performed strengthening to (L) hip flexors/psoas but had increased pain at ~85-90 deg using lightest resistive yellow cord.  Patient tolerated (L)  hip adduction well with yellow cord to fatigue response.    PLAN/RECOMMENDATIONS:   Plan for treatment: therapy treatment to continue next visit.  Planned interventions for next visit: continue with current treatment.

## 2021-04-19 PROCEDURE — RXMED WILLOW AMBULATORY MEDICATION CHARGE: Performed by: FAMILY MEDICINE

## 2021-04-21 ENCOUNTER — PHARMACY VISIT (OUTPATIENT)
Dept: PHARMACY | Facility: MEDICAL CENTER | Age: 66
End: 2021-04-21
Payer: MEDICARE

## 2021-04-21 ENCOUNTER — PHYSICAL THERAPY (OUTPATIENT)
Dept: PHYSICAL THERAPY | Facility: REHABILITATION | Age: 66
End: 2021-04-21
Attending: FAMILY MEDICINE
Payer: MEDICARE

## 2021-04-21 DIAGNOSIS — M16.0 PRIMARY OSTEOARTHRITIS OF BOTH HIPS: ICD-10-CM

## 2021-04-21 PROCEDURE — 97110 THERAPEUTIC EXERCISES: CPT

## 2021-04-21 PROCEDURE — 97140 MANUAL THERAPY 1/> REGIONS: CPT

## 2021-04-21 NOTE — OP THERAPY DAILY TREATMENT
Outpatient Physical Therapy  DAILY TREATMENT     [facility] [address], [address]  [place] [address]  Phone:  [phone]    Date: 04/21/2021    Patient: Shirley Unger  YOB: 1955  MRN: [id number]     Time Calculation    Start time: 0900  Stop time: 0930 Time Calculation (min): 30 minutes         Chief Complaint: Back Problem and Hip Problem    Visit #: 4    SUBJECTIVE:  The patient reports (L) hip has been feeling better over the last couple of days. The patient has been standing and weeding in her garden    OBJECTIVE:  Current objective measures:     increase (L) hip flexion and mobility in IR to cross leg with less pain       Therapeutic Exercises (CPT 63998):     1. Nu Step bike , seat 18; UE 7 , 6 minutes     2. Oralia squats, 10x    3. 3 way hip (white tb), 2 x10 reps     4. Bridges with hip adduction, 2 x10 reps    5. Supine Felix stretch (L) LE, 2'    Therapeutic Treatments and Modalities:     1. Manual Therapy (CPT 87873), (L) LE , (L) hip mobilization inferior and lateral grade 2-3 using blue mob belt    Time-based treatments/modalities:    Physical Therapy Timed Treatment Charges  Manual therapy minutes (CPT 75134): 10 minutes  Therapeutic exercise minutes (CPT 07637): 20 minutes      ASSESSMENT:   Response to treatment:   (L) hip mobilization grade 2-3 both inferiorly and laterally, patient reported less stiffness following treatment; next visit try leg press and gluteal strengthening    PLAN/RECOMMENDATIONS:   Plan for treatment: therapy treatment to continue next visit.  Planned interventions for next visit: continue with current treatment.

## 2021-04-23 ENCOUNTER — PATIENT OUTREACH (OUTPATIENT)
Dept: HEALTH INFORMATION MANAGEMENT | Facility: OTHER | Age: 66
End: 2021-04-23

## 2021-04-23 NOTE — PROGRESS NOTES
Outcome:     HIPAA Verified - Spoke to patient.     Declined scheduling [facility] at this time. Would like to discuss this with her before scheduling.    [facility] Verified: yes     Attempt # 2

## 2021-04-28 ENCOUNTER — OFFICE VISIT (OUTPATIENT)
Dept: MEDICAL GROUP | Facility: PHYSICIAN GROUP | Age: 66
End: 2021-04-28
Payer: MEDICARE

## 2021-04-28 ENCOUNTER — PATIENT OUTREACH (OUTPATIENT)
Dept: HEALTH INFORMATION MANAGEMENT | Facility: OTHER | Age: 66
End: 2021-04-28

## 2021-04-28 VITALS
HEART RATE: 93 BPM | SYSTOLIC BLOOD PRESSURE: 124 MMHG | OXYGEN SATURATION: 97 % | WEIGHT: 184.3 LBS | DIASTOLIC BLOOD PRESSURE: 68 MMHG | BODY MASS INDEX: 27.93 KG/M2 | HEIGHT: 68 IN | TEMPERATURE: 97.5 F

## 2021-04-28 DIAGNOSIS — M25.551 BILATERAL HIP PAIN: ICD-10-CM

## 2021-04-28 DIAGNOSIS — E78.5 DYSLIPIDEMIA: ICD-10-CM

## 2021-04-28 DIAGNOSIS — Z12.11 SCREENING FOR COLORECTAL CANCER: ICD-10-CM

## 2021-04-28 DIAGNOSIS — E11.9 TYPE 2 DIABETES MELLITUS WITHOUT COMPLICATION, WITHOUT LONG-TERM CURRENT USE OF INSULIN (HCC): ICD-10-CM

## 2021-04-28 DIAGNOSIS — E55.9 VITAMIN D DEFICIENCY: ICD-10-CM

## 2021-04-28 DIAGNOSIS — Z91.81 RISK FOR FALLS: ICD-10-CM

## 2021-04-28 DIAGNOSIS — N20.0 RECURRENT NEPHROLITHIASIS: ICD-10-CM

## 2021-04-28 DIAGNOSIS — F51.01 PRIMARY INSOMNIA: ICD-10-CM

## 2021-04-28 DIAGNOSIS — Z00.00 MEDICARE ANNUAL WELLNESS VISIT, SUBSEQUENT: ICD-10-CM

## 2021-04-28 DIAGNOSIS — M25.552 BILATERAL HIP PAIN: ICD-10-CM

## 2021-04-28 DIAGNOSIS — Z12.12 SCREENING FOR COLORECTAL CANCER: ICD-10-CM

## 2021-04-28 PROBLEM — G57.62 MORTON'S NEUROMA OF LEFT FOOT: Status: RESOLVED | Noted: 2019-02-27 | Resolved: 2021-04-28

## 2021-04-28 PROBLEM — M54.2 NECK PAIN: Status: RESOLVED | Noted: 2020-10-19 | Resolved: 2021-04-28

## 2021-04-28 PROBLEM — E53.8 B12 DEFICIENCY: Status: RESOLVED | Noted: 2020-01-20 | Resolved: 2021-04-28

## 2021-04-28 PROBLEM — J30.1 SEASONAL ALLERGIC RHINITIS DUE TO POLLEN: Status: RESOLVED | Noted: 2017-03-07 | Resolved: 2021-04-28

## 2021-04-28 PROCEDURE — G0439 PPPS, SUBSEQ VISIT: HCPCS | Performed by: FAMILY MEDICINE

## 2021-04-28 RX ORDER — CLINDAMYCIN HYDROCHLORIDE 300 MG/1
CAPSULE ORAL
COMMUNITY
Start: 2021-04-19

## 2021-04-28 ASSESSMENT — ACTIVITIES OF DAILY LIVING (ADL): BATHING_REQUIRES_ASSISTANCE: 0

## 2021-04-28 ASSESSMENT — FIBROSIS 4 INDEX: FIB4 SCORE: 0.83

## 2021-04-28 ASSESSMENT — ENCOUNTER SYMPTOMS: GENERAL WELL-BEING: EXCELLENT

## 2021-04-28 ASSESSMENT — PATIENT HEALTH QUESTIONNAIRE - PHQ9: CLINICAL INTERPRETATION OF PHQ2 SCORE: 0

## 2021-04-28 NOTE — PROGRESS NOTES
Chief Complaint   Patient presents with   • Annual Wellness Visit       HPI:  Shirley Unger is a 66 y.o. here for Medicare Annual Wellness Visit        Patient Active Problem List    Diagnosis Date Noted   • Bilateral hip pain 04/28/2021   • Risk for falls 04/28/2021   • Type 2 diabetes mellitus without complication, without long-term current use of insulin (HCC) 01/27/2021   • Primary osteoarthritis of both hips 01/20/2020   • Primary insomnia 06/12/2018   • Dyslipidemia    • Recurrent nephrolithiasis 03/22/2013       Current Outpatient Medications   Medication Sig Dispense Refill   • Canagliflozin (INVOKANA) 300 MG Tab Take 1 tablet by mouth every day. 100 tablet 3   • lisinopril (PRINIVIL) 5 MG Tab Take one half tablet by mouth every day. 90 tablet 1   • atorvastatin (LIPITOR) 20 MG Tab Take 1 tablet by mouth daily. 100 tablet 2   • colesevelam (WELCHOL) 625 MG Tab Take 1 tablet by mouth every morning and take 2 tablets every evening with dinner. 300 tablet 2   • valACYclovir (VALTREX) 500 MG Tab Take 1 tablet by mouth every day. 100 tablet 0   • glimepiride (AMARYL) 4 MG Tab Take 1 tablet by mouth every morning. 100 tablet 2   • metformin (GLUCOPHAGE) 1000 MG tablet Take 1 tablet by mouth 2 times a day, with meals. 180 tablet 0   • Dulaglutide (TRULICITY) 0.75 MG/0.5ML Solution Pen-injector Inject 0.5 mL (1 pen) under the skin every 7 days. 6 mL 1   • glucose blood (FREESTYLE LITE) strip Use 1 Strip daily 100 Strip 3   • aspirin 81 MG EC tablet Take 1 Tab by mouth every day. 30 Tab 0   • potassium citrate SR (UROCIT-K SR) 10 MEQ (1080 MG) Tab CR potassium citrate ER 10 mEq (1,080 mg) tablet,extended release     • Cyanocobalamin (VITAMIN B 12 PO) Take 1,000 mcg by mouth every day.     • Lancets Lancets order: Lancets for Song Contour Next meter. Sig: use daily and prn ssx high or low sugar. 100 Each 3   • acyclovir (ZOVIRAX) 5 % Cream Apply 1 Application to affected area(s) every 3 hours. 1 Tube 6   •  triamcinolone acetonide (KENALOG) 0.1 % Ointment Apply 1 Application to affected area(s) 2 times a day as needed.     • mupirocin calcium (BACTROBAN) 2 % Cream Apply to affected area(s) 2 times a day. (Patient taking differently: Apply 1 Application to affected area(s) as needed. Apply to affected area(s) 2 times a day.) 15 g 2   • Blood Glucose Monitoring Suppl SUPPLIES MISC Test strips order: Test strips for Abbott Freestyle Lite meter. Sig: use BID and prn ssx high or low sugar #100 RF x 3 100 Each 3   • clindamycin (CLEOCIN) 300 MG Cap      • Cholecalciferol 125 MCG (5000 UT) Tab Take 10,000 Units by mouth every day.       No current facility-administered medications for this visit.          Current supplements as per medication list.     Allergies: Macrobid [nitrofurantoin monohydrate macrocrystals] and Pcn [penicillins]    Current social contact/activities: lives withspouse, dtr and grandson  Belongs to a girlscouts group    She  reports that she has never smoked. She has never used smokeless tobacco. She reports that she does not drink alcohol and does not use drugs.  Counseling given: Not Answered      DPA/Advanced Directive:  Patient does not have an Advanced Directive.  A packet and workshop information was given on Advanced Directives.    ROS:    Gait: Uses a cane  Ostomy: No  Other tubes: No  Amputations: No  Chronic oxygen use: No  Last eye exam: 10/2020  Wears hearing aids: No   : Reports urinary leakage during the last 6 months that has somewhat interfered with their daily activities or sleep.    Screening:    Depression Screening    Little interest or pleasure in doing things?  0 - not at all  Feeling down, depressed , or hopeless? 0 - not at all  Trouble falling or staying asleep, or sleeping too much?     Feeling tired or having little energy?     Poor appetite or overeating?     Feeling bad about yourself - or that you are a failure or have let yourself or your family down?    Trouble  concentrating on things, such as reading the newspaper or watching television?    Moving or speaking so slowly that other people could have noticed.  Or the opposite - being so fidgety or restless that you have been moving around a lot more than usual?     Thoughts that you would be better off dead, or of hurting yourself?     Patient Health Questionnaire Score:      If depressive symptoms identified deferred to follow up visit unless specifically addressed in assessment and plan.    Interpretation of PHQ-9 Total Score   Score Severity   1-4 No Depression   5-9 Mild Depression   10-14 Moderate Depression   15-19 Moderately Severe Depression   20-27 Severe Depression      Screening for Cognitive Impairment    Three Minute Recall (captain, garden, picture) 2/3    Saleem clock face with all 12 numbers and set the hands to show 5 past 8.  Yes    Cognitive concerns identified deferred for follow up unless specifically addressed in assessment and plan.    Fall Risk Assessment    Has the patient had two or more falls in the last year or any fall with injury in the last year?  Yes    Safety Assessment    Throw rugs on floor.  Yes  Handrails on all stairs.  Yes  Good lighting in all hallways.  Yes  Difficulty hearing.  No  Patient counseled about all safety risks that were identified.    Functional Assessment ADLs    Are there any barriers preventing you from cooking for yourself or meeting nutritional needs?  No.    Are there any barriers preventing you from driving safely or obtaining transportation?  No.    Are there any barriers preventing you from using a telephone or calling for help?  No.    Are there any barriers preventing you from shopping?  No.    Are there any barriers preventing you from taking care of your own finances?  No.    Are there any barriers preventing you from managing your medications?  No.    Are there any barriers preventing you from showering, bathing or dressing yourself? No.    Are you currently  engaging in any exercise or physical activity?  Yes.     What is your perception of your health?  Excellent.    Health Maintenance Summary                IMM ZOSTER VACCINES Overdue 5/23/2014      Done 3/28/2014 Imm Admin: Zoster Vaccine Live (ZVL) (Zostavax) - HISTORICAL DATA    IMM PNEUMOCOCCAL VACCINE: 65+ Years Overdue 3/12/2020      Done 3/12/2019 Imm Admin: Pneumococcal Conjugate Vaccine (Prevnar/PCV-13)     Patient has more history with this topic...    COLONOSCOPY Next Due 5/16/2021      Done 5/16/2016 AMB REFERRAL TO GI FOR COLONOSCOPY    A1C SCREENING Next Due 8/3/2021      Done 2/3/2021 HEMOGLOBIN A1C     Patient has more history with this topic...    MAMMOGRAM Next Due 10/5/2021      Done 10/5/2020 MA-SCREENING MAMMO BILAT W/TOMOSYNTHESIS W/CAD     Patient has more history with this topic...    RETINAL SCREENING Next Due 10/22/2021      Done 10/22/2020 REFERRAL FOR RETINAL SCREENING EXAM     Patient has more history with this topic...    FASTING LIPID PROFILE Next Due 2/3/2022      Done 2/3/2021 LIPID PROFILE     Patient has more history with this topic...    URINE ACR / MICROALBUMIN Next Due 2/3/2022      Done 2/3/2021 MICROALBUMIN CREAT RATIO URINE     Patient has more history with this topic...    SERUM CREATININE Next Due 2/3/2022      Done 2/3/2021 COMP METABOLIC PANEL     Patient has more history with this topic...    DIABETES MONOFILAMENT / LE EXAM Next Due 2/24/2022      Done 2/24/2021 AMB DIABETIC MONOFILAMENT LOWER EXTREMITY EXAM     Patient has more history with this topic...    IMM DTaP/Tdap/Td Vaccine Next Due 10/15/2022      Done 10/15/2012 Imm Admin: Tdap Vaccine     Patient has more history with this topic...    BONE DENSITY Next Due 4/3/2024      Done 4/3/2019 DS-BONE DENSITY STUDY (DEXA)          Patient Care Team:  Rose Marie Goa M.D. as PCP - General (Family Medicine)  Alexandra Warren, PT, MPT, OCS (Inactive) as Physical Therapist (Physical Therapy)  Jonah Monge M.D.  "(Urology)  L[facility] (Nurse Practitioner Family)  Manjit Moyer O.D. (Optometry)  Hebert Aguila, PT as Physical Therapist (Physical Therapy)      Social History     Tobacco Use   • Smoking status: Never Smoker   • Smokeless tobacco: Never Used   Substance Use Topics   • Alcohol use: No     Alcohol/week: 0.0 oz     Comment: Rare Useage   • Drug use: No     Family History   Problem Relation Age of Onset   • Cancer Mother         Lung   • Heart Disease Father          at 71, No MI? May have been electrical   • No Known Problems Brother    • No Known Problems Brother    • Diabetes Maternal Aunt    • Diabetes Maternal Grandfather    • No Known Problems Brother      She  has a past medical history of DM (diabetes mellitus) ([facility]) (), Herpes genitalia (3/22/2013), High cholesterol, Hyperlipidemia LDL goal < 100, Nephrolithiasis (3/22/2013), Urinary incontinence, and Vitamin d deficiency (2013).   Past Surgical History:   Procedure Laterality Date   • PB TOTAL HIP ARTHROPLASTY Right 3/11/2020    Procedure: ARTHROPLASTY, HIP, TOTAL;  Surgeon: Miguel Madden M.D.;  Location: [facility];  Service: Orthopedics   • ARTHROPLASTY Right    • GANGLION EXCISION  2014    Performed by Efra Ramirez M.D. at [facility]   • HYSTERECTOMY, TOTAL ABDOMINAL      For non cancerous reasons   • CHOLECYSTECTOMY     • ABDOMINAL HYSTERECTOMY TOTAL     • CARPAL TUNNEL RELEASE      Right hand   • CYSTOSCOPY STENT PLACEMENT      several, stent removed in MD office   • PB  DELIVERY ONLY     • PRIMARY C SECTION       x 2       Exam:   /68 (BP Location: Left arm, Patient Position: Sitting, BP Cuff Size: Adult)   Pulse 93   Temp 36.4 °C (97.5 °F) (Temporal)   Ht 1.727 m (5' 8\")   Wt 83.6 kg (184 lb 4.8 oz)   SpO2 97%  Body mass index is 28.02 kg/m².    Hearing good.    Dentition fair  Alert, oriented in no acute distress.  Eye contact is good, " speech goal directed, affect calm    Assessment and Plan. The following treatment and monitoring plan is recommended:      1. Medicare annual wellness visit, subsequent  She had some questions regarding [facility]. After discussion, she'll schedule her visit with them for assessment  She continues to take clindamycin prior to any dental procedures.  States this is due to her right hip replacement.  Vaccines were d/w her today.  Will hold for now as she completed her covid vaccine this month.     2. Type 2 diabetes mellitus without complication, without long-term current use of insulin (HCC)  Chronic.  Overall stable.  Discussed with her that her goal is to have her A1c at or below 7%.  She continues to follow-up with endocrinology and has her medications managed through them.    3. Dyslipidemia  Chronic medic cold diagnosis.  Her LDL is at goal below 70.  Triglycerides were at 174 with recent labs.  Continue with Lipitor 20 mg daily.    4. Recurrent nephrolithiasis  History of.  Currently stable.    5. Vitamin D deficiency disease  Chronic medical diagnosis.  She continues to get vitamin D and B12 through endocrinology.  Recent labs did have normal range.    6. Bilateral hip pain  Chronic medical diagnosis.  Improving with therapy.  She had her right hip replaced last year and is planning on having the left one replaced this fall.    7.. Risk for falls  - Patient identified as fall risk.  Appropriate orders and counseling given.    9. Primary insomnia  Chronic medical diagnosis.  She continues to take melatonin as needed.    10. Screening for colorectal cancer  - REFERRAL TO GI FOR COLONOSCOPY        Services suggested: No services needed at this time  Health Care Screening: Age-appropriate preventive services recommended by USPTF and ACIP covered by Medicare were discussed today. Services ordered if indicated and agreed upon by the patient.  Referrals offered: Community-based lifestyle interventions to reduce health risks  and promote self-management and wellness, fall prevention, nutrition, physical activity, tobacco-use cessation, weight loss, and mental health services as per orders if indicated.    Discussion today about general wellness and lifestyle habits:    · Prevent falls and reduce trip hazards; Cautioned about securing or removing rugs.  · Have a working fire alarm and carbon monoxide detector;   · Engage in regular physical activity and social activities     Follow-up: Return in about 3 months (around 7/28/2021) for Diabetes.

## 2021-04-28 NOTE — PROGRESS NOTES
Outcome:Schuled for  Comprehensive Health Assessment   Thursday, April 29, 2021  10:00 am     [facility] - [address]    Please transfer to Patient Outreach Team at [phone] when patient returns call.      Attempt # 1

## 2021-04-29 NOTE — PROGRESS NOTES
Appointment reminder call for a 10am appointment with ALEXIS Blanton at the [address] [place] [address] 3 office Mbr stated she will be there.   No further questions or comments

## 2021-04-29 NOTE — OP THERAPY DAILY TREATMENT
Outpatient Physical Therapy  DAILY TREATMENT     [facility] Outpatient Physical Therapy [address], [address]  [place] [address]  Phone:  [phone]    Date: 04/30/2021    Patient: Shirley Unger  YOB: 1955  MRN: [id number]     Time Calculation    Start time: 0930  Stop time: 1000 Time Calculation (min): 30 minutes         Chief Complaint: Difficulty Walking and Hip Problem    Visit #: 5    SUBJECTIVE:  The patient reports some groin pain to the (L) LE while walking in to the gym this morning     OBJECTIVE:  Current objective measures:       Improve gait pattern with increased stance time over (L) LE and step length     Therapeutic Exercises (CPT 07711):     1. Nu Step bike , seat 18; UE 7 , 6 minutes     2. Oralia squats, 10x    3. 3 way hip (white tb), 2 x10 reps     4. Bridges with hip adduction, 2 x10 reps    5. Supine Felix stretch (L) LE, 2'    Therapeutic Treatments and Modalities:     1. Manual Therapy (CPT 87437), (L) LE , (L) hip mobilization inferior and lateral grade 2-3 using blue mob belt    Time-based treatments/modalities:    Physical Therapy Timed Treatment Charges  Manual therapy minutes (CPT 82564): 10 minutes  Therapeutic exercise minutes (CPT 94164): 20 minutes      ASSESSMENT:   Response to treatment:   (L) hip lateral and inferior glides grade 2-3 with occasional pain in inferior glide positions today. Gait pattern improved with less stance time over (L) LE and increased step length observed; still has antalgia with increased walking distances    PLAN/RECOMMENDATIONS:   Plan for treatment: therapy treatment to continue next visit.  Planned interventions for next visit: continue with current treatment.

## 2021-04-30 ENCOUNTER — PHYSICAL THERAPY (OUTPATIENT)
Dept: PHYSICAL THERAPY | Facility: REHABILITATION | Age: 66
End: 2021-04-30
Attending: FAMILY MEDICINE
Payer: MEDICARE

## 2021-04-30 DIAGNOSIS — M16.0 PRIMARY OSTEOARTHRITIS OF BOTH HIPS: ICD-10-CM

## 2021-04-30 DIAGNOSIS — M25.551 GREATER TROCHANTERIC PAIN SYNDROME OF RIGHT LOWER EXTREMITY: ICD-10-CM

## 2021-04-30 PROCEDURE — 97110 THERAPEUTIC EXERCISES: CPT

## 2021-04-30 PROCEDURE — 97140 MANUAL THERAPY 1/> REGIONS: CPT

## 2021-05-06 ENCOUNTER — APPOINTMENT (OUTPATIENT)
Dept: PHYSICAL THERAPY | Facility: REHABILITATION | Age: 66
End: 2021-05-06
Attending: FAMILY MEDICINE
Payer: MEDICARE

## 2021-05-09 PROCEDURE — RXMED WILLOW AMBULATORY MEDICATION CHARGE: Performed by: FAMILY MEDICINE

## 2021-05-13 ENCOUNTER — PHARMACY VISIT (OUTPATIENT)
Dept: PHARMACY | Facility: MEDICAL CENTER | Age: 66
End: 2021-05-13
Payer: MEDICARE

## 2021-05-18 ENCOUNTER — PHYSICAL THERAPY (OUTPATIENT)
Dept: PHYSICAL THERAPY | Facility: REHABILITATION | Age: 66
End: 2021-05-18
Attending: FAMILY MEDICINE
Payer: MEDICARE

## 2021-05-18 DIAGNOSIS — M25.551 GREATER TROCHANTERIC PAIN SYNDROME OF RIGHT LOWER EXTREMITY: ICD-10-CM

## 2021-05-18 DIAGNOSIS — M16.0 PRIMARY OSTEOARTHRITIS OF BOTH HIPS: ICD-10-CM

## 2021-05-18 PROCEDURE — 97140 MANUAL THERAPY 1/> REGIONS: CPT

## 2021-05-18 PROCEDURE — 97110 THERAPEUTIC EXERCISES: CPT

## 2021-05-18 PROCEDURE — 97112 NEUROMUSCULAR REEDUCATION: CPT

## 2021-05-18 NOTE — OP THERAPY DAILY TREATMENT
"  Outpatient Physical Therapy  DAILY TREATMENT     [facility] Outpatient Physical Therapy [address], [address]  [place] [address]  Phone:  [phone]    Date: 05/18/2021    Patient: Shirley Unger  YOB: 1955  MRN: [id number]     Time Calculation    Start time: 0130  Stop time: 0230 Time Calculation (min): 60 minutes         Chief Complaint: Hip Problem    Visit #: 6    SUBJECTIVE:  The patient reports having trouble getting the (L) leg comfortable; patient has been sleeping better at night but continues to have pain      OBJECTIVE:  Current objective measures:       Increase mobility to the (L) hip     Therapeutic Exercises (CPT 55263):     1. Nu Step bike , seat 18; UE 7 , 5:37 minutes     2. Oralia squats, 10x    3. 3 way hip (white tb), 2 x10 reps     4. Bridges with hip adduction, 2 x10 reps    5. Supine Felix stretch (L) LE, 2'    6. Bridges with hip abduction , 2 x10 reps (pink tb)    7. Theraball hamstring curls with ball, 10 x    8. Leg press Shuttle, 1 x20 reps @ 3 B double stance     Therapeutic Treatments and Modalities:     1. Manual Therapy (CPT 30772), (L) LE , (L) hip mobilization inferior and lateral grade 2-3 using blue mob belt, hip distraction of (L) hip grade 2-3     Time-based treatments/modalities:    Physical Therapy Timed Treatment Charges  Manual therapy minutes (CPT 59058): 15 minutes  Neuromusc re-ed, balance, coor, post minutes (CPT 89411): 15 minutes  Therapeutic exercise minutes (CPT 16063): 30 minutes      ASSESSMENT:   Response to treatment:   (L) hip joint mobilizations to inferior and lateral glides at grade 2-3; no pain but patient continues to have \"pressure: to hip. Recommended hip distraction to (L) hip; patient randomly had self manipulation and felt alleviation of (L) hip symptoms.     PLAN/RECOMMENDATIONS:   Plan for treatment: therapy treatment to continue next visit.  Planned interventions for next visit: continue with current treatment.       "

## 2021-05-24 PROCEDURE — RXMED WILLOW AMBULATORY MEDICATION CHARGE: Performed by: FAMILY MEDICINE

## 2021-05-26 ENCOUNTER — PHARMACY VISIT (OUTPATIENT)
Dept: PHARMACY | Facility: MEDICAL CENTER | Age: 66
End: 2021-05-26
Payer: MEDICARE

## 2021-06-25 ENCOUNTER — TELEMEDICINE (OUTPATIENT)
Dept: ENDOCRINOLOGY | Facility: MEDICAL CENTER | Age: 66
End: 2021-06-25
Attending: NURSE PRACTITIONER
Payer: MEDICARE

## 2021-06-25 DIAGNOSIS — E55.9 VITAMIN D DEFICIENCY: ICD-10-CM

## 2021-06-25 DIAGNOSIS — E11.69 HYPERLIPIDEMIA ASSOCIATED WITH TYPE 2 DIABETES MELLITUS (HCC): ICD-10-CM

## 2021-06-25 DIAGNOSIS — E53.8 VITAMIN B 12 DEFICIENCY: ICD-10-CM

## 2021-06-25 DIAGNOSIS — E11.9 TYPE 2 DIABETES MELLITUS WITHOUT COMPLICATION, WITHOUT LONG-TERM CURRENT USE OF INSULIN (HCC): ICD-10-CM

## 2021-06-25 DIAGNOSIS — E78.5 HYPERLIPIDEMIA ASSOCIATED WITH TYPE 2 DIABETES MELLITUS (HCC): ICD-10-CM

## 2021-06-25 PROCEDURE — 999999 HB NO CHARGE: Mod: 95 | Performed by: NURSE PRACTITIONER

## 2021-06-25 PROCEDURE — 99214 OFFICE O/P EST MOD 30 MIN: CPT | Mod: 95 | Performed by: NURSE PRACTITIONER

## 2021-06-25 NOTE — PROGRESS NOTES
CHIEF COMPLAINT: Patient is here for follow up of Type 2 Diabetes Mellitus, hyperlipidemia, Vitamin D deficiency, and Vitamin B deficiency.  Patient was presented for a telehealth consultation via secure and encrypted videoconferencing technology. This encounter was conducted via Zoom . Verbal consent was obtained. Patient's identity was verified.    HPI:     Shirley Unger is a 66 y.o. female  for continued evaluation & treatment of the followin. Type 2 Diabetes Mellitus  Ms. Unger is a very pleasant 66-year-old female who states she has been doing well since her last visit.    Current Diabetes Regimen:  Bydureon 2 mg weekly.    Canagliflozin 300 mg once daily   Metformin 1000 mg BID    Glimepiride 4 mg daily      Labs from 2021 HbA1c is 6.9  2020 A1c 6.7    BG Diary:2021- testing twice daily.  Blood sugar log not available for this appointment.  Testing blood sugars randomly.      Hypoglycemia: None.  Patient is hypoglycemic aware.     Weight unchanged from prior appointment.    Diabetes Complications   Retinopathy: No known retinopathy.  Last eye exam: 10/22/2020 at [facility].  Diagnosed with drusen (degenerative) of macula bilaterally.  Neuropathy: Denies paresthesias or numbness in hands or feet. Denies any foot wounds.  Exercise: Minimal.  Diet: Fair.    Currently taking lisinopril 2.5 mg daily.  BP not currently tested as this is a virtual visit.     Ref. Range 2/3/2021 09:10   Creatinine, Urine Latest Units: mg/dL 58.64   Microalbumin, Urine Random Latest Units: mg/dL <1.2       2.  Hyperlipidemia  Currently taking Lipitor 20 mg daily.  Denies muscle weakness and/or fatigue.     Ref. Range 2/3/2021 09:10   Cholesterol,Tot Latest Ref Range: 100 - 199 mg/dL 139   Triglycerides Latest Ref Range: 0 - 149 mg/dL 174 (H)   HDL Latest Ref Range: >=40 mg/dL 44   LDL Latest Ref Range: <100 mg/dL 60       3.  Vitamin D deficiency  Currently taking vitamin D 10,000  units/day.  This was decreased from 20,000 IU at the last appointment and no current vitamin D level is available.     Ref. Range 2/3/2021 09:10   25-Hydroxy   Vitamin D 25 Latest Ref Range: 30 - 100 ng/mL 93     4.  Vitamin B12 deficiency  Currently taking vitamin B12 1000 mcg daily.     Ref. Range 2/3/2021 09:10   Vitamin B12 -True Cobalamin Latest Ref Range: 211 - 911 pg/mL 3239 (H)         Patient's medications, allergies, and social histories were reviewed and updated as appropriate.    ROS:     CONS:     No fever, no chills   EYES:     No diplopia, no blurry vision   CV:           No chest pain, no palpitations   PULM:     No SOB, no cough, no hemoptysis.   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  2018-06: Skin cancer screening  2018-06: Primary insomnia  2018-03: Functional diarrhea  2017-09: Obesity (BMI 30-39.9)  2017-09: Numbness of left foot  2017-05: Arthralgia of left temporomandibular joint  2017-03: Seasonal allergic rhinitis due to pollen  2016-11: Acute mastitis of left breast  2016-11: Herpes dermatitis  2016-07: Primary osteoarthritis of left hip  2016-07: Trigger finger of right thumb  2016-05: Colon polyps  2015-08: Uncontrolled type 2 diabetes mellitus without complication,   without long-term current use of insulin  2014-07: Hand eczema  2014-02: Right hand pain  2013-07: Vitamin D deficiency disease  2013-03: Recurrent nephrolithiasis  Dyslipidemia  Type 2 DM, uncontrolled      Past Surgical History:  Past Surgical History:    Procedure Laterality Date   • PB TOTAL HIP ARTHROPLASTY Right 3/11/2020    Procedure: ARTHROPLASTY, HIP, TOTAL;  Surgeon: Miguel Madden M.D.;  Location: SUR[facility]ERY [facility];  Service: Orthopedics   • ARTHROPLASTY Right    • GANGLION EXCISION  2014    Performed by Efra Ramirez M.D. at SURGERY [facility]   • HYSTERECTOMY, TOTAL ABDOMINAL      For non cancerous reasons   • CHOLECYSTECTOMY     • ABDOMINAL HYSTERECTOMY TOTAL     • CARPAL TUNNEL RELEASE      Right hand   • CYSTOSCOPY STENT PLACEMENT      several, stent removed in MD office   • PB  DELIVERY ONLY     • PRIMARY C SECTION       x 2        Allergies:  Macrobid [nitrofurantoin monohydrate macrocrystals] and Pcn [penicillins]     Social History:  Social History     Tobacco Use   • Smoking status: Never Smoker   • Smokeless tobacco: Never Used   Vaping Use   • Vaping Use: Never used   Substance Use Topics   • Alcohol use: No     Alcohol/week: 0.0 oz     Comment: Rare Useage   • Drug use: No        Family History:   family history includes Cancer in her mother; Diabetes in her maternal aunt and maternal grandfather; Heart Disease in her father; No Known Problems in her brother, brother, and brother.      PHYSICAL EXAM:   OBJECTIVE:  Vital signs: Virtual appointment.  GENERAL: Well-developed, well-nourished in no apparent distress.   EYE:  No ocular asymmetry, PERRLA  HENT: Pink, moist mucous membranes.    NECK: No thyromegaly.   CARDIOVASCULAR:  No murmurs  LUNGS: Clear breath sounds  ABDOMEN: Soft, nontender   EXTREMITIES: No clubbing, cyanosis, or edema.   NEUROLOGICAL: No gross focal motor abnormalities   LYMPH: No cervical adenopathy seen.   SKIN: No rashes, lesions.       ASSESSMENT/PLAN:   1. Type 2 diabetes mellitus without complication, without long-term current use of insulin (HCC)  Stable.  Continue diabetes Regimen:  Change to Trulicity 0.75 mg weekly.  Drug formulary will cover this medication and  prior Auth will be handled by pharmacy [facility] in our office.  Rx will be sent to [facility] for mail order.  Canagliflozin 300 mg once daily   Metformin 1000 mg BID    Glimepiride 4 mg daily    Continue twice a day monitoring of home glucose numbers.  Recommend 2 to 3 L of water each day.  Recommend 150 minutes of exercise each week.  Continue balanced eating via my plate recommendations with a focus on portion control.  Daily foot inspection should be completed.  And dilated eye exam will be due by October 2021.    2. Diabetes mellitus without complication (HCC)  As per plan #1.    3. Hyperlipidemia, unspecified hyperlipidemia type  Stable.  Continue Lipitor 20 mg daily.    4. Vitamin D deficiency disease  Unstable.  Recommend decreasing vitamin D 10,000 units/day.   Explained to patient that the goal is to keep her between 40 and 70 safely.    5. B12 deficiency  Unstable.  Reduce vitamin B 12 supplement to every other day.    Repeat vitamin D lab assay before next appointment  Complete point-of-care A1c in the office at next appointment.  Next appointment should be scheduled for 4 to 5 months.    Thank you kindly for allowing me to participate in the diabetes care plan for this patient.    Aleida Mi, APRN  06/25/2021    CC:   Rose Marie Gao M.D.

## 2021-06-29 DIAGNOSIS — E11.9 DIABETES MELLITUS WITHOUT COMPLICATION (HCC): ICD-10-CM

## 2021-06-29 DIAGNOSIS — B00.89 HERPES DERMATITIS: ICD-10-CM

## 2021-06-29 PROCEDURE — RXMED WILLOW AMBULATORY MEDICATION CHARGE: Performed by: INTERNAL MEDICINE

## 2021-06-29 RX ORDER — VALACYCLOVIR HYDROCHLORIDE 500 MG/1
500 TABLET, FILM COATED ORAL
Qty: 100 TABLET | Refills: 0 | Status: SHIPPED | OUTPATIENT
Start: 2021-06-29 | End: 2021-10-13 | Stop reason: SDUPTHER

## 2021-06-30 PROCEDURE — RXMED WILLOW AMBULATORY MEDICATION CHARGE: Performed by: FAMILY MEDICINE

## 2021-06-30 PROCEDURE — RXMED WILLOW AMBULATORY MEDICATION CHARGE: Performed by: INTERNAL MEDICINE

## 2021-07-01 ENCOUNTER — PHARMACY VISIT (OUTPATIENT)
Dept: PHARMACY | Facility: MEDICAL CENTER | Age: 66
End: 2021-07-01
Payer: MEDICARE

## 2021-07-06 DIAGNOSIS — E78.5 DYSLIPIDEMIA: ICD-10-CM

## 2021-07-06 DIAGNOSIS — E11.29 TYPE 2 DIABETES MELLITUS WITH OTHER DIABETIC KIDNEY COMPLICATION (HCC): ICD-10-CM

## 2021-07-06 PROCEDURE — RXMED WILLOW AMBULATORY MEDICATION CHARGE: Performed by: FAMILY MEDICINE

## 2021-07-06 RX ORDER — LISINOPRIL 5 MG/1
2.5 TABLET ORAL DAILY
Qty: 90 TABLET | Refills: 1 | Status: SHIPPED | OUTPATIENT
Start: 2021-07-06 | End: 2022-07-22 | Stop reason: SDUPTHER

## 2021-07-06 RX ORDER — ATORVASTATIN CALCIUM 20 MG/1
TABLET, FILM COATED ORAL
Qty: 100 TABLET | Refills: 2 | Status: SHIPPED | OUTPATIENT
Start: 2021-07-06 | End: 2022-08-15 | Stop reason: SDUPTHER

## 2021-07-06 RX ORDER — COLESEVELAM 180 1/1
TABLET ORAL
Qty: 300 TABLET | Refills: 2 | Status: SHIPPED | OUTPATIENT
Start: 2021-07-06 | End: 2022-06-02 | Stop reason: SDUPTHER

## 2021-07-06 RX ORDER — GLIMEPIRIDE 4 MG/1
TABLET ORAL
Qty: 100 TABLET | Refills: 2 | Status: SHIPPED | OUTPATIENT
Start: 2021-07-06 | End: 2022-06-02 | Stop reason: SDUPTHER

## 2021-07-06 NOTE — TELEPHONE ENCOUNTER
Requested Prescriptions     Pending Prescriptions Disp Refills   • atorvastatin (LIPITOR) 20 MG Tab 100 tablet 2     Sig: Take 1 tablet by mouth daily.   • colesevelam (WELCHOL) 625 MG Tab 300 tablet 2     Sig: Take 1 tablet by mouth every morning and take 2 tablets every evening with dinner.   • lisinopril (PRINIVIL) 5 MG Tab 90 tablet 1     Sig: Take one half tablet by mouth every day.   • glimepiride (AMARYL) 4 MG Tab 100 tablet 2     Sig: Take 1 tablet by mouth every morning.   Rose Marie Gao M.D.

## 2021-07-07 ENCOUNTER — PHARMACY VISIT (OUTPATIENT)
Dept: PHARMACY | Facility: MEDICAL CENTER | Age: 66
End: 2021-07-07
Payer: MEDICARE

## 2021-07-14 PROCEDURE — RXMED WILLOW AMBULATORY MEDICATION CHARGE: Performed by: INTERNAL MEDICINE

## 2021-07-15 PROBLEM — M16.12 ARTHRITIS OF LEFT HIP: Status: ACTIVE | Noted: 2021-07-15

## 2021-07-16 ENCOUNTER — PHARMACY VISIT (OUTPATIENT)
Dept: PHARMACY | Facility: MEDICAL CENTER | Age: 66
End: 2021-07-16
Payer: MEDICARE

## 2021-07-16 PROCEDURE — RXMED WILLOW AMBULATORY MEDICATION CHARGE: Performed by: FAMILY MEDICINE

## 2021-07-19 ENCOUNTER — PHARMACY VISIT (OUTPATIENT)
Dept: PHARMACY | Facility: MEDICAL CENTER | Age: 66
End: 2021-07-19
Payer: MEDICARE

## 2021-07-29 ENCOUNTER — HOSPITAL ENCOUNTER (OUTPATIENT)
Dept: RADIOLOGY | Facility: MEDICAL CENTER | Age: 66
End: 2021-07-29
Attending: UROLOGY
Payer: MEDICARE

## 2021-07-29 DIAGNOSIS — Z87.442 PERSONAL HISTORY OF URINARY CALCULI: ICD-10-CM

## 2021-07-29 PROCEDURE — 76775 US EXAM ABDO BACK WALL LIM: CPT

## 2021-08-07 PROCEDURE — RXMED WILLOW AMBULATORY MEDICATION CHARGE: Performed by: FAMILY MEDICINE

## 2021-08-09 ENCOUNTER — PHARMACY VISIT (OUTPATIENT)
Dept: PHARMACY | Facility: MEDICAL CENTER | Age: 66
End: 2021-08-09
Payer: MEDICARE

## 2021-08-12 ENCOUNTER — APPOINTMENT (RX ONLY)
Dept: URBAN - METROPOLITAN AREA CLINIC 4 | Facility: CLINIC | Age: 66
Setting detail: DERMATOLOGY
End: 2021-08-12

## 2021-08-12 PROCEDURE — ? COUNSELING

## 2021-08-12 PROCEDURE — 99203 OFFICE O/P NEW LOW 30 MIN: CPT

## 2021-08-13 DIAGNOSIS — D18.0 HEMANGIOMA: ICD-10-CM

## 2021-08-13 DIAGNOSIS — L81.4 OTHER MELANIN HYPERPIGMENTATION: ICD-10-CM

## 2021-08-13 DIAGNOSIS — L82.1 OTHER SEBORRHEIC KERATOSIS: ICD-10-CM

## 2021-08-13 DIAGNOSIS — B07.8 OTHER VIRAL WARTS: ICD-10-CM

## 2021-08-13 DIAGNOSIS — D22 MELANOCYTIC NEVI: ICD-10-CM

## 2021-08-13 PROBLEM — D22.5 MELANOCYTIC NEVI OF TRUNK: Status: ACTIVE | Noted: 2021-08-13

## 2021-08-13 PROBLEM — D18.01 HEMANGIOMA OF SKIN AND SUBCUTANEOUS TISSUE: Status: ACTIVE | Noted: 2021-08-13

## 2021-08-13 ASSESSMENT — LOCATION ZONE DERM
LOCATION ZONE: TRUNK
LOCATION ZONE: HAND
LOCATION ZONE: FINGER

## 2021-08-13 ASSESSMENT — LOCATION DETAILED DESCRIPTION DERM
LOCATION DETAILED: RIGHT SUPERIOR LATERAL LOWER BACK
LOCATION DETAILED: LEFT BUTTOCK
LOCATION DETAILED: RIGHT MID DORSAL MIDDLE FINGER
LOCATION DETAILED: RIGHT RADIAL DORSAL HAND
LOCATION DETAILED: RIGHT BUTTOCK

## 2021-08-13 ASSESSMENT — LOCATION SIMPLE DESCRIPTION DERM
LOCATION SIMPLE: RIGHT LOWER BACK
LOCATION SIMPLE: RIGHT BUTTOCK
LOCATION SIMPLE: RIGHT HAND
LOCATION SIMPLE: LEFT BUTTOCK
LOCATION SIMPLE: RIGHT MIDDLE FINGER

## 2021-08-13 NOTE — PROCEDURE: COUNSELING
Detail Level: Generalized
Detail Level: Detailed
Patient Specific Counseling (Will Not Stick From Patient To Patient): Patient declines cryosurgery today.

## 2021-08-16 ENCOUNTER — OFFICE VISIT (OUTPATIENT)
Dept: MEDICAL GROUP | Facility: PHYSICIAN GROUP | Age: 66
End: 2021-08-16
Payer: MEDICARE

## 2021-08-16 VITALS
RESPIRATION RATE: 14 BRPM | HEART RATE: 94 BPM | DIASTOLIC BLOOD PRESSURE: 74 MMHG | BODY MASS INDEX: 29.84 KG/M2 | OXYGEN SATURATION: 97 % | HEIGHT: 68 IN | SYSTOLIC BLOOD PRESSURE: 106 MMHG | TEMPERATURE: 98.1 F | WEIGHT: 196.9 LBS

## 2021-08-16 DIAGNOSIS — M16.12 ARTHRITIS OF LEFT HIP: ICD-10-CM

## 2021-08-16 DIAGNOSIS — E78.5 HYPERLIPIDEMIA ASSOCIATED WITH TYPE 2 DIABETES MELLITUS (HCC): ICD-10-CM

## 2021-08-16 DIAGNOSIS — E11.69 HYPERLIPIDEMIA ASSOCIATED WITH TYPE 2 DIABETES MELLITUS (HCC): ICD-10-CM

## 2021-08-16 DIAGNOSIS — E11.9 TYPE 2 DIABETES MELLITUS WITHOUT COMPLICATION, WITHOUT LONG-TERM CURRENT USE OF INSULIN (HCC): ICD-10-CM

## 2021-08-16 LAB
HBA1C MFR BLD: 7.5 % (ref 0–5.6)
INT CON NEG: ABNORMAL
INT CON POS: ABNORMAL

## 2021-08-16 PROCEDURE — 99214 OFFICE O/P EST MOD 30 MIN: CPT | Performed by: FAMILY MEDICINE

## 2021-08-16 PROCEDURE — 83036 HEMOGLOBIN GLYCOSYLATED A1C: CPT | Performed by: FAMILY MEDICINE

## 2021-08-16 ASSESSMENT — FIBROSIS 4 INDEX: FIB4 SCORE: 0.83

## 2021-08-16 NOTE — ASSESSMENT & PLAN NOTE
Chronic.  A1c rechecked today and A1c increased to 7.5%.  Taking trulicity 0.75mg weekly, amaryl 4mg daily, and invokana 300mg daily, metformin 1000mg bid.  Home sugars in the range .

## 2021-08-16 NOTE — PROGRESS NOTES
CC:   Chief Complaint   Patient presents with   • Diabetes Follow-up     3 months.          HPI:   Shirley presents today for a 4-month follow-up visit.  Telemedicine with endocrinology on June 25.  Saw derm 8/12   Colonoscopy scheduled for 8/19- [facility]    Type 2 diabetes mellitus without complication, without long-term current use of insulin (HCC)  Chronic.  A1c rechecked today and A1c increased to 7.5%.  Taking trulicity 0.75mg weekly, amaryl 4mg daily, and invokana 300mg daily, metformin 1000mg bid.  Home sugars in the range .     Hyperlipidemia associated with type 2 diabetes mellitus (HCC)  Chronic issue.  Taking lipitor 20mg and welchol 625mg-three tabs daily.  Most recent labs from February 2021 have total cholesterol 139, improved triglycerides 174, and LDL 60.    Arthritis of left hip  Chronic. Scheduled for left hip replacement on September 1 by Dr Pineda.  Stopped taking aspirin. Currently not taking anything for pain. Continues to ambulate with a cane.       Current Outpatient Medications Ordered in Epic   Medication Sig Dispense Refill   • atorvastatin (LIPITOR) 20 MG Tab Take 1 tablet by mouth daily. 100 tablet 2   • colesevelam (WELCHOL) 625 MG Tab Take 1 tablet by mouth every morning and take 2 tablets every evening with dinner. 300 tablet 2   • lisinopril (PRINIVIL) 5 MG Tab Take one half tablet by mouth every day. 90 tablet 1   • glimepiride (AMARYL) 4 MG Tab Take 1 tablet by mouth every morning. 100 tablet 2   • valACYclovir (VALTREX) 500 MG Tab Take 1 tablet by mouth every day. 100 tablet 0   • metformin (GLUCOPHAGE) 1000 MG tablet Take 1 tablet by mouth 2 times a day with meals. 180 tablet 2   • clindamycin (CLEOCIN) 300 MG Cap      • Canagliflozin (INVOKANA) 300 MG Tab Take 1 tablet by mouth every day. 100 tablet 3   • Dulaglutide (TRULICITY) 0.75 MG/0.5ML Solution Pen-injector Inject 0.5 mL (1 pen) under the skin every 7 days. 6 mL 1   • glucose blood (FREESTYLE LITE) strip Use 1  Strip daily 100 Strip 3   • aspirin 81 MG EC tablet Take 1 Tab by mouth every day. 30 Tab 0   • potassium citrate SR (UROCIT-K SR) 10 MEQ (1080 MG) Tab CR potassium citrate ER 10 mEq (1,080 mg) tablet,extended release     • Cholecalciferol 125 MCG (5000 UT) Tab Take 10,000 Units by mouth every day.     • Cyanocobalamin (VITAMIN B 12 PO) Take 1,000 mcg by mouth every day.     • Lancets Lancets order: Lancets for Song Contour Next meter. Sig: use daily and prn ssx high or low sugar. 100 Each 3   • triamcinolone acetonide (KENALOG) 0.1 % Ointment Apply 1 Application to affected area(s) 2 times a day as needed.     • mupirocin calcium (BACTROBAN) 2 % Cream Apply to affected area(s) 2 times a day. (Patient taking differently: Apply 1 Application to affected area(s) as needed. Apply to affected area(s) 2 times a day.) 15 g 2   • Blood Glucose Monitoring Suppl SUPPLIES MISC Test strips order: Test strips for Abbott Freestyle Lite meter. Sig: use BID and prn ssx high or low sugar #100 RF x 3 100 Each 3     No current Epic-ordered facility-administered medications on file.       Past Medical History:   Diagnosis Date   • DM (diabetes mellitus) ([facility]) 2004   • Herpes genitalia 3/22/2013   • High cholesterol    • Hyperlipidemia LDL goal < 100    • Nephrolithiasis 3/22/2013    Recurrent Dr Saleem - Calcium stones,    • Urinary incontinence    • Vitamin d deficiency 7/8/2013       Social History     Tobacco Use   • Smoking status: Never Smoker   • Smokeless tobacco: Never Used   Vaping Use   • Vaping Use: Never used   Substance Use Topics   • Alcohol use: No     Alcohol/week: 0.0 oz     Comment: Rare Useage   • Drug use: No       Allergies:  Macrobid [nitrofurantoin monohydrate macrocrystals] and Pcn [penicillins]    Health Maintenance: will postpone immunizations as scheduled for [facility].     Objective:       Exam:  /74 (BP Location: Right arm, Patient Position: Sitting, BP Cuff Size: Adult)   Pulse 94   Temp 36.7 °C (98.1  "°F) (Temporal)   Resp 14   Ht 1.727 m (5' 8\")   Wt 89.3 kg (196 lb 14.4 oz)   LMP 11/29/2004   SpO2 97%   BMI 29.94 kg/m²   Body mass index is 29.94 kg/m².  Wt Readings from Last 4 Encounters:   08/16/21 89.3 kg (196 lb 14.4 oz)   08/09/21 82.6 kg (182 lb)   07/15/21 89.1 kg (196 lb 6.4 oz)   04/28/21 83.6 kg (184 lb 4.8 oz)       Gen: Alert and oriented, No apparent distress. Appropriately groomed.  Neck: Neck is supple without lymphadenopathy.No thyromegaly.   Lungs: Normal effort, CTA bilaterally, no wheezes, rhonchi, or rales  CV: Regular rate and rhythm. No Lower extremity edema  Skin: No rash noted.      Assessment & Plan:     66 y.o. female with the following -     1. Type 2 diabetes mellitus without complication, without long-term current use of insulin (HCC)  Chronic issue. Recent labs with increase in A1c from 6.9 to 7.5 over the last 6 months.  Improving these numbers prior to surgery encouraged and discussed.- POCT Hemoglobin A1C  - VITAMIN B12; Future  - VITAMIN D,25 HYDROXY; Future  - HEMOGLOBIN A1C; Future    2. Hyperlipidemia associated with type 2 diabetes mellitus (HCC)  -chronic. Improving. C/w lipitor and welchol   Lipid Profile; Future    3. Arthritis of left hip  Chronic. Scheduled for RAMOS on 9/1/21.  Hasn't been told if she needs a preop or not.  Has been holding her aspirin.       Return in about 3 months (around 11/16/2021) for Diabetes.    Please note that this dictation was created using voice recognition software. I have made every reasonable attempt to correct obvious errors, but I expect that there are errors of grammar and possibly content that I did not discover before finalizing the note.      "

## 2021-08-16 NOTE — ASSESSMENT & PLAN NOTE
Chronic. Scheduled for left hip replacement on September 1 by Dr Pineda.  Stopped taking aspirin.

## 2021-08-26 ENCOUNTER — HOSPITAL ENCOUNTER (OUTPATIENT)
Facility: MEDICAL CENTER | Age: 66
End: 2021-08-26
Attending: ANESTHESIOLOGY
Payer: MEDICARE

## 2021-08-26 LAB
SARS-COV+SARS-COV-2 AG RESP QL IA.RAPID: NOTDETECTED
SPECIMEN SOURCE: NORMAL

## 2021-08-26 PROCEDURE — 87426 SARSCOV CORONAVIRUS AG IA: CPT

## 2021-08-26 PROCEDURE — RXMED WILLOW AMBULATORY MEDICATION CHARGE: Performed by: PHYSICIAN ASSISTANT

## 2021-08-30 ENCOUNTER — PHARMACY VISIT (OUTPATIENT)
Dept: PHARMACY | Facility: MEDICAL CENTER | Age: 66
End: 2021-08-30
Payer: MEDICARE

## 2021-09-07 ENCOUNTER — PHARMACY VISIT (OUTPATIENT)
Dept: PHARMACY | Facility: MEDICAL CENTER | Age: 66
End: 2021-09-07
Payer: MEDICARE

## 2021-09-07 PROCEDURE — RXMED WILLOW AMBULATORY MEDICATION CHARGE: Performed by: INTERNAL MEDICINE

## 2021-09-20 DIAGNOSIS — E11.9 TYPE 2 DIABETES MELLITUS WITHOUT COMPLICATION, WITHOUT LONG-TERM CURRENT USE OF INSULIN (HCC): ICD-10-CM

## 2021-09-20 PROCEDURE — RXMED WILLOW AMBULATORY MEDICATION CHARGE: Performed by: NURSE PRACTITIONER

## 2021-09-20 RX ORDER — DULAGLUTIDE 0.75 MG/.5ML
0.5 INJECTION, SOLUTION SUBCUTANEOUS
Qty: 6 ML | Refills: 1 | Status: SHIPPED | OUTPATIENT
Start: 2021-09-20 | End: 2022-03-07

## 2021-09-21 ENCOUNTER — PHARMACY VISIT (OUTPATIENT)
Dept: PHARMACY | Facility: MEDICAL CENTER | Age: 66
End: 2021-09-21
Payer: MEDICARE

## 2021-10-13 DIAGNOSIS — B00.89 HERPES DERMATITIS: ICD-10-CM

## 2021-10-13 PROCEDURE — RXMED WILLOW AMBULATORY MEDICATION CHARGE: Performed by: FAMILY MEDICINE

## 2021-10-13 PROCEDURE — RXMED WILLOW AMBULATORY MEDICATION CHARGE: Performed by: INTERNAL MEDICINE

## 2021-10-13 RX ORDER — VALACYCLOVIR HYDROCHLORIDE 500 MG/1
500 TABLET, FILM COATED ORAL
Qty: 100 TABLET | Refills: 3 | Status: SHIPPED | OUTPATIENT
Start: 2021-10-13 | End: 2022-10-26

## 2021-10-13 NOTE — TELEPHONE ENCOUNTER
Received request via: Pharmacy    Was the patient seen in the last year in this department? Yes  8/17/21  Does the patient have an active prescription (recently filled or refills available) for medication(s) requested? No

## 2021-10-13 NOTE — TELEPHONE ENCOUNTER
Requested Prescriptions     Pending Prescriptions Disp Refills   • valACYclovir (VALTREX) 500 MG Tab 100 Tablet 3     Sig: Take 1 tablet by mouth every day.   Rose Marie Gao M.D.

## 2021-10-14 ENCOUNTER — PHARMACY VISIT (OUTPATIENT)
Dept: PHARMACY | Facility: MEDICAL CENTER | Age: 66
End: 2021-10-14
Payer: MEDICARE

## 2021-10-25 PROCEDURE — RXMED WILLOW AMBULATORY MEDICATION CHARGE: Performed by: FAMILY MEDICINE

## 2021-10-26 ENCOUNTER — OFFICE VISIT (OUTPATIENT)
Dept: MEDICAL GROUP | Facility: PHYSICIAN GROUP | Age: 66
End: 2021-10-26
Payer: MEDICARE

## 2021-10-26 ENCOUNTER — HOSPITAL ENCOUNTER (OUTPATIENT)
Facility: MEDICAL CENTER | Age: 66
End: 2021-10-26
Attending: FAMILY MEDICINE
Payer: MEDICARE

## 2021-10-26 VITALS
DIASTOLIC BLOOD PRESSURE: 62 MMHG | HEIGHT: 68 IN | RESPIRATION RATE: 12 BRPM | TEMPERATURE: 98.7 F | SYSTOLIC BLOOD PRESSURE: 112 MMHG | WEIGHT: 194.6 LBS | HEART RATE: 91 BPM | BODY MASS INDEX: 29.49 KG/M2 | OXYGEN SATURATION: 96 %

## 2021-10-26 DIAGNOSIS — J02.9 SORE THROAT: ICD-10-CM

## 2021-10-26 PROCEDURE — 87804 INFLUENZA ASSAY W/OPTIC: CPT | Performed by: FAMILY MEDICINE

## 2021-10-26 PROCEDURE — 87880 STREP A ASSAY W/OPTIC: CPT | Performed by: FAMILY MEDICINE

## 2021-10-26 PROCEDURE — U0005 INFEC AGEN DETEC AMPLI PROBE: HCPCS

## 2021-10-26 PROCEDURE — U0003 INFECTIOUS AGENT DETECTION BY NUCLEIC ACID (DNA OR RNA); SEVERE ACUTE RESPIRATORY SYNDROME CORONAVIRUS 2 (SARS-COV-2) (CORONAVIRUS DISEASE [COVID-19]), AMPLIFIED PROBE TECHNIQUE, MAKING USE OF HIGH THROUGHPUT TECHNOLOGIES AS DESCRIBED BY CMS-2020-01-R: HCPCS

## 2021-10-26 PROCEDURE — 99213 OFFICE O/P EST LOW 20 MIN: CPT | Performed by: FAMILY MEDICINE

## 2021-10-26 ASSESSMENT — FIBROSIS 4 INDEX: FIB4 SCORE: 0.83

## 2021-10-26 NOTE — PROGRESS NOTES
CC:   Chief Complaint   Patient presents with   • Pharyngitis     Started 2 weeks ago.    • Oral Swelling     Soreness.    • Nasal Drainage         HPI:   Shirley presents today complaining of a sore throat..    Epic was down during this visit    Sore throat x 2 weeks  Denies any SOB, fevers chills, dysphagia.    Tried otc zyrtec and dayquil without much improvement. Oxygen sats 96% RA.   No sinus tenderness.  Was around grandson a few weeks ago who had a cold.         No problem-specific Assessment & Plan notes found for this encounter.      Current Outpatient Medications Ordered in Epic   Medication Sig Dispense Refill   • valACYclovir (VALTREX) 500 MG Tab Take 1 tablet by mouth every day. 100 Tablet 3   • Dulaglutide (TRULICITY) 0.75 MG/0.5ML Solution Pen-injector Inject 0.5 mL (1 pen) under the skin every 7 days. 6 mL 1   • aspirin (ASPIRIN 81) 81 MG EC tablet Take 1 Tablet by mouth 2 times a day. 60 Tablet 0   • docusate sodium 100 MG Cap Take 1 capsule by mouth daily. 30 Capsule 0   • atorvastatin (LIPITOR) 20 MG Tab Take 1 tablet by mouth daily. 100 tablet 2   • colesevelam (WELCHOL) 625 MG Tab Take 1 tablet by mouth every morning and take 2 tablets every evening with dinner. 300 tablet 2   • lisinopril (PRINIVIL) 5 MG Tab Take one half tablet by mouth every day. 90 tablet 1   • glimepiride (AMARYL) 4 MG Tab Take 1 tablet by mouth every morning. 100 tablet 2   • metformin (GLUCOPHAGE) 1000 MG tablet Take 1 tablet by mouth 2 times a day with meals. 180 tablet 2   • clindamycin (CLEOCIN) 300 MG Cap      • Canagliflozin (INVOKANA) 300 MG Tab Take 1 tablet by mouth every day. 100 tablet 3   • glucose blood (FREESTYLE LITE) strip Use 1 Strip daily 100 Strip 3   • potassium citrate SR (UROCIT-K SR) 10 MEQ (1080 MG) Tab CR potassium citrate ER 10 mEq (1,080 mg) tablet,extended release     • Cholecalciferol 125 MCG (5000 UT) Tab Take 10,000 Units by mouth every day.     • Cyanocobalamin (VITAMIN B 12 PO) Take 1,000 mcg  "by mouth every day.     • Lancets Lancets order: Lancets for Song Contour Next meter. Sig: use daily and prn ssx high or low sugar. 100 Each 3   • triamcinolone acetonide (KENALOG) 0.1 % Ointment Apply 1 Application to affected area(s) 2 times a day as needed.     • Blood Glucose Monitoring Suppl SUPPLIES MISC Test strips order: Test strips for Abbott Freestyle Lite meter. Sig: use BID and prn ssx high or low sugar #100 RF x 3 100 Each 3     No current Epic-ordered facility-administered medications on file.       Past Medical History:   Diagnosis Date   • DM (diabetes mellitus) ([facility]) 2004   • Herpes genitalia 3/22/2013   • High cholesterol    • Hyperlipidemia LDL goal < 100    • Nephrolithiasis 3/22/2013    Recurrent Dr Monge - Calcium stones,    • Urinary incontinence    • Vitamin d deficiency 7/8/2013       Social History     Tobacco Use   • Smoking status: Never Smoker   • Smokeless tobacco: Never Used   Vaping Use   • Vaping Use: Never used   Substance Use Topics   • Alcohol use: No     Alcohol/week: 0.0 oz     Comment: Rare Useage   • Drug use: No       Allergies:  Macrobid [nitrofurantoin monohydrate macrocrystals] and Pcn [penicillins]        Objective:       Exam:  /62 (BP Location: Left arm, Patient Position: Sitting, BP Cuff Size: Adult)   Pulse 91   Temp 37.1 °C (98.7 °F) (Temporal)   Resp 12   Ht 1.727 m (5' 8\")   Wt 88.3 kg (194 lb 9.6 oz)   LMP 11/29/2004   SpO2 96%   BMI 29.59 kg/m²   Body mass index is 29.59 kg/m².  Wt Readings from Last 4 Encounters:   10/26/21 88.3 kg (194 lb 9.6 oz)   09/01/21 87.5 kg (192 lb 12.7 oz)   08/16/21 89.3 kg (196 lb 14.4 oz)   08/09/21 82.6 kg (182 lb)       Gen: Alert and oriented, No apparent distress. Appropriately groomed.  Neck: Neck is supple without lymphadenopathy.No thyromegaly.   Pharynx: erythematous, enlarged cervical LN  Lungs: Normal effort, CTA bilaterally, no wheezes, rhonchi, or rales  CV: Regular rate and rhythm. No Lower extremity " edema  Skin: No rash noted.      Assessment & Plan:     66 y.o. female with the following -     1. Sore throat  -New medical diagnosis.  Her rapid flu and rapid strep tested in the office were negative.  We will go ahead and swab her for COVID-19.  We did discuss trying over-the-counter allergy and nasal spray medications if no improvement.  She will keep us updated.  POCT Influenza A/B  - SARS-CoV-2 PCR (24 hour In-House): Collect NP swab in VTM; Future  - POCT Rapid Strep A  .      No follow-ups on file.    Please note that this dictation was created using voice recognition software. I have made every reasonable attempt to correct obvious errors, but I expect that there are errors of grammar and possibly content that I did not discover before finalizing the note.

## 2021-10-27 ENCOUNTER — PHARMACY VISIT (OUTPATIENT)
Dept: PHARMACY | Facility: MEDICAL CENTER | Age: 66
End: 2021-10-27
Payer: MEDICARE

## 2021-10-27 DIAGNOSIS — J02.9 SORE THROAT: ICD-10-CM

## 2021-10-27 LAB
COVID ORDER STATUS COVID19: NORMAL
FLUAV+FLUBV AG SPEC QL IA: NEGATIVE
INT CON NEG: NEGATIVE
INT CON NEG: NEGATIVE
INT CON POS: POSITIVE
INT CON POS: POSITIVE
S PYO AG THROAT QL: NEGATIVE
SARS-COV-2 RNA RESP QL NAA+PROBE: NOTDETECTED
SPECIMEN SOURCE: NORMAL

## 2021-11-01 ENCOUNTER — NON-PROVIDER VISIT (OUTPATIENT)
Dept: MEDICAL GROUP | Facility: PHYSICIAN GROUP | Age: 66
End: 2021-11-01
Payer: MEDICARE

## 2021-11-01 DIAGNOSIS — Z23 NEED FOR VACCINATION: ICD-10-CM

## 2021-11-01 PROCEDURE — RXMED WILLOW AMBULATORY MEDICATION CHARGE: Performed by: INTERNAL MEDICINE

## 2021-11-01 PROCEDURE — G0008 ADMIN INFLUENZA VIRUS VAC: HCPCS | Performed by: FAMILY MEDICINE

## 2021-11-01 PROCEDURE — 90662 IIV NO PRSV INCREASED AG IM: CPT | Performed by: FAMILY MEDICINE

## 2021-11-01 NOTE — PROGRESS NOTES
"Shirley Unger is a 66 y.o. female here for a non-provider visit for:   FLU    Reason for immunization: Annual Flu Vaccine  Immunization records indicate need for vaccine: Yes, confirmed with Epic  Minimum interval has been met for this vaccine: Yes  ABN completed: Yes    VIS Dated  08/2021 was given to patient: Yes  All IAC Questionnaire questions were answered \"No.\"    Patient tolerated injection and no adverse effects were observed or reported: No    Pt scheduled for next dose in series: No    "

## 2021-11-03 ENCOUNTER — PHARMACY VISIT (OUTPATIENT)
Dept: PHARMACY | Facility: MEDICAL CENTER | Age: 66
End: 2021-11-03
Payer: MEDICARE

## 2021-11-08 PROCEDURE — RXMED WILLOW AMBULATORY MEDICATION CHARGE: Performed by: PHYSICIAN ASSISTANT

## 2021-11-10 ENCOUNTER — PHARMACY VISIT (OUTPATIENT)
Dept: PHARMACY | Facility: MEDICAL CENTER | Age: 66
End: 2021-11-10
Payer: MEDICARE

## 2021-11-16 ENCOUNTER — OFFICE VISIT (OUTPATIENT)
Dept: ENDOCRINOLOGY | Facility: MEDICAL CENTER | Age: 66
End: 2021-11-16
Attending: NURSE PRACTITIONER
Payer: MEDICARE

## 2021-11-16 VITALS
SYSTOLIC BLOOD PRESSURE: 118 MMHG | HEART RATE: 114 BPM | BODY MASS INDEX: 29.33 KG/M2 | RESPIRATION RATE: 20 BRPM | HEIGHT: 68 IN | OXYGEN SATURATION: 99 % | DIASTOLIC BLOOD PRESSURE: 62 MMHG | WEIGHT: 193.5 LBS

## 2021-11-16 DIAGNOSIS — E78.5 HYPERLIPIDEMIA ASSOCIATED WITH TYPE 2 DIABETES MELLITUS (HCC): ICD-10-CM

## 2021-11-16 DIAGNOSIS — E53.8 VITAMIN B 12 DEFICIENCY: ICD-10-CM

## 2021-11-16 DIAGNOSIS — E55.9 VITAMIN D DEFICIENCY: ICD-10-CM

## 2021-11-16 DIAGNOSIS — E11.9 TYPE 2 DIABETES MELLITUS WITHOUT COMPLICATION, WITHOUT LONG-TERM CURRENT USE OF INSULIN (HCC): ICD-10-CM

## 2021-11-16 DIAGNOSIS — E11.69 HYPERLIPIDEMIA ASSOCIATED WITH TYPE 2 DIABETES MELLITUS (HCC): ICD-10-CM

## 2021-11-16 LAB
HBA1C MFR BLD: 6.6 % (ref 0–5.6)
INT CON NEG: ABNORMAL
INT CON POS: ABNORMAL

## 2021-11-16 PROCEDURE — 99212 OFFICE O/P EST SF 10 MIN: CPT | Performed by: NURSE PRACTITIONER

## 2021-11-16 PROCEDURE — 99214 OFFICE O/P EST MOD 30 MIN: CPT | Performed by: NURSE PRACTITIONER

## 2021-11-16 PROCEDURE — 83036 HEMOGLOBIN GLYCOSYLATED A1C: CPT | Performed by: NURSE PRACTITIONER

## 2021-11-16 RX ORDER — OXYCODONE HYDROCHLORIDE 5 MG/1
TABLET ORAL
COMMUNITY
End: 2021-12-06

## 2021-11-16 RX ORDER — ONDANSETRON 4 MG/1
TABLET, FILM COATED ORAL
COMMUNITY
End: 2021-12-06

## 2021-11-16 RX ORDER — EMPAGLIFLOZIN 25 MG/1
TABLET, FILM COATED ORAL
COMMUNITY
End: 2022-03-07

## 2021-11-16 RX ORDER — POTASSIUM CITRATE 10 MEQ/1
TABLET, EXTENDED RELEASE ORAL
COMMUNITY
End: 2021-12-06

## 2021-11-16 RX ORDER — TRAMADOL HYDROCHLORIDE 50 MG/1
TABLET ORAL
COMMUNITY
End: 2021-12-06

## 2021-11-16 RX ORDER — MELOXICAM 7.5 MG/1
TABLET ORAL
COMMUNITY
End: 2022-03-07

## 2021-11-16 ASSESSMENT — FIBROSIS 4 INDEX: FIB4 SCORE: 0.83

## 2021-11-16 NOTE — PROGRESS NOTES
CHIEF COMPLAINT: Patient is here for follow up of Type 2 Diabetes Mellitus, hyperlipidemia, Vitamin D deficiency, and Vitamin B deficiency.    HPI:     Shirley Unger is a 66 y.o. female  for continued evaluation & treatment of the followin. Type 2 Diabetes Mellitus  Ms. Unger is a very pleasant 66-year-old female who states she has been doing well since her last visit.  Patient is post Operative L THR. Completed PT. Currenlty quite comfortable.  Patient reports ongoing throat pain for several weeks r/t allergies.     Current Diabetes Regimen:  Trulicity .75 mg weekly  Canagliflozin 300 mg once daily   Metformin 1000 mg BID    Glimepiride 4 mg daily    POC A1c 2021: 6.6%  Labs from 2021 HbA1c is 6.9%      BG Diary: 2021- testing twice daily.  Blood sugar log not available for this appointment.  Fasting Glucose:      Hypoglycemia: None. Patient is hypoglycemic aware.     Weight unchanged from prior appointment.    Diabetes Complications   Retinopathy: No known retinopathy.  Last eye exam: 2021 [facility].  Diagnosed with drusen (degenerative) of macula bilaterally.  Neuropathy: Denies paresthesias or numbness in hands or feet. Denies any foot wounds.  Exercise: Minimal.  Diet: Fair.    Currently taking lisinopril 2.5 mg daily.  BP currently 118/62.     Ref. Range 2/3/2021 09:10   Creatinine, Urine Latest Units: mg/dL 58.64   Microalbumin, Urine Random Latest Units: mg/dL <1.2       2.  Hyperlipidemia  Currently taking Lipitor 20 mg daily.  Patient has tolerated statin therapy well over the last 2 years.  Denies muscle weakness and/or fatigue.     Ref. Range 2/3/2021 09:10   Cholesterol,Tot Latest Ref Range: 100 - 199 mg/dL 139   Triglycerides Latest Ref Range: 0 - 149 mg/dL 174 (H)   HDL Latest Ref Range: >=40 mg/dL 44   LDL Latest Ref Range: <100 mg/dL 60       3.  Vitamin D deficiency  Currently taking vitamin D 10,000 units/day.  This was decreased from  20,000 IU at the last appointment and no current vitamin D level is available.     Ref. Range 2/3/2021 09:10   25-Hydroxy   Vitamin D 25 Latest Ref Range: 30 - 100 ng/mL 93     4.  Vitamin B12 deficiency  Currently taking vitamin B12 1000 mcg daily.     Ref. Range 2/3/2021 09:10   Vitamin B12 -True Cobalamin Latest Ref Range: 211 - 911 pg/mL 3239 (H)         Patient's medications, allergies, and social histories were reviewed and updated as appropriate.    ROS:     CONS:     No fever, no chills   EYES:     No diplopia, no blurry vision   CV:           No chest pain, no palpitations   PULM:     No SOB, no cough, no hemoptysis.   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  2018-06: Skin cancer screening  2018-06: Primary insomnia  2018-03: Functional diarrhea  2017-09: Obesity (BMI 30-39.9)  2017-09: Numbness of left foot  2017-05: Arthralgia of left temporomandibular joint  2017-03: Seasonal allergic rhinitis due to pollen  2016-11: Acute mastitis of left breast  2016-11: Herpes dermatitis  2016-07: Primary osteoarthritis of left hip  2016-07: Trigger finger of right thumb  2016-05: Colon polyps  2015-08: Uncontrolled type 2 diabetes mellitus without complication,   without long-term current use of insulin  2014-07: Hand eczema  2014-02: Right hand pain  2013-07: Vitamin D deficiency disease  2013-03: Recurrent nephrolithiasis  Hyperlipidemia associated with type 2 diabetes mellitus (HCC)  Type 2 DM, uncontrolled      Past  Surgical History:  Past Surgical History:   Procedure Laterality Date   • PB TOTAL HIP ARTHROPLASTY Left 2021    Procedure: LEFT TOTAL HIP ARTHROPLASTY;  Surgeon: Miguel Madden M.D.;  Location: [facility];  Service: Orthopedics   • PB TOTAL HIP ARTHROPLASTY Right 3/11/2020    Procedure: ARTHROPLASTY, HIP, TOTAL;  Surgeon: Miguel Madden M.D.;  Location: SUR[facility]ERY [facility];  Service: Orthopedics   • ARTHROPLASTY Right    • GANGLION EXCISION  2014    Performed by Efra Ramirez M.D. at SURGERY [facility]   • HYSTERECTOMY, TOTAL ABDOMINAL      For non cancerous reasons   • CHOLECYSTECTOMY     • ABDOMINAL HYSTERECTOMY TOTAL     • CARPAL TUNNEL RELEASE      Right hand   • CYSTOSCOPY STENT PLACEMENT      several, stent removed in MD office   • PB  DELIVERY ONLY     • PRIMARY C SECTION       x 2        Allergies:  Macrobid [nitrofurantoin monohydrate macrocrystals] and Pcn [penicillins]     Social History:  Social History     Tobacco Use   • Smoking status: Never Smoker   • Smokeless tobacco: Never Used   Vaping Use   • Vaping Use: Never used   Substance Use Topics   • Alcohol use: No     Alcohol/week: 0.0 oz     Comment: Rare Useage   • Drug use: No        Family History:   family history includes Cancer in her mother; Diabetes in her maternal aunt and maternal grandfather; Heart Disease in her father; No Known Problems in her brother, brother, and brother.      PHYSICAL EXAM:   OBJECTIVE:  Vital signs: Virtual appointment.  GENERAL: Well-developed, well-nourished in no apparent distress.   EYE:  No ocular asymmetry, PERRLA  HENT: Pink, moist mucous membranes.    NECK: No thyromegaly.   CARDIOVASCULAR:  No murmurs  LUNGS: Clear breath sounds  ABDOMEN: Soft, nontender   EXTREMITIES: No clubbing, cyanosis, or edema.   NEUROLOGICAL: No gross focal motor abnormalities   LYMPH: No cervical adenopathy seen.   SKIN: No rashes, lesions.        ASSESSMENT/PLAN:   1. Type 2 diabetes mellitus without complication, without long-term current use of insulin (HCC)  Stable.  Continue diabetes Regimen:  Trulicity 1.5 mg weekly  Canagliflozin 300 mg once daily   Metformin 1000 mg BID    Glimepiride 4 mg daily    Continue twice a day monitoring of home glucose numbers.    Recommend 2 to 3 L of water each day.  Recommend 150 minutes of exercise each week.  Continue balanced eating via my plate recommendations with a focus on portion control.  Daily foot inspection should be completed.  And dilated eye exam will be due by October 2021.    2. Diabetes mellitus without complication (HCC)  As per plan #1.    3. Hyperlipidemia, unspecified hyperlipidemia type  Stable.  Continue Lipitor 20 mg daily.    4. Vitamin D deficiency disease  Unstable.  Recommend decreasing vitamin D 10,000 units/day.   Explained to patient that the goal is to keep her between 40 and 70 safely.    5. B12 deficiency  Unstable.  Reduce vitamin B 12 supplement to every other day.    Repeat vitamin D lab assay before next appointment  Complete point-of-care A1c in the office at next appointment.  Next appointment should be scheduled for 4 to 5 months.    Thank you kindly for allowing me to participate in the diabetes care plan for this patient.    Aleida Mi, APRN  11/16/2021    CC:   Rose Marie Gao M.D.

## 2021-11-22 ENCOUNTER — HOSPITAL ENCOUNTER (OUTPATIENT)
Facility: MEDICAL CENTER | Age: 66
End: 2021-11-22
Attending: NURSE PRACTITIONER
Payer: MEDICARE

## 2021-11-22 ENCOUNTER — NON-PROVIDER VISIT (OUTPATIENT)
Dept: MEDICAL GROUP | Facility: PHYSICIAN GROUP | Age: 66
End: 2021-11-22
Payer: MEDICARE

## 2021-11-22 DIAGNOSIS — E11.69 HYPERLIPIDEMIA ASSOCIATED WITH TYPE 2 DIABETES MELLITUS (HCC): ICD-10-CM

## 2021-11-22 DIAGNOSIS — E11.9 TYPE 2 DIABETES MELLITUS WITHOUT COMPLICATION, WITHOUT LONG-TERM CURRENT USE OF INSULIN (HCC): ICD-10-CM

## 2021-11-22 DIAGNOSIS — E78.5 HYPERLIPIDEMIA ASSOCIATED WITH TYPE 2 DIABETES MELLITUS (HCC): ICD-10-CM

## 2021-11-22 LAB
CREAT UR-MCNC: 49.32 MG/DL
MICROALBUMIN UR-MCNC: <1.2 MG/DL
MICROALBUMIN/CREAT UR: NORMAL MG/G (ref 0–30)

## 2021-11-22 PROCEDURE — 80061 LIPID PANEL: CPT

## 2021-11-22 PROCEDURE — 82306 VITAMIN D 25 HYDROXY: CPT

## 2021-11-22 PROCEDURE — 99000 SPECIMEN HANDLING OFFICE-LAB: CPT | Performed by: FAMILY MEDICINE

## 2021-11-22 PROCEDURE — 36415 COLL VENOUS BLD VENIPUNCTURE: CPT | Performed by: FAMILY MEDICINE

## 2021-11-22 PROCEDURE — 84481 FREE ASSAY (FT-3): CPT

## 2021-11-22 PROCEDURE — 82570 ASSAY OF URINE CREATININE: CPT

## 2021-11-22 PROCEDURE — 84443 ASSAY THYROID STIM HORMONE: CPT

## 2021-11-22 PROCEDURE — 82043 UR ALBUMIN QUANTITATIVE: CPT

## 2021-11-22 PROCEDURE — 82607 VITAMIN B-12: CPT

## 2021-11-23 LAB
CHOLEST SERPL-MCNC: 145 MG/DL (ref 100–199)
HDLC SERPL-MCNC: 41 MG/DL
LDLC SERPL CALC-MCNC: 64 MG/DL
T3FREE SERPL-MCNC: 2.76 PG/ML (ref 2–4.4)
TRIGL SERPL-MCNC: 198 MG/DL (ref 0–149)
TSH SERPL DL<=0.005 MIU/L-ACNC: 0.82 UIU/ML (ref 0.38–5.33)
VIT B12 SERPL-MCNC: 784 PG/ML (ref 211–911)

## 2021-11-24 LAB — 25(OH)D3 SERPL-MCNC: 51 NG/ML (ref 30–80)

## 2021-11-30 ENCOUNTER — TELEPHONE (OUTPATIENT)
Dept: MEDICAL GROUP | Facility: PHYSICIAN GROUP | Age: 66
End: 2021-11-30

## 2021-11-30 ENCOUNTER — HOSPITAL ENCOUNTER (OUTPATIENT)
Dept: RADIOLOGY | Facility: MEDICAL CENTER | Age: 66
End: 2021-11-30
Attending: FAMILY MEDICINE
Payer: MEDICARE

## 2021-11-30 DIAGNOSIS — Z12.31 VISIT FOR SCREENING MAMMOGRAM: ICD-10-CM

## 2021-11-30 PROCEDURE — 77063 BREAST TOMOSYNTHESIS BI: CPT

## 2021-11-30 NOTE — TELEPHONE ENCOUNTER
ESTABLISHED PATIENT PRE-VISIT PLANNING     Patient was NOT contacted to complete PVP.     Note: Patient will not be contacted if there is no indication to call.     1.  Reviewed notes from the last few office visits within the medical group: Yes    2.  If any orders were placed at last visit or intended to be done for this visit (i.e. 6 mos follow-up), do we have Results/Consult Notes?         •  Labs - Labs ordered, completed on 10/27/2021 and results are in chart.  Note: If patient appointment is for lab review and patient did not complete labs, check with provider if OK to reschedule patient until labs completed.       •  Imaging - Imaging was not ordered at last office visit.       •  Referrals - No referrals were ordered at last office visit.    3. Is this appointment scheduled as a Hospital Follow-Up? No    4.  Immunizations were updated in Epic using Reconcile Outside Information activity? Yes    5.  Patient is due for the following Health Maintenance Topics:   Health Maintenance Due   Topic Date Due   • IMM ZOSTER VACCINES (2 of 3) 05/23/2014   • COVID-19 Vaccine (3 - Booster for Pfizer series) 10/02/2021   • MAMMOGRAM  10/05/2021       6.  AHA (Pulse8) form printed for Provider? No, already completed

## 2021-12-06 ENCOUNTER — OFFICE VISIT (OUTPATIENT)
Dept: MEDICAL GROUP | Facility: PHYSICIAN GROUP | Age: 66
End: 2021-12-06
Payer: MEDICARE

## 2021-12-06 VITALS
WEIGHT: 192.8 LBS | RESPIRATION RATE: 12 BRPM | DIASTOLIC BLOOD PRESSURE: 70 MMHG | OXYGEN SATURATION: 97 % | TEMPERATURE: 98.1 F | HEART RATE: 85 BPM | HEIGHT: 68 IN | SYSTOLIC BLOOD PRESSURE: 108 MMHG | BODY MASS INDEX: 29.22 KG/M2

## 2021-12-06 DIAGNOSIS — Z23 NEED FOR VACCINATION: ICD-10-CM

## 2021-12-06 DIAGNOSIS — J31.2 CHRONIC SORE THROAT: ICD-10-CM

## 2021-12-06 DIAGNOSIS — E11.69 HYPERLIPIDEMIA ASSOCIATED WITH TYPE 2 DIABETES MELLITUS (HCC): ICD-10-CM

## 2021-12-06 DIAGNOSIS — R92.30 DENSE BREAST TISSUE ON MAMMOGRAM: ICD-10-CM

## 2021-12-06 DIAGNOSIS — E11.9 TYPE 2 DIABETES MELLITUS WITHOUT COMPLICATION, WITHOUT LONG-TERM CURRENT USE OF INSULIN (HCC): ICD-10-CM

## 2021-12-06 DIAGNOSIS — E78.5 HYPERLIPIDEMIA ASSOCIATED WITH TYPE 2 DIABETES MELLITUS (HCC): ICD-10-CM

## 2021-12-06 PROCEDURE — 99214 OFFICE O/P EST MOD 30 MIN: CPT | Performed by: FAMILY MEDICINE

## 2021-12-06 RX ORDER — ZOSTER VACCINE RECOMBINANT, ADJUVANTED 50 MCG/0.5
0.5 KIT INTRAMUSCULAR ONCE
Qty: 0.5 ML | Refills: 0 | Status: SHIPPED | OUTPATIENT
Start: 2021-12-06 | End: 2021-12-06

## 2021-12-06 ASSESSMENT — FIBROSIS 4 INDEX: FIB4 SCORE: 0.83

## 2021-12-06 NOTE — PROGRESS NOTES
CC:   Chief Complaint   Patient presents with   • Follow-Up     3 months.    • Lab Results   • Pharyngitis     Ongoing issue.        HPI:   Shirley presents today for a follow-up visit.  She was seen in our office approximately 6 weeks ago for her sore throat.  At that time she had been having this for approximately 2 weeks.  In the office, her rapid strep, flu, Covid PCR test was negative.  She was seen by endocrinology last month on the November 16.  She will be following up with endocrinology again in April.  Seen by allergist last week, f/u in January for allergy test.       Type 2 diabetes mellitus without complication, without long-term current use of insulin (HCC)  Chronic.  Saw endo last month. Last A1c was at 6.6% in November.   Home sugars in the range of 110-114 fasting and 90s later.    Chronic sore throat  Still with sore throat.  Slightly better.    C/o sore throat.  Feels like right side tongue swollen and difficulty swallowing food.  Took flonase and zyrtec until she saw the allergist.  Negative smoking history.    Hyperlipidemia associated with type 2 diabetes mellitus (HCC)  Chronic medical diagnosis. She continues to take Lipitor 20 mg and will call 625 mg, 3 tabs daily. Most recent labs from last month with triglycerides elevated at 198.      Current Outpatient Medications Ordered in Epic   Medication Sig Dispense Refill   • Zoster Vac Recomb Adjuvanted (SHINGRIX) 50 MCG/0.5ML Recon Susp Inject 0.5 mL into the shoulder, thigh, or buttocks one time for 1 dose. 0.5 mL 0   • meloxicam (MOBIC) 7.5 MG Tab meloxicam 7.5 mg tablet     • Empagliflozin (JARDIANCE) 25 MG Tab Jardiance 25 mg tablet     • glucose blood strip FreeStyle Lite Strips     • potassium citrate SR (UROCIT-K SR) 10 MEQ (1080 MG) Tab CR Take 2 Tablets by mouth 3 times a day for 40 days. 240 Tablet 0   • valACYclovir (VALTREX) 500 MG Tab Take 1 tablet by mouth every day. 100 Tablet 3   • Dulaglutide (TRULICITY) 0.75 MG/0.5ML Solution  Pen-injector Inject 0.5 mL (1 pen) under the skin every 7 days. 6 mL 1   • aspirin (ASPIRIN 81) 81 MG EC tablet Take 1 Tablet by mouth 2 times a day. 60 Tablet 0   • atorvastatin (LIPITOR) 20 MG Tab Take 1 tablet by mouth daily. 100 tablet 2   • colesevelam (WELCHOL) 625 MG Tab Take 1 tablet by mouth every morning and take 2 tablets every evening with dinner. 300 tablet 2   • lisinopril (PRINIVIL) 5 MG Tab Take one half tablet by mouth every day. 90 tablet 1   • glimepiride (AMARYL) 4 MG Tab Take 1 tablet by mouth every morning. 100 tablet 2   • metformin (GLUCOPHAGE) 1000 MG tablet Take 1 tablet by mouth 2 times a day with meals. 180 tablet 2   • clindamycin (CLEOCIN) 300 MG Cap      • Canagliflozin (INVOKANA) 300 MG Tab Take 1 tablet by mouth every day. 100 tablet 3   • glucose blood (FREESTYLE LITE) strip Use 1 Strip daily 100 Strip 3   • Cholecalciferol 125 MCG (5000 UT) Tab Take 10,000 Units by mouth every day.     • Cyanocobalamin (VITAMIN B 12 PO) Take 1,000 mcg by mouth every day.     • Lancets Lancets order: Lancets for [employer] Contour Next meter. Sig: use daily and prn ssx high or low sugar. 100 Each 3   • triamcinolone acetonide (KENALOG) 0.1 % Ointment Apply 1 Application to affected area(s) 2 times a day as needed.     • Blood Glucose Monitoring Suppl SUPPLIES MISC Test strips order: Test strips for Abbott Freestyle Lite meter. Sig: use BID and prn ssx high or low sugar #100 RF x 3 100 Each 3     No current Epic-ordered facility-administered medications on file.       Past Medical History:   Diagnosis Date   • DM (diabetes mellitus) ([facility]) 2004   • Herpes genitalia 3/22/2013   • High cholesterol    • Hyperlipidemia LDL goal < 100    • Nephrolithiasis 3/22/2013    Recurrent Dr Monge - Calcium stones,    • Urinary incontinence    • Vitamin d deficiency 7/8/2013       Social History     Tobacco Use   • Smoking status: Never Smoker   • Smokeless tobacco: Never Used   Vaping Use   • Vaping Use: Never used  "  Substance Use Topics   • Alcohol use: No     Alcohol/week: 0.0 oz     Comment: Rare Useage   • Drug use: No       Allergies:  Macrobid [nitrofurantoin monohydrate macrocrystals] and Pcn [penicillins]        Objective:       Exam:  /70 (BP Location: Right arm, Patient Position: Sitting, BP Cuff Size: Adult)   Pulse 85   Temp 36.7 °C (98.1 °F) (Temporal)   Resp 12   Ht 1.727 m (5' 8\")   Wt 87.5 kg (192 lb 12.8 oz)   LMP 11/29/2004   SpO2 97%   BMI 29.32 kg/m²   Body mass index is 29.32 kg/m².  Wt Readings from Last 4 Encounters:   12/06/21 87.5 kg (192 lb 12.8 oz)   11/16/21 87.8 kg (193 lb 8 oz)   10/26/21 88.3 kg (194 lb 9.6 oz)   09/01/21 87.5 kg (192 lb 12.7 oz)       Gen: Alert and oriented, No apparent distress. Appropriately groomed.  ENT: normal pharynx, normal tongue.   Neck: Neck is supple without lymphadenopathy.No thyromegaly.   Lungs: Normal effort, CTA bilaterally, no wheezes, rhonchi, or rales  CV: Regular rate and rhythm. No Lower extremity edema  Skin: No rash noted.      Assessment & Plan:     66 y.o. female with the following -     1. Hyperlipidemia associated with type 2 diabetes mellitus (HCC)  Chronic medical diagnosis. Not well controlled. Discussed with her that her recent labs have triglyceride levels elevated. She will continue to work on diet and lifestyle changes. For now, continue with Lipitor 20 mg daily.  - Lipid Profile; Future    2. Dense breast tissue on mammogram  -Chronic medical diagnosis. She has never had he is unassigned. Previous mammograms are consistent with dense breast.   US-SCREENING WHOLE BREAST BILATERAL (3D SCREENING); Future    3. Type 2 diabetes mellitus without complication, without long-term current use of insulin (HCC)  -Chronic medical diagnosis. Improving. She continues to follow-up with endocrinology. HEMOGLOBIN A1C; Future  - CBC WITH DIFFERENTIAL; Future  - Comp Metabolic Panel; Future    4. Chronic sore throat  -Chronic medical diagnosis. This " has been going on for 2 months. Not improving. She does not think this is heartburn or reflux. Is wondering if she should have a referral to ENT.   Referral to ENT    5. Need for vaccination  - Zoster Vac Recomb Adjuvanted (SHINGRIX) 50 MCG/0.5ML Recon Susp; Inject 0.5 mL into the shoulder, thigh, or buttocks one time for 1 dose.  Dispense: 0.5 mL; Refill: 0    Return in about 3 months (around 3/6/2022) for Diabetes, hypertrig.    Please note that this dictation was created using voice recognition software. I have made every reasonable attempt to correct obvious errors, but I expect that there are errors of grammar and possibly content that I did not discover before finalizing the note.

## 2021-12-06 NOTE — ASSESSMENT & PLAN NOTE
Still with sore throat.  Slightly better.    C/o sore throat.  Feels like right side tongue swollen and difficulty swallowing food.  Took flonase and zyrtec until she saw the allergist.  Negative smoking history.

## 2021-12-07 PROCEDURE — RXMED WILLOW AMBULATORY MEDICATION CHARGE: Performed by: FAMILY MEDICINE

## 2021-12-07 NOTE — ASSESSMENT & PLAN NOTE
Chronic medical diagnosis. She continues to take Lipitor 20 mg and will call 625 mg, 3 tabs daily.

## 2021-12-10 ENCOUNTER — PHARMACY VISIT (OUTPATIENT)
Dept: PHARMACY | Facility: MEDICAL CENTER | Age: 66
End: 2021-12-10
Payer: MEDICARE

## 2021-12-13 ENCOUNTER — APPOINTMENT (OUTPATIENT)
Dept: MEDICAL GROUP | Facility: PHYSICIAN GROUP | Age: 66
End: 2021-12-13
Payer: MEDICARE

## 2021-12-13 PROCEDURE — RXMED WILLOW AMBULATORY MEDICATION CHARGE: Performed by: PHYSICIAN ASSISTANT

## 2021-12-20 ENCOUNTER — PHARMACY VISIT (OUTPATIENT)
Dept: PHARMACY | Facility: MEDICAL CENTER | Age: 66
End: 2021-12-20
Payer: MEDICARE

## 2021-12-20 DIAGNOSIS — E11.9 TYPE 2 DIABETES MELLITUS WITHOUT COMPLICATION, WITHOUT LONG-TERM CURRENT USE OF INSULIN (HCC): ICD-10-CM

## 2021-12-20 PROCEDURE — RXMED WILLOW AMBULATORY MEDICATION CHARGE: Performed by: NURSE PRACTITIONER

## 2021-12-20 RX ORDER — DULAGLUTIDE 1.5 MG/.5ML
0.5 INJECTION, SOLUTION SUBCUTANEOUS
Qty: 6 ML | Refills: 3 | Status: SHIPPED | OUTPATIENT
Start: 2021-12-20 | End: 2022-10-25

## 2022-01-05 ENCOUNTER — TELEPHONE (OUTPATIENT)
Dept: PHYSICAL THERAPY | Facility: REHABILITATION | Age: 67
End: 2022-01-05

## 2022-01-05 PROCEDURE — RXMED WILLOW AMBULATORY MEDICATION CHARGE: Performed by: FAMILY MEDICINE

## 2022-01-05 PROCEDURE — RXMED WILLOW AMBULATORY MEDICATION CHARGE: Performed by: INTERNAL MEDICINE

## 2022-01-05 NOTE — OP THERAPY DISCHARGE SUMMARY
Outpatient Physical Therapy  DISCHARGE SUMMARY NOTE      [facility] Physical Therapy [address], [address]  [place] [address]  Phone:  [phone]    Date of Visit: 01/05/2022    Patient: Shirley Unger  YOB: 1955  MRN: [id number]     Referring Provider: No referring provider defined for this encounter.   Referring Diagnosis No admission diagnoses are documented for this encounter.         Functional Assessment Used        Your patient is being discharged from Physical Therapy with the following comments:   · Goals met    Mrs Unger was seen in physical therapy for evaluation and treatment of (L) hip pain. Patient attended a total of 6 treatment sessions over 7 weeks time period. Treatment included Ther Ex, Ther Act, Manual Therapy, Neuro Re-ed and Modalities as needed. Following last treatment session patient did not schedule any additional follow up visits, and at this time, he/she has been discharged from skilled therapy services.       At last, visit patient was demonstrating improvement in impairments seen at evaluation and making functional improvements towards the following goals, as established at initial evaluation:    1. Sleeping better at night but still having pain in (L) hip  2. Decreased (L) hip strength and difficulty with weightbearing activity       Thank you for the opportunity to work with Mrs. Unger and participate in their care. If I may be of any further assistance please feel free to contact me.       Hebert Aguila, PT    Date: 1/5/2022

## 2022-01-06 ENCOUNTER — PHARMACY VISIT (OUTPATIENT)
Dept: PHARMACY | Facility: MEDICAL CENTER | Age: 67
End: 2022-01-06
Payer: MEDICARE

## 2022-01-07 ENCOUNTER — PHARMACY VISIT (OUTPATIENT)
Dept: PHARMACY | Facility: MEDICAL CENTER | Age: 67
End: 2022-01-07
Payer: MEDICARE

## 2022-01-19 DIAGNOSIS — E11.9 DIABETES MELLITUS WITHOUT COMPLICATION (HCC): ICD-10-CM

## 2022-01-20 PROCEDURE — RXMED WILLOW AMBULATORY MEDICATION CHARGE: Performed by: NURSE PRACTITIONER

## 2022-01-20 NOTE — TELEPHONE ENCOUNTER
Received request via: Pharmacy    Was the patient seen in the last year in this department? Yes    Does the patient have an active prescription (recently filled or refills available) for medication(s) requested? No     REQUESTED MEDICATION:   Requested Prescriptions     Pending Prescriptions Disp Refills   • metformin (GLUCOPHAGE) 1000 MG tablet 180 Tablet 2     Sig: Take 1 tablet by mouth 2 times a day with meals.

## 2022-01-21 ENCOUNTER — PHARMACY VISIT (OUTPATIENT)
Dept: PHARMACY | Facility: MEDICAL CENTER | Age: 67
End: 2022-01-21
Payer: MEDICARE

## 2022-02-08 PROCEDURE — RXMED WILLOW AMBULATORY MEDICATION CHARGE: Performed by: FAMILY MEDICINE

## 2022-02-10 ENCOUNTER — PHARMACY VISIT (OUTPATIENT)
Dept: PHARMACY | Facility: MEDICAL CENTER | Age: 67
End: 2022-02-10
Payer: MEDICARE

## 2022-02-16 PROCEDURE — RXMED WILLOW AMBULATORY MEDICATION CHARGE: Performed by: INTERNAL MEDICINE

## 2022-02-16 PROCEDURE — RXMED WILLOW AMBULATORY MEDICATION CHARGE: Performed by: FAMILY MEDICINE

## 2022-02-18 PROCEDURE — RXMED WILLOW AMBULATORY MEDICATION CHARGE: Performed by: PHYSICIAN ASSISTANT

## 2022-02-22 ENCOUNTER — PHARMACY VISIT (OUTPATIENT)
Dept: PHARMACY | Facility: MEDICAL CENTER | Age: 67
End: 2022-02-22
Payer: MEDICARE

## 2022-02-28 ENCOUNTER — APPOINTMENT (OUTPATIENT)
Dept: MEDICAL GROUP | Facility: PHYSICIAN GROUP | Age: 67
End: 2022-02-28
Payer: MEDICARE

## 2022-03-01 ENCOUNTER — PHARMACY VISIT (OUTPATIENT)
Dept: PHARMACY | Facility: MEDICAL CENTER | Age: 67
End: 2022-03-01
Payer: MEDICARE

## 2022-03-01 PROCEDURE — RXMED WILLOW AMBULATORY MEDICATION CHARGE: Performed by: PHYSICIAN ASSISTANT

## 2022-03-02 ENCOUNTER — HOSPITAL ENCOUNTER (OUTPATIENT)
Facility: MEDICAL CENTER | Age: 67
End: 2022-03-02
Attending: FAMILY MEDICINE
Payer: MEDICARE

## 2022-03-02 ENCOUNTER — NON-PROVIDER VISIT (OUTPATIENT)
Dept: MEDICAL GROUP | Facility: PHYSICIAN GROUP | Age: 67
End: 2022-03-02
Payer: MEDICARE

## 2022-03-02 DIAGNOSIS — E11.9 TYPE 2 DIABETES MELLITUS WITHOUT COMPLICATION, WITHOUT LONG-TERM CURRENT USE OF INSULIN (HCC): ICD-10-CM

## 2022-03-02 DIAGNOSIS — E11.69 HYPERLIPIDEMIA ASSOCIATED WITH TYPE 2 DIABETES MELLITUS (HCC): ICD-10-CM

## 2022-03-02 DIAGNOSIS — E78.5 HYPERLIPIDEMIA ASSOCIATED WITH TYPE 2 DIABETES MELLITUS (HCC): ICD-10-CM

## 2022-03-02 LAB
EST. AVERAGE GLUCOSE BLD GHB EST-MCNC: 163 MG/DL
HBA1C MFR BLD: 7.3 % (ref 4–5.6)

## 2022-03-02 PROCEDURE — 83036 HEMOGLOBIN GLYCOSYLATED A1C: CPT

## 2022-03-02 PROCEDURE — 80053 COMPREHEN METABOLIC PANEL: CPT

## 2022-03-02 PROCEDURE — 80061 LIPID PANEL: CPT

## 2022-03-02 PROCEDURE — 36415 COLL VENOUS BLD VENIPUNCTURE: CPT | Performed by: FAMILY MEDICINE

## 2022-03-02 PROCEDURE — 99000 SPECIMEN HANDLING OFFICE-LAB: CPT | Performed by: FAMILY MEDICINE

## 2022-03-02 PROCEDURE — 85025 COMPLETE CBC W/AUTO DIFF WBC: CPT

## 2022-03-03 LAB
ALBUMIN SERPL BCP-MCNC: 4.7 G/DL (ref 3.2–4.9)
ALBUMIN/GLOB SERPL: 2.2 G/DL
ALP SERPL-CCNC: 63 U/L (ref 30–99)
ALT SERPL-CCNC: 8 U/L (ref 2–50)
ANION GAP SERPL CALC-SCNC: 13 MMOL/L (ref 7–16)
AST SERPL-CCNC: 12 U/L (ref 12–45)
BASOPHILS # BLD AUTO: 0.7 % (ref 0–1.8)
BASOPHILS # BLD: 0.04 K/UL (ref 0–0.12)
BILIRUB SERPL-MCNC: 0.5 MG/DL (ref 0.1–1.5)
BUN SERPL-MCNC: 21 MG/DL (ref 8–22)
CALCIUM SERPL-MCNC: 9.3 MG/DL (ref 8.5–10.5)
CHLORIDE SERPL-SCNC: 103 MMOL/L (ref 96–112)
CHOLEST SERPL-MCNC: 134 MG/DL (ref 100–199)
CO2 SERPL-SCNC: 26 MMOL/L (ref 20–33)
CREAT SERPL-MCNC: 0.62 MG/DL (ref 0.5–1.4)
EOSINOPHIL # BLD AUTO: 0.1 K/UL (ref 0–0.51)
EOSINOPHIL NFR BLD: 1.8 % (ref 0–6.9)
ERYTHROCYTE [DISTWIDTH] IN BLOOD BY AUTOMATED COUNT: 48.9 FL (ref 35.9–50)
GLOBULIN SER CALC-MCNC: 2.1 G/DL (ref 1.9–3.5)
GLUCOSE SERPL-MCNC: 140 MG/DL (ref 65–99)
HCT VFR BLD AUTO: 46.9 % (ref 37–47)
HDLC SERPL-MCNC: 43 MG/DL
HGB BLD-MCNC: 14.3 G/DL (ref 12–16)
IMM GRANULOCYTES # BLD AUTO: 0.01 K/UL (ref 0–0.11)
IMM GRANULOCYTES NFR BLD AUTO: 0.2 % (ref 0–0.9)
LDLC SERPL CALC-MCNC: 60 MG/DL
LYMPHOCYTES # BLD AUTO: 2.1 K/UL (ref 1–4.8)
LYMPHOCYTES NFR BLD: 38 % (ref 22–41)
MCH RBC QN AUTO: 28.8 PG (ref 27–33)
MCHC RBC AUTO-ENTMCNC: 30.5 G/DL (ref 33.6–35)
MCV RBC AUTO: 94.6 FL (ref 81.4–97.8)
MONOCYTES # BLD AUTO: 0.52 K/UL (ref 0–0.85)
MONOCYTES NFR BLD AUTO: 9.4 % (ref 0–13.4)
NEUTROPHILS # BLD AUTO: 2.75 K/UL (ref 2–7.15)
NEUTROPHILS NFR BLD: 49.9 % (ref 44–72)
NRBC # BLD AUTO: 0 K/UL
NRBC BLD-RTO: 0 /100 WBC
PLATELET # BLD AUTO: 257 K/UL (ref 164–446)
PMV BLD AUTO: 9.6 FL (ref 9–12.9)
POTASSIUM SERPL-SCNC: 4.4 MMOL/L (ref 3.6–5.5)
PROT SERPL-MCNC: 6.8 G/DL (ref 6–8.2)
RBC # BLD AUTO: 4.96 M/UL (ref 4.2–5.4)
SODIUM SERPL-SCNC: 142 MMOL/L (ref 135–145)
TRIGL SERPL-MCNC: 153 MG/DL (ref 0–149)
WBC # BLD AUTO: 5.5 K/UL (ref 4.8–10.8)

## 2022-03-06 PROCEDURE — RXMED WILLOW AMBULATORY MEDICATION CHARGE: Performed by: INTERNAL MEDICINE

## 2022-03-07 ENCOUNTER — OFFICE VISIT (OUTPATIENT)
Dept: MEDICAL GROUP | Facility: PHYSICIAN GROUP | Age: 67
End: 2022-03-07
Payer: MEDICARE

## 2022-03-07 VITALS
HEART RATE: 83 BPM | RESPIRATION RATE: 12 BRPM | TEMPERATURE: 98.3 F | BODY MASS INDEX: 29.15 KG/M2 | OXYGEN SATURATION: 97 % | HEIGHT: 68 IN | SYSTOLIC BLOOD PRESSURE: 110 MMHG | WEIGHT: 192.3 LBS | DIASTOLIC BLOOD PRESSURE: 66 MMHG

## 2022-03-07 DIAGNOSIS — E11.69 TYPE 2 DIABETES MELLITUS WITH OTHER SPECIFIED COMPLICATION, WITHOUT LONG-TERM CURRENT USE OF INSULIN (HCC): ICD-10-CM

## 2022-03-07 DIAGNOSIS — E78.5 HYPERLIPIDEMIA ASSOCIATED WITH TYPE 2 DIABETES MELLITUS (HCC): ICD-10-CM

## 2022-03-07 DIAGNOSIS — K21.9 GASTROESOPHAGEAL REFLUX DISEASE WITHOUT ESOPHAGITIS: ICD-10-CM

## 2022-03-07 DIAGNOSIS — E11.69 HYPERLIPIDEMIA ASSOCIATED WITH TYPE 2 DIABETES MELLITUS (HCC): ICD-10-CM

## 2022-03-07 PROBLEM — Z87.442 HISTORY OF RENAL CALCULI: Status: ACTIVE | Noted: 2021-12-07

## 2022-03-07 PROBLEM — M25.552 BILATERAL HIP PAIN: Status: RESOLVED | Noted: 2021-04-28 | Resolved: 2022-03-07

## 2022-03-07 PROBLEM — M25.551 BILATERAL HIP PAIN: Status: RESOLVED | Noted: 2021-04-28 | Resolved: 2022-03-07

## 2022-03-07 PROCEDURE — 99215 OFFICE O/P EST HI 40 MIN: CPT | Performed by: FAMILY MEDICINE

## 2022-03-07 ASSESSMENT — FIBROSIS 4 INDEX: FIB4 SCORE: 1.12

## 2022-03-07 ASSESSMENT — PATIENT HEALTH QUESTIONNAIRE - PHQ9: CLINICAL INTERPRETATION OF PHQ2 SCORE: 0

## 2022-03-07 NOTE — ASSESSMENT & PLAN NOTE
Chronic issue.  She has been complaining of a chronic sore throat and has been evaluated by allergist as well as ENT.  She started taking Nexium and has noticed resolution of her symptoms.  Resolved with nexium x 1 week.

## 2022-03-07 NOTE — ASSESSMENT & PLAN NOTE
Chronic issue. Takes lipitor 20mg daily and asa 81mg daily.   Results for KACEY VILLAR (MRN [id number]) as of 3/7/2022 12:27   Ref. Range 3/2/2022 09:45   Cholesterol,Tot Latest Ref Range: 100 - 199 mg/dL 134   Triglycerides Latest Ref Range: 0 - 149 mg/dL 153 (H)   HDL Latest Ref Range: >=40 mg/dL 43   LDL Latest Ref Range: <100 mg/dL 60

## 2022-03-07 NOTE — ASSESSMENT & PLAN NOTE
Chronic medical diagnosis.  Recent labs have hemoglobin A1c increased from 6.6 to 7.3%.  She continues to take glimepiride 4 mg every morning, invokana 300mg, metformin 1000mg bid and Trulicity 1.5 mg weekly. Fasting sugars 120s range.  she also had her follow-up with endocrinology, Aleida Mi on November 16.  Next f/u with her on 4/18/22.

## 2022-03-07 NOTE — PROGRESS NOTES
Annual Health Assessment Questions:    1.  Are you currently engaging in any exercise or physical activity? Yes    2.  How would you describe your mood or emotional well-being today? good    3.  Have you had any falls in the last year? No    4.  Have you noticed any problems with your balance or had difficulty walking? Yes    5.  In the last six months have you experienced any leakage of urine? Yes    6. DPA/Advanced Directive: Patient has Advanced Directive, but it is not on file. Instructed to bring in a copy to scan into their chart.     CC:   Chief Complaint   Patient presents with   • Diabetes Follow-up     3 months    • Lab Results         HPI:   Shirley presents today for a 3-month follow-up visit.  She was last seen in our office on December 6.    In the interim, she had a follow-up with allergist, Dr. Hendrix and underwent allergy testing.  She had a follow-up at the [facility] on January 27 for her left hip. S/p left hip surgery 9/1/22.       Gastroesophageal reflux disease without esophagitis  Chronic issue.  She has been complaining of a chronic sore throat and has been evaluated by allergist as well as ENT.  She started taking Nexium and has noticed resolution of her symptoms.  Resolved with nexium x 1 week.    Type 2 diabetes mellitus with other specified complication (HCC)  Chronic medical diagnosis.  Recent labs have hemoglobin A1c increased from 6.6 to 7.3%.  She continues to take glimepiride 4 mg every morning, invokana 300mg, metformin 1000mg bid and Trulicity 1.5 mg weekly. Fasting sugars 120s range.  she also had her follow-up with endocrinology, Aleida Mi on November 16.  Next f/u with her on 4/18/22.     Hyperlipidemia associated with type 2 diabetes mellitus (HCC)  Chronic issue. Takes lipitor 20mg daily and asa 81mg daily.   Results for SHIRLEY VILLAR (MRN [id number]) as of 3/7/2022 12:27   Ref. Range 3/2/2022 09:45   Cholesterol,Tot Latest Ref Range: 100 - 199 mg/dL 134   Triglycerides Latest Ref  Range: 0 - 149 mg/dL 153 (H)   HDL Latest Ref Range: >=40 mg/dL 43   LDL Latest Ref Range: <100 mg/dL 60       Current Outpatient Medications Ordered in Epic   Medication Sig Dispense Refill   • Esomeprazole Magnesium (NEXIUM PO) Take  by mouth every day.     • metformin (GLUCOPHAGE) 1000 MG tablet Take 1 tablet by mouth 2 times a day with meals. 180 Tablet 2   • Dulaglutide (TRULICITY) 1.5 MG/0.5ML Solution Pen-injector Inject 0.5 mL under the skin every 7 days. 6 mL 3   • potassium citrate SR (UROCIT-K SR) 10 MEQ (1080 MG) Tab CR Take 2 tablets by mouth 3 times a day for 90 days. 540 Tablet 3   • glucose blood strip FreeStyle Lite Strips     • valACYclovir (VALTREX) 500 MG Tab Take 1 tablet by mouth every day. 100 Tablet 3   • aspirin (ASPIRIN 81) 81 MG EC tablet Take 1 Tablet by mouth 2 times a day. 60 Tablet 0   • atorvastatin (LIPITOR) 20 MG Tab Take 1 tablet by mouth daily. (Patient taking differently: Take 1 tablet by mouth daily.) 100 tablet 2   • colesevelam (WELCHOL) 625 MG Tab Take 1 tablet by mouth every morning and take 2 tablets every evening with dinner. (Patient taking differently: Take 1 tablet by mouth every morning and take 2 tablets every evening with dinner.) 300 tablet 2   • lisinopril (PRINIVIL) 5 MG Tab Take one half tablet by mouth every day. 90 tablet 1   • glimepiride (AMARYL) 4 MG Tab Take 1 tablet by mouth every morning. (Patient taking differently: Take 1 tablet by mouth every morning.) 100 tablet 2   • clindamycin (CLEOCIN) 300 MG Cap      • Canagliflozin (INVOKANA) 300 MG Tab Take 1 tablet by mouth every day. 100 tablet 3   • glucose blood (FREESTYLE LITE) strip Use 1 Strip daily 100 Strip 3   • Cholecalciferol 125 MCG (5000 UT) Tab Take 10,000 Units by mouth every day.     • Cyanocobalamin (VITAMIN B 12 PO) Take 1,000 mcg by mouth every day.     • Lancets Lancets order: Lancets for [employer] Contour Next meter. Sig: use daily and prn ssx high or low sugar. 100 Each 3   • triamcinolone  "acetonide (KENALOG) 0.1 % Ointment Apply 1 Application to affected area(s) 2 times a day as needed.     • Blood Glucose Monitoring Suppl SUPPLIES MISC Test strips order: Test strips for [employer] Freestyle Lite meter. Sig: use BID and prn ssx high or low sugar #100 RF x 3 100 Each 3     No current Epic-ordered facility-administered medications on file.       Past Medical History:   Diagnosis Date   • DM (diabetes mellitus) ([facility]) 2004   • Herpes genitalia 3/22/2013   • High cholesterol    • Hyperlipidemia LDL goal < 100    • Nephrolithiasis 3/22/2013    Recurrent Dr Monge - Calcium stones,    • Urinary incontinence    • Vitamin d deficiency 7/8/2013       Social History     Tobacco Use   • Smoking status: Never Smoker   • Smokeless tobacco: Never Used   Vaping Use   • Vaping Use: Never used   Substance Use Topics   • Alcohol use: No     Alcohol/week: 0.0 oz     Comment: Rare Useage   • Drug use: No       Allergies:  Macrobid [nitrofurantoin monohydrate macrocrystals] and Pcn [penicillins]      Health maintenance:  Encouraged to get sonocine scheduled.     Objective:       Exam:  /66 (BP Location: Right arm, Patient Position: Sitting, BP Cuff Size: Adult)   Pulse 83   Temp 36.8 °C (98.3 °F) (Temporal)   Resp 12   Ht 1.727 m (5' 8\")   Wt 87.2 kg (192 lb 4.8 oz)   LMP 11/29/2004   SpO2 97%   BMI 29.24 kg/m²   Body mass index is 29.24 kg/m².  Wt Readings from Last 4 Encounters:   03/07/22 87.2 kg (192 lb 4.8 oz)   01/27/22 81.6 kg (180 lb)   12/06/21 87.5 kg (192 lb 12.8 oz)   11/16/21 87.8 kg (193 lb 8 oz)       Gen: Alert and oriented, No apparent distress. Appropriately groomed.  Neck: Neck is supple without lymphadenopathy.No thyromegaly.   Lungs: Normal effort, CTA bilaterally, no wheezes, rhonchi, or rales  CV: Regular rate and rhythm. No Lower extremity edema  Skin: No rash noted.    Monofilament examination shows intact sensation in 6 over 6 areas tested bilaterally.   Visual examination revels no " lesions, ulcers. Pulses 2+ and symmetrical -DP and PT. Good capillary refill, less than 2 seconds      Assessment & Plan:     67 y.o. female with the following -     1. Type 2 diabetes mellitus with other specified complication, without long-term current use of insulin (Former[facility])  Chronic medical diagnosis.  Recent labs with increase in hemoglobin A1c to 7.3%.  She will continue checking her fingersticks twice a day and follow-up with endocrinology as scheduled next month on April 18.  She also underwent a GSC assessment in May 2021 with decrease Quanta flow to bilateral lower extremities.  We will go ahead and further evaluate with ABIs.  - Diabetic Monofilament Lower Extremity Exam  - US-EXTREMITY ARTERY LOWER BILAT W/FELY (COMBO); Future    2. Gastroesophageal reflux disease without esophagitis  Ongoing medical problem.  Suspect underlying silent heartburn.  Has been evaluated by ENT and allergist.  Symptoms of chronic sore throat have improved with Nexium.  Encouraged to try lower dose of omeprazole and see if her symptoms are managed.    3. Hyperlipidemia associated with type 2 diabetes mellitus (HCC)  Chronic medical diagnosis.  Improving.  Continue with atorvastatin 20 mg daily.    Other orders  - Esomeprazole Magnesium (NEXIUM PO); Take  by mouth every day.        I spent a total of 44 minutes with record review, exam, communication with the patient, communication with other providers, and documentation of this encounter.      Return in about 4 months (around 7/7/2022) for Diabetes.    Please note that this dictation was created using voice recognition software. I have made every reasonable attempt to correct obvious errors, but I expect that there are errors of grammar and possibly content that I did not discover before finalizing the note.

## 2022-03-10 PROCEDURE — RXMED WILLOW AMBULATORY MEDICATION CHARGE: Performed by: NURSE PRACTITIONER

## 2022-03-10 PROCEDURE — RXMED WILLOW AMBULATORY MEDICATION CHARGE: Performed by: FAMILY MEDICINE

## 2022-03-14 ENCOUNTER — PHARMACY VISIT (OUTPATIENT)
Dept: PHARMACY | Facility: MEDICAL CENTER | Age: 67
End: 2022-03-14
Payer: MEDICARE

## 2022-03-29 ENCOUNTER — HOSPITAL ENCOUNTER (OUTPATIENT)
Dept: RADIOLOGY | Facility: MEDICAL CENTER | Age: 67
End: 2022-03-29
Attending: FAMILY MEDICINE
Payer: MEDICARE

## 2022-03-29 DIAGNOSIS — R92.30 DENSE BREAST TISSUE ON MAMMOGRAM: ICD-10-CM

## 2022-03-29 PROCEDURE — 76641 ULTRASOUND BREAST COMPLETE: CPT

## 2022-03-30 ENCOUNTER — HOSPITAL ENCOUNTER (OUTPATIENT)
Dept: RADIOLOGY | Facility: MEDICAL CENTER | Age: 67
End: 2022-03-30
Attending: FAMILY MEDICINE
Payer: MEDICARE

## 2022-03-30 DIAGNOSIS — E11.69 TYPE 2 DIABETES MELLITUS WITH OTHER SPECIFIED COMPLICATION, WITHOUT LONG-TERM CURRENT USE OF INSULIN (HCC): ICD-10-CM

## 2022-03-30 PROCEDURE — 93922 UPR/L XTREMITY ART 2 LEVELS: CPT

## 2022-04-14 PROCEDURE — RXMED WILLOW AMBULATORY MEDICATION CHARGE: Performed by: FAMILY MEDICINE

## 2022-04-18 ENCOUNTER — OFFICE VISIT (OUTPATIENT)
Dept: ENDOCRINOLOGY | Facility: MEDICAL CENTER | Age: 67
End: 2022-04-18
Attending: NURSE PRACTITIONER
Payer: MEDICARE

## 2022-04-18 ENCOUNTER — PHARMACY VISIT (OUTPATIENT)
Dept: PHARMACY | Facility: MEDICAL CENTER | Age: 67
End: 2022-04-18
Payer: MEDICARE

## 2022-04-18 VITALS
BODY MASS INDEX: 29.25 KG/M2 | HEART RATE: 72 BPM | OXYGEN SATURATION: 96 % | HEIGHT: 68 IN | DIASTOLIC BLOOD PRESSURE: 62 MMHG | SYSTOLIC BLOOD PRESSURE: 106 MMHG | WEIGHT: 193 LBS

## 2022-04-18 DIAGNOSIS — E78.5 HYPERLIPIDEMIA ASSOCIATED WITH TYPE 2 DIABETES MELLITUS (HCC): ICD-10-CM

## 2022-04-18 DIAGNOSIS — E55.9 VITAMIN D DEFICIENCY: ICD-10-CM

## 2022-04-18 DIAGNOSIS — E11.69 TYPE 2 DIABETES MELLITUS WITH OTHER SPECIFIED COMPLICATION, WITHOUT LONG-TERM CURRENT USE OF INSULIN (HCC): ICD-10-CM

## 2022-04-18 DIAGNOSIS — E53.8 VITAMIN B 12 DEFICIENCY: ICD-10-CM

## 2022-04-18 DIAGNOSIS — E11.69 HYPERLIPIDEMIA ASSOCIATED WITH TYPE 2 DIABETES MELLITUS (HCC): ICD-10-CM

## 2022-04-18 PROCEDURE — 99213 OFFICE O/P EST LOW 20 MIN: CPT | Performed by: NURSE PRACTITIONER

## 2022-04-18 PROCEDURE — 99214 OFFICE O/P EST MOD 30 MIN: CPT | Performed by: NURSE PRACTITIONER

## 2022-04-18 ASSESSMENT — FIBROSIS 4 INDEX: FIB4 SCORE: 1.12

## 2022-04-18 NOTE — PROGRESS NOTES
CHIEF COMPLAINT: Patient is here for follow up of Type 2 Diabetes Mellitus, hyperlipidemia, Vitamin D deficiency, and Vitamin B deficiency.    HPI:     Shirley Unger is a 67 y.o. female  for continued evaluation & treatment of the followin. Type 2 Diabetes Mellitus  Ms. Unger is a very pleasant 67-year-old female who states she has been doing well since her last visit.Patient is post Operative L THR. Completed PT. Patient continues to make progress and is ambulating with DME at this time.    Current Diabetes Regimen:  Trulicity 1.5 mg weekly  Canagliflozin 300 mg once daily   Metformin 1000 mg BID    Glimepiride 4 mg daily      POC A1c 2022: 7.3%  POC A1c 2021: 6.6%  Labs from 2021 HbA1c is 6.9%      BG Diary: 2022- testing twice weekly.  Blood sugar log not available for this appointment.  Fasting Glucose: .    Patient denies hypoglycemic events. Patient is hypoglycemic aware.      Weight unchanged from prior appointment.    Diabetes Complications   Retinopathy: No known retinopathy.  Last eye exam: 2021 [facility].  Diagnosed with drusen (degenerative) of macula bilaterally.  Neuropathy: Denies paresthesias or numbness in hands or feet. Denies any foot wounds.  Exercise: Minimal.  Diet: Fair.    Currently taking lisinopril 2.5 mg daily.  BP currently 106/62.     Ref. Range 2021 10:15   Creatinine, Urine Latest Units: mg/dL 49.32   Microalbumin, Urine Random Latest Units: mg/dL <1.2         Hyperlipidemia  Currently taking Lipitor 20 mg daily.  Patient has tolerated statin therapy well over the last 2 years.  Denies muscle weakness and/or fatigue.     Ref. Range 3/2/2022 09:45   Cholesterol,Tot Latest Ref Range: 100 - 199 mg/dL 134   Triglycerides Latest Ref Range: 0 - 149 mg/dL 153 (H)   HDL Latest Ref Range: >=40 mg/dL 43   LDL Latest Ref Range: <100 mg/dL 60         Vitamin D deficiency  Currently taking vitamin D 10,000 units/day.  This was  decreased from 20,000 IU at the last appointment and no current vitamin D level is available.     Ref. Range 11/22/2021 10:00   25-Hydroxy   Vitamin D 25 Latest Ref Range: 30 - 80 ng/mL 51       Vitamin B12 deficiency  Currently taking vitamin B12 1000 mcg daily.     Ref. Range 11/22/2021 10:00   Vitamin B12 -True Cobalamin Latest Ref Range: 211 - 911 pg/mL 784         Patient's medications, allergies, and social histories were reviewed and updated as appropriate.    ROS:     CONS:     No fever, no chills   EYES:     No diplopia, no blurry vision   CV:           No chest pain, no palpitations   PULM:     No SOB, no cough, no hemoptysis.   GI:            No nausea, no vomiting, no diarrhea, no constipation   ENDO:     No polyuria, no polydipsia, no heat intolerance, no cold intolerance       Past Medical History:  Problem List:  2021-12: History of renal calculi  2021-12: Dense breast tissue on mammogram  2021-12: Gastroesophageal reflux disease without esophagitis  2021-07: Arthritis of left hip  2021-04: Bilateral hip pain  2021-04: Risk for falls  2021-01: Type 2 diabetes mellitus with other specified complication   (HCC)  2020-10: Neck pain  2020-10: Left ear pain  2020-01: Primary osteoarthritis of both hips  2020-01: B12 deficiency  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  2018-06: Skin cancer screening  2018-06: Primary insomnia  2018-03: Functional diarrhea  2017-09: Obesity (BMI 30-39.9)  2017-09: Numbness of left foot  2017-05: Arthralgia of left temporomandibular joint  2017-03: Seasonal allergic rhinitis due to pollen  2016-11: Acute mastitis of left breast  2016-11: Herpes dermatitis  2016-07: Primary osteoarthritis of left hip  2016-07: Trigger finger of right thumb  2016-05: Colon polyps  2015-08: Uncontrolled type 2 diabetes mellitus without complication,   without long-term current use of insulin  2014-07: Hand eczema  2014-02: Right hand pain  2013-07: Vitamin D deficiency disease  2013-03: Recurrent  nephrolithiasis  Hyperlipidemia associated with type 2 diabetes mellitus (HCC)  Type 2 DM, uncontrolled      Past Surgical History:  Past Surgical History:   Procedure Laterality Date   • FL TOTAL HIP ARTHROPLASTY Left 2021    Procedure: LEFT TOTAL HIP ARTHROPLASTY;  Surgeon: Miguel Madden M.D.;  Location: [facility] [place];  Service: Orthopedics   • FL TOTAL HIP ARTHROPLASTY Right 3/11/2020    Procedure: ARTHROPLASTY, HIP, TOTAL;  Surgeon: Miguel Madden M.D.;  Location: SURGERY [facility];  Service: Orthopedics   • ARTHROPLASTY Right    • GANGLION EXCISION  2014    Performed by Efra Ramirez M.D. at SURGERY [facility]   • HYSTERECTOMY, TOTAL ABDOMINAL      For non cancerous reasons   • CHOLECYSTECTOMY     • ABDOMINAL HYSTERECTOMY TOTAL     • CARPAL TUNNEL RELEASE      Right hand   • CYSTOSCOPY STENT PLACEMENT      several, stent removed in MD office   • FL  DELIVERY ONLY     • PRIMARY C SECTION       x 2        Allergies:  Macrobid [nitrofurantoin monohydrate macrocrystals] and Pcn [penicillins]     Social History:  Social History     Tobacco Use   • Smoking status: Never Smoker   • Smokeless tobacco: Never Used   Vaping Use   • Vaping Use: Never used   Substance Use Topics   • Alcohol use: No     Alcohol/week: 0.0 oz     Comment: Rare Useage   • Drug use: No        Family History:   family history includes Cancer in her mother; Diabetes in her maternal aunt and maternal grandfather; Heart Disease in her father; No Known Problems in her brother, brother, and brother.      PHYSICAL EXAM:   OBJECTIVE:  Vital signs: Virtual appointment.  GENERAL: Well-developed, well-nourished in no apparent distress.   EYE:  No ocular asymmetry, PERRLA  HENT: Pink, moist mucous membranes.    NECK: No thyromegaly.   CARDIOVASCULAR:  No murmurs  LUNGS: Clear breath sounds  ABDOMEN: Soft, nontender   EXTREMITIES: No clubbing, cyanosis, or edema.   NEUROLOGICAL: No gross  focal motor abnormalities   LYMPH: No cervical adenopathy seen.   SKIN: No rashes, lesions.       ASSESSMENT/PLAN:   1. Type 2 diabetes mellitus without complication, without long-term current use of insulin (HCC)  Stable.  Continue diabetes Regimen:  Trulicity 1.5 mg weekly  Canagliflozin 300 mg once daily   Metformin 1000 mg BID    Glimepiride 4 mg daily    Recommend daily glucose monitoring.     Recommend 2 to 3 L of water each day.  Recommend 150 minutes of exercise each week.  Continue balanced eating via my plate recommendations with a focus on portion control.  Daily foot inspection should be completed.  And dilated eye exam will be due by October 2021.      2. Hyperlipidemia, unspecified hyperlipidemia type  Stable.  Continue Lipitor 20 mg daily.      3. Vitamin D deficiency disease  Stable.   Recommend decreasing vitamin D 10,000 units/day.   Explained to patient that the goal is to keep her between 40 and 70 safely.    4. B12 deficiency  Stable.   Reduce vitamin B 12 supplement to every other day.    Repeat vitamin D lab assay before next appointment  Complete point-of-care A1c in the office at next appointment.  Next appointment should be scheduled for 4 to 5 months.    Thank you kindly for allowing me to participate in the diabetes care plan for this patient.    Aleida Mi, APRN  04/18/2022    CC:   Rose Marie Gao M.D.

## 2022-04-25 ENCOUNTER — TELEMEDICINE (OUTPATIENT)
Dept: MEDICAL GROUP | Facility: PHYSICIAN GROUP | Age: 67
End: 2022-04-25
Payer: MEDICARE

## 2022-04-25 ENCOUNTER — PHARMACY VISIT (OUTPATIENT)
Dept: PHARMACY | Facility: MEDICAL CENTER | Age: 67
End: 2022-04-25
Payer: MEDICARE

## 2022-04-25 VITALS
RESPIRATION RATE: 16 BRPM | DIASTOLIC BLOOD PRESSURE: 66 MMHG | SYSTOLIC BLOOD PRESSURE: 116 MMHG | HEART RATE: 100 BPM | OXYGEN SATURATION: 94 % | TEMPERATURE: 98.9 F

## 2022-04-25 DIAGNOSIS — U07.1 2019 NOVEL CORONAVIRUS DETECTED: ICD-10-CM

## 2022-04-25 PROCEDURE — 99213 OFFICE O/P EST LOW 20 MIN: CPT | Mod: 95,CS | Performed by: FAMILY MEDICINE

## 2022-04-25 PROCEDURE — RXMED WILLOW AMBULATORY MEDICATION CHARGE: Performed by: FAMILY MEDICINE

## 2022-04-25 NOTE — PROGRESS NOTES
Virtual Visit: Established Patient   This visit was conducted via Zoom using secure and encrypted videoconferencing technology. The patient was in a private location in the state of [place].    The patient's identity was confirmed and verbal consent was obtained for this virtual visit.    Subjective:   CC:   Chief Complaint   Patient presents with   • Coronavirus Screening     Exposed to  who tested positive for COVID-19. Symptoms developed and tested negative at home yesterday.    • Cough     Heavy feeling in the chest. Wet cough.        Shirley Unger is a 67 y.o. female presenting for evaluation and management of covid exposure    States that her   Had not been feeling well since last Thursday 4/21.  Initially thought that he had allergies.  However on Saturday, April 23 he took a home COVID test which was positive.  Patient states that she was around him without a mask for the first few days and then started masking after his positive test.  She had been asymptomatic took a home COVID test yesterday which was negative.  Since 3 PM yesterday, she has been having coughing, fatigue, increased somnolence.  Denies any fevers or chills.  States that she has also been taking DayQuil.  Her daughter who is a nursing student and has been assisting both parents.  She went ahead and did a rapid test today during the appointment with positive results.    ROS   Denies any recent fevers or chills. No nausea or vomiting. No chest pains or shortness of breath.     Allergies   Allergen Reactions   • Macrobid [Nitrofurantoin Monohydrate Macrocrystals] Hives   • Pcn [Penicillins] Hives       Current medicines (including changes today)  Current Outpatient Medications   Medication Sig Dispense Refill   • Nirmatrelvir & Ritonavir 20 x 150 MG & 10 x 100MG Tablet Therapy Pack Take 300 mg nirmatrelvir (two 150 mg tablets) with 100 mg  ritonavir (one 100 mg tablet) by mouth, with all three tablets taken  together  twice daily for 5 days. 30 Each 0   • Esomeprazole Magnesium (NEXIUM PO) Take  by mouth every day.     • metformin (GLUCOPHAGE) 1000 MG tablet Take 1 tablet by mouth 2 times a day with meals. 180 Tablet 2   • Dulaglutide (TRULICITY) 1.5 MG/0.5ML Solution Pen-injector Inject 0.5 mL under the skin every 7 days. 6 mL 3   • potassium citrate SR (UROCIT-K SR) 10 MEQ (1080 MG) Tab CR Take 2 tablets by mouth 3 times a day for 90 days. 540 Tablet 3   • valACYclovir (VALTREX) 500 MG Tab Take 1 tablet by mouth every day. 100 Tablet 3   • aspirin (ASPIRIN 81) 81 MG EC tablet Take 1 Tablet by mouth 2 times a day. 60 Tablet 0   • atorvastatin (LIPITOR) 20 MG Tab Take 1 tablet by mouth daily. (Patient taking differently: Take 1 tablet by mouth daily.) 100 tablet 2   • colesevelam (WELCHOL) 625 MG Tab Take 1 tablet by mouth every morning and take 2 tablets every evening with dinner. (Patient taking differently: Take 1 tablet by mouth every morning and take 2 tablets every evening with dinner.) 300 tablet 2   • lisinopril (PRINIVIL) 5 MG Tab Take one half tablet by mouth every day. 90 tablet 1   • glimepiride (AMARYL) 4 MG Tab Take 1 tablet by mouth every morning. (Patient taking differently: Take 1 tablet by mouth every morning.) 100 tablet 2   • clindamycin (CLEOCIN) 300 MG Cap PRN dental work     • Canagliflozin (INVOKANA) 300 MG Tab Take 1 tablet by mouth every day. 100 tablet 3   • glucose blood (FREESTYLE LITE) strip Use 1 Strip daily 100 Strip 3   • Cholecalciferol 125 MCG (5000 UT) Tab Take 10,000 Units by mouth every day.     • Cyanocobalamin (VITAMIN B 12 PO) Take 1,000 mcg by mouth every day.     • Lancets Lancets order: Lancets for Song Contour Next meter. Sig: use daily and prn ssx high or low sugar. 100 Each 3   • triamcinolone acetonide (KENALOG) 0.1 % Ointment Apply 1 Application to affected area(s) 2 times a day as needed.     • Blood Glucose Monitoring Suppl SUPPLIES MISC Test strips order: Test strips  for Abbott Freestyle Lite meter. Sig: use BID and prn ssx high or low sugar #100 RF x 3 100 Each 3     No current facility-administered medications for this visit.       Patient Active Problem List    Diagnosis Date Noted   • History of renal calculi 2021   • Dense breast tissue on mammogram 2021   • Gastroesophageal reflux disease without esophagitis 2021   • Arthritis of left hip 07/15/2021   • Risk for falls 2021   • Type 2 diabetes mellitus with other specified complication (HCC) 2021   • Primary osteoarthritis of both hips 2020   • Primary insomnia 2018   • Hyperlipidemia associated with type 2 diabetes mellitus (HCC)    • Recurrent nephrolithiasis 2013       Family History   Problem Relation Age of Onset   • Cancer Mother         Lung   • Heart Disease Father          at 71, No MI? May have been electrical   • No Known Problems Brother    • No Known Problems Brother    • Diabetes Maternal Aunt    • Diabetes Maternal Grandfather    • No Known Problems Brother        She  has a past medical history of DM (diabetes mellitus) (HCC) (), Herpes genitalia (3/22/2013), High cholesterol, Hyperlipidemia LDL goal < 100, Nephrolithiasis (3/22/2013), Urinary incontinence, and Vitamin d deficiency (2013).  She  has a past surgical history that includes hysterectomy, total abdominal (); cholecystectomy (); carpal tunnel release; pr  delivery only (); cystoscopy stent placement; ganglion excision (2014); pr total hip arthroplasty (Right, 3/11/2020); abdominal hysterectomy total; arthroplasty (Right, ); primary c section; and pr total hip arthroplasty (Left, 2021).       Objective:   /66   Pulse 100   Temp 37.2 °C (98.9 °F)   Resp 16   LMP 2004   SpO2 94%     Physical Exam:  Constitutional: Alert, no distress, well-groomed.  Skin: No rashes in visible areas.  Eye: Round. Conjunctiva clear, lids normal. No icterus.    ENMT: Lips pink without lesions, good dentition, moist mucous membranes. Phonation normal.  Neck: No masses, no thyromegaly. Moves freely without pain.  Respiratory: Unlabored respiratory effort, no cough or audible wheeze  Psych: Alert and oriented x3, normal affect and mood.       Assessment and Plan:   The following treatment plan was discussed:     1. 2019 novel coronavirus detected  New diagnosis.  Has received three pfizer vaccines, last booster given 11/16/21.  Encouraged to monitor blood sugars and oxygen saturations.  Encouraged to keep me updated.    Nirmatrelvir & Ritonavir 20 x 150 MG & 10 x 100MG Tablet Therapy Pack; Take 300 mg nirmatrelvir (two 150 mg tablets) with 100 mg  ritonavir (one 100 mg tablet) by mouth, with all three tablets taken together  twice daily for 5 days.  Dispense: 30 Each; Refill: 0    I spent a total of 23 minutes with record review, exam, communication with the patient, communication with other providers, and documentation of this encounter.      Follow-up: No follow-ups on file.

## 2022-04-27 PROCEDURE — RXMED WILLOW AMBULATORY MEDICATION CHARGE: Performed by: FAMILY MEDICINE

## 2022-04-28 ENCOUNTER — PHARMACY VISIT (OUTPATIENT)
Dept: PHARMACY | Facility: MEDICAL CENTER | Age: 67
End: 2022-04-28
Payer: MEDICARE

## 2022-05-04 PROCEDURE — RXMED WILLOW AMBULATORY MEDICATION CHARGE: Performed by: NURSE PRACTITIONER

## 2022-05-06 ENCOUNTER — TELEPHONE (OUTPATIENT)
Dept: MEDICAL GROUP | Facility: PHYSICIAN GROUP | Age: 67
End: 2022-05-06
Payer: MEDICARE

## 2022-05-06 ENCOUNTER — PHARMACY VISIT (OUTPATIENT)
Dept: PHARMACY | Facility: MEDICAL CENTER | Age: 67
End: 2022-05-06
Payer: MEDICARE

## 2022-05-06 NOTE — TELEPHONE ENCOUNTER
Caller Name: Shirley Unger  Call Back Number: [phone] (home)       Called patient back to advise on positive COVID. Patient has sneezing, headache and coughing. Has been using Dayquill and flonase. Suggested nasal rinse, showers, plenty of rest and stay hydrated. If symptoms worsen (SOB, wheezing, dizziness) to get checked out immediatley. Also suggested UC if patient would like script for Tesslon Perles or other cough medication.

## 2022-05-09 NOTE — TELEPHONE ENCOUNTER
Please call patient and see how she is feeling.  Per our records she had a positive home COVID test and was seen for a telemedicine visit on April 25.  She was also given a prescription for Paxlovid.     Thanks,  Rose Marie Gao M.D.

## 2022-05-09 NOTE — TELEPHONE ENCOUNTER
Spoke with patient and she finished the Paxlovid. Symptoms have improved but she is still experiencing a cough and congestion. Currently taking Dayquil and Nyquil but does not seem to be helping. Any suggestions? Please advise.

## 2022-05-10 ENCOUNTER — SUPERVISING PHYSICIAN REVIEW (OUTPATIENT)
Dept: MEDICAL GROUP | Facility: PHYSICIAN GROUP | Age: 67
End: 2022-05-10

## 2022-05-10 ENCOUNTER — PHARMACY VISIT (OUTPATIENT)
Dept: PHARMACY | Facility: MEDICAL CENTER | Age: 67
End: 2022-05-10
Payer: MEDICARE

## 2022-05-10 ENCOUNTER — TELEMEDICINE (OUTPATIENT)
Dept: MEDICAL GROUP | Facility: PHYSICIAN GROUP | Age: 67
End: 2022-05-10
Payer: MEDICARE

## 2022-05-10 VITALS
DIASTOLIC BLOOD PRESSURE: 72 MMHG | BODY MASS INDEX: 29.1 KG/M2 | HEART RATE: 78 BPM | HEIGHT: 68 IN | WEIGHT: 192 LBS | SYSTOLIC BLOOD PRESSURE: 102 MMHG

## 2022-05-10 DIAGNOSIS — U07.1 COVID-19: ICD-10-CM

## 2022-05-10 DIAGNOSIS — R05.9 COUGH: ICD-10-CM

## 2022-05-10 PROCEDURE — RXMED WILLOW AMBULATORY MEDICATION CHARGE: Performed by: FAMILY MEDICINE

## 2022-05-10 PROCEDURE — 99212 OFFICE O/P EST SF 10 MIN: CPT | Mod: 95 | Performed by: FAMILY MEDICINE

## 2022-05-10 RX ORDER — COVID-19 MOLECULAR TEST ASSAY
KIT MISCELLANEOUS
COMMUNITY
Start: 2022-05-06 | End: 2022-07-18

## 2022-05-10 RX ORDER — BENZONATATE 100 MG/1
100 CAPSULE ORAL 3 TIMES DAILY PRN
Qty: 60 CAPSULE | Refills: 0 | Status: SHIPPED | OUTPATIENT
Start: 2022-05-10 | End: 2022-07-18

## 2022-05-10 ASSESSMENT — FIBROSIS 4 INDEX: FIB4 SCORE: 1.12

## 2022-05-10 NOTE — PROGRESS NOTES
Virtual Visit: Established Patient   This visit was conducted via Zoom using secure and encrypted videoconferencing technology. The patient was in a private location in the state of [place].    The patient's identity was confirmed and verbal consent was obtained for this virtual visit.    Subjective:   CC:   Chief Complaint   Patient presents with   • Other     April 25th Home test Positive for Covid    Last Thursday sneezing and congestion  cough  Positive on Friday as well with Home Test        Shirley Unger is a 67 y.o. female presenting for evaluation and management of the following. Shirley is accompanied by herself to this virtual visit. States that she tested positive for covid end of April, then she took a course of Paxlovid which did help her symptoms and took another home test yesterday and was still positive.  Denies fever, chills, chest pains, shortness of breath,wheezing, or dizziness. States she is still having some residual coughing and congestion.   OTC dayquil and nyquil which improves her symptoms as well as Flonase.     No problems updated.     Current Outpatient Medications   Medication Sig Dispense Refill   • BINAXNOW COVID-19 AG HOME TEST Kit USE AS DIRECTED     • benzonatate (TESSALON) 100 MG Cap Take 1 Capsule by mouth 3 times a day as needed for Cough. 60 Capsule 0   • Nirmatrelvir & Ritonavir 20 x 150 MG & 10 x 100MG Tablet Therapy Pack Take 300 mg nirmatrelvir (two 150 mg tablets) with 100 mg  ritonavir (one 100 mg tablet) by mouth, with all three tablets taken together  twice daily for 5 days. 30 Each 0   • Esomeprazole Magnesium (NEXIUM PO) Take  by mouth every day.     • metformin (GLUCOPHAGE) 1000 MG tablet Take 1 tablet by mouth 2 times a day with meals. 180 Tablet 2   • Dulaglutide (TRULICITY) 1.5 MG/0.5ML Solution Pen-injector Inject 0.5 mL under the skin every 7 days. 6 mL 3   • potassium citrate SR (UROCIT-K SR) 10 MEQ (1080 MG) Tab CR Take 2 tablets by mouth 3 times a day  "for 90 days. 540 Tablet 3   • valACYclovir (VALTREX) 500 MG Tab Take 1 tablet by mouth every day. 100 Tablet 3   • aspirin (ASPIRIN 81) 81 MG EC tablet Take 1 Tablet by mouth 2 times a day. 60 Tablet 0   • atorvastatin (LIPITOR) 20 MG Tab Take 1 tablet by mouth daily. (Patient taking differently: Take 1 tablet by mouth daily.) 100 tablet 2   • colesevelam (WELCHOL) 625 MG Tab Take 1 tablet by mouth every morning and take 2 tablets every evening with dinner. (Patient taking differently: Take 1 tablet by mouth every morning and take 2 tablets every evening with dinner.) 300 tablet 2   • lisinopril (PRINIVIL) 5 MG Tab Take one half tablet by mouth every day. 90 tablet 1   • glimepiride (AMARYL) 4 MG Tab Take 1 tablet by mouth every morning. (Patient taking differently: Take 1 tablet by mouth every morning.) 100 tablet 2   • clindamycin (CLEOCIN) 300 MG Cap PRN dental work     • Canagliflozin (INVOKANA) 300 MG Tab Take 1 tablet by mouth every day. 100 tablet 3   • glucose blood (FREESTYLE LITE) strip Use 1 Strip daily 100 Strip 3   • Cholecalciferol 125 MCG (5000 UT) Tab Take 10,000 Units by mouth every day.     • Cyanocobalamin (VITAMIN B 12 PO) Take 1,000 mcg by mouth every day.     • Lancets Lancets order: Lancets for [employer] Contour Next meter. Sig: use daily and prn ssx high or low sugar. 100 Each 3   • triamcinolone acetonide (KENALOG) 0.1 % Ointment Apply 1 Application to affected area(s) 2 times a day as needed.     • Blood Glucose Monitoring Suppl SUPPLIES MISC Test strips order: Test strips for Abbott Freestyle Lite meter. Sig: use BID and prn ssx high or low sugar #100 RF x 3 100 Each 3     No current facility-administered medications for this visit.          Objective:   /72   Pulse 78   Ht 1.727 m (5' 8\")   Wt 87.1 kg (192 lb)   LMP 11/29/2004   BMI 29.19 kg/m²     Physical Exam  Vitals reviewed.   Constitutional:       General: She is not in acute distress.     Appearance: Normal appearance. She " is not ill-appearing.   HENT:      Mouth/Throat:      Mouth: Mucous membranes are moist.      Pharynx: Oropharynx is clear.   Eyes:      Conjunctiva/sclera: Conjunctivae normal.      Pupils: Pupils are equal, round, and reactive to light.   Cardiovascular:      Rate and Rhythm: Normal rate.   Pulmonary:      Effort: Pulmonary effort is normal. No respiratory distress.      Breath sounds: Normal breath sounds. No stridor. No wheezing, rhonchi or rales.      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   Neurological:      Mental Status: She is alert and oriented to person, place, and time.   Psychiatric:         Mood and Affect: Mood normal.         Behavior: Behavior normal.        Assessment and Plan:   Shirley is a 67 y.o. female and the following treatment plan was discussed:     1. Cough  Acute problem, residual after having COVID 19 a few weeks ago. Discussed she can do Mucinex OTC and I have sent Tessalon to her pharmacy. Per daughter LS normal on exam. ED/UC precautions given.   - benzonatate (TESSALON) 100 MG Cap; Take 1 Capsule by mouth 3 times a day as needed for Cough.  Dispense: 60 Capsule; Refill: 0    2. COVID-19  Acute problem, better after course of Paxlovid. Discussed she can increase fluids, take OTC saline spray, Mucinex, Tessalon to help with residual symptoms.   Other orders  - BINAXNOW COVID-19 AG HOME TEST Kit; USE AS DIRECTED        Follow-up: No follow-ups on file.         ALEXIS Quiros  [facility]

## 2022-05-10 NOTE — PATIENT INSTRUCTIONS
recommended conservative management with rest, fluids, otc meds, hot water and honey, nasal saline rinses, Mucinex. Discussed reasons to return, advised cough may last up to 3 weeks.  COVID-19  COVID-19 is a respiratory infection that is caused by a virus called severe acute respiratory syndrome coronavirus 2 (SARS-CoV-2). The disease is also known as coronavirus disease or novel coronavirus. In some people, the virus may not cause any symptoms. In others, it may cause a serious infection. The infection can get worse quickly and can lead to complications, such as:  · Pneumonia, or infection of the lungs.  · Acute respiratory distress syndrome or ARDS. This is fluid build-up in the lungs.  · Acute respiratory failure. This is a condition in which there is not enough oxygen passing from the lungs to the body.  · Sepsis or septic shock. This is a serious bodily reaction to an infection.  · Blood clotting problems.  · Secondary infections due to bacteria or fungus.  The virus that causes COVID-19 is contagious. This means that it can spread from person to person through droplets from coughs and sneezes (respiratory secretions).  What are the causes?  This illness is caused by a virus. You may catch the virus by:  · Breathing in droplets from an infected person's cough or sneeze.  · Touching something, like a table or a doorknob, that was exposed to the virus (contaminated) and then touching your mouth, nose, or eyes.  What increases the risk?  Risk for infection  You are more likely to be infected with this virus if you:  · Live in or travel to an area with a COVID-19 outbreak.  · Come in contact with a sick person who recently traveled to an area with a COVID-19 outbreak.  · Provide care for or live with a person who is infected with COVID-19.  Risk for serious illness  You are more likely to become seriously ill from the virus if you:  · Are 65 years of age or older.  · Have a long-term disease that lowers your  body's ability to fight infection (immunocompromised).  · Live in a nursing home or long-term care facility.  · Have a long-term (chronic) disease such as:  ? Chronic lung disease, including chronic obstructive pulmonary disease or asthma  ? Heart disease.  ? Diabetes.  ? Chronic kidney disease.  ? Liver disease.  · Are obese.  What are the signs or symptoms?  Symptoms of this condition can range from mild to severe. Symptoms may appear any time from 2 to 14 days after being exposed to the virus. They include:  · A fever.  · A cough.  · Difficulty breathing.  · Chills.  · Muscle pains.  · A sore throat.  · Loss of taste or smell.  Some people may also have stomach problems, such as nausea, vomiting, or diarrhea.  Other people may not have any symptoms of COVID-19.  How is this diagnosed?  This condition may be diagnosed based on:  · Your signs and symptoms, especially if:  ? You live in an area with a COVID-19 outbreak.  ? You recently traveled to or from an area where the virus is common.  ? You provide care for or live with a person who was diagnosed with COVID-19.  · A physical exam.  · Lab tests, which may include:  ? A nasal swab to take a sample of fluid from your nose.  ? A throat swab to take a sample of fluid from your throat.  ? A sample of mucus from your lungs (sputum).  ? Blood tests.  · Imaging tests, which may include, X-rays, CT scan, or ultrasound.  How is this treated?  At present, there is no medicine to treat COVID-19. Medicines that treat other diseases are being used on a trial basis to see if they are effective against COVID-19.  Your health care provider will talk with you about ways to treat your symptoms. For most people, the infection is mild and can be managed at home with rest, fluids, and over-the-counter medicines.  Treatment for a serious infection usually takes places in a hospital intensive care unit (ICU). It may include one or more of the following treatments. These treatments are  given until your symptoms improve.  · Receiving fluids and medicines through an IV.  · Supplemental oxygen. Extra oxygen is given through a tube in the nose, a face mask, or a cooper.  · Positioning you to lie on your stomach (prone position). This makes it easier for oxygen to get into the lungs.  · Continuous positive airway pressure (CPAP) or bi-level positive airway pressure (BPAP) machine. This treatment uses mild air pressure to keep the airways open. A tube that is connected to a motor delivers oxygen to the body.  · Ventilator. This treatment moves air into and out of the lungs by using a tube that is placed in your windpipe.  · Tracheostomy. This is a procedure to create a hole in the neck so that a breathing tube can be inserted.  · Extracorporeal membrane oxygenation (ECMO). This procedure gives the lungs a chance to recover by taking over the functions of the heart and lungs. It supplies oxygen to the body and removes carbon dioxide.  Follow these instructions at home:  Lifestyle  · If you are sick, stay home except to get medical care. Your health care provider will tell you how long to stay home. Call your health care provider before you go for medical care.  · Rest at home as told by your health care provider.  · Do not use any products that contain nicotine or tobacco, such as cigarettes, e-cigarettes, and chewing tobacco. If you need help quitting, ask your health care provider.  · Return to your normal activities as told by your health care provider. Ask your health care provider what activities are safe for you.  General instructions  · Take over-the-counter and prescription medicines only as told by your health care provider.  · Drink enough fluid to keep your urine pale yellow.  · Keep all follow-up visits as told by your health care provider. This is important.  How is this prevented?    There is no vaccine to help prevent COVID-19 infection. However, there are steps you can take to protect  yourself and others from this virus.  To protect yourself:   · Do not travel to areas where COVID-19 is a risk. The areas where COVID-19 is reported change often. To identify high-risk areas and travel restrictions, check the CDC travel website: wwwnc.cdc.gov/travel/notices  · If you live in, or must travel to, an area where COVID-19 is a risk, take precautions to avoid infection.  ? Stay away from people who are sick.  ? Wash your hands often with soap and water for 20 seconds. If soap and water are not available, use an alcohol-based hand .  ? Avoid touching your mouth, face, eyes, or nose.  ? Avoid going out in public, follow guidance from your state and local health authorities.  ? If you must go out in public, wear a cloth face covering or face mask.  ? Disinfect objects and surfaces that are frequently touched every day. This may include:  § Counters and tables.  § Doorknobs and light switches.  § Sinks and faucets.  § Electronics, such as phones, remote controls, keyboards, computers, and tablets.  To protect others:  If you have symptoms of COVID-19, take steps to prevent the virus from spreading to others.  · If you think you have a COVID-19 infection, contact your health care provider right away. Tell your health care team that you think you may have a COVID-19 infection.  · Stay home. Leave your house only to seek medical care. Do not use public transport.  · Do not travel while you are sick.  · Wash your hands often with soap and water for 20 seconds. If soap and water are not available, use alcohol-based hand .  · Stay away from other members of your household. Let healthy household members care for children and pets, if possible. If you have to care for children or pets, wash your hands often and wear a mask. If possible, stay in your own room, separate from others. Use a different bathroom.  · Make sure that all people in your household wash their hands well and often.  · Cough or  sneeze into a tissue or your sleeve or elbow. Do not cough or sneeze into your hand or into the air.  · Wear a cloth face covering or face mask.  Where to find more information  · Centers for Disease Control and Prevention: www.cdc.gov/coronavirus/2019-ncov/index.html  · World Health Organization: www.who.int/health-topics/coronavirus  Contact a health care provider if:  · You live in or have traveled to an area where COVID-19 is a risk and you have symptoms of the infection.  · You have had contact with someone who has COVID-19 and you have symptoms of the infection.  Get help right away if:  · You have trouble breathing.  · You have pain or pressure in your chest.  · You have confusion.  · You have bluish lips and fingernails.  · You have difficulty waking from sleep.  · You have symptoms that get worse.  These symptoms may represent a serious problem that is an emergency. Do not wait to see if the symptoms will go away. Get medical help right away. Call your local emergency services (911 in the [place] Do not drive yourself to the hospital. Let the emergency medical personnel know if you think you have COVID-19.  Summary  · COVID-19 is a respiratory infection that is caused by a virus. It is also known as coronavirus disease or novel coronavirus. It can cause serious infections, such as pneumonia, acute respiratory distress syndrome, acute respiratory failure, or sepsis.  · The virus that causes COVID-19 is contagious. This means that it can spread from person to person through droplets from coughs and sneezes.  · You are more likely to develop a serious illness if you are 65 years of age or older, have a weak immunity, live in a nursing home, or have chronic disease.  · There is no medicine to treat COVID-19. Your health care provider will talk with you about ways to treat your symptoms.  · Take steps to protect yourself and others from infection. Wash your hands often and disinfect objects and surfaces that are  frequently touched every day. Stay away from people who are sick and wear a mask if you are sick.  This information is not intended to replace advice given to you by your health care provider. Make sure you discuss any questions you have with your health care provider.  Document Released: 01/23/2020 Document Revised: 05/14/2020 Document Reviewed: 01/23/2020  Elsevier Patient Education © 2020 [employer]

## 2022-05-11 NOTE — PROGRESS NOTES
I have reviewed and agree with history, assessment and plan for office encounter on 5/10/22 with Advanced Practice Provider: julienne nieves  Face to face encounter/direct observation: no  Suggested changes to plan or follow-up: agree with assessment and plan.  Rose Marie Gao M.D.

## 2022-05-16 DIAGNOSIS — E11.9 DIABETES MELLITUS WITHOUT COMPLICATION (HCC): ICD-10-CM

## 2022-05-16 PROCEDURE — RXMED WILLOW AMBULATORY MEDICATION CHARGE: Performed by: FAMILY MEDICINE

## 2022-05-17 PROCEDURE — RXMED WILLOW AMBULATORY MEDICATION CHARGE: Performed by: INTERNAL MEDICINE

## 2022-05-17 RX ORDER — CANAGLIFLOZIN 300 MG/1
1 TABLET, FILM COATED ORAL
Qty: 100 TABLET | Refills: 3 | Status: SHIPPED | OUTPATIENT
Start: 2022-05-17 | End: 2023-03-06

## 2022-05-19 ENCOUNTER — PHARMACY VISIT (OUTPATIENT)
Dept: PHARMACY | Facility: MEDICAL CENTER | Age: 67
End: 2022-05-19
Payer: MEDICARE

## 2022-05-19 DIAGNOSIS — E11.29 TYPE 2 DIABETES MELLITUS WITH OTHER DIABETIC KIDNEY COMPLICATION (HCC): ICD-10-CM

## 2022-05-19 RX ORDER — LISINOPRIL 5 MG/1
2.5 TABLET ORAL DAILY
Qty: 100 TABLET | Refills: 3 | Status: SHIPPED | OUTPATIENT
Start: 2022-05-19 | End: 2022-07-18

## 2022-05-19 NOTE — TELEPHONE ENCOUNTER
Requested Prescriptions     Pending Prescriptions Disp Refills   • lisinopril (PRINIVIL) 5 MG Tab 100 Tablet 3     Sig: Take one half tablet by mouth every day.   Rose Marie Gao M.D.

## 2022-05-31 PROCEDURE — RXMED WILLOW AMBULATORY MEDICATION CHARGE: Performed by: PHYSICIAN ASSISTANT

## 2022-06-02 ENCOUNTER — PHARMACY VISIT (OUTPATIENT)
Dept: PHARMACY | Facility: MEDICAL CENTER | Age: 67
End: 2022-06-02
Payer: MEDICARE

## 2022-06-02 DIAGNOSIS — E78.5 DYSLIPIDEMIA: ICD-10-CM

## 2022-06-02 DIAGNOSIS — E11.29 TYPE 2 DIABETES MELLITUS WITH OTHER DIABETIC KIDNEY COMPLICATION (HCC): ICD-10-CM

## 2022-06-02 PROCEDURE — RXMED WILLOW AMBULATORY MEDICATION CHARGE: Performed by: NURSE PRACTITIONER

## 2022-06-02 RX ORDER — COLESEVELAM 180 1/1
TABLET ORAL
Qty: 300 TABLET | Refills: 3 | Status: SHIPPED | OUTPATIENT
Start: 2022-06-02 | End: 2022-09-06

## 2022-06-02 RX ORDER — GLIMEPIRIDE 4 MG/1
TABLET ORAL
Qty: 100 TABLET | Refills: 3 | Status: SHIPPED | OUTPATIENT
Start: 2022-06-02 | End: 2023-08-01

## 2022-06-03 PROCEDURE — RXMED WILLOW AMBULATORY MEDICATION CHARGE: Performed by: FAMILY MEDICINE

## 2022-06-03 NOTE — TELEPHONE ENCOUNTER
Requested Prescriptions     Pending Prescriptions Disp Refills   • colesevelam (WELCHOL) 625 MG Tab 300 Tablet 3     Sig: Take 1 tablet by mouth every morning and take 2 tablets every evening with dinner.   • glimepiride (AMARYL) 4 MG Tab 100 Tablet 3     Sig: Take 1 tablet by mouth every morning.   Rose Marie Gao M.D.

## 2022-06-08 ENCOUNTER — PHARMACY VISIT (OUTPATIENT)
Dept: PHARMACY | Facility: MEDICAL CENTER | Age: 67
End: 2022-06-08
Payer: MEDICARE

## 2022-07-15 ENCOUNTER — TELEPHONE (OUTPATIENT)
Dept: MEDICAL GROUP | Facility: PHYSICIAN GROUP | Age: 67
End: 2022-07-15
Payer: MEDICARE

## 2022-07-15 NOTE — TELEPHONE ENCOUNTER
ESTABLISHED PATIENT PRE-VISIT PLANNING     Patient was NOT contacted to complete PVP.     Note: Patient will not be contacted if there is no indication to call.     1.  Reviewed notes from the last few office visits within the medical group: Yes    2.  If any orders were placed at last visit or intended to be done for this visit (i.e. 6 mos follow-up), do we have Results/Consult Notes?         •  Labs - Labs were not ordered at last office visit.  Note: If patient appointment is for lab review and patient did not complete labs, check with provider if OK to reschedule patient until labs completed.       •  Imaging - Imaging was not ordered at last office visit.       •  Referrals - No referrals were ordered at last office visit.    3. Is this appointment scheduled as a Hospital Follow-Up? No    4.  Immunizations were updated in Epic using Reconcile Outside Information activity? Yes    5.  Patient is due for the following Health Maintenance Topics:   Health Maintenance Due   Topic Date Due   • Annual Wellness Visit  Never done   • IMM PNEUMOCOCCAL VACCINE: 65+ Years (3 - PPSV23 or PCV20) 03/12/2020   • COVID-19 Vaccine (4 - Booster for Pfizer series) 03/16/2022         6.  AHA (Pulse8) form printed for Provider? No, already completed

## 2022-07-18 ENCOUNTER — OFFICE VISIT (OUTPATIENT)
Dept: MEDICAL GROUP | Facility: PHYSICIAN GROUP | Age: 67
End: 2022-07-18
Payer: MEDICARE

## 2022-07-18 VITALS
HEIGHT: 68 IN | WEIGHT: 190.1 LBS | TEMPERATURE: 97.3 F | RESPIRATION RATE: 12 BRPM | SYSTOLIC BLOOD PRESSURE: 118 MMHG | DIASTOLIC BLOOD PRESSURE: 66 MMHG | OXYGEN SATURATION: 95 % | BODY MASS INDEX: 28.81 KG/M2 | HEART RATE: 92 BPM

## 2022-07-18 DIAGNOSIS — K21.9 GASTROESOPHAGEAL REFLUX DISEASE WITHOUT ESOPHAGITIS: ICD-10-CM

## 2022-07-18 DIAGNOSIS — Z23 NEED FOR VACCINATION: ICD-10-CM

## 2022-07-18 DIAGNOSIS — E11.69 TYPE 2 DIABETES MELLITUS WITH OTHER SPECIFIED COMPLICATION, WITHOUT LONG-TERM CURRENT USE OF INSULIN (HCC): ICD-10-CM

## 2022-07-18 DIAGNOSIS — N39.41 URGE INCONTINENCE OF URINE: ICD-10-CM

## 2022-07-18 PROBLEM — Z86.16 HISTORY OF COVID-19: Status: ACTIVE | Noted: 2022-07-18

## 2022-07-18 LAB
HBA1C MFR BLD: 7.3 % (ref 0–5.6)
INT CON NEG: ABNORMAL
INT CON POS: ABNORMAL

## 2022-07-18 PROCEDURE — G0009 ADMIN PNEUMOCOCCAL VACCINE: HCPCS | Performed by: FAMILY MEDICINE

## 2022-07-18 PROCEDURE — 90732 PPSV23 VACC 2 YRS+ SUBQ/IM: CPT | Performed by: FAMILY MEDICINE

## 2022-07-18 PROCEDURE — 83036 HEMOGLOBIN GLYCOSYLATED A1C: CPT | Performed by: FAMILY MEDICINE

## 2022-07-18 PROCEDURE — 99214 OFFICE O/P EST MOD 30 MIN: CPT | Mod: 25 | Performed by: FAMILY MEDICINE

## 2022-07-18 RX ORDER — OMEPRAZOLE 20 MG/1
CAPSULE, DELAYED RELEASE ORAL
COMMUNITY
Start: 2022-07-01 | End: 2024-02-13

## 2022-07-18 ASSESSMENT — FIBROSIS 4 INDEX: FIB4 SCORE: 1.12

## 2022-07-18 NOTE — PROGRESS NOTES
CC:   Chief Complaint   Patient presents with   • Diabetes Follow-up     4 months.    • Gastrophageal Reflux         HPI:   Shirley presents today for f/u visit.  .  Last seen by me on 4/25 for telemedicine visit for cough/covid. .    Since our last appointment, has been seen by:  Diagnosed with COVID in April 2022  Seen by GI July 1 and recommendations to continue with omeprazole 20 mg daily    Upcoming appointments with:  Endo on October 25      Gastroesophageal reflux disease without esophagitis  Chronic. Seen by Gi on 7/1 and recommendations to c/w prilosec.  trulicity was doubled in September and since then has had more heartburn. Stopped nexium. Has been taking prilosec 20mg daily and has noticed an improvement.    Type 2 diabetes mellitus with other specified complication (HCC)   Chronic condition.    Continues to follow up with an endocrinologist, Dr Posey..  Currently taking metformin 1000 mg twice a day, Trulicity 4.5 mg weekly, Invokana 300 mg daily, amaryl 4mg am. ...   Checks fingersticks fasting and postprandial. . , blood sugars are in the fastings.  range.    Continues to take ASA 81mg  and statin, Lipitor 20 mg   Last A1c:  7.3% today in the office.  Last urine microalbumin creatinine ratio was normal 11/2021.   Last monofilament exam 3/7/22.  Last Eye Exam: 11/3/21    Urge incontinence of urine  Chronic. No issues with nighttime leakage.   Denies any leakage with coughing or sneezing. Feels like when she needs to go.  Continues to drink lots of fluids as she does have a history of kidney stones.      Current Outpatient Medications Ordered in Epic   Medication Sig Dispense Refill   • omeprazole (PRILOSEC) 20 MG delayed-release capsule TAKE 1 CAPSULE BY MOUTH ONCE A DAY 30 MINUTES BEFORE BREAKFAST MEAL     • colesevelam (WELCHOL) 625 MG Tab Take 1 tablet by mouth every morning and take 2 tablets every evening with dinner. 300 Tablet 3   • glimepiride (AMARYL) 4 MG Tab Take 1 tablet by mouth  every morning. 100 Tablet 3   • Canagliflozin (INVOKANA) 300 MG Tab Take 1 tablet by mouth every day. 100 Tablet 3   • metformin (GLUCOPHAGE) 1000 MG tablet Take 1 tablet by mouth 2 times a day with meals. 180 Tablet 2   • Dulaglutide (TRULICITY) 1.5 MG/0.5ML Solution Pen-injector Inject 0.5 mL under the skin every 7 days. (Patient taking differently: Inject 1.5 mL under the skin every 7 days.) 6 mL 3   • potassium citrate SR (UROCIT-K SR) 10 MEQ (1080 MG) Tab CR Take 2 tablets by mouth 3 times a day for 90 days. 540 Tablet 3   • valACYclovir (VALTREX) 500 MG Tab Take 1 tablet by mouth every day. 100 Tablet 3   • aspirin (ASPIRIN 81) 81 MG EC tablet Take 1 Tablet by mouth 2 times a day. 60 Tablet 0   • atorvastatin (LIPITOR) 20 MG Tab Take 1 tablet by mouth daily. (Patient taking differently: Take 1 tablet by mouth daily.) 100 tablet 2   • lisinopril (PRINIVIL) 5 MG Tab Take one half tablet by mouth every day. 90 tablet 1   • clindamycin (CLEOCIN) 300 MG Cap PRN dental work     • glucose blood (FREESTYLE LITE) strip Use 1 Strip daily 100 Strip 3   • Cholecalciferol 125 MCG (5000 UT) Tab Take 10,000 Units by mouth every day.     • Cyanocobalamin (VITAMIN B 12 PO) Take 1,000 mcg by mouth every day.     • Lancets Lancets order: Lancets for Song Contour Next meter. Sig: use daily and prn ssx high or low sugar. 100 Each 3   • triamcinolone acetonide (KENALOG) 0.1 % Ointment Apply 1 Application to affected area(s) 2 times a day as needed.     • Blood Glucose Monitoring Suppl SUPPLIES MISC Test strips order: Test strips for Abbott Freestyle Lite meter. Sig: use BID and prn ssx high or low sugar #100 RF x 3 100 Each 3     No current Epic-ordered facility-administered medications on file.       Past Medical History:   Diagnosis Date   • DM (diabetes mellitus) (HCC) 2004   • Herpes genitalia 3/22/2013   • High cholesterol    • Hyperlipidemia LDL goal < 100    • Nephrolithiasis 3/22/2013    Recurrent Dr Monge - Calcium stones,  "   • Urinary incontinence    • Vitamin d deficiency 7/8/2013       Social History     Tobacco Use   • Smoking status: Never Smoker   • Smokeless tobacco: Never Used   Vaping Use   • Vaping Use: Never used   Substance Use Topics   • Alcohol use: No     Alcohol/week: 0.0 oz     Comment: Rare Useage   • Drug use: No       Allergies:  Macrobid [nitrofurantoin monohydrate macrocrystals] and Pcn [penicillins]    Health Maintenance: Pneumovax 23 administered today.  Also encouraged patient to receive the second COVID-19 booster.      Objective:       Exam:  /66   Pulse 92   Temp 36.3 °C (97.3 °F) (Temporal)   Resp 12   Ht 1.727 m (5' 8\")   Wt 86.2 kg (190 lb 1.6 oz)   LMP 11/29/2004   SpO2 95%   BMI 28.90 kg/m²   Body mass index is 28.9 kg/m².  Wt Readings from Last 4 Encounters:   07/18/22 86.2 kg (190 lb 1.6 oz)   05/10/22 87.1 kg (192 lb)   04/18/22 87.5 kg (193 lb)   03/07/22 87.2 kg (192 lb 4.8 oz)       Gen: Alert and oriented, No apparent distress. Appropriately groomed.  Neck: Neck is supple without lymphadenopathy.No thyromegaly.   Lungs: Normal effort, CTA bilaterally, no wheezes, rhonchi, or rales  CV: Regular rate and rhythm. No Lower extremity edema  Skin: No rash noted.      Assessment & Plan:     67 y.o. female with the following -     1. Type 2 diabetes mellitus with other specified complication, without long-term current use of insulin ([facility])  Chronic medical diagnosis.  A1c checked in the office today is stable at 7.3%.  She states that she has been under increased stress as her daughter and grandson had been living with her.  They both since moved out.  She will continue to work on diet and lifestyle.  Continue with current meds and follow-up with endocrinology as scheduled in October.  - POCT A1C    2. Gastroesophageal reflux disease without esophagitis  Chronic medical diagnosis.  Improving.  She recently had her follow-up appointment with GI 2 weeks ago and was recommended to continue " with omeprazole 20 mg daily.  She was also told at that visit that her reflux symptoms may be coming from the Trulicity.  For now, continue with omeprazole and monitor.  Follow-up with Endo in October.    3. Need for vaccination  - Pneumococal Polysaccharide Vaccine 23-Valent =>3YO SQ/IM    4. Urge incontinence of urine  Chronic medical diagnosis.  Encouraged patient to do bladder training and to improve blood sugars.  We did discuss referral to specialist if necessary.  For now, shared decision to monitor and follow-up with me in November.    Other orders  - omeprazole (PRILOSEC) 20 MG delayed-release capsule; TAKE 1 CAPSULE BY MOUTH ONCE A DAY 30 MINUTES BEFORE BREAKFAST MEAL        I spent a total of 31 minutes with record review, exam, communication with the patient, communication with other providers, and documentation of this encounter.      Return in about 4 months (around 11/18/2022), or beg November, for Diabetes.    Please note that this dictation was created using voice recognition software. I have made every reasonable attempt to correct obvious errors, but I expect that there are errors of grammar and possibly content that I did not discover before finalizing the note.

## 2022-07-18 NOTE — ASSESSMENT & PLAN NOTE
Chronic. Seen by Gi on 7/1 and recommendations to c/w prilosec.  trulicity was doubled in September and since then has had more heartburn. Stopped nexium. Has been taking prilosec 20mg daily and has noticed an improvement.

## 2022-07-18 NOTE — ASSESSMENT & PLAN NOTE
Chronic condition.    Continues to follow up with an endocrinologist, Dr Posey..  Currently taking metformin 1000 mg twice a day, Trulicity 4.5 mg weekly, Invokana 300 mg daily, amaryl 4mg am. ...   Checks fingersticks fasting and postprandial. . , blood sugars are in the fastings.  range.    Continues to take ASA 81mg  and statin, Lipitor 20 mg   Last A1c:  7.3% today in the office.  Last urine microalbumin creatinine ratio was normal 11/2021.   Last monofilament exam 3/7/22.  Last Eye Exam: 11/3/21

## 2022-07-18 NOTE — ASSESSMENT & PLAN NOTE
Chronic. No issues with nighttime leakage.   Denies any leakage with coughing or sneezing. Feels like when she needs to go.  Continues to drink lots of fluids as she does have a history of kidney stones.

## 2022-07-22 DIAGNOSIS — E11.29 TYPE 2 DIABETES MELLITUS WITH OTHER DIABETIC KIDNEY COMPLICATION (HCC): ICD-10-CM

## 2022-07-22 RX ORDER — LISINOPRIL 5 MG/1
2.5 TABLET ORAL DAILY
Qty: 100 TABLET | Refills: 3 | Status: SHIPPED | OUTPATIENT
Start: 2022-07-22 | End: 2023-08-22

## 2022-08-10 ENCOUNTER — HOSPITAL ENCOUNTER (OUTPATIENT)
Dept: RADIOLOGY | Facility: MEDICAL CENTER | Age: 67
End: 2022-08-10
Attending: PHYSICIAN ASSISTANT
Payer: MEDICARE

## 2022-08-10 DIAGNOSIS — Z87.442 PERSONAL HISTORY OF URINARY CALCULI: ICD-10-CM

## 2022-08-10 PROCEDURE — 74018 RADEX ABDOMEN 1 VIEW: CPT

## 2022-08-15 DIAGNOSIS — E11.29 TYPE 2 DIABETES MELLITUS WITH OTHER DIABETIC KIDNEY COMPLICATION (HCC): ICD-10-CM

## 2022-08-15 DIAGNOSIS — E78.5 DYSLIPIDEMIA: ICD-10-CM

## 2022-08-15 RX ORDER — ATORVASTATIN CALCIUM 20 MG/1
TABLET, FILM COATED ORAL
Qty: 100 TABLET | Refills: 3 | Status: SHIPPED | OUTPATIENT
Start: 2022-08-15 | End: 2023-08-28

## 2022-08-15 NOTE — TELEPHONE ENCOUNTER
Requested Prescriptions     Pending Prescriptions Disp Refills   • atorvastatin (LIPITOR) 20 MG Tab 100 Tablet 3     Sig: Take 1 tablet by mouth daily.   Rose Marie Gao M.D.

## 2022-09-04 DIAGNOSIS — E78.5 DYSLIPIDEMIA: ICD-10-CM

## 2022-09-06 RX ORDER — COLESEVELAM 180 1/1
TABLET ORAL
Qty: 300 TABLET | Refills: 3 | Status: SHIPPED | OUTPATIENT
Start: 2022-09-06 | End: 2023-09-05

## 2022-09-06 NOTE — TELEPHONE ENCOUNTER
Requested Prescriptions     Pending Prescriptions Disp Refills   • colesevelam (WELCHOL) 625 MG Tab [Pharmacy Med Name: COLESEVELAM 625 MG TABLET] 300 Tablet 3     Sig: TAKE 1 TABLET BY MOUTH EVERY MORNING AND TAKE 2 TABS EVERY EVENING WITH DINNER   Rose Marie Gao M.D.

## 2022-09-18 DIAGNOSIS — E11.69 TYPE 2 DIABETES MELLITUS WITH OTHER SPECIFIED COMPLICATION, WITHOUT LONG-TERM CURRENT USE OF INSULIN (HCC): ICD-10-CM

## 2022-09-30 DIAGNOSIS — E11.9 DIABETES MELLITUS WITHOUT COMPLICATION (HCC): ICD-10-CM

## 2022-09-30 NOTE — TELEPHONE ENCOUNTER
Received request via: Pharmacy    Was the patient seen in the last year in this department? Yes - has an appt 10/2022    Does the patient have an active prescription (recently filled or refills available) for medication(s) requested? No

## 2022-10-13 ENCOUNTER — OFFICE VISIT (OUTPATIENT)
Dept: MEDICAL GROUP | Facility: PHYSICIAN GROUP | Age: 67
End: 2022-10-13
Payer: MEDICARE

## 2022-10-13 VITALS
SYSTOLIC BLOOD PRESSURE: 112 MMHG | RESPIRATION RATE: 16 BRPM | HEART RATE: 82 BPM | TEMPERATURE: 98.1 F | BODY MASS INDEX: 28.19 KG/M2 | WEIGHT: 186 LBS | OXYGEN SATURATION: 97 % | DIASTOLIC BLOOD PRESSURE: 64 MMHG | HEIGHT: 68 IN

## 2022-10-13 DIAGNOSIS — Z23 NEED FOR VACCINATION: ICD-10-CM

## 2022-10-13 DIAGNOSIS — S76.311S HAMSTRING MUSCLE STRAIN, RIGHT, SEQUELA: ICD-10-CM

## 2022-10-13 PROCEDURE — 99213 OFFICE O/P EST LOW 20 MIN: CPT | Mod: 25 | Performed by: FAMILY MEDICINE

## 2022-10-13 PROCEDURE — 90662 IIV NO PRSV INCREASED AG IM: CPT | Performed by: FAMILY MEDICINE

## 2022-10-13 PROCEDURE — G0008 ADMIN INFLUENZA VIRUS VAC: HCPCS | Performed by: FAMILY MEDICINE

## 2022-10-13 RX ORDER — POTASSIUM CITRATE 10 MEQ/1
TABLET, EXTENDED RELEASE ORAL
COMMUNITY
End: 2022-11-16

## 2022-10-13 RX ORDER — BENZONATATE 100 MG/1
CAPSULE ORAL
COMMUNITY
End: 2023-05-15

## 2022-10-13 RX ORDER — TIZANIDINE 4 MG/1
4 TABLET ORAL EVERY 6 HOURS PRN
Qty: 30 TABLET | Refills: 3 | Status: SHIPPED | OUTPATIENT
Start: 2022-10-13 | End: 2023-05-01

## 2022-10-13 ASSESSMENT — PAIN SCALES - GENERAL: PAINLEVEL: 10=SEVERE PAIN

## 2022-10-13 ASSESSMENT — FIBROSIS 4 INDEX: FIB4 SCORE: 1.12

## 2022-10-13 NOTE — PATIENT INSTRUCTIONS
Can try muscle relaxer at bedtime   Ibuprofen with food and water as needed   Heat/ice off and on 10 minutes at a time  Muscle rubs as needed

## 2022-10-13 NOTE — PROGRESS NOTES
Subjective:     CC:   Chief Complaint   Patient presents with    Leg Pain     X 7 months, comes and goes, R, behind knee, hurts to bend        HPI:   Shirley presents today with leg pain that started 7 months ago that comes and goes. It is above R knee along posterior aspect of R hamstring and hurts to bend her knee when she is getting in and out of the car. States when she takes Ibuprofen for a few days then her pain improves for weeks at a time.  Has not tried any topicals, heat or ice, tylenol, and was holding off on Profen due to getting a steroid injection last Thursday for her finger.  Aggravating factors: Walking, bending her leg backward worsen the pain.  Denies fever, chills, discoloration of skin, injury to area.       No problem-specific Assessment & Plan notes found for this encounter.      Current Outpatient Medications Ordered in Epic   Medication Sig Dispense Refill    tizanidine (ZANAFLEX) 4 MG Tab Take 1 Tablet by mouth every 6 hours as needed (muscle spasm in leg). 30 Tablet 3    metformin (GLUCOPHAGE) 1000 MG tablet Take 1 tablet by mouth 2 times a day with meals. 180 Tablet 2    colesevelam (WELCHOL) 625 MG Tab TAKE 1 TABLET BY MOUTH EVERY MORNING AND TAKE 2 TABS EVERY EVENING WITH DINNER 300 Tablet 3    atorvastatin (LIPITOR) 20 MG Tab Take 1 tablet by mouth daily. 100 Tablet 3    lisinopril (PRINIVIL) 5 MG Tab Take one half tablet by mouth every day. 100 Tablet 3    omeprazole (PRILOSEC) 20 MG delayed-release capsule TAKE 1 CAPSULE BY MOUTH ONCE A DAY 30 MINUTES BEFORE BREAKFAST MEAL      glimepiride (AMARYL) 4 MG Tab Take 1 tablet by mouth every morning. 100 Tablet 3    Canagliflozin (INVOKANA) 300 MG Tab Take 1 tablet by mouth every day. 100 Tablet 3    Dulaglutide (TRULICITY) 1.5 MG/0.5ML Solution Pen-injector Inject 0.5 mL under the skin every 7 days. (Patient taking differently: Inject 1.5 mL under the skin every 7 days.) 6 mL 3    potassium citrate SR (UROCIT-K SR) 10 MEQ (1080 MG) Tab CR  "Take 2 tablets by mouth 3 times a day for 90 days. 540 Tablet 3    valACYclovir (VALTREX) 500 MG Tab Take 1 tablet by mouth every day. 100 Tablet 3    aspirin (ASPIRIN 81) 81 MG EC tablet Take 1 Tablet by mouth 2 times a day. 60 Tablet 0    clindamycin (CLEOCIN) 300 MG Cap PRN dental work      glucose blood (FREESTYLE LITE) strip Use 1 Strip daily 100 Strip 3    Cholecalciferol 125 MCG (5000 UT) Tab Take 10,000 Units by mouth every day.      Cyanocobalamin (VITAMIN B 12 PO) Take 1,000 mcg by mouth every day.      Lancets Lancets order: Lancets for [employer] Contour Next meter. Sig: use daily and prn ssx high or low sugar. 100 Each 3    triamcinolone acetonide (KENALOG) 0.1 % Ointment Apply 1 Application to affected area(s) 2 times a day as needed.      Blood Glucose Monitoring Suppl SUPPLIES MISC Test strips order: Test strips for [employer] Freestyle Lite meter. Sig: use BID and prn ssx high or low sugar #100 RF x 3 100 Each 3    benzonatate (TESSALON) 100 MG Cap benzonatate 100 mg capsule (Patient not taking: Reported on 10/13/2022)      potassium citrate SR (UROCIT-K SR) 10 MEQ (1080 MG) Tab CR potassium citrate ER 10 mEq (1,080 mg) tablet,extended release   Take 2 tablets 3 times a day by oral route for 90 days.       No current [facility]-ordered facility-administered medications on file.       Health Maintenance: Completed          Objective:     Exam:  /64 (BP Location: Right arm, Patient Position: Sitting, BP Cuff Size: Adult)   Pulse 82   Temp 36.7 °C (98.1 °F) (Temporal)   Resp 16   Ht 1.727 m (5' 8\")   Wt 84.4 kg (186 lb)   LMP 11/29/2004   SpO2 97%   BMI 28.28 kg/m²  Body mass index is 28.28 kg/m².    Physical Exam  Constitutional:       Appearance: Normal appearance.   Eyes:      Conjunctiva/sclera: Conjunctivae normal.      Pupils: Pupils are equal, round, and reactive to light.   Cardiovascular:      Rate and Rhythm: Normal rate.      Pulses: Normal pulses.   Pulmonary:      Effort: Pulmonary effort " is normal.   Musculoskeletal:         General: Normal range of motion.      Right lower leg: No edema.      Left lower leg: No edema.      Comments: R hamstring pain with movement, no discoloration, redness, warmth, or swelling.   ROM and strength intact bilaterally    Skin:     General: Skin is warm.   Neurological:      Mental Status: She is alert and oriented to person, place, and time.   Psychiatric:         Mood and Affect: Mood normal.         Behavior: Behavior normal.        A chaperone was offered to the patient during today's exam. Chaperone name: Lauryn ESPINOZA student  was present.      Assessment & Plan:     67 y.o. female with the following -     1. Need for vaccination  - INFLUENZA VACCINE, HIGH DOSE (65+ ONLY)    2. Hamstring muscle strain, right, sequela  Chronic, has been intermittent over the last 7 months with improvement however over the last few days she noticed it flaring up.  Discussed ibuprofen as needed with food and water, tizanidine given for her to take at night, ice, heat, muscle rubs.  Discussed if this does not improve we could send her to physical therapy for more isolated exercises and stretches to help improve pain in this area.  No abnormalities found on examination, pain with palpation over hamstring and some tightness over hamstring otherwise unremarkable exam.  Other orders  - benzonatate (TESSALON) 100 MG Cap; benzonatate 100 mg capsule (Patient not taking: Reported on 10/13/2022)  - potassium citrate SR (UROCIT-K SR) 10 MEQ (1080 MG) Tab CR; potassium citrate ER 10 mEq (1,080 mg) tablet,extended release   Take 2 tablets 3 times a day by oral route for 90 days.  - tizanidine (ZANAFLEX) 4 MG Tab; Take 1 Tablet by mouth every 6 hours as needed (muscle spasm in leg).  Dispense: 30 Tablet; Refill: 3       I spent a total of 23 minutes with record review, exam, communication with the patient, communication with other providers, and documentation of this encounter.      Return if  symptoms worsen or fail to improve.    Please note that this dictation was created using voice recognition software. I have made every reasonable attempt to correct obvious errors, but I expect that there are errors of grammar and possibly content that I did not discover before finalizing the note.

## 2022-10-20 ENCOUNTER — APPOINTMENT (RX ONLY)
Dept: URBAN - METROPOLITAN AREA CLINIC 4 | Facility: CLINIC | Age: 67
Setting detail: DERMATOLOGY
End: 2022-10-20

## 2022-10-20 DIAGNOSIS — D22 MELANOCYTIC NEVI: ICD-10-CM

## 2022-10-20 DIAGNOSIS — L20.89 OTHER ATOPIC DERMATITIS: ICD-10-CM

## 2022-10-20 DIAGNOSIS — L82.1 OTHER SEBORRHEIC KERATOSIS: ICD-10-CM

## 2022-10-20 DIAGNOSIS — L81.4 OTHER MELANIN HYPERPIGMENTATION: ICD-10-CM

## 2022-10-20 DIAGNOSIS — D18.0 HEMANGIOMA: ICD-10-CM

## 2022-10-20 PROBLEM — D18.01 HEMANGIOMA OF SKIN AND SUBCUTANEOUS TISSUE: Status: ACTIVE | Noted: 2022-10-20

## 2022-10-20 PROBLEM — D22.5 MELANOCYTIC NEVI OF TRUNK: Status: ACTIVE | Noted: 2022-10-20

## 2022-10-20 PROBLEM — L20.84 INTRINSIC (ALLERGIC) ECZEMA: Status: ACTIVE | Noted: 2022-10-20

## 2022-10-20 PROCEDURE — ? COUNSELING

## 2022-10-20 PROCEDURE — 99213 OFFICE O/P EST LOW 20 MIN: CPT

## 2022-10-20 ASSESSMENT — LOCATION SIMPLE DESCRIPTION DERM
LOCATION SIMPLE: RIGHT BUTTOCK
LOCATION SIMPLE: RIGHT HAND
LOCATION SIMPLE: RIGHT LOWER BACK
LOCATION SIMPLE: RIGHT PRETIBIAL REGION
LOCATION SIMPLE: LEFT BUTTOCK

## 2022-10-20 ASSESSMENT — LOCATION ZONE DERM
LOCATION ZONE: HAND
LOCATION ZONE: LEG
LOCATION ZONE: TRUNK

## 2022-10-20 ASSESSMENT — LOCATION DETAILED DESCRIPTION DERM
LOCATION DETAILED: RIGHT BUTTOCK
LOCATION DETAILED: RIGHT SUPERIOR LATERAL LOWER BACK
LOCATION DETAILED: LEFT BUTTOCK
LOCATION DETAILED: RIGHT PROXIMAL PRETIBIAL REGION
LOCATION DETAILED: RIGHT RADIAL DORSAL HAND

## 2022-10-20 NOTE — PROCEDURE: COUNSELING
Detail Level: Generalized
Patient Specific Counseling (Will Not Stick From Patient To Patient): Patient declines a Rx today, prefers to use moisturizers first before using Rx
Detail Level: Simple

## 2022-10-21 ENCOUNTER — HOSPITAL ENCOUNTER (OUTPATIENT)
Dept: LAB | Facility: MEDICAL CENTER | Age: 67
End: 2022-10-21
Attending: FAMILY MEDICINE
Payer: MEDICARE

## 2022-10-21 DIAGNOSIS — E11.9 TYPE 2 DIABETES MELLITUS WITHOUT COMPLICATION, WITHOUT LONG-TERM CURRENT USE OF INSULIN (HCC): ICD-10-CM

## 2022-10-21 DIAGNOSIS — E11.69 TYPE 2 DIABETES MELLITUS WITH OTHER SPECIFIED COMPLICATION, WITHOUT LONG-TERM CURRENT USE OF INSULIN (HCC): ICD-10-CM

## 2022-10-21 LAB
ALBUMIN SERPL BCP-MCNC: 4.7 G/DL (ref 3.2–4.9)
ALBUMIN/GLOB SERPL: 1.9 G/DL
ALP SERPL-CCNC: 66 U/L (ref 30–99)
ALT SERPL-CCNC: 9 U/L (ref 2–50)
ANION GAP SERPL CALC-SCNC: 14 MMOL/L (ref 7–16)
AST SERPL-CCNC: 11 U/L (ref 12–45)
BASOPHILS # BLD AUTO: 0.4 % (ref 0–1.8)
BASOPHILS # BLD: 0.03 K/UL (ref 0–0.12)
BILIRUB SERPL-MCNC: 0.5 MG/DL (ref 0.1–1.5)
BUN SERPL-MCNC: 18 MG/DL (ref 8–22)
CALCIUM SERPL-MCNC: 9.7 MG/DL (ref 8.5–10.5)
CHLORIDE SERPL-SCNC: 102 MMOL/L (ref 96–112)
CHOLEST SERPL-MCNC: 147 MG/DL (ref 100–199)
CO2 SERPL-SCNC: 26 MMOL/L (ref 20–33)
CREAT SERPL-MCNC: 0.54 MG/DL (ref 0.5–1.4)
EOSINOPHIL # BLD AUTO: 0.06 K/UL (ref 0–0.51)
EOSINOPHIL NFR BLD: 0.9 % (ref 0–6.9)
ERYTHROCYTE [DISTWIDTH] IN BLOOD BY AUTOMATED COUNT: 48 FL (ref 35.9–50)
EST. AVERAGE GLUCOSE BLD GHB EST-MCNC: 163 MG/DL
FASTING STATUS PATIENT QL REPORTED: NORMAL
GFR SERPLBLD CREATININE-BSD FMLA CKD-EPI: 100 ML/MIN/1.73 M 2
GLOBULIN SER CALC-MCNC: 2.5 G/DL (ref 1.9–3.5)
GLUCOSE SERPL-MCNC: 108 MG/DL (ref 65–99)
HBA1C MFR BLD: 7.3 % (ref 4–5.6)
HCT VFR BLD AUTO: 47.2 % (ref 37–47)
HDLC SERPL-MCNC: 43 MG/DL
HGB BLD-MCNC: 14.6 G/DL (ref 12–16)
IMM GRANULOCYTES # BLD AUTO: 0.01 K/UL (ref 0–0.11)
IMM GRANULOCYTES NFR BLD AUTO: 0.1 % (ref 0–0.9)
LDLC SERPL CALC-MCNC: 67 MG/DL
LYMPHOCYTES # BLD AUTO: 2.42 K/UL (ref 1–4.8)
LYMPHOCYTES NFR BLD: 34.4 % (ref 22–41)
MCH RBC QN AUTO: 29 PG (ref 27–33)
MCHC RBC AUTO-ENTMCNC: 30.9 G/DL (ref 33.6–35)
MCV RBC AUTO: 93.7 FL (ref 81.4–97.8)
MONOCYTES # BLD AUTO: 0.55 K/UL (ref 0–0.85)
MONOCYTES NFR BLD AUTO: 7.8 % (ref 0–13.4)
NEUTROPHILS # BLD AUTO: 3.96 K/UL (ref 2–7.15)
NEUTROPHILS NFR BLD: 56.4 % (ref 44–72)
NRBC # BLD AUTO: 0 K/UL
NRBC BLD-RTO: 0 /100 WBC
PLATELET # BLD AUTO: 247 K/UL (ref 164–446)
PMV BLD AUTO: 9.8 FL (ref 9–12.9)
POTASSIUM SERPL-SCNC: 4.6 MMOL/L (ref 3.6–5.5)
PROT SERPL-MCNC: 7.2 G/DL (ref 6–8.2)
RBC # BLD AUTO: 5.04 M/UL (ref 4.2–5.4)
SODIUM SERPL-SCNC: 142 MMOL/L (ref 135–145)
TRIGL SERPL-MCNC: 184 MG/DL (ref 0–149)
WBC # BLD AUTO: 7 K/UL (ref 4.8–10.8)

## 2022-10-21 PROCEDURE — 82570 ASSAY OF URINE CREATININE: CPT

## 2022-10-21 PROCEDURE — 82043 UR ALBUMIN QUANTITATIVE: CPT

## 2022-10-21 PROCEDURE — 85025 COMPLETE CBC W/AUTO DIFF WBC: CPT

## 2022-10-21 PROCEDURE — 80061 LIPID PANEL: CPT

## 2022-10-21 PROCEDURE — 36415 COLL VENOUS BLD VENIPUNCTURE: CPT

## 2022-10-21 PROCEDURE — 83036 HEMOGLOBIN GLYCOSYLATED A1C: CPT

## 2022-10-21 PROCEDURE — 80053 COMPREHEN METABOLIC PANEL: CPT

## 2022-10-22 LAB
CREAT UR-MCNC: 58.49 MG/DL
MICROALBUMIN UR-MCNC: <1.2 MG/DL
MICROALBUMIN/CREAT UR: NORMAL MG/G (ref 0–30)

## 2022-10-25 ENCOUNTER — OFFICE VISIT (OUTPATIENT)
Dept: ENDOCRINOLOGY | Facility: MEDICAL CENTER | Age: 67
End: 2022-10-25
Attending: INTERNAL MEDICINE
Payer: MEDICARE

## 2022-10-25 VITALS
HEART RATE: 94 BPM | HEIGHT: 68 IN | RESPIRATION RATE: 16 BRPM | DIASTOLIC BLOOD PRESSURE: 58 MMHG | OXYGEN SATURATION: 98 % | BODY MASS INDEX: 28.39 KG/M2 | WEIGHT: 187.3 LBS | SYSTOLIC BLOOD PRESSURE: 98 MMHG

## 2022-10-25 DIAGNOSIS — E78.5 HYPERLIPIDEMIA, UNSPECIFIED HYPERLIPIDEMIA TYPE: ICD-10-CM

## 2022-10-25 DIAGNOSIS — B00.89 HERPES DERMATITIS: ICD-10-CM

## 2022-10-25 DIAGNOSIS — E11.65 TYPE 2 DIABETES MELLITUS WITH HYPERGLYCEMIA, WITHOUT LONG-TERM CURRENT USE OF INSULIN (HCC): ICD-10-CM

## 2022-10-25 DIAGNOSIS — E55.9 VITAMIN D DEFICIENCY: ICD-10-CM

## 2022-10-25 DIAGNOSIS — Z79.84 LONG TERM (CURRENT) USE OF ORAL HYPOGLYCEMIC DRUGS: ICD-10-CM

## 2022-10-25 PROCEDURE — 99214 OFFICE O/P EST MOD 30 MIN: CPT | Performed by: INTERNAL MEDICINE

## 2022-10-25 PROCEDURE — 92250 FUNDUS PHOTOGRAPHY W/I&R: CPT | Performed by: INTERNAL MEDICINE

## 2022-10-25 PROCEDURE — 99212 OFFICE O/P EST SF 10 MIN: CPT | Performed by: INTERNAL MEDICINE

## 2022-10-25 RX ORDER — DULAGLUTIDE 3 MG/.5ML
3 INJECTION, SOLUTION SUBCUTANEOUS
Qty: 2 ML | Refills: 6 | Status: SHIPPED | OUTPATIENT
Start: 2022-10-25 | End: 2023-01-05

## 2022-10-25 ASSESSMENT — FIBROSIS 4 INDEX: FIB4 SCORE: 1.01

## 2022-10-25 NOTE — PROGRESS NOTES
CHIEF COMPLAINT: Patient is here for follow up of Type 2 Diabetes Mellitus    HPI:     Shirley Unger is a 67 y.o. female with Type 2 Diabetes Mellitus here for follow up.    Labs from 10/21/2022 HbA1c is 7.3%      She is on   Metformin 1000 mg twice a day  Trulicity 1.5 mg weekly  Invokana 300 mg daily  Glimepiride 4 mg in the morning        She forgot her glucose meter  Her sugars according to her are fairly controlled but she is not happy that her A1c is above 7%  She is open to increasing to Trulicity to 3 mg but is also worried about the side effects  She reports GERD symptoms since on Trulicity but she is taking a PPI        She has hyperlipidemia and is on atorvastatin 20 mg daily  She denies a history of coronary artery disease and cerebrovascular disease  She denies peripheral arterial disease  She denies muscle aches and muscle cramps  She is also taking WelChol which can help with hyperlipidemia  Incidentally WelChol can also help her sugars  LDL cholesterol was 67 October 1, 2022        She does not have diabetic kidney disease  She is on lisinopril 5 mg daily and her blood pressure today is good  U ACR was less than 30 on October 21, 2022        She has not had an eye exam this year.  She was originally scheduled on November 2022 but then she switched eye doctors and she has an eye appointment on January  We discussed getting a point-of-care eye exam the office and she is open to this      BG Diary:  Patient forgot glucose meter    Weight has been stable    Diabetes Complications   Retinopathy: No known retinopathy.  Last eye exam: We are getting a point-of-care eye exam today  Neuropathy: Denies paresthesias or numbness in hands or feet. Denies any foot wounds.  Exercise: Minimal.  Diet: Fair.  Patient's medications, allergies, and social histories were reviewed and updated as appropriate.    ROS:     CONS:     No fever, no chills   EYES:     No diplopia, no blurry vision   CV:           No  chest pain, no palpitations   PULM:     No SOB, no cough, no hemoptysis.   GI:            No nausea, no vomiting, no diarrhea, no constipation   ENDO:     No polyuria, no polydipsia, no heat intolerance, no cold intolerance       Past Medical History:  Problem List:  2022-07: History of COVID-19  2022-07: Urge incontinence of urine  2021-12: History of renal calculi  2021-12: Dense breast tissue on mammogram  2021-12: Gastroesophageal reflux disease without esophagitis  2021-07: Arthritis of left hip  2021-04: Bilateral hip pain  2021-04: Risk for falls  2021-01: Type 2 diabetes mellitus with other specified complication   (HCC)  2020-10: Neck pain  2020-10: Left ear pain  2020-01: Primary osteoarthritis of both hips  2020-01: B12 deficiency  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  2018-06: Skin cancer screening  2018-06: Primary insomnia  2018-03: Functional diarrhea  2017-09: Obesity (BMI 30-39.9)  2017-09: Numbness of left foot  2017-05: Arthralgia of left temporomandibular joint  2017-03: Seasonal allergic rhinitis due to pollen  2016-11: Acute mastitis of left breast  2016-11: Herpes dermatitis  2016-07: Primary osteoarthritis of left hip  2016-07: Trigger finger of right thumb  2016-05: Colon polyps  2015-08: Uncontrolled type 2 diabetes mellitus without complication,   without long-term current use of insulin  2014-07: Hand eczema  2014-02: Right hand pain  2013-07: Vitamin D deficiency disease  2013-03: Recurrent nephrolithiasis  Hyperlipidemia associated with type 2 diabetes mellitus (HCC)  Type 2 DM, uncontrolled      Past Surgical History:  Past Surgical History:   Procedure Laterality Date    TN TOTAL HIP ARTHROPLASTY Left 9/1/2021    Procedure: LEFT TOTAL HIP ARTHROPLASTY;  Surgeon: Miguel Madden M.D.;  Location: [facility];  Service: Orthopedics    TN TOTAL HIP ARTHROPLASTY Right 3/11/2020    Procedure: ARTHROPLASTY, HIP, TOTAL;  Surgeon: Miguel Madden M.D.;  Location: SURGERY  "[facility];  Service: Orthopedics    ARTHROPLASTY Right 2020    GANGLION EXCISION  2014    Performed by Efra Ramirez M.D. at SURGERY [facility]    HYSTERECTOMY, TOTAL ABDOMINAL      For non cancerous reasons    CHOLECYSTECTOMY  2004    ABDOMINAL HYSTERECTOMY TOTAL      CARPAL TUNNEL RELEASE      Right hand    CYSTOSCOPY STENT PLACEMENT      several, stent removed in MD office    MS  DELIVERY ONLY      PRIMARY C SECTION       x 2        Allergies:  Macrobid [nitrofurantoin monohydrate macrocrystals], Pcn [penicillins], and Penicillamine     Social History:  Social History     Tobacco Use    Smoking status: Never    Smokeless tobacco: Never   Vaping Use    Vaping Use: Never used   Substance Use Topics    Alcohol use: No     Alcohol/week: 0.0 oz     Comment: Rare Useage    Drug use: No        Family History:   family history includes Cancer in her mother; Diabetes in her maternal aunt and maternal grandfather; Heart Disease in her father; No Known Problems in her brother, brother, and brother.      PHYSICAL EXAM:   OBJECTIVE:  Vital signs: BP (!) 98/58 (BP Location: Left arm, Patient Position: Sitting)   Pulse 94   Resp 16   Ht 1.727 m (5' 8\")   Wt 85 kg (187 lb 4.8 oz)   LMP 2004   SpO2 98%   BMI 28.48 kg/m²   GENERAL: Well-developed, well-nourished in no apparent distress.   EYE:  No ocular asymmetry, PERRLA  HENT: Pink, moist mucous membranes.    NECK: No thyromegaly.   CARDIOVASCULAR:  No murmurs  LUNGS: Clear breath sounds  ABDOMEN: Soft, nontender   EXTREMITIES: No clubbing, cyanosis, or edema.   NEUROLOGICAL: No gross focal motor abnormalities   LYMPH: No cervical adenopathy palpated.   SKIN: No rashes, lesions.     Labs:  Lab Results   Component Value Date/Time    HBA1C 7.3 (H) 10/21/2022 11:29 AM        Lab Results   Component Value Date/Time    WBC 7.0 10/21/2022 11:29 AM    RBC 5.04 10/21/2022 11:29 AM    HEMOGLOBIN 14.6 10/21/2022 11:29 AM    MCV 93.7 " 10/21/2022 11:29 AM    MCH 29.0 10/21/2022 11:29 AM    MCHC 30.9 (L) 10/21/2022 11:29 AM    RDW 48.0 10/21/2022 11:29 AM    MPV 9.8 10/21/2022 11:29 AM       Lab Results   Component Value Date/Time    SODIUM 142 10/21/2022 11:29 AM    POTASSIUM 4.6 10/21/2022 11:29 AM    CHLORIDE 102 10/21/2022 11:29 AM    CO2 26 10/21/2022 11:29 AM    ANION 14.0 10/21/2022 11:29 AM    GLUCOSE 108 (H) 10/21/2022 11:29 AM    BUN 18 10/21/2022 11:29 AM    CREATININE 0.54 10/21/2022 11:29 AM    CREATININE 0.9 04/07/2005 09:15 PM    CALCIUM 9.7 10/21/2022 11:29 AM    ASTSGOT 11 (L) 10/21/2022 11:29 AM    ALTSGPT 9 10/21/2022 11:29 AM    TBILIRUBIN 0.5 10/21/2022 11:29 AM    ALBUMIN 4.7 10/21/2022 11:29 AM    TOTPROTEIN 7.2 10/21/2022 11:29 AM    GLOBULIN 2.5 10/21/2022 11:29 AM    AGRATIO 1.9 10/21/2022 11:29 AM       Lab Results   Component Value Date/Time    CHOLSTRLTOT 147 10/21/2022 1129    TRIGLYCERIDE 184 (H) 10/21/2022 1129    HDL 43 10/21/2022 1129    LDL 67 10/21/2022 1129       Lab Results   Component Value Date/Time    MALBCRT see below 10/21/2022 11:30 AM    MICROALBUR <1.2 10/21/2022 11:30 AM        Lab Results   Component Value Date/Time    TSHULTRASEN 0.820 11/22/2021 1000     No results found for: FREEDIR  Lab Results   Component Value Date/Time    FREET3 2.76 11/22/2021 1000     No results found for: THYSTIMIG        ASSESSMENT/PLAN:     1. Type 2 diabetes mellitus with hyperglycemia, without long-term current use of insulin (HCC)  Fair control  A1c 7.3%  Overall this is acceptable but I agree that better glucose control can be obtained  We will increase Trulicity to 3 mg weekly  Cautioned her about hypoglycemia because she is on glimepiride  She is made aware that she should discontinue glimepiride if she has hypoglycemia  Reviewed side effects of Trulicity again  Continue metformin  Continue Invokana at max dose  Advise adequate hydration  She is up-to-date with her labs  Point-of-care eye exam was completed  today  Follow-up in 3 to 6 months with labs    2. Hyperlipidemia, unspecified hyperlipidemia type  Stable  Continue atorvastatin  Repeat fasting lipids in 6 to 12 months    3. Vitamin D deficiency  Stable  Continue monitoring    4. Long term (current) use of oral hypoglycemic drugs  Patient is on multiple oral agents for type 2 diabetes management      Return in about 6 months (around 4/25/2023).      This patient during there office visit today was started on a new medication.  Side effects of the new medication were discussed with the patient today in the office.     Thank you kindly for allowing me to participate in the diabetes care plan for this patient.    Munir Posey MD, [facility], [facility]  10/25/22    CC:   Rose Marie Gao M.D.

## 2022-10-26 RX ORDER — VALACYCLOVIR HYDROCHLORIDE 500 MG/1
500 TABLET, FILM COATED ORAL
Qty: 100 TABLET | Refills: 3 | Status: SHIPPED | OUTPATIENT
Start: 2022-10-26 | End: 2023-05-15

## 2022-10-26 NOTE — TELEPHONE ENCOUNTER
Requested Prescriptions     Pending Prescriptions Disp Refills   • valACYclovir (VALTREX) 500 MG Tab [Pharmacy Med Name: VALACYCLOVIR  MG TABLET] 100 Tablet 3     Sig: TAKE 1 TABLET BY MOUTH EVERY DAY

## 2022-11-16 ENCOUNTER — OFFICE VISIT (OUTPATIENT)
Dept: MEDICAL GROUP | Facility: PHYSICIAN GROUP | Age: 67
End: 2022-11-16
Payer: MEDICARE

## 2022-11-16 VITALS
RESPIRATION RATE: 12 BRPM | HEIGHT: 68 IN | WEIGHT: 189 LBS | DIASTOLIC BLOOD PRESSURE: 64 MMHG | TEMPERATURE: 97.9 F | HEART RATE: 88 BPM | OXYGEN SATURATION: 97 % | BODY MASS INDEX: 28.64 KG/M2 | SYSTOLIC BLOOD PRESSURE: 110 MMHG

## 2022-11-16 DIAGNOSIS — G89.29 CHRONIC PAIN OF RIGHT KNEE: ICD-10-CM

## 2022-11-16 DIAGNOSIS — M25.561 CHRONIC PAIN OF RIGHT KNEE: ICD-10-CM

## 2022-11-16 DIAGNOSIS — E11.69 TYPE 2 DIABETES MELLITUS WITH OTHER SPECIFIED COMPLICATION, WITHOUT LONG-TERM CURRENT USE OF INSULIN (HCC): ICD-10-CM

## 2022-11-16 DIAGNOSIS — R76.8 HSV-2 SEROPOSITIVE: ICD-10-CM

## 2022-11-16 DIAGNOSIS — Z23 NEED FOR VACCINATION: ICD-10-CM

## 2022-11-16 PROCEDURE — 90471 IMMUNIZATION ADMIN: CPT | Performed by: FAMILY MEDICINE

## 2022-11-16 PROCEDURE — 90715 TDAP VACCINE 7 YRS/> IM: CPT | Performed by: FAMILY MEDICINE

## 2022-11-16 PROCEDURE — 99214 OFFICE O/P EST MOD 30 MIN: CPT | Mod: 25 | Performed by: FAMILY MEDICINE

## 2022-11-16 ASSESSMENT — FIBROSIS 4 INDEX: FIB4 SCORE: 1.01

## 2022-11-16 NOTE — PROGRESS NOTES
CC:   Chief Complaint   Patient presents with    Follow-Up     4 months    Lab Results    Leg Pain     Right side. Back of her leg is hurting and physical therapy was suggested.          HPI:   Shirley presents today for a follow-up visit.  Last seen by me on July 18..    Since our last appointment, has been seen by:  Ortho 2 weeks ago for left middle trigger finger.  Endocrinology in October for her diabetes.    Type 2 diabetes mellitus with other specified complication (HCC)    Chronic. Trulicity dose increased when she lastt saw endo. Hasn't increased this dose yet. Continues with metformin 1000 bid, glimepiride 4mg, invokana 300mg. Recent labs with A1c stable at 7.3%    HSV-2 seropositive  Has been on valtrex prophylaxis since 2020.  States that around the time of her hip surgery she had some lesions to her lower back near her coccyx region and was told that its HSV. Since then has been on valtrex 500 daily. Currently denies any lesions    Chronic pain of right knee  Chronic issue. Has been going on for several months. States that she has noticed posterior knee pain.  Denies any history of falls or injury.  Went to a chiropractor and PT was recommended.      Current Outpatient Medications Ordered in Epic   Medication Sig Dispense Refill    valACYclovir (VALTREX) 500 MG Tab TAKE 1 TABLET BY MOUTH EVERY  Tablet 3    Dulaglutide (TRULICITY) 3 MG/0.5ML Solution Pen-injector Inject 3 mg under the skin every 7 days. 2.0 ml equals 4 pens per month 2 mL 6    benzonatate (TESSALON) 100 MG Cap       tizanidine (ZANAFLEX) 4 MG Tab Take 1 Tablet by mouth every 6 hours as needed (muscle spasm in leg). 30 Tablet 3    metformin (GLUCOPHAGE) 1000 MG tablet Take 1 tablet by mouth 2 times a day with meals. 180 Tablet 2    colesevelam (WELCHOL) 625 MG Tab TAKE 1 TABLET BY MOUTH EVERY MORNING AND TAKE 2 TABS EVERY EVENING WITH DINNER 300 Tablet 3    atorvastatin (LIPITOR) 20 MG Tab Take 1 tablet by mouth daily. 100 Tablet 3     lisinopril (PRINIVIL) 5 MG Tab Take one half tablet by mouth every day. 100 Tablet 3    omeprazole (PRILOSEC) 20 MG delayed-release capsule TAKE 1 CAPSULE BY MOUTH ONCE A DAY 30 MINUTES BEFORE BREAKFAST MEAL      glimepiride (AMARYL) 4 MG Tab Take 1 tablet by mouth every morning. 100 Tablet 3    Canagliflozin (INVOKANA) 300 MG Tab Take 1 tablet by mouth every day. 100 Tablet 3    potassium citrate SR (UROCIT-K SR) 10 MEQ (1080 MG) Tab CR Take 2 tablets by mouth 3 times a day for 90 days. 540 Tablet 3    aspirin (ASPIRIN 81) 81 MG EC tablet Take 1 Tablet by mouth 2 times a day. 60 Tablet 0    clindamycin (CLEOCIN) 300 MG Cap PRN dental work      glucose blood (FREESTYLE LITE) strip Use 1 Strip daily 100 Strip 3    Cholecalciferol 125 MCG (5000 UT) Tab Take 2 Tablets by mouth every day.      Cyanocobalamin (VITAMIN B 12 PO) Take 1,000 mcg by mouth every day.      Lancets Lancets order: Lancets for [employer] Contour Next meter. Sig: use daily and prn ssx high or low sugar. 100 Each 3    triamcinolone acetonide (KENALOG) 0.1 % Ointment Apply 1 Application to affected area(s) 2 times a day as needed.      Blood Glucose Monitoring Suppl SUPPLIES MISC Test strips order: Test strips for [employer] Freestyle Lite meter. Sig: use BID and prn ssx high or low sugar #100 RF x 3 100 Each 3     No current Epic-ordered facility-administered medications on file.       Past Medical History:   Diagnosis Date    DM (diabetes mellitus) ([facility]) 2004    Herpes genitalia 3/22/2013    High cholesterol     Hyperlipidemia LDL goal < 100     Nephrolithiasis 3/22/2013    Recurrent Dr Monge - Calcium stones,     Urinary incontinence     Vitamin d deficiency 7/8/2013       Social History     Tobacco Use    Smoking status: Never    Smokeless tobacco: Never   Vaping Use    Vaping Use: Never used   Substance Use Topics    Alcohol use: No     Alcohol/week: 0.0 oz     Comment: Rare Useage    Drug use: No       Allergies:  Macrobid [nitrofurantoin monohydrate  "macrocrystals], Pcn [penicillins], and Penicillamine    Health Maintenance: Tdap administered today      Objective:       Exam:  /64   Pulse 88   Temp 36.6 °C (97.9 °F) (Temporal)   Resp 12   Ht 1.727 m (5' 8\")   Wt 85.7 kg (189 lb)   LMP 11/29/2004   SpO2 97%   BMI 28.74 kg/m²   Body mass index is 28.74 kg/m².  Wt Readings from Last 4 Encounters:   11/16/22 85.7 kg (189 lb)   10/25/22 85 kg (187 lb 4.8 oz)   10/13/22 84.4 kg (186 lb)   09/08/22 81.6 kg (180 lb)       Gen: Alert and oriented, No apparent distress. Appropriately groomed.  Neck: Neck is supple without lymphadenopathy.No thyromegaly.   Lungs: Normal effort, CTA bilaterally, no wheezes, rhonchi, or rales  CV: Regular rate and rhythm. No Lower extremity edema  Skin: No rash noted.  Right knee : tender to palpation over popliteal region. No effusion, no crepitus. Full range of motion    Assessment & Plan:     67 y.o. female with the following -     1. Type 2 diabetes mellitus with other specified complication, without long-term current use of insulin (HCC)  Chronic medical diagnosis.  Stable.  Continue to work with diet and lifestyle changes.  Recent labs with A1c stable at 7.3%.  Continue with Invokana, Trulicity, Amaryl, metformin.    2. Need for vaccination  - Tdap =>6yo IM    3. HSV-2 seropositive  History of.  He has been on Valtrex for the last 2 years.  Has not had any eruptions lately.  We will do a trial of this medication and instructed to just use the Valtrex as needed when she does have outbreaks.    4. Chronic pain of right knee  -Chronic medical diagnosis.  Not improving.  Has been going to a chiropractor who recommended physical therapy.  Patient would like to try aqua therapy instead.  New referral placed.    Referral to Physical Therapy        Return in about 3 months (around 2/16/2023), or end of January- check A1c, for Annual Wellness Visit.    Please note that this dictation was created using voice recognition software. I " have made every reasonable attempt to correct obvious errors, but I expect that there are errors of grammar and possibly content that I did not discover before finalizing the note.

## 2022-11-17 NOTE — ASSESSMENT & PLAN NOTE
Has been on valtrex prophylaxis since 2020.  States that around the time of her hip surgery she had some lesions to her lower back near her coccyx region and was told that its HSV. Since then has been on valtrex 500 daily. Currently denies any lesions

## 2022-11-17 NOTE — ASSESSMENT & PLAN NOTE
Chronic. Trulicity dose increased when she lastt saw constance. Hasn't increased this dose yet. Continues with metformin 1000 bid, glimepiride 4mg, invokana 300mg. Recent labs with A1c stable at 7.3%

## 2022-11-21 LAB — RETINAL SCREEN: NEGATIVE

## 2022-12-01 ENCOUNTER — HOSPITAL ENCOUNTER (OUTPATIENT)
Dept: RADIOLOGY | Facility: MEDICAL CENTER | Age: 67
End: 2022-12-01
Attending: FAMILY MEDICINE
Payer: MEDICARE

## 2022-12-01 DIAGNOSIS — Z12.31 VISIT FOR SCREENING MAMMOGRAM: ICD-10-CM

## 2022-12-01 PROCEDURE — 77063 BREAST TOMOSYNTHESIS BI: CPT

## 2023-01-04 DIAGNOSIS — E11.65 TYPE 2 DIABETES MELLITUS WITH HYPERGLYCEMIA, WITHOUT LONG-TERM CURRENT USE OF INSULIN (HCC): ICD-10-CM

## 2023-01-05 RX ORDER — DULAGLUTIDE 3 MG/.5ML
3 INJECTION, SOLUTION SUBCUTANEOUS
Qty: 2 ML | Refills: 6 | Status: SHIPPED | OUTPATIENT
Start: 2023-01-05 | End: 2023-01-18

## 2023-01-18 DIAGNOSIS — E11.65 TYPE 2 DIABETES MELLITUS WITH HYPERGLYCEMIA, WITHOUT LONG-TERM CURRENT USE OF INSULIN (HCC): ICD-10-CM

## 2023-01-18 RX ORDER — DULAGLUTIDE 3 MG/.5ML
3 INJECTION, SOLUTION SUBCUTANEOUS
Qty: 2 ML | Refills: 6 | Status: SHIPPED | OUTPATIENT
Start: 2023-01-18 | End: 2023-05-01

## 2023-01-23 DIAGNOSIS — E11.65 TYPE 2 DIABETES MELLITUS WITH HYPERGLYCEMIA, WITHOUT LONG-TERM CURRENT USE OF INSULIN (HCC): ICD-10-CM

## 2023-01-23 PROCEDURE — RXMED WILLOW AMBULATORY MEDICATION CHARGE: Performed by: INTERNAL MEDICINE

## 2023-01-23 RX ORDER — DULAGLUTIDE 1.5 MG/.5ML
0.5 INJECTION, SOLUTION SUBCUTANEOUS
Qty: 2 ML | Refills: 5 | Status: SHIPPED | OUTPATIENT
Start: 2023-01-23 | End: 2023-05-01

## 2023-01-26 ENCOUNTER — PHARMACY VISIT (OUTPATIENT)
Dept: PHARMACY | Facility: MEDICAL CENTER | Age: 68
End: 2023-01-26
Payer: MEDICARE

## 2023-02-16 PROCEDURE — RXMED WILLOW AMBULATORY MEDICATION CHARGE: Performed by: INTERNAL MEDICINE

## 2023-02-17 ENCOUNTER — PHARMACY VISIT (OUTPATIENT)
Dept: PHARMACY | Facility: MEDICAL CENTER | Age: 68
End: 2023-02-17
Payer: MEDICARE

## 2023-03-06 DIAGNOSIS — E11.65 TYPE 2 DIABETES MELLITUS WITH HYPERGLYCEMIA, WITHOUT LONG-TERM CURRENT USE OF INSULIN (HCC): ICD-10-CM

## 2023-03-06 RX ORDER — EMPAGLIFLOZIN 25 MG/1
1 TABLET, FILM COATED ORAL DAILY
Qty: 90 TABLET | Refills: 2 | COMMUNITY
Start: 2023-03-06 | End: 2023-03-08 | Stop reason: SDUPTHER

## 2023-03-08 DIAGNOSIS — E11.65 TYPE 2 DIABETES MELLITUS WITH HYPERGLYCEMIA, WITHOUT LONG-TERM CURRENT USE OF INSULIN (HCC): ICD-10-CM

## 2023-03-08 RX ORDER — EMPAGLIFLOZIN 25 MG/1
1 TABLET, FILM COATED ORAL DAILY
Qty: 90 TABLET | Refills: 2 | Status: SHIPPED | OUTPATIENT
Start: 2023-03-08 | End: 2023-11-01 | Stop reason: SDUPTHER

## 2023-03-15 DIAGNOSIS — G89.29 CHRONIC PAIN OF RIGHT KNEE: ICD-10-CM

## 2023-03-15 DIAGNOSIS — M25.561 CHRONIC PAIN OF RIGHT KNEE: ICD-10-CM

## 2023-03-17 ENCOUNTER — APPOINTMENT (OUTPATIENT)
Dept: RADIOLOGY | Facility: MEDICAL CENTER | Age: 68
End: 2023-03-17
Attending: FAMILY MEDICINE
Payer: MEDICARE

## 2023-03-17 DIAGNOSIS — M25.561 CHRONIC PAIN OF RIGHT KNEE: ICD-10-CM

## 2023-03-17 DIAGNOSIS — G89.29 CHRONIC PAIN OF RIGHT KNEE: ICD-10-CM

## 2023-03-17 PROCEDURE — 73562 X-RAY EXAM OF KNEE 3: CPT | Mod: RT

## 2023-03-21 PROCEDURE — RXMED WILLOW AMBULATORY MEDICATION CHARGE: Performed by: INTERNAL MEDICINE

## 2023-03-22 ENCOUNTER — PHARMACY VISIT (OUTPATIENT)
Dept: PHARMACY | Facility: MEDICAL CENTER | Age: 68
End: 2023-03-22
Payer: MEDICARE

## 2023-04-13 PROCEDURE — RXMED WILLOW AMBULATORY MEDICATION CHARGE: Performed by: INTERNAL MEDICINE

## 2023-04-15 DIAGNOSIS — E11.9 DIABETES MELLITUS WITHOUT COMPLICATION (HCC): ICD-10-CM

## 2023-04-17 ENCOUNTER — PHARMACY VISIT (OUTPATIENT)
Dept: PHARMACY | Facility: MEDICAL CENTER | Age: 68
End: 2023-04-17
Payer: MEDICARE

## 2023-04-24 ENCOUNTER — HOSPITAL ENCOUNTER (OUTPATIENT)
Dept: LAB | Facility: MEDICAL CENTER | Age: 68
End: 2023-04-24
Attending: INTERNAL MEDICINE
Payer: MEDICARE

## 2023-04-24 DIAGNOSIS — E11.65 TYPE 2 DIABETES MELLITUS WITH HYPERGLYCEMIA, WITHOUT LONG-TERM CURRENT USE OF INSULIN (HCC): ICD-10-CM

## 2023-04-24 DIAGNOSIS — E55.9 VITAMIN D DEFICIENCY: ICD-10-CM

## 2023-04-24 DIAGNOSIS — E78.5 HYPERLIPIDEMIA, UNSPECIFIED HYPERLIPIDEMIA TYPE: ICD-10-CM

## 2023-04-24 DIAGNOSIS — Z79.84 LONG TERM (CURRENT) USE OF ORAL HYPOGLYCEMIC DRUGS: ICD-10-CM

## 2023-04-24 PROCEDURE — 82570 ASSAY OF URINE CREATININE: CPT

## 2023-04-24 PROCEDURE — 84439 ASSAY OF FREE THYROXINE: CPT

## 2023-04-24 PROCEDURE — 82306 VITAMIN D 25 HYDROXY: CPT

## 2023-04-24 PROCEDURE — 80053 COMPREHEN METABOLIC PANEL: CPT

## 2023-04-24 PROCEDURE — 36415 COLL VENOUS BLD VENIPUNCTURE: CPT

## 2023-04-24 PROCEDURE — 80061 LIPID PANEL: CPT

## 2023-04-24 PROCEDURE — 82043 UR ALBUMIN QUANTITATIVE: CPT

## 2023-04-24 PROCEDURE — 84443 ASSAY THYROID STIM HORMONE: CPT

## 2023-04-25 LAB
25(OH)D3 SERPL-MCNC: 53 NG/ML (ref 30–100)
ALBUMIN SERPL BCP-MCNC: 4.5 G/DL (ref 3.2–4.9)
ALBUMIN/GLOB SERPL: 2 G/DL
ALP SERPL-CCNC: 63 U/L (ref 30–99)
ALT SERPL-CCNC: 10 U/L (ref 2–50)
ANION GAP SERPL CALC-SCNC: 14 MMOL/L (ref 7–16)
AST SERPL-CCNC: 12 U/L (ref 12–45)
BILIRUB SERPL-MCNC: 0.5 MG/DL (ref 0.1–1.5)
BUN SERPL-MCNC: 19 MG/DL (ref 8–22)
CALCIUM ALBUM COR SERPL-MCNC: 8.7 MG/DL (ref 8.5–10.5)
CALCIUM SERPL-MCNC: 9.1 MG/DL (ref 8.5–10.5)
CHLORIDE SERPL-SCNC: 103 MMOL/L (ref 96–112)
CHOLEST SERPL-MCNC: 136 MG/DL (ref 100–199)
CO2 SERPL-SCNC: 25 MMOL/L (ref 20–33)
CREAT SERPL-MCNC: 0.48 MG/DL (ref 0.5–1.4)
CREAT UR-MCNC: 58.82 MG/DL
FASTING STATUS PATIENT QL REPORTED: NORMAL
GFR SERPLBLD CREATININE-BSD FMLA CKD-EPI: 102 ML/MIN/1.73 M 2
GLOBULIN SER CALC-MCNC: 2.3 G/DL (ref 1.9–3.5)
GLUCOSE SERPL-MCNC: 112 MG/DL (ref 65–99)
HDLC SERPL-MCNC: 47 MG/DL
LDLC SERPL CALC-MCNC: 57 MG/DL
MICROALBUMIN UR-MCNC: <1.2 MG/DL
MICROALBUMIN/CREAT UR: NORMAL MG/G (ref 0–30)
POTASSIUM SERPL-SCNC: 4.7 MMOL/L (ref 3.6–5.5)
PROT SERPL-MCNC: 6.8 G/DL (ref 6–8.2)
SODIUM SERPL-SCNC: 142 MMOL/L (ref 135–145)
T4 FREE SERPL-MCNC: 1.39 NG/DL (ref 0.93–1.7)
TRIGL SERPL-MCNC: 159 MG/DL (ref 0–149)
TSH SERPL DL<=0.005 MIU/L-ACNC: 0.79 UIU/ML (ref 0.38–5.33)

## 2023-05-01 ENCOUNTER — OFFICE VISIT (OUTPATIENT)
Dept: ENDOCRINOLOGY | Facility: MEDICAL CENTER | Age: 68
End: 2023-05-01
Attending: INTERNAL MEDICINE
Payer: MEDICARE

## 2023-05-01 VITALS
WEIGHT: 188.7 LBS | HEIGHT: 68 IN | SYSTOLIC BLOOD PRESSURE: 120 MMHG | HEART RATE: 95 BPM | OXYGEN SATURATION: 100 % | BODY MASS INDEX: 28.6 KG/M2 | DIASTOLIC BLOOD PRESSURE: 68 MMHG

## 2023-05-01 DIAGNOSIS — Z78.0 POSTMENOPAUSAL: ICD-10-CM

## 2023-05-01 DIAGNOSIS — E11.65 TYPE 2 DIABETES MELLITUS WITH HYPERGLYCEMIA, WITHOUT LONG-TERM CURRENT USE OF INSULIN (HCC): ICD-10-CM

## 2023-05-01 DIAGNOSIS — E55.9 VITAMIN D DEFICIENCY: ICD-10-CM

## 2023-05-01 DIAGNOSIS — S62.101D CLOSED FRACTURE OF RIGHT WRIST WITH ROUTINE HEALING, SUBSEQUENT ENCOUNTER: ICD-10-CM

## 2023-05-01 DIAGNOSIS — Z13.820 SCREENING FOR OSTEOPOROSIS: ICD-10-CM

## 2023-05-01 DIAGNOSIS — E78.5 HYPERLIPIDEMIA, UNSPECIFIED HYPERLIPIDEMIA TYPE: ICD-10-CM

## 2023-05-01 DIAGNOSIS — Z79.84 LONG TERM (CURRENT) USE OF ORAL HYPOGLYCEMIC DRUGS: ICD-10-CM

## 2023-05-01 LAB
HBA1C MFR BLD: 7.2 % (ref ?–5.8)
POCT INT CON NEG: NEGATIVE
POCT INT CON POS: POSITIVE

## 2023-05-01 PROCEDURE — 99212 OFFICE O/P EST SF 10 MIN: CPT | Performed by: INTERNAL MEDICINE

## 2023-05-01 PROCEDURE — 83036 HEMOGLOBIN GLYCOSYLATED A1C: CPT | Performed by: INTERNAL MEDICINE

## 2023-05-01 PROCEDURE — 99215 OFFICE O/P EST HI 40 MIN: CPT | Performed by: INTERNAL MEDICINE

## 2023-05-01 PROCEDURE — RXMED WILLOW AMBULATORY MEDICATION CHARGE: Performed by: INTERNAL MEDICINE

## 2023-05-01 RX ORDER — CANAGLIFLOZIN 300 MG/1
TABLET, FILM COATED ORAL
COMMUNITY
Start: 2023-03-04 | End: 2023-05-01

## 2023-05-01 RX ORDER — DULAGLUTIDE 3 MG/.5ML
3 INJECTION, SOLUTION SUBCUTANEOUS
Qty: 6 ML | Refills: 2 | Status: SHIPPED | OUTPATIENT
Start: 2023-05-01 | End: 2023-11-01 | Stop reason: SDUPTHER

## 2023-05-01 ASSESSMENT — FIBROSIS 4 INDEX: FIB4 SCORE: 1.06

## 2023-05-01 ASSESSMENT — PATIENT HEALTH QUESTIONNAIRE - PHQ9: CLINICAL INTERPRETATION OF PHQ2 SCORE: 0

## 2023-05-01 NOTE — PROGRESS NOTES
CHIEF COMPLAINT: Patient is here for follow up of Type 2 Diabetes Mellitus    HPI:     Shirley Unger is a 68 y.o. female with Type 2 Diabetes Mellitus here for follow up.      Labs from 5/1/2023 show Hba1c is 7.2%  Labs from 10/21/2022 HbA1c is 7.3%      She is on   Metformin 1000 mg twice a day  Trulicity 1.5 mg weekly  Jardiance 25mg daily  Glimepiride 4 mg in the morning        She denies SE with his meds  She was supposed to be on Trulicity 3.0mg but she had to go down from 3.0 mg to 1.5mg due to supply chain issues  We had to replace Invokana with Jardiance due to formulary coverage  She  fell at home last week.   She was carrying a TV tray and slipped on a rug and she fell forward and slammed her right wrist on the TV tray.   She went to [facility] and had x rays showing a nondisplaced R wrist fracture and she now has a cast.         She has hyperlipidemia and is on atorvastatin 20 mg daily  She denies a history of coronary artery disease and cerebrovascular disease  She denies peripheral arterial disease  She denies muscle aches and muscle cramps  She is also taking WelChol which can help with hyperlipidemia  Incidentally WelChol can also help her sugars  LDL cholesterol was 57 on 4/2023        She does not have diabetic kidney disease  She is on lisinopril 5 mg daily and her blood pressure today is good  U ACR was less than 30 on 4/2023        She had a point-of-care eye exam the office on 3/10/2023       Her vitamin D is 53    Her DEXA was last done on 4/3/2019 showing normal bone mass  With lowest T score of -0.8      BG Diary:  Patient forgot glucose meter    Weight has been stable    Diabetes Complications   Retinopathy: No known retinopathy.  Last eye exam: 3/10/2023  Neuropathy: Denies paresthesias or numbness in hands or feet. Denies any foot wounds.  Exercise: Minimal.  Diet: Fair.  Patient's medications, allergies, and social histories were reviewed and updated as appropriate.    ROS:     CONS:      No fever, no chills   EYES:     No diplopia, no blurry vision   CV:           No chest pain, no palpitations   PULM:     No SOB, no cough, no hemoptysis.   GI:            No nausea, no vomiting, no diarrhea, no constipation   ENDO:     No polyuria, no polydipsia, no heat intolerance, no cold intolerance       Past Medical History:  Problem List:  2022-11: HSV-2 seropositive  2022-11: Chronic pain of right knee  2022-07: History of COVID-19  2022-07: Urge incontinence of urine  2021-12: History of renal calculi  2021-12: Dense breast tissue on mammogram  2021-12: Gastroesophageal reflux disease without esophagitis  2021-07: Arthritis of left hip  2021-04: Bilateral hip pain  2021-04: Risk for falls  2021-01: Type 2 diabetes mellitus with other specified complication   (HCC)  2020-10: Neck pain  2020-10: Left ear pain  2020-01: Primary osteoarthritis of both hips  2020-01: B12 deficiency  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  2018-06: Skin cancer screening  2018-06: Primary insomnia  2018-03: Functional diarrhea  2017-09: Obesity (BMI 30-39.9)  2017-09: Numbness of left foot  2017-05: Arthralgia of left temporomandibular joint  2017-03: Seasonal allergic rhinitis due to pollen  2016-11: Acute mastitis of left breast  2016-11: Herpes dermatitis  2016-07: Primary osteoarthritis of left hip  2016-07: Trigger finger of right thumb  2016-05: Colon polyps  2015-08: Uncontrolled type 2 diabetes mellitus without complication,   without long-term current use of insulin  2014-07: Hand eczema  2014-02: Right hand pain  2013-07: Vitamin D deficiency disease  2013-03: Recurrent nephrolithiasis  Hyperlipidemia associated with type 2 diabetes mellitus (HCC)  Type 2 DM, uncontrolled      Past Surgical History:  Past Surgical History:   Procedure Laterality Date    [place] TOTAL HIP ARTHROPLASTY Left 9/1/2021    Procedure: LEFT TOTAL HIP ARTHROPLASTY;  Surgeon: Miguel Madden M.D.;  Location: [facility];  Service:  "Orthopedics    [place] TOTAL HIP ARTHROPLASTY Right 3/11/2020    Procedure: ARTHROPLASTY, HIP, TOTAL;  Surgeon: Miguel Madden M.D.;  Location: [facility]ERY [facility];  Service: Orthopedics    ARTHROPLASTY Right     GANGLION EXCISION  2014    Performed by Efra Ramirez M.D. at SURGERY [facility]    HYSTERECTOMY, TOTAL ABDOMINAL      For non cancerous reasons    CHOLECYSTECTOMY      ABDOMINAL HYSTERECTOMY TOTAL      CARPAL TUNNEL RELEASE      Right hand    CYSTOSCOPY STENT PLACEMENT      several, stent removed in MD office    [place]  DELIVERY ONLY      PRIMARY C SECTION       x 2        Allergies:  Macrobid [nitrofurantoin monohydrate macrocrystals], Pcn [penicillins], and Penicillamine     Social History:  Social History     Tobacco Use    Smoking status: Never    Smokeless tobacco: Never   Vaping Use    Vaping Use: Never used   Substance Use Topics    Alcohol use: No     Alcohol/week: 0.0 oz     Comment: Rare Useage    Drug use: No        Family History:   family history includes Cancer in her mother; Diabetes in her maternal aunt and maternal grandfather; Heart Disease in her father; No Known Problems in her brother, brother, and brother.      PHYSICAL EXAM:   OBJECTIVE:  Vital signs: /68   Pulse 95   Ht 1.727 m (5' 8\")   Wt 85.6 kg (188 lb 11.2 oz)   LMP 2004   SpO2 100%   BMI 28.69 kg/m²   GENERAL: Well-developed, well-nourished in no apparent distress.   EYE:  No ocular asymmetry, PERRLA  HENT: Pink, moist mucous membranes.    NECK: No thyromegaly.   CARDIOVASCULAR:  No murmurs  LUNGS: Clear breath sounds  ABDOMEN: Soft, nontender   EXTREMITIES: No clubbing, cyanosis, or edema. Right forearm cast in place.  NEUROLOGICAL: No gross focal motor abnormalities   LYMPH: No cervical adenopathy palpated.   SKIN: No rashes, lesions.     Labs:  Lab Results   Component Value Date/Time    HBA1C 7.2 (A) 2023 11:42 AM        Lab Results   Component Value Date/Time "    WBC 7.0 10/21/2022 11:29 AM    RBC 5.04 10/21/2022 11:29 AM    HEMOGLOBIN 14.6 10/21/2022 11:29 AM    MCV 93.7 10/21/2022 11:29 AM    MCH 29.0 10/21/2022 11:29 AM    MCHC 30.9 (L) 10/21/2022 11:29 AM    RDW 48.0 10/21/2022 11:29 AM    MPV 9.8 10/21/2022 11:29 AM       Lab Results   Component Value Date/Time    SODIUM 142 04/24/2023 09:58 AM    POTASSIUM 4.7 04/24/2023 09:58 AM    CHLORIDE 103 04/24/2023 09:58 AM    CO2 25 04/24/2023 09:58 AM    ANION 14.0 04/24/2023 09:58 AM    GLUCOSE 112 (H) 04/24/2023 09:58 AM    BUN 19 04/24/2023 09:58 AM    CREATININE 0.48 (L) 04/24/2023 09:58 AM    CREATININE 0.9 04/07/2005 09:15 PM    CALCIUM 9.1 04/24/2023 09:58 AM    ASTSGOT 12 04/24/2023 09:58 AM    ALTSGPT 10 04/24/2023 09:58 AM    TBILIRUBIN 0.5 04/24/2023 09:58 AM    ALBUMIN 4.5 04/24/2023 09:58 AM    TOTPROTEIN 6.8 04/24/2023 09:58 AM    GLOBULIN 2.3 04/24/2023 09:58 AM    AGRATIO 2.0 04/24/2023 09:58 AM       Lab Results   Component Value Date/Time    CHOLSTRLTOT 147 10/21/2022 1129    TRIGLYCERIDE 184 (H) 10/21/2022 1129    HDL 43 10/21/2022 1129    LDL 67 10/21/2022 1129       Lab Results   Component Value Date/Time    MALBCRT see below 04/24/2023 09:58 AM    MICROALBUR <1.2 04/24/2023 09:58 AM        Lab Results   Component Value Date/Time    TSHULTRASEN 0.820 11/22/2021 1000     No results found for: FREEDIR  Lab Results   Component Value Date/Time    FREET3 2.76 11/22/2021 1000     No results found for: THYSTIMIG        ASSESSMENT/PLAN:     1. Type 2 diabetes mellitus with hyperglycemia, without long-term current use of insulin (HCC)  Fair control  A1c 7.2%  We will increase Trulicity to 3 mg weekly again  Cautioned her about hypoglycemia because she is on glimepiride  She is made aware that she should discontinue glimepiride if she has hypoglycemia  Continue metformin  Continue Jardiance   Advise adequate hydration  She is up-to-date with her labs  Follow-up in 3 to 6 months with labs    2. Hyperlipidemia,  unspecified hyperlipidemia type  Stable  Continue atorvastatin  Repeat fasting lipids in 12 months    3. Vitamin D deficiency  Stable  Continue monitoring    4. Long term (current) use of oral hypoglycemic drugs  Patient is on multiple oral agents for type 2 diabetes management      5. Screening for osteoporosis  I am scheduling her for DEXA because of her fragility fracture  I will update her on the bone density results and discussed the findings in detail.    6. Postmenopausal  See plan above    7. Closed fracture of right wrist with routine healing, subsequent encounter  See plan above      Return in about 6 months (around 11/1/2023).      Total time spent on day of service was over 60 minutes which included obtaining a detailed history and physical exam, ordering labs, coordinating care and scheduling future follow-up    Thank you kindly for allowing me to participate in the diabetes care plan for this patient.    Munir Posey MD, FACE, [facility]      CC:   Rose Marie Gao M.D.

## 2023-05-05 ENCOUNTER — PHARMACY VISIT (OUTPATIENT)
Dept: PHARMACY | Facility: MEDICAL CENTER | Age: 68
End: 2023-05-05
Payer: MEDICARE

## 2023-05-15 ENCOUNTER — OFFICE VISIT (OUTPATIENT)
Dept: MEDICAL GROUP | Facility: PHYSICIAN GROUP | Age: 68
End: 2023-05-15
Payer: MEDICARE

## 2023-05-15 VITALS
HEART RATE: 74 BPM | BODY MASS INDEX: 28.49 KG/M2 | OXYGEN SATURATION: 98 % | TEMPERATURE: 98 F | DIASTOLIC BLOOD PRESSURE: 68 MMHG | WEIGHT: 188 LBS | SYSTOLIC BLOOD PRESSURE: 124 MMHG | RESPIRATION RATE: 12 BRPM | HEIGHT: 68 IN

## 2023-05-15 DIAGNOSIS — K21.9 GASTROESOPHAGEAL REFLUX DISEASE WITHOUT ESOPHAGITIS: ICD-10-CM

## 2023-05-15 DIAGNOSIS — S52.501S CLOSED FRACTURE OF DISTAL END OF RIGHT RADIUS, UNSPECIFIED FRACTURE MORPHOLOGY, SEQUELA: ICD-10-CM

## 2023-05-15 DIAGNOSIS — A60.00 GENITAL HERPES SIMPLEX, UNSPECIFIED SITE: ICD-10-CM

## 2023-05-15 DIAGNOSIS — E11.65 TYPE 2 DIABETES MELLITUS WITH HYPERGLYCEMIA, WITHOUT LONG-TERM CURRENT USE OF INSULIN (HCC): ICD-10-CM

## 2023-05-15 PROBLEM — S52.501A CLOSED FRACTURE OF DISTAL END OF RIGHT RADIUS: Status: ACTIVE | Noted: 2023-05-15

## 2023-05-15 PROCEDURE — 3078F DIAST BP <80 MM HG: CPT | Performed by: FAMILY MEDICINE

## 2023-05-15 PROCEDURE — 1125F AMNT PAIN NOTED PAIN PRSNT: CPT | Performed by: FAMILY MEDICINE

## 2023-05-15 PROCEDURE — 3074F SYST BP LT 130 MM HG: CPT | Performed by: FAMILY MEDICINE

## 2023-05-15 PROCEDURE — 99214 OFFICE O/P EST MOD 30 MIN: CPT | Performed by: FAMILY MEDICINE

## 2023-05-15 RX ORDER — VALACYCLOVIR HYDROCHLORIDE 500 MG/1
500 TABLET, FILM COATED ORAL 2 TIMES DAILY PRN
Qty: 100 TABLET | Refills: 0
Start: 2023-05-15 | End: 2023-11-17 | Stop reason: SDUPTHER

## 2023-05-15 ASSESSMENT — FIBROSIS 4 INDEX: FIB4 SCORE: 1.06

## 2023-05-15 NOTE — ASSESSMENT & PLAN NOTE
Chronic. Saw constance 2 weeks ago on 5/1/23. At that time her trulicity was increased to 3mg weekly. Will start it this week. Continues to take metformin 1000mg bid and jardiance 25mg and amaryl 4mg am.   Lastt A1c improved to 7.2 in may  Fastings 90s.

## 2023-05-15 NOTE — PROGRESS NOTES
CC:   Chief Complaint   Patient presents with    Follow-Up     5 months          HPI:   Shirley presents today for a f/u visit. .        Since our last appointment, has been seen by:  5/5/23- Sarita  5/1/23- constance, dexa ordered      Upcoming appointments with:  Ortho - 5/24/23 7/20- dexa  8/20- [id number]-for kidney stones    Closed fracture of distal end of right radius  Ongoing medical diagnosis. Suffered a GLF 3 weeks ago. Fell onto a TV tray she was carrying.  Her follow-up with Ortho on May 5 for her right distal radius fracture.  She had a short arm cast placed and will be following up with Ortho on May 24.    Last dexa April 2019.   Tscore lumbar 2.7 and -0.8 proximal left femur.  Doesn't take any calcium. Continues to take vitamin D daily. No hx prescription bone meds.     Type 2 diabetes mellitus with hyperglycemia, without long-term current use of insulin (HCC)  Chronic. Saw constance 2 weeks ago on 5/1/23. At that time her trulicity was increased to 3mg weekly. Will start it this week. Continues to take metformin 1000mg bid and jardiance 25mg and amaryl 4mg am.   Lastt A1c improved to 7.2 in may  Fastings 90s.     Gastroesophageal reflux disease without esophagitis  Chronic.  Did a trial off prilosec for two weeeks. Didn't notice any heartburn. Had her f/u with GI last year in July and was told that her symptoms were most likely coming from the Trulicity 3mg.         Current Outpatient Medications Ordered in Epic   Medication Sig Dispense Refill    valACYclovir (VALTREX) 500 MG Tab Take 1 Tablet by mouth 2 times a day as needed. Take twice a day prn for flareups 100 Tablet 0    Dulaglutide (TRULICITY) 3 MG/0.5ML Solution Pen-injector Inject 3 mg under the skin every 7 days.  6 mL 2    metformin (GLUCOPHAGE) 1000 MG tablet TAKE 1 TABLET BY MOUTH TWICE A DAY WITH MEALS 200 Tablet 2    JARDIANCE 25 MG Tab Take 1 tablet  by mouth every day. 90 Tablet 2    colesevelam (WELCHOL) 625 MG Tab TAKE 1 TABLET BY MOUTH EVERY  MORNING AND TAKE 2 TABS EVERY EVENING WITH DINNER 300 Tablet 3    atorvastatin (LIPITOR) 20 MG Tab Take 1 tablet by mouth daily. 100 Tablet 3    lisinopril (PRINIVIL) 5 MG Tab Take one half tablet by mouth every day. 100 Tablet 3    omeprazole (PRILOSEC) 20 MG delayed-release capsule TAKE 1 CAPSULE BY MOUTH ONCE A DAY 30 MINUTES BEFORE BREAKFAST MEAL      glimepiride (AMARYL) 4 MG Tab Take 1 tablet by mouth every morning. 100 Tablet 3    potassium citrate SR (UROCIT-K SR) 10 MEQ (1080 MG) Tab CR Take 2 tablets by mouth 3 times a day for 90 days. 540 Tablet 3    aspirin (ASPIRIN 81) 81 MG EC tablet Take 1 Tablet by mouth 2 times a day. 60 Tablet 0    clindamycin (CLEOCIN) 300 MG Cap PRN dental work      glucose blood (FREESTYLE LITE) strip Use 1 Strip daily 100 Strip 3    Cholecalciferol 125 MCG (5000 UT) Tab Take 4,000 Units by mouth every day.      Cyanocobalamin (VITAMIN B 12 PO) Take 1,000 mcg by mouth every day.      Lancets Lancets order: Lancets for Minube Contour Next meter. Sig: use daily and prn ssx high or low sugar. 100 Each 3    triamcinolone acetonide (KENALOG) 0.1 % Ointment Apply 1 Application to affected area(s) 2 times a day as needed.      Blood Glucose Monitoring Suppl SUPPLIES MISC Test strips order: Test strips for Abbott Freestyle Lite meter. Sig: use BID and prn ssx high or low sugar #100 RF x 3 100 Each 3     No current Epic-ordered facility-administered medications on file.       Past Medical History:   Diagnosis Date    DM (diabetes mellitus) ([facility]) 2004    Herpes genitalia 3/22/2013    High cholesterol     Hyperlipidemia LDL goal < 100     Nephrolithiasis 3/22/2013    Recurrent Dr Monge - Calcium stones,     Urinary incontinence     Vitamin d deficiency 7/8/2013       Social History     Tobacco Use    Smoking status: Never    Smokeless tobacco: Never   Vaping Use    Vaping Use: Never used   Substance Use Topics    Alcohol use: No     Alcohol/week: 0.0 oz     Comment: Rare Useage    Drug use:  "No       Allergies:  Macrobid [nitrofurantoin monohydrate macrocrystals], Nitrofurantoin, Pcn [penicillins], and Penicillamine    Health Maintenance: Annual wellness visit at next appointment      Objective:       Exam:  /68   Pulse 74   Temp 36.7 °C (98 °F) (Temporal)   Resp 12   Ht 1.727 m (5' 8\")   Wt 85.3 kg (188 lb)   LMP 11/29/2004   SpO2 98%   BMI 28.59 kg/m²   Body mass index is 28.59 kg/m².  Wt Readings from Last 4 Encounters:   05/15/23 85.3 kg (188 lb)   05/01/23 85.6 kg (188 lb 11.2 oz)   04/21/23 80.3 kg (177 lb)   03/17/23 80.3 kg (177 lb)       Gen: Alert and oriented, No apparent distress. Appropriately groomed.  Neck: Neck is supple without lymphadenopathy.No thyromegaly.   Lungs: Normal effort, CTA bilaterally, no wheezes, rhonchi, or rales  CV: Regular rate and rhythm. No Lower extremity edema  Skin: No rash noted.  Right wrist cast. Normal sensation to her digits.      Assessment & Plan:     68 y.o. female with the following -     1. Closed fracture of distal end of right radius, unspecified fracture morphology, sequela  Ongoing issue. Improving.  Will be f/u ortho next week.    2. Genital herpes simplex, unspecified site  -chronic. Hx of genital herpes. Takes valtrex bid prn for flareups.   valACYclovir (VALTREX) 500 MG Tab; Take 1 Tablet by mouth 2 times a day as needed. Take twice a day prn for flareups  Dispense: 100 Tablet; Refill: 0    3. Type 2 diabetes mellitus with hyperglycemia, without long-term current use of insulin (HCC)  Chronic. Improving. A1c impproved to 7.2. will be increasing her trulicity to 3mg this week. Precautions d/w her risk of hypoglycemia with glimperide.    4. Gastroesophageal reflux disease without esophagitis  Chronic. Stable. Encouraged to do trial of tums/pepcid instead of prilosec. She'll do a trial of this and let me know.      No follow-ups on file.    Please note that this dictation was created using voice recognition software. I have made every " reasonable attempt to correct obvious errors, but I expect that there are errors of grammar and possibly content that I did not discover before finalizing the note.

## 2023-05-15 NOTE — ASSESSMENT & PLAN NOTE
Chronic.  Did a trial off prilosec for two weeeks. Didn't notice any heartburn. Had her f/u with GI last year in July and was told that her symptoms were most likely coming from the Trulicity 3mg.

## 2023-05-15 NOTE — ASSESSMENT & PLAN NOTE
Ongoing medical diagnosis. Suffered a GLF 3 weeks ago. Fell onto a TV tray she was carrying.  Her follow-up with Ortho on May 5 for her right distal radius fracture.  She had a short arm cast placed and will be following up with Ortho on May 24.    Last dexa April 2019.   Tscore lumbar 2.7 and -0.8 proximal left femur.  Doesn't take any calcium. Continues to take vitamin D daily. No hx prescription bone meds.

## 2023-06-06 ENCOUNTER — DOCUMENTATION (OUTPATIENT)
Dept: HEALTH INFORMATION MANAGEMENT | Facility: OTHER | Age: 68
End: 2023-06-06
Payer: MEDICARE

## 2023-06-12 PROCEDURE — RXMED WILLOW AMBULATORY MEDICATION CHARGE: Performed by: INTERNAL MEDICINE

## 2023-06-14 ENCOUNTER — PHARMACY VISIT (OUTPATIENT)
Dept: PHARMACY | Facility: MEDICAL CENTER | Age: 68
End: 2023-06-14
Payer: MEDICARE

## 2023-06-28 ENCOUNTER — APPOINTMENT (OUTPATIENT)
Dept: MEDICAL GROUP | Facility: PHYSICIAN GROUP | Age: 68
End: 2023-06-28
Payer: MEDICARE

## 2023-07-06 PROCEDURE — RXMED WILLOW AMBULATORY MEDICATION CHARGE: Performed by: INTERNAL MEDICINE

## 2023-07-13 ENCOUNTER — PHARMACY VISIT (OUTPATIENT)
Dept: PHARMACY | Facility: MEDICAL CENTER | Age: 68
End: 2023-07-13
Payer: MEDICARE

## 2023-07-20 ENCOUNTER — HOSPITAL ENCOUNTER (OUTPATIENT)
Dept: RADIOLOGY | Facility: MEDICAL CENTER | Age: 68
End: 2023-07-20
Attending: INTERNAL MEDICINE
Payer: MEDICARE

## 2023-07-20 DIAGNOSIS — Z13.820 SCREENING FOR OSTEOPOROSIS: ICD-10-CM

## 2023-07-20 DIAGNOSIS — S62.101D CLOSED FRACTURE OF RIGHT WRIST WITH ROUTINE HEALING, SUBSEQUENT ENCOUNTER: ICD-10-CM

## 2023-07-20 DIAGNOSIS — Z78.0 POSTMENOPAUSAL: ICD-10-CM

## 2023-07-20 PROCEDURE — 77080 DXA BONE DENSITY AXIAL: CPT

## 2023-07-31 ENCOUNTER — TELEPHONE (OUTPATIENT)
Dept: PHARMACY | Facility: MEDICAL CENTER | Age: 68
End: 2023-07-31

## 2023-07-31 NOTE — TELEPHONE ENCOUNTER
Received a message from PT inquiring about Jardiance Coupon . Checked PT's coverage it is Medicare . Medicare does not allow coupons or copay cards . Called Shriley to explain this and offer FA screening.    Shirley answered and verified my self and my role in the clinic, after pleasantries. I explained that medicare does not accept coupons.  I asked shirley if the phase of medicare had ever been explained rossy, she replied no. SO I provided education on the phases of medicare. Shirley said that when she obtained her last refill the tech mention she is in Donut hole Once I had explained all the phase of medicare to Shirley I screened her for PAP program  HH 2  Income over $55k annually.  I informed Shirley that she is over income at this time , could talk to Prescriber about a lower cost alternative , Shirley declined this option want stay on Jardiance       I offered my callback in case she needs further assistance     [email or web address]  RX Coordinator

## 2023-08-05 PROCEDURE — RXMED WILLOW AMBULATORY MEDICATION CHARGE: Performed by: INTERNAL MEDICINE

## 2023-08-10 ENCOUNTER — PHARMACY VISIT (OUTPATIENT)
Dept: PHARMACY | Facility: MEDICAL CENTER | Age: 68
End: 2023-08-10
Payer: MEDICARE

## 2023-08-22 DIAGNOSIS — E11.29 TYPE 2 DIABETES MELLITUS WITH OTHER DIABETIC KIDNEY COMPLICATION (HCC): ICD-10-CM

## 2023-08-22 RX ORDER — LISINOPRIL 5 MG/1
2.5 TABLET ORAL
Qty: 50 TABLET | Refills: 3 | Status: SHIPPED | OUTPATIENT
Start: 2023-08-22 | End: 2023-09-13

## 2023-08-22 NOTE — TELEPHONE ENCOUNTER
Received request via: Patient    Was the patient seen in the last year in this department? Yes    Does the patient have an active prescription (recently filled or refills available) for medication(s) requested? No    Does the patient have senior living Plus and need 100 day supply (blood pressure, diabetes and cholesterol meds only)? Yes, quantity updated to 100 days

## 2023-08-22 NOTE — TELEPHONE ENCOUNTER
Requested Prescriptions     Pending Prescriptions Disp Refills   • lisinopril (PRINIVIL) 5 MG Tab [Pharmacy Med Name: LISINOPRIL 5 MG TABLET] 50 Tablet 3     Sig: TAKE 1/2 TABLET BY MOUTH EVERY DAY   Rose Marie Gao M.D.

## 2023-08-26 DIAGNOSIS — E78.5 DYSLIPIDEMIA: ICD-10-CM

## 2023-08-26 DIAGNOSIS — E11.29 TYPE 2 DIABETES MELLITUS WITH OTHER DIABETIC KIDNEY COMPLICATION (HCC): ICD-10-CM

## 2023-08-28 RX ORDER — ATORVASTATIN CALCIUM 20 MG/1
TABLET, FILM COATED ORAL
Qty: 100 TABLET | Refills: 3 | Status: SHIPPED | OUTPATIENT
Start: 2023-08-28 | End: 2023-11-17 | Stop reason: SDUPTHER

## 2023-08-28 NOTE — TELEPHONE ENCOUNTER
Requested Prescriptions     Pending Prescriptions Disp Refills   • atorvastatin (LIPITOR) 20 MG Tab [Pharmacy Med Name: ATORVASTATIN 20 MG TABLET] 100 Tablet 3     Sig: TAKE 1 TABLET BY MOUTH EVERY DAY     Rose Marie Gao M.D.

## 2023-08-31 PROCEDURE — RXMED WILLOW AMBULATORY MEDICATION CHARGE: Performed by: INTERNAL MEDICINE

## 2023-09-01 ENCOUNTER — PHARMACY VISIT (OUTPATIENT)
Dept: PHARMACY | Facility: MEDICAL CENTER | Age: 68
End: 2023-09-01
Payer: COMMERCIAL

## 2023-09-02 DIAGNOSIS — E78.5 DYSLIPIDEMIA: ICD-10-CM

## 2023-09-05 RX ORDER — COLESEVELAM 180 1/1
TABLET ORAL
Qty: 270 TABLET | Refills: 4 | Status: SHIPPED | OUTPATIENT
Start: 2023-09-05 | End: 2023-11-17 | Stop reason: SDUPTHER

## 2023-09-05 NOTE — TELEPHONE ENCOUNTER
Requested Prescriptions     Pending Prescriptions Disp Refills   • colesevelam (WELCHOL) 625 MG Tab [Pharmacy Med Name: COLESEVELAM 625 MG TABLET] 270 Tablet 4     Sig: TAKE 1 TABLET BY MOUTH EVERY MORNING AND TAKE 2 TABS EVERY EVENING WITH DINNER     Rose Marie Gao M.D.

## 2023-09-05 NOTE — TELEPHONE ENCOUNTER
Received request via: Pharmacy    Was the patient seen in the last year in this department? Yes  5/15/2023  Does the patient have an active prescription (recently filled or refills available) for medication(s) requested? No    Does the patient have custodial Plus and need 100 day supply (blood pressure, diabetes and cholesterol meds only)? Yes, quantity updated to 100 days

## 2023-09-12 ENCOUNTER — OFFICE VISIT (OUTPATIENT)
Dept: MEDICAL GROUP | Facility: PHYSICIAN GROUP | Age: 68
End: 2023-09-12
Payer: MEDICARE

## 2023-09-12 VITALS
TEMPERATURE: 97.7 F | DIASTOLIC BLOOD PRESSURE: 62 MMHG | HEIGHT: 68 IN | OXYGEN SATURATION: 96 % | WEIGHT: 180 LBS | RESPIRATION RATE: 16 BRPM | BODY MASS INDEX: 27.28 KG/M2 | SYSTOLIC BLOOD PRESSURE: 98 MMHG | HEART RATE: 82 BPM

## 2023-09-12 DIAGNOSIS — E11.65 TYPE 2 DIABETES MELLITUS WITH HYPERGLYCEMIA, WITHOUT LONG-TERM CURRENT USE OF INSULIN (HCC): ICD-10-CM

## 2023-09-12 DIAGNOSIS — Z00.00 MEDICARE ANNUAL WELLNESS VISIT, SUBSEQUENT: ICD-10-CM

## 2023-09-12 DIAGNOSIS — M25.561 CHRONIC PAIN OF RIGHT KNEE: ICD-10-CM

## 2023-09-12 DIAGNOSIS — Z96.643 HISTORY OF HIP REPLACEMENT, TOTAL, BILATERAL: ICD-10-CM

## 2023-09-12 DIAGNOSIS — N20.0 RECURRENT NEPHROLITHIASIS: ICD-10-CM

## 2023-09-12 DIAGNOSIS — S52.501S CLOSED FRACTURE OF DISTAL END OF RIGHT RADIUS, UNSPECIFIED FRACTURE MORPHOLOGY, SEQUELA: ICD-10-CM

## 2023-09-12 DIAGNOSIS — I70.0 ATHEROSCLEROSIS OF AORTA (HCC): ICD-10-CM

## 2023-09-12 DIAGNOSIS — E11.69 HYPERLIPIDEMIA ASSOCIATED WITH TYPE 2 DIABETES MELLITUS (HCC): ICD-10-CM

## 2023-09-12 DIAGNOSIS — G89.29 CHRONIC PAIN OF RIGHT KNEE: ICD-10-CM

## 2023-09-12 DIAGNOSIS — Z91.81 RISK FOR FALLS: ICD-10-CM

## 2023-09-12 DIAGNOSIS — K21.9 GASTROESOPHAGEAL REFLUX DISEASE WITHOUT ESOPHAGITIS: ICD-10-CM

## 2023-09-12 DIAGNOSIS — Z79.84 LONG TERM (CURRENT) USE OF ORAL HYPOGLYCEMIC DRUGS: ICD-10-CM

## 2023-09-12 DIAGNOSIS — N39.41 URGE INCONTINENCE OF URINE: ICD-10-CM

## 2023-09-12 DIAGNOSIS — Z87.442 HISTORY OF RENAL CALCULI: ICD-10-CM

## 2023-09-12 DIAGNOSIS — R76.8 HSV-2 SEROPOSITIVE: ICD-10-CM

## 2023-09-12 DIAGNOSIS — I95.2 HYPOTENSION DUE TO DRUGS: ICD-10-CM

## 2023-09-12 DIAGNOSIS — E78.5 HYPERLIPIDEMIA ASSOCIATED WITH TYPE 2 DIABETES MELLITUS (HCC): ICD-10-CM

## 2023-09-12 PROBLEM — M16.12 ARTHRITIS OF LEFT HIP: Status: RESOLVED | Noted: 2021-07-15 | Resolved: 2023-09-12

## 2023-09-12 PROBLEM — F51.01 PRIMARY INSOMNIA: Status: RESOLVED | Noted: 2018-06-12 | Resolved: 2023-09-12

## 2023-09-12 PROCEDURE — 3074F SYST BP LT 130 MM HG: CPT | Performed by: FAMILY MEDICINE

## 2023-09-12 PROCEDURE — 99213 OFFICE O/P EST LOW 20 MIN: CPT | Mod: 25 | Performed by: FAMILY MEDICINE

## 2023-09-12 PROCEDURE — 3078F DIAST BP <80 MM HG: CPT | Performed by: FAMILY MEDICINE

## 2023-09-12 PROCEDURE — G0439 PPPS, SUBSEQ VISIT: HCPCS | Performed by: FAMILY MEDICINE

## 2023-09-12 ASSESSMENT — ACTIVITIES OF DAILY LIVING (ADL): BATHING_REQUIRES_ASSISTANCE: 0

## 2023-09-12 ASSESSMENT — ENCOUNTER SYMPTOMS: GENERAL WELL-BEING: EXCELLENT

## 2023-09-12 ASSESSMENT — PATIENT HEALTH QUESTIONNAIRE - PHQ9: CLINICAL INTERPRETATION OF PHQ2 SCORE: 0

## 2023-09-12 ASSESSMENT — FIBROSIS 4 INDEX: FIB4 SCORE: 1.06

## 2023-09-12 NOTE — PROGRESS NOTES
Chief Complaint   Patient presents with    Annual Wellness Visit       HPI:  Shirley Unger is a 68 y.o. here for Medicare Annual Wellness Visit     I discussed with the patient the likelihood of costs associated with double billing for an acute & Medicare Wellness Visit. Patient is aware they may receive a bill for additional services and/or copayment.       hypotension  Blood pressure today 80/46.  On my recheck it had improved to 98/62.  Continues to take lisinopril 5 mg daily.  Thinks she was started on this medication to protect her kidneys  Felt dizzy last week when she leaned over to  things while camping last week.  Has lost 8 pounds over the last 3 months. Thinks its the trulicity.  Has a machine at home and will start checking bps at home.         Patient Active Problem List    Diagnosis Date Noted    Atherosclerosis of aorta (HCC) 09/13/2023    Hypotension due to drugs 09/13/2023    Closed fracture of distal end of right radius 05/15/2023    Long term (current) use of oral hypoglycemic drugs 05/01/2023    HSV-2 seropositive 11/16/2022    Chronic pain of right knee 11/16/2022    History of COVID-19 07/18/2022    Urge incontinence of urine 07/18/2022    History of renal calculi 12/07/2021    Dense breast tissue on mammogram 12/06/2021    Gastroesophageal reflux disease without esophagitis 12/06/2021    Risk for falls 04/28/2021    Type 2 diabetes mellitus with hyperglycemia, without long-term current use of insulin (HCC) 01/27/2021    History of hip replacement, total, bilateral 01/20/2020    Hyperlipidemia associated with type 2 diabetes mellitus (HCC)     Recurrent nephrolithiasis 03/22/2013       Current Outpatient Medications   Medication Sig Dispense Refill    Magnesium Citrate 200 MG Tab Take  by mouth.      colesevelam (WELCHOL) 625 MG Tab TAKE 1 TABLET BY MOUTH EVERY MORNING AND TAKE 2 TABS EVERY EVENING WITH DINNER 270 Tablet 4    atorvastatin (LIPITOR) 20 MG Tab TAKE 1 TABLET BY  MOUTH EVERY  Tablet 3    lisinopril (PRINIVIL) 5 MG Tab TAKE 1/2 TABLET BY MOUTH EVERY DAY 50 Tablet 3    glimepiride (AMARYL) 4 MG Tab TAKE 1 TABLET BY MOUTH EVERY MORNING 100 Tablet 0    valACYclovir (VALTREX) 500 MG Tab Take 1 Tablet by mouth 2 times a day as needed. Take twice a day prn for flareups 100 Tablet 0    Dulaglutide (TRULICITY) 3 MG/0.5ML Solution Pen-injector Inject 3 mg under the skin every 7 days.  6 mL 2    metformin (GLUCOPHAGE) 1000 MG tablet TAKE 1 TABLET BY MOUTH TWICE A DAY WITH MEALS 200 Tablet 2    JARDIANCE 25 MG Tab Take 1 tablet  by mouth every day. 90 Tablet 2    omeprazole (PRILOSEC) 20 MG delayed-release capsule TAKE 1 CAPSULE BY MOUTH ONCE A DAY 30 MINUTES BEFORE BREAKFAST MEAL      potassium citrate SR (UROCIT-K SR) 10 MEQ (1080 MG) Tab CR Take 2 tablets by mouth 3 times a day for 90 days. 540 Tablet 3    aspirin (ASPIRIN 81) 81 MG EC tablet Take 1 Tablet by mouth 2 times a day. 60 Tablet 0    clindamycin (CLEOCIN) 300 MG Cap PRN dental work      glucose blood (FREESTYLE LITE) strip Use 1 Strip daily 100 Strip 3    Cholecalciferol 125 MCG (5000 UT) Tab Take 4,000 Units by mouth every day.      Cyanocobalamin (VITAMIN B 12 PO) Take 1,000 mcg by mouth every day.      Lancets Lancets order: Lancets for MedStartr Contour Next meter. Sig: use daily and prn ssx high or low sugar. 100 Each 3    triamcinolone acetonide (KENALOG) 0.1 % Ointment Apply 1 Application to affected area(s) 2 times a day as needed.      Blood Glucose Monitoring Suppl SUPPLIES MISC Test strips order: Test strips for Abbott Freestyle Lite meter. Sig: use BID and prn ssx high or low sugar #100 RF x 3 100 Each 3     No current facility-administered medications for this visit.          Current supplements as per medication list.     Allergies: Macrobid [nitrofurantoin monohydrate macrocrystals], Nitrofurantoin, Pcn [penicillins], and Penicillamine    Current social contact/activities: family activities     She   reports that she has never smoked. She has never used smokeless tobacco. She reports that she does not drink alcohol and does not use drugs.  Counseling given: Yes      ROS:    Gait: Uses no assistive device  Ostomy: No  Other tubes: No  Amputations: No  Chronic oxygen use: No  Last eye exam: 1/2023  Wears hearing aids: No   : Reports urinary leakage during the last 6 months that has somewhat interfered with their daily activities or sleep.    Depression Screening  Little interest or pleasure in doing things?  0 - not at all  Feeling down, depressed , or hopeless? 0 - not at all  Patient Health Questionnaire Score: 0     If depressive symptoms identified deferred to follow up visit unless specifically addressed in assessment and plan.    Interpretation of PHQ-9 Total Score   Score Severity   1-4 No Depression   5-9 Mild Depression   10-14 Moderate Depression   15-19 Moderately Severe Depression   20-27 Severe Depression    Screening for Cognitive Impairment  Do you or any of your friends or family members have any concern about your memory? No  Three Minute Recall (Banana, Sunrise, Chair) 3/3    Saleem clock face with all 12 numbers and set the hands to show 20 past 8.  Yes 5/5  Cognitive concerns identified deferred for follow up unless specifically addressed in assessment and plan.    Fall Risk Assessment  Has the patient had two or more falls in the last year or any fall with injury in the last year?  Yes, tripped over a rug in April    Safety Assessment  Do you always wear your seatbelt?  Yes  Any changes to home needed to function safely? No  Difficulty hearing.  No  Patient counseled about all safety risks that were identified.    Functional Assessment ADLs  Are there any barriers preventing you from cooking for yourself or meeting nutritional needs?  No.    Are there any barriers preventing you from driving safely or obtaining transportation?  No.    Are there any barriers preventing you from using a  telephone or calling for help?  No    Are there any barriers preventing you from shopping?  No.    Are there any barriers preventing you from taking care of your own finances?  No    Are there any barriers preventing you from managing your medications?  No    Are there any barriers preventing you from showering, bathing or dressing yourself? No    Are there any barriers preventing you from doing housework or laundry? No  Are there any barriers preventing you from using the toilet?No  Are you currently engaging in any exercise or physical activity?  Yes. Walking/hiking    Self-Assessment of Health  What is your perception of your health? Excellent  Do you sleep more than six hours a night? No  In the past 7 days, how much did pain keep you from doing your normal work? None  Do you spend quality time with family or friends (virtually or in person)? Yes  Do you usually eat a heart healthy diet that constists of a variety of fruits, vegetables, whole grains and fiber? Yes  Do you eat foods high in fat and/or Fast Food more than three times per week? No    Advance Care Planning  Do you have an Advance Directive, Living Will, Durable Power of , or POLST? No                 Health Maintenance Summary            Overdue - COVID-19 Vaccine (6 - Pfizer series) Overdue since 4/17/2023 12/17/2022  Imm Admin: PFIZER BIVALENT SARS-COV-2 VACCINE (12+)    08/23/2022  Imm Admin: PFIZER PURPLE CAP SARS-COV-2 VACCINATION (12+)    11/16/2021  Imm Admin: PFIZER PURPLE CAP SARS-COV-2 VACCINATION (12+)    04/02/2021  Imm Admin: PFIZER PURPLE CAP SARS-COV-2 VACCINATION (12+)    03/11/2021  Imm Admin: PFIZER PURPLE CAP SARS-COV-2 VACCINATION (12+)              Overdue - Influenza Vaccine (1) Overdue since 9/1/2023      10/13/2022  Imm Admin: Influenza Vaccine Adult HD    11/01/2021  Imm Admin: Influenza Vaccine Adult HD    10/05/2020  Imm Admin: Influenza Vaccine Adult HD    10/06/2019  Imm Admin: Influenza Vaccine Adult HD     10/03/2018  Imm Admin: Influenza Vaccine Quad Inj (Pf)    Only the first 5 history entries have been loaded, but more history exists.              Ordered - A1c Screening (Every 6 Months) Ordered on 9/12/2023 05/01/2023  POCT Hemoglobin A1C    10/21/2022  HEMOGLOBIN A1C    07/18/2022  POCT A1C    03/02/2022  HEMOGLOBIN A1C    11/16/2021  POCT Hemoglobin A1C    Only the first 5 history entries have been loaded, but more history exists.              Diabetes: Retinopathy Screening (Yearly) Next due on 3/10/2024      03/10/2023  AMB EXTERNAL RETINAL SCREENING RESULTS    11/21/2022  RETINAL SCREENING RESULTS    10/25/2022  POCT Retinal Eye Exam    11/03/2021  REFERRAL FOR RETINAL SCREENING EXAM    10/22/2020  REFERRAL FOR RETINAL SCREENING EXAM    Only the first 5 history entries have been loaded, but more history exists.              Fasting Lipid Profile (Yearly) Tentatively due on 4/24/2024 04/24/2023  Lipid Profile    10/21/2022  Lipid Profile    03/02/2022  Lipid Profile    11/22/2021  Lipid Profile    02/03/2021  Lipid Profile    Only the first 5 history entries have been loaded, but more history exists.              Diabetes: Urine Protein Screening (Yearly) Next due on 4/24/2024 04/24/2023  MICROALBUMIN CREAT RATIO URINE    10/21/2022  MICROALBUMIN CREAT RATIO URINE    11/22/2021  MICROALBUMIN CREAT RATIO URINE    02/03/2021  MICROALBUMIN CREAT RATIO URINE    08/17/2020  MICROALBUMIN CREAT RATIO URINE    Only the first 5 history entries have been loaded, but more history exists.              Ordered - SERUM CREATININE (Yearly) Ordered on 5/1/2023 04/24/2023  Comp Metabolic Panel    10/21/2022  Comp Metabolic Panel    03/02/2022  Comp Metabolic Panel    02/03/2021  Comp Metabolic Panel    08/17/2020  Basic Metabolic Panel    Only the first 5 history entries have been loaded, but more history exists.              Diabetes: Monofilament / LE Exam (Yearly) Tentatively due on 5/1/2024       05/01/2023  Diabetic Monofilament LE Exam    03/07/2022  Diabetic Monofilament Lower Extremity Exam    02/24/2021  Diabetic Monofilament LE Exam    10/28/2019  Diabetic Monofilament LE Exam    07/22/2019  Diabetic Monofilament LE Exam    Only the first 5 history entries have been loaded, but more history exists.              Colorectal Cancer Screening (Colonoscopy - Every 3 Years) Tentatively due on 8/25/2024 08/25/2021  REFERRAL TO GI FOR COLONOSCOPY    08/19/2021  REFERRAL TO GI FOR COLONOSCOPY    08/19/2021  REFERRAL TO GI FOR COLONOSCOPY    08/19/2021  REFERRAL TO GI FOR COLONOSCOPY    05/16/2016  AMB REFERRAL TO GI FOR COLONOSCOPY              Annual Wellness Visit (Every 366 Days) Next due on 9/12/2024 09/12/2023  Visit Dx: Medicare annual wellness visit, subsequent    04/28/2021  Visit Dx: Medicare annual wellness visit, subsequent              Mammogram (Every 2 Years) Next due on 12/1/2024 12/01/2022  MA-SCREENING MAMMO BILAT W/TOMOSYNTHESIS W/CAD    11/30/2021  MA-SCREENING MAMMO BILAT W/TOMOSYNTHESIS W/CAD    10/05/2020  MA-SCREENING MAMMO BILAT W/TOMOSYNTHESIS W/CAD    07/22/2019  MA-SCREENING MAMMO BILAT W/TOMOSYNTHESIS W/CAD    07/19/2018  MA-MAMMO SCREENING BILAT W/MAGDALENA W/CAD    Only the first 5 history entries have been loaded, but more history exists.              Bone Density Scan (Every 5 Years) Next due on 7/20/2028 07/20/2023  DS-BONE DENSITY STUDY (DEXA)    04/03/2019  DS-BONE DENSITY STUDY (DEXA)              IMM DTaP/Tdap/Td Vaccine (5 - Td or Tdap) Next due on 11/16/2032 11/16/2022  Imm Admin: Tdap Vaccine    10/15/2012  Imm Admin: Tdap Vaccine    09/01/2012  Imm Admin: Tdap Vaccine    08/17/2009  Imm Admin: Tdap Vaccine              Hepatitis B Vaccine (Hep B) (Series Information) Completed      10/10/2019  Imm Admin: Hepatitis B Vaccine (Adol/Adult)    03/06/2002  Imm Admin: Hepatitis B Vaccine (Adol/Adult)    10/05/2001  Imm Admin: Hepatitis B Vaccine  (Adol/Adult)    2001  Imm Admin: Hepatitis B Vaccine (Adol/Adult)              Hepatitis C Screening  Tentatively Complete      2021  Hepatitis C Antibody component of HCV Scrn ( 9595-1885 1xLife)              Zoster (Shingles) Vaccines (Series Information) Completed      2022  Imm Admin: Zoster Vaccine Recombinant (RZV) (SHINGRIX)    2022  Imm Admin: Zoster Vaccine Recombinant (RZV) (SHINGRIX)    2014  Imm Admin: Zoster Vaccine Live (ZVL) (Zostavax) - HISTORICAL DATA              Pneumococcal Vaccine: 65+ Years (Series Information) Completed      2022  Imm Admin: Pneumococcal polysaccharide vaccine (PPSV-23)    2019  Imm Admin: Pneumococcal Conjugate Vaccine (Prevnar/PCV-13)    2005  Imm Admin: Pneumococcal Vaccine (UF) - HISTORICAL DATA    2005  Imm Admin: Pneumococcal polysaccharide vaccine (PPSV-23)              Hepatitis A Vaccine (Hep A) (Series Information) Aged Out      No completion history exists for this topic.              HPV Vaccines (Series Information) Aged Out      No completion history exists for this topic.              Polio Vaccine (Inactivated Polio) (Series Information) Aged Out      No completion history exists for this topic.              IMM MENINGOCOCCAL ACWY VACCINE (Series Information) Aged Out      No completion history exists for this topic.                    Patient Care Team:  Rose Marie Gao M.D. as PCP - General (Family Medicine)  Jonah Monge M.D. (Urology)  LILA House (Nurse Practitioner Family)  Manjit Moyer O.D. (Optometry)  Adrián Parker M.D. (Gastroenterology)  Tennille Hendrix M.D. (Allergy and Immunology)        Social History     Tobacco Use    Smoking status: Never    Smokeless tobacco: Never   Vaping Use    Vaping Use: Never used   Substance Use Topics    Alcohol use: No     Alcohol/week: 0.0 oz     Comment: Rare Useage    Drug use: No     Family History   Problem Relation  "Age of Onset    Cancer Mother         Lung    Heart Disease Father          at 71, No MI? May have been electrical    No Known Problems Brother     No Known Problems Brother     Diabetes Maternal Aunt     Diabetes Maternal Grandfather     No Known Problems Brother      She  has a past medical history of Arthritis of left hip (7/15/2021), DM (diabetes mellitus) ([facility]) (), Herpes genitalia (3/22/2013), High cholesterol, Hyperlipidemia LDL goal < 100, Nephrolithiasis (3/22/2013), Primary insomnia (2018), Urinary incontinence, and Vitamin d deficiency (2013).   Past Surgical History:   Procedure Laterality Date    OH TOTAL HIP ARTHROPLASTY Left 2021    Procedure: LEFT TOTAL HIP ARTHROPLASTY;  Surgeon: Miguel Madden M.D.;  Location: [facility];  Service: Orthopedics    OH TOTAL HIP ARTHROPLASTY Right 3/11/2020    Procedure: ARTHROPLASTY, HIP, TOTAL;  Surgeon: Miguel Madden M.D.;  Location: [facility];  Service: Orthopedics    ARTHROPLASTY Right     GANGLION EXCISION  2014    Performed by Efra Ramirez M.D. at SURGERY [facility]    HYSTERECTOMY, TOTAL ABDOMINAL      For non cancerous reasons    CHOLECYSTECTOMY      ABDOMINAL HYSTERECTOMY TOTAL      CARPAL TUNNEL RELEASE      Right hand    CYSTOSCOPY STENT PLACEMENT      several, stent removed in MD office    OH  DELIVERY ONLY      PRIMARY C SECTION       x 2       Exam:   BP 98/62   Pulse 82   Temp 36.5 °C (97.7 °F) (Temporal)   Resp 16   Ht 1.727 m (5' 8\")   Wt 81.6 kg (180 lb)   SpO2 96%  Body mass index is 27.37 kg/m².    Hearing good.    Dentition good  Alert, oriented in no acute distress.  Eye contact is good, speech goal directed, affect calm    Assessment and Plan. The following treatment and monitoring plan is recommended:      1. Medicare annual wellness visit, subsequent  Normal mini cog    2. Hypotension due to drugs  New medical diagnosis.  Not well " controlled.  Has been taking lisinopril 5 mg daily.  States that this was started to protect her kidneys.  We reviewed labs patient does not have any history of proteinuria.  She has also been feeling dizzy.  We will go ahead and discontinue lisinopril 5 mg and have her follow-up in 1 week for an MA visit.  Increased risk of falls due to hypotension and dizziness discussed with patient.    3. Type 2 diabetes mellitus with hyperglycemia, without long-term current use of insulin (HCC)  4. Long term (current) use of oral hypoglycemic drugs  -Chronic medical diagnosis.  Stable.  Continue with Jardiance 25 mg, metformin 1000 mg twice a day and Trulicity 3 mg weekly.    HEMOGLOBIN A1C; Future  - CBC WITH DIFFERENTIAL; Future      5. Hyperlipidemia associated with type 2 diabetes mellitus (HCC)  6. Atherosclerosis of aorta (HCC)  Chronic medical diagnosis.  Stable.  Continue with Lipitor 20 mg daily.  Last set of labs from April had LDL levels at 57.    7. Recurrent nephrolithiasis  8. History of renal calculi  Chronic. Stable.  Continues to follow-up with urology.    9. Risk for falls  - Patient identified as fall risk.  Appropriate orders and counseling given.    10. Closed fracture of distal end of right radius, unspecified fracture morphology, sequela  History of.  Healing.  Completed a bone density scan and will be following up with endocrinology.  Bisphosphonates discussed with patient.  She will discuss further with her endocrinologist.    11. Gastroesophageal reflux disease without esophagitis  Chronic medical diagnosis.  Stable.    12. History of hip replacement, total, bilateral  History of.    13. Chronic pain of right knee  Chronic medical diagnoses.  Stable.    14. HSV-2 seropositive  Chronic medical diagnosis.  Stable.  Continues to use Valtrex as needed.    15. Urge incontinence of urine  Chronic medical diagnoses.  Stable.  Other orders  - Magnesium Citrate 200 MG Tab; Take  by mouth.      Services  suggested: No services needed at this time  Health Care Screening: Age-appropriate preventive services recommended by USPTF and ACIP covered by Medicare were discussed today. Services ordered if indicated and agreed upon by the patient.  Referrals offered: Community-based lifestyle interventions to reduce health risks and promote self-management and wellness, fall prevention, nutrition, physical activity, tobacco-use cessation, weight loss, and mental health services as per orders if indicated.    Discussion today about general wellness and lifestyle habits:    Prevent falls and reduce trip hazards; Cautioned about securing or removing rugs.  Have a working fire alarm and carbon monoxide detector;   Engage in regular physical activity and social activities     Follow-up: Return 1 week MA for BP and 3 months with MD, for Diabetes, Hypertension.

## 2023-09-13 PROBLEM — I70.0 ATHEROSCLEROSIS OF AORTA (HCC): Status: ACTIVE | Noted: 2023-09-13

## 2023-09-13 PROBLEM — I95.2 HYPOTENSION DUE TO DRUGS: Status: ACTIVE | Noted: 2023-09-13

## 2023-09-20 ENCOUNTER — NON-PROVIDER VISIT (OUTPATIENT)
Dept: MEDICAL GROUP | Facility: PHYSICIAN GROUP | Age: 68
End: 2023-09-20
Payer: MEDICARE

## 2023-09-20 NOTE — PROGRESS NOTES
Patient came in for a blood pressure check. Her blood pressure has been running low and wanted to compare it against our machine.   Manual 108/66  Machine 108/62        BP Home Machine Readings:  114/63  101/65  120/74  117/64  107/68  109/72  111/71

## 2023-09-29 PROCEDURE — RXMED WILLOW AMBULATORY MEDICATION CHARGE: Performed by: INTERNAL MEDICINE

## 2023-10-04 ENCOUNTER — PHARMACY VISIT (OUTPATIENT)
Dept: PHARMACY | Facility: MEDICAL CENTER | Age: 68
End: 2023-10-04
Payer: COMMERCIAL

## 2023-10-16 ENCOUNTER — APPOINTMENT (RX ONLY)
Dept: URBAN - METROPOLITAN AREA CLINIC 15 | Facility: CLINIC | Age: 68
Setting detail: DERMATOLOGY
End: 2023-10-16

## 2023-10-16 DIAGNOSIS — D22 MELANOCYTIC NEVI: ICD-10-CM

## 2023-10-16 DIAGNOSIS — L82.1 OTHER SEBORRHEIC KERATOSIS: ICD-10-CM

## 2023-10-16 DIAGNOSIS — Z71.89 OTHER SPECIFIED COUNSELING: ICD-10-CM

## 2023-10-16 DIAGNOSIS — D18.0 HEMANGIOMA: ICD-10-CM

## 2023-10-16 DIAGNOSIS — L81.4 OTHER MELANIN HYPERPIGMENTATION: ICD-10-CM

## 2023-10-16 DIAGNOSIS — L57.8 OTHER SKIN CHANGES DUE TO CHRONIC EXPOSURE TO NONIONIZING RADIATION: ICD-10-CM

## 2023-10-16 DIAGNOSIS — L57.0 ACTINIC KERATOSIS: ICD-10-CM

## 2023-10-16 PROBLEM — D22.5 MELANOCYTIC NEVI OF TRUNK: Status: ACTIVE | Noted: 2023-10-16

## 2023-10-16 PROBLEM — D18.01 HEMANGIOMA OF SKIN AND SUBCUTANEOUS TISSUE: Status: ACTIVE | Noted: 2023-10-16

## 2023-10-16 PROCEDURE — 99213 OFFICE O/P EST LOW 20 MIN: CPT | Mod: 25

## 2023-10-16 PROCEDURE — 17000 DESTRUCT PREMALG LESION: CPT

## 2023-10-16 PROCEDURE — ? LIQUID NITROGEN

## 2023-10-16 PROCEDURE — ? SUNSCREEN RECOMMENDATIONS

## 2023-10-16 PROCEDURE — ? COUNSELING

## 2023-10-16 ASSESSMENT — LOCATION ZONE DERM
LOCATION ZONE: ARM
LOCATION ZONE: FACE
LOCATION ZONE: TRUNK
LOCATION ZONE: NOSE

## 2023-10-16 ASSESSMENT — LOCATION SIMPLE DESCRIPTION DERM
LOCATION SIMPLE: RIGHT UPPER BACK
LOCATION SIMPLE: RIGHT FOREARM
LOCATION SIMPLE: LEFT UPPER BACK
LOCATION SIMPLE: RIGHT LOWER BACK
LOCATION SIMPLE: RIGHT FOREHEAD
LOCATION SIMPLE: NOSE

## 2023-10-16 ASSESSMENT — LOCATION DETAILED DESCRIPTION DERM
LOCATION DETAILED: LEFT INFERIOR UPPER BACK
LOCATION DETAILED: RIGHT SUPERIOR MEDIAL MIDBACK
LOCATION DETAILED: NASAL SUPRATIP
LOCATION DETAILED: RIGHT DISTAL DORSAL FOREARM
LOCATION DETAILED: RIGHT MEDIAL UPPER BACK
LOCATION DETAILED: RIGHT INFERIOR FOREHEAD

## 2023-10-23 ENCOUNTER — HOSPITAL ENCOUNTER (OUTPATIENT)
Dept: LAB | Facility: MEDICAL CENTER | Age: 68
End: 2023-10-23
Attending: FAMILY MEDICINE
Payer: MEDICARE

## 2023-10-23 DIAGNOSIS — E11.65 TYPE 2 DIABETES MELLITUS WITH HYPERGLYCEMIA, WITHOUT LONG-TERM CURRENT USE OF INSULIN (HCC): ICD-10-CM

## 2023-10-23 DIAGNOSIS — Z78.0 POSTMENOPAUSAL: ICD-10-CM

## 2023-10-23 DIAGNOSIS — Z13.820 SCREENING FOR OSTEOPOROSIS: ICD-10-CM

## 2023-10-23 DIAGNOSIS — E78.5 HYPERLIPIDEMIA, UNSPECIFIED HYPERLIPIDEMIA TYPE: ICD-10-CM

## 2023-10-23 DIAGNOSIS — E55.9 VITAMIN D DEFICIENCY: ICD-10-CM

## 2023-10-23 DIAGNOSIS — Z79.84 LONG TERM (CURRENT) USE OF ORAL HYPOGLYCEMIC DRUGS: ICD-10-CM

## 2023-10-23 LAB
25(OH)D3 SERPL-MCNC: 58 NG/ML (ref 30–100)
ALBUMIN SERPL BCP-MCNC: 4.6 G/DL (ref 3.2–4.9)
ALBUMIN/GLOB SERPL: 1.8 G/DL
ALP SERPL-CCNC: 59 U/L (ref 30–99)
ALT SERPL-CCNC: 15 U/L (ref 2–50)
ANION GAP SERPL CALC-SCNC: 12 MMOL/L (ref 7–16)
AST SERPL-CCNC: 14 U/L (ref 12–45)
BASOPHILS # BLD AUTO: 0.7 % (ref 0–1.8)
BASOPHILS # BLD: 0.04 K/UL (ref 0–0.12)
BILIRUB SERPL-MCNC: 0.6 MG/DL (ref 0.1–1.5)
BUN SERPL-MCNC: 20 MG/DL (ref 8–22)
CALCIUM ALBUM COR SERPL-MCNC: 8.9 MG/DL (ref 8.5–10.5)
CALCIUM SERPL-MCNC: 9.4 MG/DL (ref 8.5–10.5)
CHLORIDE SERPL-SCNC: 103 MMOL/L (ref 96–112)
CO2 SERPL-SCNC: 27 MMOL/L (ref 20–33)
CREAT SERPL-MCNC: 0.52 MG/DL (ref 0.5–1.4)
EOSINOPHIL # BLD AUTO: 0.06 K/UL (ref 0–0.51)
EOSINOPHIL NFR BLD: 1.1 % (ref 0–6.9)
ERYTHROCYTE [DISTWIDTH] IN BLOOD BY AUTOMATED COUNT: 45.6 FL (ref 35.9–50)
EST. AVERAGE GLUCOSE BLD GHB EST-MCNC: 151 MG/DL
GFR SERPLBLD CREATININE-BSD FMLA CKD-EPI: 100 ML/MIN/1.73 M 2
GLOBULIN SER CALC-MCNC: 2.6 G/DL (ref 1.9–3.5)
GLUCOSE SERPL-MCNC: 119 MG/DL (ref 65–99)
HBA1C MFR BLD: 6.9 % (ref 4–5.6)
HCT VFR BLD AUTO: 49.3 % (ref 37–47)
HGB BLD-MCNC: 15.6 G/DL (ref 12–16)
IMM GRANULOCYTES # BLD AUTO: 0.01 K/UL (ref 0–0.11)
IMM GRANULOCYTES NFR BLD AUTO: 0.2 % (ref 0–0.9)
LYMPHOCYTES # BLD AUTO: 1.89 K/UL (ref 1–4.8)
LYMPHOCYTES NFR BLD: 34.1 % (ref 22–41)
MCH RBC QN AUTO: 29.4 PG (ref 27–33)
MCHC RBC AUTO-ENTMCNC: 31.6 G/DL (ref 32.2–35.5)
MCV RBC AUTO: 93 FL (ref 81.4–97.8)
MONOCYTES # BLD AUTO: 0.46 K/UL (ref 0–0.85)
MONOCYTES NFR BLD AUTO: 8.3 % (ref 0–13.4)
NEUTROPHILS # BLD AUTO: 3.08 K/UL (ref 1.82–7.42)
NEUTROPHILS NFR BLD: 55.6 % (ref 44–72)
NRBC # BLD AUTO: 0 K/UL
NRBC BLD-RTO: 0 /100 WBC (ref 0–0.2)
PHOSPHATE SERPL-MCNC: 3.6 MG/DL (ref 2.5–4.5)
PLATELET # BLD AUTO: 246 K/UL (ref 164–446)
PMV BLD AUTO: 10 FL (ref 9–12.9)
POTASSIUM SERPL-SCNC: 4.5 MMOL/L (ref 3.6–5.5)
PROT SERPL-MCNC: 7.2 G/DL (ref 6–8.2)
PTH-INTACT SERPL-MCNC: 36.3 PG/ML (ref 14–72)
RBC # BLD AUTO: 5.3 M/UL (ref 4.2–5.4)
SODIUM SERPL-SCNC: 142 MMOL/L (ref 135–145)
WBC # BLD AUTO: 5.5 K/UL (ref 4.8–10.8)

## 2023-10-23 PROCEDURE — 83036 HEMOGLOBIN GLYCOSYLATED A1C: CPT

## 2023-10-23 PROCEDURE — 83970 ASSAY OF PARATHORMONE: CPT

## 2023-10-23 PROCEDURE — 84165 PROTEIN E-PHORESIS SERUM: CPT

## 2023-10-23 PROCEDURE — 85025 COMPLETE CBC W/AUTO DIFF WBC: CPT

## 2023-10-23 PROCEDURE — 84100 ASSAY OF PHOSPHORUS: CPT

## 2023-10-23 PROCEDURE — 84155 ASSAY OF PROTEIN SERUM: CPT | Mod: XU

## 2023-10-23 PROCEDURE — 80053 COMPREHEN METABOLIC PANEL: CPT

## 2023-10-23 PROCEDURE — 86364 TISS TRNSGLTMNASE EA IG CLAS: CPT

## 2023-10-23 PROCEDURE — 36415 COLL VENOUS BLD VENIPUNCTURE: CPT

## 2023-10-23 PROCEDURE — 82306 VITAMIN D 25 HYDROXY: CPT

## 2023-10-23 PROCEDURE — 82784 ASSAY IGA/IGD/IGG/IGM EACH: CPT

## 2023-10-25 LAB — IGA SERPL-MCNC: 118 MG/DL (ref 68–408)

## 2023-10-26 LAB
ALBUMIN SERPL ELPH-MCNC: 4.25 G/DL (ref 3.75–5.01)
ALPHA1 GLOB SERPL ELPH-MCNC: 0.27 G/DL (ref 0.19–0.46)
ALPHA2 GLOB SERPL ELPH-MCNC: 0.86 G/DL (ref 0.48–1.05)
B-GLOBULIN SERPL ELPH-MCNC: 0.75 G/DL (ref 0.48–1.1)
GAMMA GLOB SERPL ELPH-MCNC: 0.67 G/DL (ref 0.62–1.51)
INTERPRETATION SERPL IFE-IMP: NORMAL
MONOCLON BAND OBS SERPL: NORMAL
MONOCLONAL PROTEIN NL11656: NORMAL G/DL
PATHOLOGY STUDY: NORMAL
PROT SERPL-MCNC: 6.8 G/DL (ref 6.3–8.2)
TTG IGA SER IA-ACNC: <2 U/ML (ref 0–3)

## 2023-11-01 ENCOUNTER — OFFICE VISIT (OUTPATIENT)
Dept: ENDOCRINOLOGY | Facility: MEDICAL CENTER | Age: 68
End: 2023-11-01
Attending: INTERNAL MEDICINE
Payer: MEDICARE

## 2023-11-01 VITALS
HEIGHT: 68 IN | HEART RATE: 83 BPM | BODY MASS INDEX: 27.83 KG/M2 | DIASTOLIC BLOOD PRESSURE: 60 MMHG | RESPIRATION RATE: 20 BRPM | OXYGEN SATURATION: 98 % | SYSTOLIC BLOOD PRESSURE: 118 MMHG | WEIGHT: 183.6 LBS

## 2023-11-01 DIAGNOSIS — M79.2 NEUROPATHIC PAIN: ICD-10-CM

## 2023-11-01 DIAGNOSIS — E11.42 CONTROLLED TYPE 2 DIABETES MELLITUS WITH DIABETIC POLYNEUROPATHY, WITHOUT LONG-TERM CURRENT USE OF INSULIN (HCC): ICD-10-CM

## 2023-11-01 DIAGNOSIS — E11.29 TYPE 2 DIABETES MELLITUS WITH OTHER DIABETIC KIDNEY COMPLICATION (HCC): ICD-10-CM

## 2023-11-01 DIAGNOSIS — M85.80 OSTEOPENIA, UNSPECIFIED LOCATION: ICD-10-CM

## 2023-11-01 DIAGNOSIS — E11.9 DIABETES MELLITUS WITHOUT COMPLICATION (HCC): ICD-10-CM

## 2023-11-01 DIAGNOSIS — E78.5 HYPERLIPIDEMIA, UNSPECIFIED HYPERLIPIDEMIA TYPE: ICD-10-CM

## 2023-11-01 DIAGNOSIS — Z79.84 LONG TERM (CURRENT) USE OF ORAL HYPOGLYCEMIC DRUGS: ICD-10-CM

## 2023-11-01 DIAGNOSIS — E55.9 VITAMIN D DEFICIENCY: ICD-10-CM

## 2023-11-01 DIAGNOSIS — E11.65 TYPE 2 DIABETES MELLITUS WITH HYPERGLYCEMIA, WITHOUT LONG-TERM CURRENT USE OF INSULIN (HCC): ICD-10-CM

## 2023-11-01 PROCEDURE — 3074F SYST BP LT 130 MM HG: CPT | Performed by: INTERNAL MEDICINE

## 2023-11-01 PROCEDURE — 99211 OFF/OP EST MAY X REQ PHY/QHP: CPT | Performed by: INTERNAL MEDICINE

## 2023-11-01 PROCEDURE — 3078F DIAST BP <80 MM HG: CPT | Performed by: INTERNAL MEDICINE

## 2023-11-01 PROCEDURE — 99215 OFFICE O/P EST HI 40 MIN: CPT | Performed by: INTERNAL MEDICINE

## 2023-11-01 RX ORDER — EMPAGLIFLOZIN 25 MG/1
1 TABLET, FILM COATED ORAL DAILY
Qty: 90 TABLET | Refills: 2 | Status: SHIPPED | OUTPATIENT
Start: 2023-11-01 | End: 2023-11-17 | Stop reason: SDUPTHER

## 2023-11-01 RX ORDER — GLIMEPIRIDE 4 MG/1
4 TABLET ORAL EVERY MORNING
Qty: 100 TABLET | Refills: 2 | Status: SHIPPED | OUTPATIENT
Start: 2023-11-01 | End: 2023-11-17 | Stop reason: SDUPTHER

## 2023-11-01 RX ORDER — DULAGLUTIDE 3 MG/.5ML
3 INJECTION, SOLUTION SUBCUTANEOUS
Qty: 6 ML | Refills: 2 | Status: SHIPPED | OUTPATIENT
Start: 2023-11-01 | End: 2023-11-02

## 2023-11-01 RX ORDER — GABAPENTIN 300 MG/1
300 CAPSULE ORAL NIGHTLY
Qty: 90 CAPSULE | Refills: 1 | Status: SHIPPED | OUTPATIENT
Start: 2023-11-01 | End: 2023-11-17 | Stop reason: SDUPTHER

## 2023-11-01 RX ORDER — ALENDRONATE SODIUM 70 MG/1
70 TABLET ORAL
Qty: 12 TABLET | Refills: 1 | Status: SHIPPED | OUTPATIENT
Start: 2023-11-01 | End: 2023-11-17 | Stop reason: SDUPTHER

## 2023-11-01 ASSESSMENT — FIBROSIS 4 INDEX: FIB4 SCORE: 1.01

## 2023-11-01 NOTE — PROGRESS NOTES
CHIEF COMPLAINT: Patient is here for follow up of Type 2 Diabetes Mellitus    HPI:     Shirley Unger is a 68 y.o. female with Type 2 Diabetes Mellitus here for follow up.    Labs from October 23, 2023 show A1c is 6.9%  Labs from 5/1/2023 show Hba1c was 7.2%  Labs from 10/21/2022 show HbA1c was 7.3%      She is on   Metformin 1000 mg twice a day  Trulicity 3.0 mg weekly  Jardiance 25mg daily  Glimepiride 4 mg in the morning        She denies SE with his meds  She wants med for neuropathic pain localized on her left foot.  She thought this pain was much improved after her surgery and did not.  She reports numbness and tingling that is affecting her sleep.    She also fell at home weeks ago   She was carrying a TV tray and slipped on a rug and she fell forward and slammed her right wrist on the TV tray.   She went to [facility] and had x rays showing a nondisplaced R wrist fracture.  We discussed that this is fragility fracture.    DEXA on 7/20/2023 showed osteopenia of the left forearm (T score -2.1) interestingly FRAX scores were not obtained because there is no data for her hip BMD              She has hyperlipidemia and is on atorvastatin 20 mg daily  She denies a history of coronary artery disease and cerebrovascular disease  She denies peripheral arterial disease  She denies muscle aches and muscle cramps  She is also taking WelChol which can help with hyperlipidemia  Incidentally WelChol can also help her sugars  LDL cholesterol was 57 on 4/2023        She does not have diabetic kidney disease  She is on lisinopril 5 mg daily and her blood pressure today is good  U ACR was less than 30 on 4/2023        She had a point-of-care eye exam the office on 3/10/2023       Her vitamin D is 53          BG Diary:  Patient forgot glucose meter    Weight has been stable    Diabetes Complications   Retinopathy: No known retinopathy.  Last eye exam: 3/10/2023  Neuropathy: Denies paresthesias or numbness in hands or feet.  Denies any foot wounds.  Exercise: Minimal.  Diet: Fair.  Patient's medications, allergies, and social histories were reviewed and updated as appropriate.    ROS:     CONS:     No fever, no chills   EYES:     No diplopia, no blurry vision   CV:           No chest pain, no palpitations   PULM:     No SOB, no cough, no hemoptysis.   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  2022-11: Chronic pain of right knee  2022-07: History of COVID-19  2022-07: Urge incontinence of urine  2021-12: History of renal calculi  2021-12: Dense breast tissue on mammogram  2021-12: Gastroesophageal reflux disease without esophagitis  2021-07: Arthritis of left hip  2021-04: Bilateral hip pain  2021-04: Risk for falls  2021-01: Controlled type 2 diabetes mellitus with diabetic   polyneuropathy, without long-term current use of insulin ([facility])  2020-10: Neck pain  2020-10: Left ear pain  2020-01: History of hip replacement, total, bilateral  2020-01: B12 deficiency  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  2018-06: Skin cancer screening  2018-06: Primary insomnia  2018-03: Functional diarrhea  2017-09: Obesity (BMI 30-39.9)  2017-09: Numbness of left foot  2017-05: Arthralgia of left temporomandibular joint  2017-03: Seasonal allergic rhinitis due to pollen  2016-11: Acute mastitis of left breast  2016-11: Herpes dermatitis  2016-07: Primary osteoarthritis of left hip  2016-07: Trigger finger of right thumb  2016-05: Colon polyps  2015-08: Uncontrolled type 2 diabetes mellitus without complication,   without long-term current use of insulin  2014-07: Hand eczema  2014-02: Right  "hand pain  2013: Vitamin D deficiency disease  2013: Recurrent nephrolithiasis  Hyperlipidemia associated with type 2 diabetes mellitus (HCC)  Type 2 DM, uncontrolled      Past Surgical History:  Past Surgical History:   Procedure Laterality Date    [place] TOTAL HIP ARTHROPLASTY Left 2021    Procedure: LEFT TOTAL HIP ARTHROPLASTY;  Surgeon: Miguel Madden M.D.;  Location: [facility];  Service: Orthopedics    [place] TOTAL HIP ARTHROPLASTY Right 3/11/2020    Procedure: ARTHROPLASTY, HIP, TOTAL;  Surgeon: Miguel Madden M.D.;  Location: SURGERY [facility];  Service: Orthopedics    ARTHROPLASTY Right     GANGLION EXCISION  2014    Performed by Efra Ramirez M.D. at SURGERY [facility]    HYSTERECTOMY, TOTAL ABDOMINAL      For non cancerous reasons    CHOLECYSTECTOMY      ABDOMINAL HYSTERECTOMY TOTAL      CARPAL TUNNEL RELEASE      Right hand    CYSTOSCOPY STENT PLACEMENT      several, stent removed in MD office    [place]  DELIVERY ONLY      PRIMARY C SECTION       x 2        Allergies:  Macrobid [nitrofurantoin monohydrate macrocrystals], Pcn [penicillins], and Penicillamine     Social History:  Social History     Tobacco Use    Smoking status: Never    Smokeless tobacco: Never   Vaping Use    Vaping Use: Never used   Substance Use Topics    Alcohol use: No     Alcohol/week: 0.0 oz     Comment: Rare Useage    Drug use: No        Family History:   family history includes Cancer in her mother; Diabetes in her maternal aunt and maternal grandfather; Heart Disease in her father; No Known Problems in her brother, brother, and brother.      PHYSICAL EXAM:   OBJECTIVE:  Vital signs: /60 (BP Location: Left arm, Patient Position: Sitting, BP Cuff Size: Adult)   Pulse 83   Resp 20   Ht 1.727 m (5' 8\")   Wt 83.3 kg (183 lb 9.6 oz)   LMP 2004   SpO2 98%   BMI 27.92 kg/m²   GENERAL: Well-developed, well-nourished in no apparent distress.   EYE:  No " ocular asymmetry, PERRLA  HENT: Pink, moist mucous membranes.    NECK: No thyromegaly.   CARDIOVASCULAR:  No murmurs  LUNGS: Clear breath sounds  ABDOMEN: Soft, nontender   EXTREMITIES: No clubbing, cyanosis, or edema. Right forearm cast in place.  NEUROLOGICAL: No gross focal motor abnormalities   LYMPH: No cervical adenopathy palpated.   SKIN: No rashes, lesions.     Labs:  Lab Results   Component Value Date/Time    HBA1C 6.9 (H) 10/23/2023 09:53 AM        Lab Results   Component Value Date/Time    WBC 5.5 10/23/2023 09:53 AM    RBC 5.30 10/23/2023 09:53 AM    HEMOGLOBIN 15.6 10/23/2023 09:53 AM    MCV 93.0 10/23/2023 09:53 AM    MCH 29.4 10/23/2023 09:53 AM    MCHC 31.6 (L) 10/23/2023 09:53 AM    RDW 45.6 10/23/2023 09:53 AM    MPV 10.0 10/23/2023 09:53 AM       Lab Results   Component Value Date/Time    SODIUM 142 10/23/2023 09:54 AM    POTASSIUM 4.5 10/23/2023 09:54 AM    CHLORIDE 103 10/23/2023 09:54 AM    CO2 27 10/23/2023 09:54 AM    ANION 12.0 10/23/2023 09:54 AM    GLUCOSE 119 (H) 10/23/2023 09:54 AM    BUN 20 10/23/2023 09:54 AM    CREATININE 0.52 10/23/2023 09:54 AM    CREATININE 0.9 04/07/2005 09:15 PM    CALCIUM 9.4 10/23/2023 09:54 AM    ASTSGOT 14 10/23/2023 09:54 AM    ALTSGPT 15 10/23/2023 09:54 AM    TBILIRUBIN 0.6 10/23/2023 09:54 AM    ALBUMIN 4.25 10/23/2023 09:54 AM    ALBUMIN 4.6 10/23/2023 09:54 AM    TOTPROTEIN 6.8 10/23/2023 09:54 AM    TOTPROTEIN 7.2 10/23/2023 09:54 AM    GLOBULIN 2.6 10/23/2023 09:54 AM    AGRATIO 1.8 10/23/2023 09:54 AM       Lab Results   Component Value Date/Time    CHOLSTRLTOT 147 10/21/2022 1129    TRIGLYCERIDE 184 (H) 10/21/2022 1129    HDL 43 10/21/2022 1129    LDL 67 10/21/2022 1129       Lab Results   Component Value Date/Time    MALBCRT see below 04/24/2023 09:58 AM    MICROALBUR <1.2 04/24/2023 09:58 AM        Lab Results   Component Value Date/Time    TSHULTRASEN 0.820 11/22/2021 1000     No results found for: FREEDIR  Lab Results   Component Value Date/Time     FREET3 2.76 11/22/2021 1000     No results found for: THYSTIMIG        ASSESSMENT/PLAN:     1. Controlled type 2 diabetes mellitus with diabetic polyneuropathy, without long-term current use of insulin (HCC)  Fair control  A1c is better at 6.9%  Continue Trulicity  3 mg weekly  Cautioned her about hypoglycemia because she is on glimepiride  She is made aware that she should discontinue glimepiride if she has hypoglycemia  Continue metformin  Continue Jardiance   Advise adequate hydration  She is up-to-date with her labs  Follow-up in 3 to 6 months with labs      2. Osteopenia, unspecified location  Unstable  Although she has osteopenia by bone density testing she actually has osteoporosis because of fragility fracture.  Start alendronate 70 mg weekly  Reviewed proper administration of medication  Discussed the importance of regular weightbearing exercise  Discussed the importance of good dental hygiene and regular dental visits  Discussed the importance of adequate calcium and vitamin supplementation  Reviewed fall precautions  She should notify me if she has any falls or fractures  Repeat bone density on July 20, 2025    3. Neuropathic pain  Unstable  Start gabapentin 300 mg every night for neuropathic pain  Continue monitor symptoms and adjust medication accordingly until pain is controlled  If pain is not controlled with max dose gabapentin 600 g 3 times a day then we will replace with Lyrica    4. Hyperlipidemia, unspecified hyperlipidemia type  Controlled  Continue atorvastatin  Continue monitoring lipids    5. Vitamin D deficiency  Stable  Recommend she take vitamin D because of osteopenia/osteoporosis  Continue monitoring    6. Long term (current) use of oral hypoglycemic drugs  Patient is on oral agents for type 2 diabetes management        Return in about 3 months (around 2/1/2024).      Total time spent on day of service was over 60 minutes which included obtaining a detailed history and physical exam,  ordering labs, coordinating care and scheduling future follow-up    Thank you kindly for allowing me to participate in the diabetes care plan for this patient.    Munir Posey MD, FACE, NU      CC:   Rose Marie Gao M.D.

## 2023-11-02 DIAGNOSIS — E11.42 CONTROLLED TYPE 2 DIABETES MELLITUS WITH DIABETIC POLYNEUROPATHY, WITHOUT LONG-TERM CURRENT USE OF INSULIN (HCC): ICD-10-CM

## 2023-11-02 RX ORDER — DULAGLUTIDE 3 MG/.5ML
3 INJECTION, SOLUTION SUBCUTANEOUS
Qty: 6 ML | Refills: 2 | Status: SHIPPED | OUTPATIENT
Start: 2023-11-02 | End: 2023-11-17 | Stop reason: SDUPTHER

## 2023-11-17 DIAGNOSIS — M79.2 NEUROPATHIC PAIN: ICD-10-CM

## 2023-11-17 DIAGNOSIS — M85.80 OSTEOPENIA, UNSPECIFIED LOCATION: ICD-10-CM

## 2023-11-17 DIAGNOSIS — E11.42 CONTROLLED TYPE 2 DIABETES MELLITUS WITH DIABETIC POLYNEUROPATHY, WITHOUT LONG-TERM CURRENT USE OF INSULIN (HCC): ICD-10-CM

## 2023-11-17 DIAGNOSIS — A60.00 GENITAL HERPES SIMPLEX, UNSPECIFIED SITE: ICD-10-CM

## 2023-11-17 DIAGNOSIS — E11.29 TYPE 2 DIABETES MELLITUS WITH OTHER DIABETIC KIDNEY COMPLICATION (HCC): ICD-10-CM

## 2023-11-17 DIAGNOSIS — E78.5 DYSLIPIDEMIA: ICD-10-CM

## 2023-11-18 NOTE — TELEPHONE ENCOUNTER
Received request via: Pharmacy    Was the patient seen in the last year in this department? Yes  9/12/2023  Does the patient have an active prescription (recently filled or refills available) for medication(s) requested? No    Does the patient have [facility] Plus and need 100 day supply (blood pressure, diabetes and cholesterol meds only)? Yes, quantity updated to 100 days

## 2023-11-20 DIAGNOSIS — E11.42 CONTROLLED TYPE 2 DIABETES MELLITUS WITH DIABETIC POLYNEUROPATHY, WITHOUT LONG-TERM CURRENT USE OF INSULIN (HCC): ICD-10-CM

## 2023-11-20 PROCEDURE — RXMED WILLOW AMBULATORY MEDICATION CHARGE: Performed by: INTERNAL MEDICINE

## 2023-11-20 PROCEDURE — RXMED WILLOW AMBULATORY MEDICATION CHARGE: Performed by: FAMILY MEDICINE

## 2023-11-20 RX ORDER — DULAGLUTIDE 3 MG/.5ML
3 INJECTION, SOLUTION SUBCUTANEOUS
Qty: 6 ML | Refills: 2 | Status: SHIPPED | OUTPATIENT
Start: 2023-11-20 | End: 2024-01-18 | Stop reason: SDUPTHER

## 2023-11-20 RX ORDER — EMPAGLIFLOZIN 25 MG/1
1 TABLET, FILM COATED ORAL DAILY
Qty: 90 TABLET | Refills: 2 | Status: SHIPPED | OUTPATIENT
Start: 2023-11-20 | End: 2023-11-20 | Stop reason: SDUPTHER

## 2023-11-20 RX ORDER — GABAPENTIN 300 MG/1
300 CAPSULE ORAL NIGHTLY
Qty: 90 CAPSULE | Refills: 1 | Status: SHIPPED | OUTPATIENT
Start: 2023-11-20

## 2023-11-20 RX ORDER — ATORVASTATIN CALCIUM 20 MG/1
TABLET, FILM COATED ORAL
Qty: 100 TABLET | Refills: 3 | Status: SHIPPED | OUTPATIENT
Start: 2023-11-20

## 2023-11-20 RX ORDER — VALACYCLOVIR HYDROCHLORIDE 500 MG/1
500 TABLET, FILM COATED ORAL 2 TIMES DAILY
Qty: 20 TABLET | Refills: 2 | Status: SHIPPED | OUTPATIENT
Start: 2023-11-20

## 2023-11-20 RX ORDER — COLESEVELAM 180 1/1
TABLET ORAL
Qty: 270 TABLET | Refills: 4 | Status: SHIPPED | OUTPATIENT
Start: 2023-11-20

## 2023-11-20 RX ORDER — EMPAGLIFLOZIN 25 MG/1
1 TABLET, FILM COATED ORAL DAILY
Qty: 100 TABLET | Refills: 2 | Status: SHIPPED | OUTPATIENT
Start: 2023-11-20

## 2023-11-20 RX ORDER — ALENDRONATE SODIUM 70 MG/1
70 TABLET ORAL
Qty: 12 TABLET | Refills: 1 | Status: SHIPPED | OUTPATIENT
Start: 2023-11-20 | End: 2024-02-20

## 2023-11-20 RX ORDER — GLIMEPIRIDE 4 MG/1
4 TABLET ORAL EVERY MORNING
Qty: 100 TABLET | Refills: 2 | Status: SHIPPED | OUTPATIENT
Start: 2023-11-20

## 2023-11-20 NOTE — TELEPHONE ENCOUNTER
Requested Prescriptions     Pending Prescriptions Disp Refills    valACYclovir (VALTREX) 500 MG Tab 20 Tablet 2     Sig: Take 1 Tablet by mouth 2 times a day. Take twice a day prn for flareups    atorvastatin (LIPITOR) 20 MG Tab 100 Tablet 3     Sig: TAKE 1 TABLET BY MOUTH EVERY DAY    colesevelam (WELCHOL) 625 MG Tab 270 Tablet 4     Sig: TAKE 1 TABLET BY MOUTH EVERY MORNING AND TAKE 2 TABS EVERY EVENING WITH DINNER     Rose Marie Gao M.D.

## 2023-11-21 ENCOUNTER — PHARMACY VISIT (OUTPATIENT)
Dept: PHARMACY | Facility: MEDICAL CENTER | Age: 68
End: 2023-11-21
Payer: COMMERCIAL

## 2023-11-29 RX ORDER — POTASSIUM CITRATE 10 MEQ/1
TABLET, EXTENDED RELEASE ORAL
Qty: 540 TABLET | Refills: 3 | Status: SHIPPED | OUTPATIENT
Start: 2023-11-29

## 2023-11-29 NOTE — TELEPHONE ENCOUNTER
Requested Prescriptions     Pending Prescriptions Disp Refills    potassium citrate SR (UROCIT-K SR) 10 MEQ (1080 MG) Tab  Tablet 3     Sig: Take 2 tablets by mouth 3 times a day for 90 days.     Rose Marie Gao M.D.  Patient was called to confirm dosage

## 2023-12-06 ENCOUNTER — HOSPITAL ENCOUNTER (OUTPATIENT)
Dept: RADIOLOGY | Facility: MEDICAL CENTER | Age: 68
End: 2023-12-06
Attending: FAMILY MEDICINE
Payer: MEDICARE

## 2023-12-06 DIAGNOSIS — Z12.31 VISIT FOR SCREENING MAMMOGRAM: ICD-10-CM

## 2023-12-06 PROCEDURE — 77063 BREAST TOMOSYNTHESIS BI: CPT

## 2023-12-07 ENCOUNTER — PHARMACY VISIT (OUTPATIENT)
Dept: PHARMACY | Facility: MEDICAL CENTER | Age: 68
End: 2023-12-07
Payer: COMMERCIAL

## 2023-12-07 ENCOUNTER — OFFICE VISIT (OUTPATIENT)
Dept: URGENT CARE | Facility: CLINIC | Age: 68
End: 2023-12-07
Payer: MEDICARE

## 2023-12-07 VITALS
WEIGHT: 181.8 LBS | SYSTOLIC BLOOD PRESSURE: 116 MMHG | HEART RATE: 78 BPM | RESPIRATION RATE: 16 BRPM | OXYGEN SATURATION: 97 % | BODY MASS INDEX: 27.55 KG/M2 | HEIGHT: 68 IN | TEMPERATURE: 97.2 F | DIASTOLIC BLOOD PRESSURE: 70 MMHG

## 2023-12-07 DIAGNOSIS — N61.0 NIPPLE INFLAMMATION: ICD-10-CM

## 2023-12-07 PROCEDURE — 3078F DIAST BP <80 MM HG: CPT | Performed by: PHYSICIAN ASSISTANT

## 2023-12-07 PROCEDURE — RXMED WILLOW AMBULATORY MEDICATION CHARGE: Performed by: PHYSICIAN ASSISTANT

## 2023-12-07 PROCEDURE — 99213 OFFICE O/P EST LOW 20 MIN: CPT | Performed by: PHYSICIAN ASSISTANT

## 2023-12-07 PROCEDURE — 3074F SYST BP LT 130 MM HG: CPT | Performed by: PHYSICIAN ASSISTANT

## 2023-12-07 RX ORDER — NYSTATIN AND TRIAMCINOLONE ACETONIDE 100000; 1 [USP'U]/G; MG/G
OINTMENT TOPICAL
Qty: 30 G | Refills: 0 | Status: SHIPPED | OUTPATIENT
Start: 2023-12-07

## 2023-12-07 ASSESSMENT — ENCOUNTER SYMPTOMS
EYE DISCHARGE: 0
VOMITING: 0
HEADACHES: 0
EYE REDNESS: 0
FEVER: 0
NAUSEA: 0

## 2023-12-07 ASSESSMENT — FIBROSIS 4 INDEX: FIB4 SCORE: 1.01

## 2023-12-07 NOTE — PROGRESS NOTES
Subjective     Shirley Unger is a 68 y.o. female who presents with Breast Problem (X L nipple redness after mammogram, wants checked for infection )          This is a new problem.  The patient presents to clinic with concern of a possible infection to her left nipple x 1 day.  The patient states she had her yearly mammogram yesterday, and states that the mammogram technician had difficulty getting the left breast to lay correctly.  The patient states upon completion of the mammogram she noticed that her left nipple was red and inflamed.  The patient states her left nipple is chronically inverted, and has been this way her entire life.  The patient states she can typically efrem her nipple with light pressure, but states now her left nipple will not efrem.  The patient states the redness has slightly improved.  She reports no associated pain/tenderness.  She also reports no swelling.  The patient denies abnormal discharge/drainage from the nipple.  She reports no associated fever.  The patient reports no palpable lumps or masses of the left breast.  The patient states her mammogram yesterday was normal, with the exception of dense breast, which is her normal/baseline.  The patient has not taken any over-the-counter medications for her current symptoms.  She has also not applied any topical creams and ointments to the affected area.    Breast Problem  Pertinent negatives include no fever, headaches, nausea or vomiting.     PMH:  has a past medical history of Arthritis of left hip (7/15/2021), DM (diabetes mellitus) ([facility]) (2004), Herpes genitalia (3/22/2013), High cholesterol, Hyperlipidemia LDL goal < 100, Nephrolithiasis (3/22/2013), Primary insomnia (6/12/2018), Urinary incontinence, and Vitamin d deficiency (7/8/2013).  MEDS:   Current Outpatient Medications:     potassium citrate SR (UROCIT-K SR) 10 MEQ (1080 MG) Tab CR, Take 2 tablets by mouth 3 times a day for 90 days., Disp: 540 Tablet, Rfl: 3     gabapentin (NEURONTIN) 300 MG Cap, Take 1 Capsule by mouth every evening., Disp: 90 Capsule, Rfl: 1    alendronate (FOSAMAX) 70 MG Tab, Take 1 Tablet by mouth every 7 days., Disp: 12 Tablet, Rfl: 1    glimepiride (AMARYL) 4 MG Tab, Take 1 Tablet by mouth every morning., Disp: 100 Tablet, Rfl: 2    metformin (GLUCOPHAGE) 1000 MG tablet, Take 1 Tablet by mouth 2 times a day with meals., Disp: 200 Tablet, Rfl: 2    Dulaglutide (TRULICITY) 3 MG/0.5ML Solution Pen-injector, Inject 3 mg under the skin every 7 days., Disp: 6 mL, Rfl: 2    valACYclovir (VALTREX) 500 MG Tab, Take 1 Tablet by mouth 2 times a day. Take twice a day as needed for flareups, Disp: 20 Tablet, Rfl: 2    atorvastatin (LIPITOR) 20 MG Tab, TAKE 1 TABLET BY MOUTH EVERY DAY, Disp: 100 Tablet, Rfl: 3    colesevelam (WELCHOL) 625 MG Tab, TAKE 1 TABLET BY MOUTH EVERY MORNING AND TAKE 2 TABS EVERY EVENING WITH DINNER, Disp: 270 Tablet, Rfl: 4    JARDIANCE 25 MG Tab, Take 1 tablet  by mouth every day., Disp: 100 Tablet, Rfl: 2    Magnesium Citrate 200 MG Tab, Take  by mouth., Disp: , Rfl:     omeprazole (PRILOSEC) 20 MG delayed-release capsule, TAKE 1 CAPSULE BY MOUTH ONCE A DAY 30 MINUTES BEFORE BREAKFAST MEAL (Patient not taking: Reported on 11/1/2023), Disp: , Rfl:     aspirin (ASPIRIN 81) 81 MG EC tablet, Take 1 Tablet by mouth 2 times a day., Disp: 60 Tablet, Rfl: 0    clindamycin (CLEOCIN) 300 MG Cap, PRN dental work, Disp: , Rfl:     glucose blood (FREESTYLE LITE) strip, Use 1 Strip daily, Disp: 100 Strip, Rfl: 3    Cholecalciferol 125 MCG (5000 UT) Tab, Take 4,000 Units by mouth every day., Disp: , Rfl:     Cyanocobalamin (VITAMIN B 12 PO), Take 1,000 mcg by mouth every day., Disp: , Rfl:     Lancets, Lancets order: Lancets for Song Contour Next meter. Sig: use daily and prn ssx high or low sugar., Disp: 100 Each, Rfl: 3    triamcinolone acetonide (KENALOG) 0.1 % Ointment, Apply 1 Application to affected area(s) 2 times a day as needed. (Patient not  "taking: Reported on 2023), Disp: , Rfl:     Blood Glucose Monitoring Suppl SUPPLIES MISC, Test strips order: Test strips for Abbott Freestyle Lite meter. Sig: use BID and prn ssx high or low sugar #100 RF x 3, Disp: 100 Each, Rfl: 3  ALLERGIES:   Allergies   Allergen Reactions    Macrobid [Nitrofurantoin Monohydrate Macrocrystals] Hives    Nitrofurantoin Hives    Pcn [Penicillins] Hives    Penicillamine      SURGHX:   Past Surgical History:   Procedure Laterality Date    [place] TOTAL HIP ARTHROPLASTY Left 2021    Procedure: LEFT TOTAL HIP ARTHROPLASTY;  Surgeon: Miguel Madden M.D.;  Location: [facility];  Service: Orthopedics    [place] TOTAL HIP ARTHROPLASTY Right 3/11/2020    Procedure: ARTHROPLASTY, HIP, TOTAL;  Surgeon: Miguel Madden M.D.;  Location: SURGERY [facility];  Service: Orthopedics    ARTHROPLASTY Right     GANGLION EXCISION  2014    Performed by Efra Ramirez M.D. at SURGERY [facility]    HYSTERECTOMY, TOTAL ABDOMINAL      For non cancerous reasons    CHOLECYSTECTOMY      ABDOMINAL HYSTERECTOMY TOTAL      CARPAL TUNNEL RELEASE      Right hand    CYSTOSCOPY STENT PLACEMENT      several, stent removed in MD office    [place]  DELIVERY ONLY      PRIMARY C SECTION       x 2     SOCHX:  reports that she has never smoked. She has never used smokeless tobacco. She reports that she does not drink alcohol and does not use drugs.  FH: Family history was reviewed, no pertinent findings to report      Review of Systems   Constitutional:  Negative for fever.   Eyes:  Negative for discharge and redness.   Gastrointestinal:  Negative for nausea and vomiting.   Skin:         + possible infection of left breast   Neurological:  Negative for headaches.              Objective     /70 (BP Location: Left arm, Patient Position: Sitting, BP Cuff Size: Adult)   Pulse 78   Temp 36.2 °C (97.2 °F) (Temporal)   Resp 16   Ht 1.727 m (5' 8\")   Wt 82.5 kg " (181 lb 12.8 oz)   LMP 11/29/2004   SpO2 97%   BMI 27.64 kg/m²      Physical Exam  Constitutional:       General: She is not in acute distress.     Appearance: Normal appearance. She is well-developed. She is not ill-appearing.   HENT:      Head: Normocephalic and atraumatic.      Right Ear: External ear normal.      Left Ear: External ear normal.      Nose: Nose normal.   Eyes:      Extraocular Movements: Extraocular movements intact.      Conjunctiva/sclera: Conjunctivae normal.   Cardiovascular:      Rate and Rhythm: Normal rate.   Pulmonary:      Effort: Pulmonary effort is normal.   Chest:   Breasts:     Left: Inverted nipple (the patient reports a chronically inverted left nipple) present. No mass, nipple discharge, skin change or tenderness.      Comments:   The patient's left left ball was found to be inverted, which she reports as chronic.  There was slight redness and swelling to the inferior aspect of the left nipple with overlying white exudates.  The patient's left nipple was unable to be inverted in clinic.  No significant tenderness to palpation.  No increased warmth.  No palpable lumps/masses.  No abnormal nipple discharge/drainage.  No obvious skin changes.  Musculoskeletal:      Cervical back: Normal range of motion and neck supple.   Skin:     General: Skin is warm and dry.   Neurological:      Mental Status: She is alert and oriented to person, place, and time.                             Assessment & Plan          1. Nipple inflammation  - nystatin-triamcinalone (MYCOLOG) 719371-7.1 UNIT/GM-% Ointment; Apply a small amount to the affected area twice daily x 14 days.  Dispense: 30 g; Refill: 0    The patient's presenting symptoms and physical examination are consistent with inflammation of the left nipple.  The patient's left nipple inflammation is concerning for a possible yeast infection.  Will prescribe the patient topical Mycolog ointment for her current symptoms.  Advised the patient to  monitor for worsening signs or symptoms.  Recommend OTC medications and supportive care for symptomatic management.  Recommend the patient follow-up with her PCP as needed.  Discussion precautions with the patient, and she verbalized understanding.    Differential diagnoses, supportive care, and indications for immediate follow-up discussed with patient.   Instructed to return to clinic or nearest emergency department for any change in condition, further concerns, or worsening of symptoms.    Mycolog as above  Monitor for worsening signs and or symptoms  Keep area clean and dry  Follow-up with PCP  Return to clinic or go to the ED if symptoms worsen or fail to improve, or if patient should develop worsening/increasing/persistent nipple inflammation, pain/tenderness, swelling, increased redness warmth, discharge/drainage, skin changes, palpable lumps/masses, breast pain, fever/chills, secondary signs of infection, and/or any concerning symptoms.    Discussed plan with the patient, and she agrees to the above.     I personally reviewed prior external notes and test results pertinent to today's visit.  I have independently reviewed and interpreted all diagnostics ordered during this urgent care visit.     Please note that this dictation was created using voice recognition software. I have made every reasonable attempt to correct obvious errors, but I expect that there may be errors of grammar and possibly content that I did not discover before finalizing the note.     This note was electronically signed by Maye Jensen PA-C

## 2023-12-27 PROCEDURE — RXMED WILLOW AMBULATORY MEDICATION CHARGE: Performed by: FAMILY MEDICINE

## 2024-01-16 ENCOUNTER — PHARMACY VISIT (OUTPATIENT)
Dept: PHARMACY | Facility: MEDICAL CENTER | Age: 69
End: 2024-01-16
Payer: COMMERCIAL

## 2024-01-18 DIAGNOSIS — E11.42 CONTROLLED TYPE 2 DIABETES MELLITUS WITH DIABETIC POLYNEUROPATHY, WITHOUT LONG-TERM CURRENT USE OF INSULIN (HCC): ICD-10-CM

## 2024-01-19 PROCEDURE — RXMED WILLOW AMBULATORY MEDICATION CHARGE: Performed by: INTERNAL MEDICINE

## 2024-01-19 RX ORDER — DULAGLUTIDE 3 MG/.5ML
3 INJECTION, SOLUTION SUBCUTANEOUS
Qty: 6 ML | Refills: 2 | Status: SHIPPED | OUTPATIENT
Start: 2024-01-19 | End: 2024-01-24

## 2024-01-22 ENCOUNTER — OFFICE VISIT (OUTPATIENT)
Dept: MEDICAL GROUP | Facility: PHYSICIAN GROUP | Age: 69
End: 2024-01-22
Payer: MEDICARE

## 2024-01-22 VITALS
SYSTOLIC BLOOD PRESSURE: 108 MMHG | WEIGHT: 183.4 LBS | HEIGHT: 68 IN | HEART RATE: 80 BPM | RESPIRATION RATE: 16 BRPM | DIASTOLIC BLOOD PRESSURE: 60 MMHG | TEMPERATURE: 97.6 F | OXYGEN SATURATION: 96 % | BODY MASS INDEX: 27.8 KG/M2

## 2024-01-22 DIAGNOSIS — E11.42 CONTROLLED TYPE 2 DIABETES MELLITUS WITH DIABETIC POLYNEUROPATHY, WITHOUT LONG-TERM CURRENT USE OF INSULIN (HCC): ICD-10-CM

## 2024-01-22 DIAGNOSIS — E11.69 HYPERLIPIDEMIA ASSOCIATED WITH TYPE 2 DIABETES MELLITUS (HCC): ICD-10-CM

## 2024-01-22 DIAGNOSIS — N64.59 BREAST COMPLAINT: ICD-10-CM

## 2024-01-22 DIAGNOSIS — E78.5 HYPERLIPIDEMIA ASSOCIATED WITH TYPE 2 DIABETES MELLITUS (HCC): ICD-10-CM

## 2024-01-22 DIAGNOSIS — I70.0 ATHEROSCLEROSIS OF AORTA (HCC): ICD-10-CM

## 2024-01-22 PROCEDURE — RXMED WILLOW AMBULATORY MEDICATION CHARGE: Performed by: FAMILY MEDICINE

## 2024-01-22 PROCEDURE — 99215 OFFICE O/P EST HI 40 MIN: CPT | Performed by: FAMILY MEDICINE

## 2024-01-22 PROCEDURE — 3074F SYST BP LT 130 MM HG: CPT | Performed by: FAMILY MEDICINE

## 2024-01-22 PROCEDURE — 3078F DIAST BP <80 MM HG: CPT | Performed by: FAMILY MEDICINE

## 2024-01-22 RX ORDER — DULAGLUTIDE 1.5 MG/.5ML
0.5 INJECTION, SOLUTION SUBCUTANEOUS
Qty: 6 ML | Refills: 1 | Status: CANCELLED | OUTPATIENT
Start: 2024-01-22

## 2024-01-22 RX ORDER — DULAGLUTIDE 1.5 MG/.5ML
0.5 INJECTION, SOLUTION SUBCUTANEOUS
Qty: 6 ML | Refills: 1 | Status: SHIPPED | OUTPATIENT
Start: 2024-01-22 | End: 2024-01-24

## 2024-01-22 ASSESSMENT — ENCOUNTER SYMPTOMS
CHILLS: 0
SHORTNESS OF BREATH: 0
FEVER: 0

## 2024-01-22 ASSESSMENT — FIBROSIS 4 INDEX: FIB4 SCORE: 1.03

## 2024-01-22 ASSESSMENT — PATIENT HEALTH QUESTIONNAIRE - PHQ9: CLINICAL INTERPRETATION OF PHQ2 SCORE: 0

## 2024-01-22 NOTE — PROGRESS NOTES
Subjective:     CC:   Chief Complaint   Patient presents with    Follow-Up         HPI:   Shirley presents today for a fu.  Last seen by me on 9/12/23 for AWV.    Since our last appointment, has been seen by:  11/1- endo- fosamax started. Gabapentin 300mg started  12/7-  for breast prroblem- nystatin    Upcoming appointments with:  2/13- endo  3/19- dentist.    Problem   Breast Complaint    New issue.  Patient went to urgent care last month for left nipple concerns.  Had complained of pruritus and was started on nystatin.  Mentions that she did notice an improvement in symptoms with this.  She has noticed some pruritus and some changes around the nipple area and would like to have this examined today.     Controlled Type 2 Diabetes Mellitus With Diabetic Polyneuropathy, Without Long-Term Current Use of Insulin ([facility])    Chronic  Oct A1c at 6.9%  F/u endo  [facility], she's in touch with endo for this. Last took it last Thursday.   Was dizzy last week, during that time took glimepiride 2mg during those days.now back to 4mg .     Glimepiride 4 mg am  Jardiance 25mg  Trulicity 3mg weekly  Metformin 1000mg bid         Current Outpatient Medications Ordered in Epic   Medication Sig Dispense Refill    Dulaglutide (TRULICITY) 3 MG/0.5ML Solution Pen-injector Inject 3 mg under the skin every 7 days. 6 mL 2    nystatin-triamcinalone (MYCOLOG) 236585-0.1 UNIT/GM-% Ointment Apply a small amount to the affected area twice daily x 14 days. 30 g 0    potassium citrate SR (UROCIT-K SR) 10 MEQ (1080 MG) Tab CR Take 2 tablets by mouth 3 times a day for 90 days. 540 Tablet 3    gabapentin (NEURONTIN) 300 MG Cap Take 1 Capsule by mouth every evening. 90 Capsule 1    alendronate (FOSAMAX) 70 MG Tab Take 1 Tablet by mouth every 7 days. 12 Tablet 1    glimepiride (AMARYL) 4 MG Tab Take 1 Tablet by mouth every morning. 100 Tablet 2    metformin (GLUCOPHAGE) 1000 MG tablet Take 1 Tablet by mouth 2 times a day with meals.  "200 Tablet 2    valACYclovir (VALTREX) 500 MG Tab Take 1 Tablet by mouth 2 times a day. Take twice a day as needed for flareups 20 Tablet 2    atorvastatin (LIPITOR) 20 MG Tab TAKE 1 TABLET BY MOUTH EVERY  Tablet 3    colesevelam (WELCHOL) 625 MG Tab TAKE 1 TABLET BY MOUTH EVERY MORNING AND TAKE 2 TABS EVERY EVENING WITH DINNER 270 Tablet 4    JARDIANCE 25 MG Tab Take 1 tablet  by mouth every day. 100 Tablet 2    Magnesium Citrate 200 MG Tab Take  by mouth.      omeprazole (PRILOSEC) 20 MG delayed-release capsule       aspirin (ASPIRIN 81) 81 MG EC tablet Take 1 Tablet by mouth 2 times a day. 60 Tablet 0    clindamycin (CLEOCIN) 300 MG Cap PRN dental work      glucose blood (FREESTYLE LITE) strip Use 1 Strip daily 100 Strip 3    Cholecalciferol 125 MCG (5000 UT) Tab Take 4,000 Units by mouth every day.      Cyanocobalamin (VITAMIN B 12 PO) Take 1,000 mcg by mouth every day.      Lancets Lancets order: Lancets for [employer] Contour Next meter. Sig: use daily and prn ssx high or low sugar. 100 Each 3    triamcinolone acetonide (KENALOG) 0.1 % Ointment Apply 1 Application  topically 2 times a day as needed.      Blood Glucose Monitoring Suppl SUPPLIES MISC Test strips order: Test strips for Abbott Freestyle Lite meter. Sig: use BID and prn ssx high or low sugar #100 RF x 3 100 Each 3     No current Epic-ordered facility-administered medications on file.         ROS:  Review of Systems   Constitutional:  Negative for chills and fever.   Respiratory:  Negative for shortness of breath.    Cardiovascular:  Negative for chest pain.   Skin:         Dry right toes  Left nipple pruritus       Objective:     Exam:  /60 (BP Location: Right arm, Patient Position: Sitting, BP Cuff Size: Adult)   Pulse 80   Temp 36.4 °C (97.6 °F) (Temporal)   Resp 16   Ht 1.727 m (5' 8\")   Wt 83.2 kg (183 lb 6.4 oz)   LMP 11/29/2004   SpO2 96%   BMI 27.89 kg/m²  Body mass index is 27.89 kg/m².    Physical Exam  Constitutional:      "  Appearance: Normal appearance.   Eyes:      Extraocular Movements: Extraocular movements intact.   Cardiovascular:      Rate and Rhythm: Normal rate and regular rhythm.   Pulmonary:      Effort: Pulmonary effort is normal.      Breath sounds: Normal breath sounds.   Chest:   Breasts:     Left: Inverted nipple present. No nipple discharge.      Comments: Slight erythema at nipple  Neurological:      Mental Status: She is alert.   Psychiatric:         Mood and Affect: Mood normal.         Behavior: Behavior normal.         A chaperone was offered to the patient during today's exam.: Chaperone Ildasherley godwin  was present.    Labs: see above    Assessment & Plan:     69 y.o. female with the following -     1. Atherosclerosis of aorta (HCC)  2. Hyperlipidemia associated with type 2 diabetes mellitus (HCC)  3. Controlled type 2 diabetes mellitus with diabetic polyneuropathy, without long-term current use of insulin ([facility])    [facility] Gap Form    Diagnosis to address: I70.0 - Atherosclerosis of aorta (HCC)  Assessment and plan: Chronic, stable. Continue with atorvastatin 20mg and asa 81mg daily  Diagnosis: E11.69, E78.5 - Hyperlipidemia associated with type 2 diabetes mellitus (HCC)  Assessment and plan: Chronic, stable. Continue with atorvastatin 20mg daily.   Diagnosis: E11.42 - Controlled type 2 diabetes mellitus with diabetic polyneuropathy, without long-term current use of insulin ([facility])  Assessment and plan: Chronic, stable. Continue with trulicity 3mg weekly, metformin 1000mg bid, jardiance 25mg and glimepiride 4mg daily. F/u endo next month as scheduled.  Encouraged to schedule f/u with Dr Rajani montenegro, due in March  Last edited 01/22/24 14:58 PST by Rose Marie Gao M.D.         4. Breast complaint  New issue. Stable.  Had improved with nystatin cream.   C/w it. Mammogram completed last month.  Precautions d/w her.       I spent a total of 42 minutes which included time preparing to see the patient (reviewing my last  note, records and recent lab results)  I also performed a medically appropriate examination, counseled and educated the patient, and documentation of this encounter.      Return in about 3 months (around 4/22/2024) for Diabetes.    Please note that this dictation was created using voice recognition software. I have made every reasonable attempt to correct obvious errors, but I expect that there are errors of grammar and possibly content that I did not discover before finalizing the note.

## 2024-01-24 DIAGNOSIS — E11.42 CONTROLLED TYPE 2 DIABETES MELLITUS WITH DIABETIC POLYNEUROPATHY, WITHOUT LONG-TERM CURRENT USE OF INSULIN (HCC): ICD-10-CM

## 2024-01-24 RX ORDER — DULAGLUTIDE 3 MG/.5ML
3 INJECTION, SOLUTION SUBCUTANEOUS
Qty: 6 ML | Refills: 2 | Status: SHIPPED | OUTPATIENT
Start: 2024-01-24

## 2024-01-24 RX ORDER — DULAGLUTIDE 1.5 MG/.5ML
0.5 INJECTION, SOLUTION SUBCUTANEOUS
Qty: 6 ML | Refills: 1 | Status: SHIPPED | OUTPATIENT
Start: 2024-01-24 | End: 2024-01-25

## 2024-01-25 ENCOUNTER — PHARMACY VISIT (OUTPATIENT)
Dept: PHARMACY | Facility: MEDICAL CENTER | Age: 69
End: 2024-01-25
Payer: COMMERCIAL

## 2024-01-25 RX ORDER — DULAGLUTIDE 1.5 MG/.5ML
0.5 INJECTION, SOLUTION SUBCUTANEOUS
Qty: 6 ML | Refills: 1 | Status: SHIPPED
Start: 2024-01-25 | End: 2024-02-13

## 2024-01-29 ENCOUNTER — HOSPITAL ENCOUNTER (OUTPATIENT)
Dept: LAB | Facility: MEDICAL CENTER | Age: 69
End: 2024-01-29
Attending: INTERNAL MEDICINE
Payer: MEDICARE

## 2024-01-29 DIAGNOSIS — M79.2 NEUROPATHIC PAIN: ICD-10-CM

## 2024-01-29 DIAGNOSIS — Z79.84 LONG TERM (CURRENT) USE OF ORAL HYPOGLYCEMIC DRUGS: ICD-10-CM

## 2024-01-29 DIAGNOSIS — E55.9 VITAMIN D DEFICIENCY: ICD-10-CM

## 2024-01-29 DIAGNOSIS — E11.42 CONTROLLED TYPE 2 DIABETES MELLITUS WITH DIABETIC POLYNEUROPATHY, WITHOUT LONG-TERM CURRENT USE OF INSULIN (HCC): ICD-10-CM

## 2024-01-29 DIAGNOSIS — M85.80 OSTEOPENIA, UNSPECIFIED LOCATION: ICD-10-CM

## 2024-01-29 DIAGNOSIS — E78.5 HYPERLIPIDEMIA, UNSPECIFIED HYPERLIPIDEMIA TYPE: ICD-10-CM

## 2024-01-29 LAB
25(OH)D3 SERPL-MCNC: 56 NG/ML (ref 30–100)
ALBUMIN SERPL BCP-MCNC: 3.4 G/DL (ref 3.2–4.9)
ALBUMIN/GLOB SERPL: 0.9 G/DL
ALP SERPL-CCNC: 59 U/L (ref 30–99)
ALT SERPL-CCNC: 7 U/L (ref 2–50)
ANION GAP SERPL CALC-SCNC: 15 MMOL/L (ref 7–16)
AST SERPL-CCNC: 13 U/L (ref 12–45)
BILIRUB SERPL-MCNC: 0.5 MG/DL (ref 0.1–1.5)
BUN SERPL-MCNC: 17 MG/DL (ref 8–22)
CALCIUM ALBUM COR SERPL-MCNC: 9.9 MG/DL (ref 8.5–10.5)
CALCIUM SERPL-MCNC: 9.4 MG/DL (ref 8.5–10.5)
CHLORIDE SERPL-SCNC: 103 MMOL/L (ref 96–112)
CHOLEST SERPL-MCNC: 128 MG/DL (ref 100–199)
CO2 SERPL-SCNC: 25 MMOL/L (ref 20–33)
CREAT SERPL-MCNC: 0.46 MG/DL (ref 0.5–1.4)
GFR SERPLBLD CREATININE-BSD FMLA CKD-EPI: 103 ML/MIN/1.73 M 2
GLOBULIN SER CALC-MCNC: 3.7 G/DL (ref 1.9–3.5)
GLUCOSE SERPL-MCNC: 121 MG/DL (ref 65–99)
HDLC SERPL-MCNC: 42 MG/DL
LDLC SERPL CALC-MCNC: 48 MG/DL
PHOSPHATE SERPL-MCNC: 3.8 MG/DL (ref 2.5–4.5)
POTASSIUM SERPL-SCNC: 4.3 MMOL/L (ref 3.6–5.5)
PROT SERPL-MCNC: 7.1 G/DL (ref 6–8.2)
PTH-INTACT SERPL-MCNC: 29.1 PG/ML (ref 14–72)
SODIUM SERPL-SCNC: 143 MMOL/L (ref 135–145)
T4 FREE SERPL-MCNC: 1.26 NG/DL (ref 0.93–1.7)
TRIGL SERPL-MCNC: 192 MG/DL (ref 0–149)
TSH SERPL DL<=0.005 MIU/L-ACNC: 0.96 UIU/ML (ref 0.38–5.33)

## 2024-01-29 PROCEDURE — 82306 VITAMIN D 25 HYDROXY: CPT

## 2024-01-29 PROCEDURE — 84443 ASSAY THYROID STIM HORMONE: CPT

## 2024-01-29 PROCEDURE — 80053 COMPREHEN METABOLIC PANEL: CPT

## 2024-01-29 PROCEDURE — 83970 ASSAY OF PARATHORMONE: CPT

## 2024-01-29 PROCEDURE — 80061 LIPID PANEL: CPT

## 2024-01-29 PROCEDURE — 82523 COLLAGEN CROSSLINKS: CPT

## 2024-01-29 PROCEDURE — 36415 COLL VENOUS BLD VENIPUNCTURE: CPT

## 2024-01-29 PROCEDURE — 84100 ASSAY OF PHOSPHORUS: CPT

## 2024-01-29 PROCEDURE — 84439 ASSAY OF FREE THYROXINE: CPT

## 2024-01-30 ENCOUNTER — HOSPITAL ENCOUNTER (OUTPATIENT)
Facility: MEDICAL CENTER | Age: 69
End: 2024-01-30
Attending: INTERNAL MEDICINE
Payer: MEDICARE

## 2024-01-30 PROCEDURE — 82043 UR ALBUMIN QUANTITATIVE: CPT

## 2024-01-30 PROCEDURE — 82570 ASSAY OF URINE CREATININE: CPT

## 2024-01-31 LAB
COLLAGEN CTX SERPL-MCNC: 256 PG/ML
CREAT UR-MCNC: 52.82 MG/DL
MICROALBUMIN UR-MCNC: 2.9 MG/DL
MICROALBUMIN/CREAT UR: 55 MG/G (ref 0–30)

## 2024-02-13 ENCOUNTER — NON-PROVIDER VISIT (OUTPATIENT)
Dept: ENDOCRINOLOGY | Facility: MEDICAL CENTER | Age: 69
End: 2024-02-13
Attending: INTERNAL MEDICINE
Payer: MEDICARE

## 2024-02-13 VITALS
BODY MASS INDEX: 27.58 KG/M2 | HEART RATE: 68 BPM | DIASTOLIC BLOOD PRESSURE: 62 MMHG | OXYGEN SATURATION: 97 % | WEIGHT: 182 LBS | HEIGHT: 68 IN | SYSTOLIC BLOOD PRESSURE: 114 MMHG

## 2024-02-13 DIAGNOSIS — E11.42 CONTROLLED TYPE 2 DIABETES MELLITUS WITH DIABETIC POLYNEUROPATHY, WITHOUT LONG-TERM CURRENT USE OF INSULIN (HCC): ICD-10-CM

## 2024-02-13 LAB
HBA1C MFR BLD: 6.8 % (ref ?–5.8)
POCT INT CON NEG: NEGATIVE
POCT INT CON POS: POSITIVE

## 2024-02-13 PROCEDURE — 99212 OFFICE O/P EST SF 10 MIN: CPT | Performed by: INTERNAL MEDICINE

## 2024-02-13 PROCEDURE — 83036 HEMOGLOBIN GLYCOSYLATED A1C: CPT

## 2024-02-13 ASSESSMENT — FIBROSIS 4 INDEX: FIB4 SCORE: 1.4

## 2024-02-13 NOTE — PROGRESS NOTES
RN-CDE Note    Subjective:   Endocrinology Clinic Progress Note  PCP: Rose Marie Gao M.D.    HPI:  Shirley Unger is a 69 y.o. old patient who is seen today by the Diabetes Nurse Specialist for review of Type 2 Diabetes.  Recent changes in health: Health good.  She has been off Trulicity for the last 3 weeks and has not had anymore diarrhea.  DM:   Last A1c:   Lab Results   Component Value Date/Time    HBA1C 6.8 (A) 02/13/2024 10:30 AM      Previous A1c was 6.9 on 10/23/23  A1C GOAL: < 7    Diabetes Medications:   Metformin 1000 mg BID  Jardiance 25 mg daily  Glimepiride 4 mg daily      Exercise: Decreased with cold weather.  Walking dog and caring for grandson.    Diet: Breakfast is toast and eggs.  Lunch is sandwich.  Dinner protein, vegetables, and carbohydrates.  Patient's body mass index is 27.67 kg/m². Exercise and nutrition counseling were performed at this visit.    Glucose monitoring frequency: Testing daily at different times  's  Hypoglycemic episodes: no  Last Retinal Exam: on file and up-to-date  Daily Foot Exam: Yes   Foot Exam:  Monofilament: done  Monofilament testing with a 10 gram force: sensation intact: intact bilaterally  Visual Inspection: Feet without maceration, ulcers, fissures.  Pedal pulses: intact bilaterally   Lab Results   Component Value Date/Time    MALBCRT 55 (H) 01/30/2024 11:30 AM    MICROALBUR 2.9 01/30/2024 11:30 AM        ACR Albumin/Creatinine Ratio goal <30     HTN:   Blood pressure goal <130/<80 .   Currently Rx ACE/ARB: No     Dyslipidemia:    Lab Results   Component Value Date/Time    CHOLSTRLTOT 128 01/29/2024 09:54 AM    LDL 48 01/29/2024 09:54 AM    HDL 42 01/29/2024 09:54 AM    TRIGLYCERIDE 192 (H) 01/29/2024 09:54 AM         Currently Rx Statin: Yes     She  reports that she has never smoked. She has never used smokeless tobacco.      Plan:     Discussed and educated on:   - All medications, side effects and compliance (discussed carefully)  - Annual eye  examinations at Ophthalmology  - Home glucose monitoring emphasized  - Weight control and daily exercise    Recommended medication changes: She will stay off the Trulicity and see how her blood sugars do.  She will stay on Metformin 1000 mg BID, Jardiance 25 mg daily, and Glimepiride 4 mg daily.  She has an appointment in May with Dr. Posey and we will recheck her urine micro Alb/creat ratio  before that appointment.

## 2024-02-20 DIAGNOSIS — M85.80 OSTEOPENIA, UNSPECIFIED LOCATION: ICD-10-CM

## 2024-02-20 RX ORDER — ALENDRONATE SODIUM 70 MG/1
70 TABLET ORAL
Qty: 12 TABLET | Refills: 3 | Status: SHIPPED | OUTPATIENT
Start: 2024-02-20

## 2024-02-29 ENCOUNTER — PHARMACY VISIT (OUTPATIENT)
Dept: PHARMACY | Facility: MEDICAL CENTER | Age: 69
End: 2024-02-29
Payer: COMMERCIAL

## 2024-02-29 PROCEDURE — RXMED WILLOW AMBULATORY MEDICATION CHARGE: Performed by: FAMILY MEDICINE

## 2024-03-28 ENCOUNTER — TELEPHONE (OUTPATIENT)
Dept: HEALTH INFORMATION MANAGEMENT | Facility: OTHER | Age: 69
End: 2024-03-28
Payer: MEDICARE

## 2024-04-17 ENCOUNTER — PATIENT MESSAGE (OUTPATIENT)
Dept: MEDICAL GROUP | Facility: PHYSICIAN GROUP | Age: 69
End: 2024-04-17
Payer: MEDICARE

## 2024-04-17 DIAGNOSIS — E78.5 DYSLIPIDEMIA: ICD-10-CM

## 2024-04-17 RX ORDER — COLESEVELAM 180 1/1
TABLET ORAL
Qty: 100 TABLET | Refills: 3 | Status: SHIPPED | OUTPATIENT
Start: 2024-04-17

## 2024-04-17 NOTE — TELEPHONE ENCOUNTER
Requested Prescriptions     Pending Prescriptions Disp Refills    colesevelam (WELCHOL) 625 MG Tab 100 Tablet 3     Sig: TAKE 1 TABLET BY MOUTH EVERY MORNING AND TAKE 2 TABS EVERY EVENING WITH DINNER     Rose Marie Gao M.D.

## 2024-04-17 NOTE — PATIENT COMMUNICATION
Received request via: Patient    Was the patient seen in the last year in this department? Yes    Does the patient have an active prescription (recently filled or refills available) for medication(s) requested? No    [facility] [address]    Does the patient have [facility] Plus and need 100 day supply (blood pressure, diabetes and cholesterol meds only)? Yes, quantity updated to 100 days

## 2024-04-18 PROCEDURE — RXMED WILLOW AMBULATORY MEDICATION CHARGE: Performed by: FAMILY MEDICINE

## 2024-04-24 ENCOUNTER — OFFICE VISIT (OUTPATIENT)
Dept: MEDICAL GROUP | Facility: PHYSICIAN GROUP | Age: 69
End: 2024-04-24
Payer: MEDICARE

## 2024-04-24 VITALS
WEIGHT: 182 LBS | BODY MASS INDEX: 27.58 KG/M2 | SYSTOLIC BLOOD PRESSURE: 114 MMHG | OXYGEN SATURATION: 98 % | DIASTOLIC BLOOD PRESSURE: 56 MMHG | TEMPERATURE: 96.7 F | HEART RATE: 72 BPM | RESPIRATION RATE: 16 BRPM | HEIGHT: 68 IN

## 2024-04-24 DIAGNOSIS — M79.2 NEUROPATHIC PAIN: ICD-10-CM

## 2024-04-24 DIAGNOSIS — E11.42 CONTROLLED TYPE 2 DIABETES MELLITUS WITH DIABETIC POLYNEUROPATHY, WITHOUT LONG-TERM CURRENT USE OF INSULIN (HCC): ICD-10-CM

## 2024-04-24 DIAGNOSIS — R80.9 MICROALBUMINURIA DUE TO TYPE 2 DIABETES MELLITUS (HCC): ICD-10-CM

## 2024-04-24 DIAGNOSIS — E11.29 MICROALBUMINURIA DUE TO TYPE 2 DIABETES MELLITUS (HCC): ICD-10-CM

## 2024-04-24 DIAGNOSIS — M85.80 OSTEOPENIA, UNSPECIFIED LOCATION: ICD-10-CM

## 2024-04-24 PROCEDURE — 3078F DIAST BP <80 MM HG: CPT | Performed by: FAMILY MEDICINE

## 2024-04-24 PROCEDURE — 3074F SYST BP LT 130 MM HG: CPT | Performed by: FAMILY MEDICINE

## 2024-04-24 PROCEDURE — 99214 OFFICE O/P EST MOD 30 MIN: CPT | Performed by: FAMILY MEDICINE

## 2024-04-24 RX ORDER — ASPIRIN 81 MG/1
81 TABLET ORAL DAILY
Qty: 30 TABLET | Refills: 0
Start: 2024-04-24

## 2024-04-24 ASSESSMENT — ENCOUNTER SYMPTOMS
FALLS: 0
CHILLS: 0

## 2024-04-24 ASSESSMENT — FIBROSIS 4 INDEX: FIB4 SCORE: 1.4

## 2024-04-24 NOTE — PROGRESS NOTES
Verbal consent was acquired by the patient to use [employer] listening note generation during this visit         History of Present Illness  The patient presents for a 3-month follow-up visit. She was last seen by me on 01/22/2024. She is accompanied by her .    The patient has consulted with a nurse from the endocrinology department since her last appointment. Her blood glucose levels have been well-regulated, and she monitors her blood glucose levels once daily. A recent morning reading was 180, which she attributes to cake consumption the previous night. Trulicity was discontinued at least 3 months ago due to unavailability at the pharmacy.      Since discontinuation of Trulicity, the patient reports an overall improvement in her condition, with increased energy levels. She denies experiencing nausea. Her current medication regimen includes glimepiride 4 mg with lunch, metformin 1000 mg twice daily, and Jardiance 25 mg. She has lost 1 pound since her last visit. Her most recent A1c was 6.8 in February .     Her physical activity is limited  but she spends most of her time performing household chores. Despite feeling exhausted after walking during her trip to [place] in the first week of 08/2023, she occasionally walks around with her 14-year-old grandson. She maintains an active lifestyle at her workplace.    The patient has been diagnosed with a shy bladder and has scheduled a follow-up appointment for this condition. Her water intake has been minimal. Over the past 3 to 4 weeks, her urinary incontinence has improved. She has a history of kidney stones, which occasionally results in increased urine leakage when the kidney stones are moving. She is currently on potassium citrate, which provides some relief. She denies hematuria or nocturia.    Supplemental Information  She takes aspirin once a day at bedtime. It has been 2.5 years since she had a hip replacement. She takes Valtrex as needed. She  takes Lipitor 20 mg every day for cholesterol. She takes Fosamax every week for bones. She takes gabapentin at nighttime for numbness and tingling in her left leg. It does not make her sleepy.  She has compression socks, but she can not get them on because they are tight. She used nystatin ointment on her nipple when she went to [place] in the first week of 08/2023. She does not use it all the time. She denies any trouble with her balance or falls. She fell a year ago and broke her arm. She saw her eye doctor in 03/2024 and got new glasses last week. She saw a dentist as well.      Review of Systems   Constitutional:  Negative for chills.   Cardiovascular:  Negative for leg swelling.   Genitourinary:  Negative for dysuria and hematuria.   Musculoskeletal:  Negative for falls.       Outpatient Medications Marked as Taking for the 4/24/24 encounter (Office Visit) with Rose Marie Gao M.D.   Medication Sig Dispense Refill    aspirin (ASPIRIN 81) 81 MG EC tablet Take 1 Tablet by mouth every day. 30 Tablet 0    colesevelam (WELCHOL) 625 MG Tab TAKE 1 TABLET BY MOUTH EVERY MORNING AND TAKE 2 TABS EVERY EVENING WITH DINNER 100 Tablet 3    alendronate (FOSAMAX) 70 MG Tab Take 1 Tablet by mouth every 7 days. 12 Tablet 3    nystatin-triamcinalone (MYCOLOG) 762144-9.1 UNIT/GM-% Ointment Apply a small amount to the affected area twice daily x 14 days. 30 g 0    potassium citrate SR (UROCIT-K SR) 10 MEQ (1080 MG) Tab CR Take 2 tablets by mouth 3 times a day for 90 days. 540 Tablet 3    gabapentin (NEURONTIN) 300 MG Cap Take 1 Capsule by mouth every evening. 90 Capsule 1    glimepiride (AMARYL) 4 MG Tab Take 1 Tablet by mouth every morning. 100 Tablet 2    metformin (GLUCOPHAGE) 1000 MG tablet Take 1 Tablet by mouth 2 times a day with meals. 200 Tablet 2    valACYclovir (VALTREX) 500 MG Tab Take 1 Tablet by mouth 2 times a day. Take twice a day as needed for flareups 20 Tablet 2    atorvastatin (LIPITOR) 20 MG Tab TAKE 1 TABLET BY  "MOUTH EVERY  Tablet 3    JARDIANCE 25 MG Tab Take 1 tablet  by mouth every day. 100 Tablet 2    clindamycin (CLEOCIN) 300 MG Cap PRN dental work      glucose blood (FREESTYLE LITE) strip Use 1 Strip daily 100 Strip 3    Cholecalciferol 125 MCG (5000 UT) Tab Take 4,000 Units by mouth every day.      Cyanocobalamin (VITAMIN B 12 PO) Take 1,000 mcg by mouth every day.      Lancets Lancets order: Lancets for Song Contour Next meter. Sig: use daily and prn ssx high or low sugar. 100 Each 3    triamcinolone acetonide (KENALOG) 0.1 % Ointment Apply 1 Application  topically 2 times a day as needed.      Blood Glucose Monitoring Suppl SUPPLIES MISC Test strips order: Test strips for Abbott Freestyle Lite meter. Sig: use BID and prn ssx high or low sugar #100 RF x 3 100 Each 3       /56 (BP Location: Right arm, Patient Position: Sitting, BP Cuff Size: Adult)   Pulse 72   Temp 35.9 °C (96.7 °F) (Temporal)   Resp 16   Ht 1.727 m (5' 8\")   Wt 82.6 kg (182 lb)   SpO2 98% , Body mass index is 27.67 kg/m².        Physical Exam  Constitutional:       Appearance: Normal appearance.   Eyes:      Extraocular Movements: Extraocular movements intact.   Cardiovascular:      Rate and Rhythm: Normal rate and regular rhythm.   Pulmonary:      Effort: Pulmonary effort is normal.      Breath sounds: Normal breath sounds.   Neurological:      Mental Status: She is alert.   Psychiatric:         Mood and Affect: Mood normal.         Behavior: Behavior normal.         Results  Laboratory Studies  A1c was 6.8. Protein in urine was 55.    Testing  [facility] blood flow test in March 2022 showed blood flow was fine.       Assessment & Plan  1. Controlled Type 2 Diabetes Mellitus.  The patient's diabetes is currently stable, with an A1c level improved to 6.8 percent. She has been off Trulicity for a minimum of 3 months due to unavailability at the pharmacy, and reports feeling better. Her current medication regimen includes metformin " 1000 mg twice daily, glimepiride 4 mg daily, and Jardiance 25 mg daily. The patient is scheduled to follow up with endocrinology next month.    2. Microalbuminuria.  This is a new medical diagnosis. The patient's last urine microalbumin test from 01/2024 showed an elevated ratio at 55, which is a new finding. She denies any urinary complaints. She has a history of kidney stones and takes potassium citrate. She mentioned that the endocrinology RN had already placed orders for her to repeat this urine test. The patient's condition will be closely monitored.    3. Osteopenia.  The patient's osteopenia is a chronic medical diagnosis, which is stable. She has been tolerating Fosamax well. The plan is to continue with Fosamax 70 mg weekly.    4. Neuropathic pain.  This is a chronic medical diagnosis. The patient has been prescribed gabapentin 300 mg to take at nighttime through her endocrinologist, and has noticed a slight improvement. The patient's condition will be closely monitored.    Orders Placed This Encounter    aspirin (ASPIRIN 81) 81 MG EC tablet             This note was created using voice recognition software (Dragon). The accuracy of the dictation is limited by the abilities of the software. I have reviewed the note prior to signing, however some errors in grammar and context are still possible. If you have any questions related to this note please do not hesitate to contact our office.

## 2024-04-30 DIAGNOSIS — M79.2 NEUROPATHIC PAIN: ICD-10-CM

## 2024-04-30 PROCEDURE — RXMED WILLOW AMBULATORY MEDICATION CHARGE: Performed by: PHYSICIAN ASSISTANT

## 2024-04-30 RX ORDER — GABAPENTIN 300 MG/1
300 CAPSULE ORAL EVERY EVENING
Qty: 90 CAPSULE | Refills: 1 | Status: SHIPPED | OUTPATIENT
Start: 2024-04-30

## 2024-05-06 ENCOUNTER — APPOINTMENT (OUTPATIENT)
Dept: LAB | Facility: MEDICAL CENTER | Age: 69
End: 2024-05-06
Payer: MEDICARE

## 2024-05-06 ENCOUNTER — HOSPITAL ENCOUNTER (OUTPATIENT)
Facility: MEDICAL CENTER | Age: 69
End: 2024-05-06
Attending: INTERNAL MEDICINE
Payer: MEDICARE

## 2024-05-06 DIAGNOSIS — E11.42 CONTROLLED TYPE 2 DIABETES MELLITUS WITH DIABETIC POLYNEUROPATHY, WITHOUT LONG-TERM CURRENT USE OF INSULIN (HCC): ICD-10-CM

## 2024-05-06 LAB
CREAT UR-MCNC: 40.08 MG/DL
MICROALBUMIN UR-MCNC: <1.2 MG/DL
MICROALBUMIN/CREAT UR: NORMAL MG/G (ref 0–30)

## 2024-05-07 ENCOUNTER — PHARMACY VISIT (OUTPATIENT)
Dept: PHARMACY | Facility: MEDICAL CENTER | Age: 69
End: 2024-05-07
Payer: COMMERCIAL

## 2024-05-15 NOTE — PROGRESS NOTES
"RN-CDE Note    Subjective:   Endocrinology Clinic Progress Note  PCP: Rose Marie Gao M.D.    HPI:  Shirley Unger is a 69 y.o. old patient who is seen today by the Diabetes Nurse Specialist for review of her type 2 diabetes.    Recent changes in health: no changes  DM:   Last A1c:   Lab Results   Component Value Date/Time    HBA1C 8.2 (A) 05/16/2024 10:43 AM      Previous A1c was 6.8 on 2/13/24  A1C GOAL: < 7    Diabetes Medications:   Metformin 1000 mg bid  Glimepiride 4 mg daily  Jardiance 25 mg daily      Exercise: moderate regular exercise, aerobic < 3 days a week  Diet: \"healthy\" diet  in general  Patient's body mass index is unknown because there is no height or weight on file. Exercise and nutrition counseling were performed at this visit.    Glucose monitoring frequency: has not been testing.  Needs a new meter.  Prescription sent to pharmacy for Accucheck Guide and strips.     Hypoglycemic episodes: no  Last Retinal Exam: on file and up-to-date    Foot Exam:  Monofilament: current.   Lab Results   Component Value Date/Time    MALBCRT see below 05/06/2024 07:00 AM    MICROALBUR <1.2 05/06/2024 07:00 AM        ACR Albumin/Creatinine Ratio goal <30     HTN:   Blood pressure goal <130/<80 .   Currently Rx ACE/ARB: no     Dyslipidemia:    Lab Results   Component Value Date/Time    CHOLSTRLTOT 128 01/29/2024 09:54 AM    LDL 48 01/29/2024 09:54 AM    HDL 42 01/29/2024 09:54 AM    TRIGLYCERIDE 192 (H) 01/29/2024 09:54 AM         Currently Rx Statin: Yes     She  reports that she has never smoked. She has never used smokeless tobacco.      Plan:     Discussed and educated on:   - All medications, side effects and compliance (discussed carefully)  - Annual eye examinations at Ophthalmology  - HbA1C: target  - Home glucose monitoring emphasized  - Weight control and daily exercise    Recommended medication changes: start Rybelsus 3 mg daily.  Sent RX to [facility] for samples    "

## 2024-05-16 ENCOUNTER — OFFICE VISIT (OUTPATIENT)
Dept: ENDOCRINOLOGY | Facility: MEDICAL CENTER | Age: 69
End: 2024-05-16
Attending: INTERNAL MEDICINE
Payer: MEDICARE

## 2024-05-16 ENCOUNTER — PHARMACY VISIT (OUTPATIENT)
Dept: PHARMACY | Facility: MEDICAL CENTER | Age: 69
End: 2024-05-16
Payer: COMMERCIAL

## 2024-05-16 VITALS
RESPIRATION RATE: 16 BRPM | HEIGHT: 68 IN | HEART RATE: 88 BPM | OXYGEN SATURATION: 97 % | DIASTOLIC BLOOD PRESSURE: 60 MMHG | SYSTOLIC BLOOD PRESSURE: 118 MMHG | WEIGHT: 182 LBS | BODY MASS INDEX: 27.58 KG/M2

## 2024-05-16 DIAGNOSIS — E55.9 VITAMIN D DEFICIENCY: ICD-10-CM

## 2024-05-16 DIAGNOSIS — E78.5 DYSLIPIDEMIA: ICD-10-CM

## 2024-05-16 DIAGNOSIS — E11.29 TYPE 2 DIABETES MELLITUS WITH OTHER DIABETIC KIDNEY COMPLICATION (HCC): ICD-10-CM

## 2024-05-16 DIAGNOSIS — M79.2 NEUROPATHIC PAIN: ICD-10-CM

## 2024-05-16 DIAGNOSIS — M85.80 OSTEOPENIA, UNSPECIFIED LOCATION: ICD-10-CM

## 2024-05-16 DIAGNOSIS — Z79.84 LONG TERM (CURRENT) USE OF ORAL HYPOGLYCEMIC DRUGS: ICD-10-CM

## 2024-05-16 LAB
HBA1C MFR BLD: 8.2 % (ref ?–5.8)
POCT INT CON NEG: NEGATIVE
POCT INT CON POS: POSITIVE

## 2024-05-16 PROCEDURE — RXMED WILLOW AMBULATORY MEDICATION CHARGE: Performed by: INTERNAL MEDICINE

## 2024-05-16 PROCEDURE — 99215 OFFICE O/P EST HI 40 MIN: CPT | Performed by: INTERNAL MEDICINE

## 2024-05-16 PROCEDURE — 3078F DIAST BP <80 MM HG: CPT | Performed by: INTERNAL MEDICINE

## 2024-05-16 PROCEDURE — 3074F SYST BP LT 130 MM HG: CPT | Performed by: INTERNAL MEDICINE

## 2024-05-16 RX ORDER — ORAL SEMAGLUTIDE 3 MG/1
1 TABLET ORAL DAILY
Qty: 30 TABLET | Refills: 0 | Status: SHIPPED | OUTPATIENT
Start: 2024-05-16

## 2024-05-16 RX ORDER — ALENDRONATE SODIUM 70 MG/1
70 TABLET ORAL
Qty: 12 TABLET | Refills: 3 | Status: SHIPPED | OUTPATIENT
Start: 2024-05-16

## 2024-05-16 RX ORDER — ORAL SEMAGLUTIDE 7 MG/1
1 TABLET ORAL
Qty: 30 TABLET | Refills: 3 | Status: SHIPPED | OUTPATIENT
Start: 2024-05-16

## 2024-05-16 ASSESSMENT — FIBROSIS 4 INDEX: FIB4 SCORE: 1.4

## 2024-05-16 NOTE — PROGRESS NOTES
CHIEF COMPLAINT: Patient is here for follow up of Type 2 Diabetes Mellitus    HPI:     Shirley Unger is a 69 y.o. female with Type 2 Diabetes Mellitus here for follow up.      Labs from 5/16/2024 show A1c is 8.2%  Labs from 2/13/2024 show A1c is 6.8%  Labs from 10/23/2023 show A1c is 6.9%  Labs from 5/1/2023 show A1c was 7.2%  Labs from 10/21/2022 show A1c was 7.3%      She is on   Metformin 1000 mg twice a day  Jardiance 25mg daily  Glimepiride 4 mg in the morning        She denies SE with his meds  She stopped Trulicity due to diarrhea   This explains why her A1c is high               She has hyperlipidemia and is on atorvastatin 20 mg daily  She is also taking WelChol which can help with hyperlipidemia  She denies a history of coronary artery disease and cerebrovascular disease  She denies peripheral arterial disease  She denies muscle aches and muscle cramps   Latest Reference Range & Units 01/29/24 09:54   Cholesterol,Tot 100 - 199 mg/dL 128   Triglycerides 0 - 149 mg/dL 192 (H)   HDL >=40 mg/dL 42   LDL <100 mg/dL 48           She does not have diabetic kidney disease  Her albuminuria has resolved on restesting   Latest Reference Range & Units 05/06/24 07:00   Micro Alb Creat Ratio 0 - 30 mg/g see below   Creatinine, Urine mg/dL 40.08       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   She went to [facility] and had x rays showing a nondisplaced R wrist fracture.      DEXA on 7/20/2023 showed osteopenia of the left forearm (T score -2.1) interestingly FRAX scores were not obtained because there is no data for her hip BMD    She did have a negative work up for myeloma and hyperparathyroidism and hyperthyroidism     She started Alendronate 70mg  weekly on 11/2023  She denies SE  She denies interval falls and fractures.  She denies dental problems  She is exercising regularly  She takes vitamin D3 2000IU daily  She takes calcium citrate 600mg bid     Latest Reference Range & Units 01/29/24 09:54   25-Hydroxy   Vitamin D 25 30 - 100 ng/mL 56      Latest Reference Range & Units 01/29/24 09:57   C. Telopeptide B Cross Linked pg/mL 256      Latest Reference Range & Units 01/29/24 09:54   TSH 0.380 - 5.330 uIU/mL 0.960   Free T-4 0.93 - 1.70 ng/dL 1.26   Pth, Intact 14.0 - 72.0 pg/mL 29.1           BG Diary:  Patient forgot glucose meter    Weight has been stable    Diabetes Complications   Retinopathy: No known retinopathy.  Last eye exam: 3/10/2023  Neuropathy: Denies paresthesias or numbness in hands or feet. Denies any foot wounds.  Exercise: Minimal.  Diet: Fair.  Patient's medications, allergies, and social histories were reviewed and updated as appropriate.    ROS:     CONS:     No fever, no chills   EYES:     No diplopia, no blurry vision   CV:           No chest pain, no palpitations   PULM:     No SOB, no cough, no hemoptysis.   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  2022-11: Chronic pain of right knee  2022-07: History of COVID-19  2022-07: Urge incontinence of urine  2021-12: History of renal calculi  2021-12: Dense breast tissue on mammogram  2021-12: Gastroesophageal reflux disease without esophagitis  2021-07: Arthritis of left hip  2021-04: Bilateral hip pain  2021-04: Risk for falls  2021-01: Controlled  type 2 diabetes mellitus with diabetic   polyneuropathy, without long-term current use of insulin ([facility])  2020-10: Neck pain  2020-10: Left ear pain  2020: History of hip replacement, total, bilateral  2020: B12 deficiency  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  2018: Skin cancer screening  2018: Primary insomnia  2018: Functional diarrhea  2017: Obesity (BMI 30-39.9)  2017: Numbness of left foot  2017: Arthralgia of left temporomandibular joint  2017: Seasonal allergic rhinitis due to pollen  2016: Acute mastitis of left breast  2016: Herpes dermatitis  2016: Primary osteoarthritis of left hip  2016: Trigger finger of right thumb  2016: Colon polyps  2015: Uncontrolled type 2 diabetes mellitus without complication,   without long-term current use of insulin  2014: Hand eczema  2014: Right hand pain  2013: Vitamin D deficiency disease  2013: Recurrent nephrolithiasis  Hyperlipidemia associated with type 2 diabetes mellitus (HCC)  Type 2 DM, uncontrolled      Past Surgical History:  Past Surgical History:   Procedure Laterality Date    [place] TOTAL HIP ARTHROPLASTY Left 2021    Procedure: LEFT TOTAL HIP ARTHROPLASTY;  Surgeon: Miguel Madden M.D.;  Location: [facility];  Service: Orthopedics    [place] TOTAL HIP ARTHROPLASTY Right 3/11/2020    Procedure: ARTHROPLASTY, HIP, TOTAL;  Surgeon: Miguel Madden M.D.;  Location: [facility];  Service: Orthopedics    ARTHROPLASTY Right 2020    GANGLION EXCISION  2014    Performed by Efra Ramirez M.D. at SURGERY [facility]S    HYSTERECTOMY, TOTAL ABDOMINAL      For non cancerous reasons    CHOLECYSTECTOMY  2004    ABDOMINAL HYSTERECTOMY TOTAL      CARPAL TUNNEL RELEASE      Right hand    CYSTOSCOPY STENT PLACEMENT      several, stent removed in MD office    [place]  DELIVERY ONLY      PRIMARY C SECTION       x 2        Allergies:  Macrobid [nitrofurantoin  "monohydrate macrocrystals], Pcn [penicillins], and Penicillamine     Social History:  Social History     Tobacco Use    Smoking status: Never    Smokeless tobacco: Never   Vaping Use    Vaping status: Never Used   Substance Use Topics    Alcohol use: No     Alcohol/week: 0.0 oz     Comment: Rare Useage    Drug use: No        Family History:   family history includes Cancer in her mother; Diabetes in her maternal aunt and maternal grandfather; Heart Disease in her father; No Known Problems in her brother, brother, and brother.      PHYSICAL EXAM:   OBJECTIVE:  Vital signs: /60 (BP Location: Right arm, Patient Position: Sitting)   Pulse 88   Resp 16   Ht 1.727 m (5' 8\")   Wt 82.6 kg (182 lb)   LMP 11/29/2004   SpO2 97%   BMI 27.67 kg/m²   GENERAL: Well-developed, well-nourished in no apparent distress.   EYE:  No ocular asymmetry, PERRLA  HENT: Pink, moist mucous membranes.    NECK: No thyromegaly.   CARDIOVASCULAR:  No murmurs  LUNGS: Clear breath sounds  ABDOMEN: Soft, nontender   EXTREMITIES: No clubbing, cyanosis, or edema. Right forearm cast in place.  NEUROLOGICAL: No gross focal motor abnormalities   LYMPH: No cervical adenopathy palpated.   SKIN: No rashes, lesions.     Labs:  Lab Results   Component Value Date/Time    HBA1C 8.2 (A) 05/16/2024 10:43 AM        Lab Results   Component Value Date/Time    WBC 5.5 10/23/2023 09:53 AM    RBC 5.30 10/23/2023 09:53 AM    HEMOGLOBIN 15.6 10/23/2023 09:53 AM    MCV 93.0 10/23/2023 09:53 AM    MCH 29.4 10/23/2023 09:53 AM    MCHC 31.6 (L) 10/23/2023 09:53 AM    RDW 45.6 10/23/2023 09:53 AM    MPV 10.0 10/23/2023 09:53 AM       Lab Results   Component Value Date/Time    SODIUM 143 01/29/2024 09:54 AM    POTASSIUM 4.3 01/29/2024 09:54 AM    CHLORIDE 103 01/29/2024 09:54 AM    CO2 25 01/29/2024 09:54 AM    ANION 15.0 01/29/2024 09:54 AM    GLUCOSE 121 (H) 01/29/2024 09:54 AM    BUN 17 01/29/2024 09:54 AM    CREATININE 0.46 (L) 01/29/2024 09:54 AM    CREATININE " 0.9 04/07/2005 09:15 PM    CALCIUM 9.4 01/29/2024 09:54 AM    ASTSGOT 13 01/29/2024 09:54 AM    ALTSGPT 7 01/29/2024 09:54 AM    TBILIRUBIN 0.5 01/29/2024 09:54 AM    ALBUMIN 3.4 01/29/2024 09:54 AM    ALBUMIN 4.25 10/23/2023 09:54 AM    TOTPROTEIN 7.1 01/29/2024 09:54 AM    TOTPROTEIN 6.8 10/23/2023 09:54 AM    GLOBULIN 3.7 (H) 01/29/2024 09:54 AM    AGRATIO 0.9 01/29/2024 09:54 AM       Lab Results   Component Value Date/Time    CHOLSTRLTOT 147 10/21/2022 1129    TRIGLYCERIDE 184 (H) 10/21/2022 1129    HDL 43 10/21/2022 1129    LDL 67 10/21/2022 1129       Lab Results   Component Value Date/Time    MALBCRT see below 05/06/2024 07:00 AM    MICROALBUR <1.2 05/06/2024 07:00 AM        Lab Results   Component Value Date/Time    TSHULTRASEN 0.820 11/22/2021 1000     No results found for: FREEDIR  Lab Results   Component Value Date/Time    FREET3 2.76 11/22/2021 1000     No results found for: THYSTIMIG        ASSESSMENT/PLAN:     1. Type 2 diabetes mellitus with other diabetic kidney complication (HCC)  Previously controlled   A1c is up  Her A1c is high because she tried and failed Trulicity and has been off a GLP-1  We are going to try Rybelsus I explained to her how to properly take Rybelsus  She will  the sample starter dose after 1 month she will go to the 7 mg dose if tolerated  Continue glimepiride  Continue metformin  Continue Jardiance   Advise adequate hydration  She is up-to-date with her labs  She will follow-up with Josseline in 1 month and bring her glucose meter and sugars      2. Osteopenia, unspecified location  Stable  There is no interval fall or fracture  Although she has osteopenia by bone density testing she actually has osteoporosis because of fragility fracture.  Continue alendronate 70 mg weekly  Reviewed proper administration of medication  Discussed the importance of regular weightbearing exercise  Discussed the importance of good dental hygiene and regular dental visits  Discussed the  importance of adequate calcium and vitamin supplementation  Reviewed fall precautions  She should notify me if she has any falls or fractures  Repeat bone density on July 20, 2025    3. Neuropathic pain  Improved  Continue  gabapentin 300 mg every night for neuropathic pain - will increase if needed  Continue monitor symptoms and adjust medication accordingly until pain is controlled  If pain is not controlled with max dose gabapentin 600 mg 3 times a day then we will replace with Lyrica    4. Hyperlipidemia, unspecified hyperlipidemia type  Controlled  Continue atorvastatin  Repeat fasting lipids in 1 year    5. Vitamin D deficiency  Stable  Continue monitoring    6. Long term (current) use of oral hypoglycemic drugs  Patient is on oral agents for type 2 diabetes management        Return in about 4 weeks (around 6/13/2024).      Total time spent on day of service was over 60 minutes which included obtaining a detailed history and physical exam, ordering labs, coordinating care and scheduling future follow-up    Thank you kindly for allowing me to participate in the diabetes care plan for this patient.    Munir oPsey MD, FACE, [facility]      CC:   Rose Marie Gao M.D.

## 2024-06-25 ENCOUNTER — NON-PROVIDER VISIT (OUTPATIENT)
Dept: ENDOCRINOLOGY | Facility: MEDICAL CENTER | Age: 69
End: 2024-06-25
Attending: INTERNAL MEDICINE
Payer: MEDICARE

## 2024-06-25 VITALS
BODY MASS INDEX: 27.13 KG/M2 | OXYGEN SATURATION: 94 % | WEIGHT: 179 LBS | HEIGHT: 68 IN | HEART RATE: 68 BPM | SYSTOLIC BLOOD PRESSURE: 112 MMHG | DIASTOLIC BLOOD PRESSURE: 70 MMHG

## 2024-06-25 DIAGNOSIS — E11.29 TYPE 2 DIABETES MELLITUS WITH OTHER DIABETIC KIDNEY COMPLICATION (HCC): ICD-10-CM

## 2024-06-25 DIAGNOSIS — E78.5 DYSLIPIDEMIA: ICD-10-CM

## 2024-06-25 DIAGNOSIS — E55.9 VITAMIN D DEFICIENCY: ICD-10-CM

## 2024-06-25 LAB — HBA1C MFR BLD: 7.8 % (ref ?–5.8)

## 2024-06-25 PROCEDURE — 99212 OFFICE O/P EST SF 10 MIN: CPT | Performed by: INTERNAL MEDICINE

## 2024-06-25 ASSESSMENT — FIBROSIS 4 INDEX: FIB4 SCORE: 1.4

## 2024-06-25 NOTE — PROGRESS NOTES
RN-CDE Note    Subjective:   Endocrinology Clinic Progress Note  PCP: Rose Marie Gao M.D.    HPI:  Shirley Unger is a 69 y.o. old patient who is seen today by the Diabetes Nurse Specialist for review of Type 2 Diabetes.  Recent changes in health: Alexandr good.  DM:   Last A1c:   Lab Results   Component Value Date/Time    HBA1C 7.8 (A) 06/25/2024 12:00 AM      Previous A1c was 8.2 on 5/16/24  A1C GOAL: < 7    Diabetes Medications:   Rybelsus 7 mg daily  Jardiance 25 mg daily  Metformin 1000 mg BID  Glimepiride 4 mg daily      Exercise: Lots of walking  Diet: Breakfast is eggs, toast.  Lunch is sandwich or soup.  Dinner is protein, vegetable, and carbohydrates.  Patient's body mass index is 27.22 kg/m². Exercise and nutrition counseling were performed at this visit.    Glucose monitoring frequency: Testing twice daily  100-170's  Hypoglycemic episodes: no  Last Retinal Exam: on file and up-to-date  Daily Foot Exam: Yes   Foot Exam:  Monofilament: done  Monofilament testing with a 10 gram force: sensation intact: intact bilaterally  Visual Inspection: Feet without maceration, ulcers, fissures.  Pedal pulses: intact bilaterally      Lab Results   Component Value Date/Time    MALBCRT see below 05/06/2024 07:00 AM    MICROALBUR <1.2 05/06/2024 07:00 AM        ACR Albumin/Creatinine Ratio goal <30     HTN:   Blood pressure goal <130/<80 .   Currently Rx ACE/ARB: Yes     Dyslipidemia:    Lab Results   Component Value Date/Time    CHOLSTRLTOT 128 01/29/2024 09:54 AM    LDL 48 01/29/2024 09:54 AM    HDL 42 01/29/2024 09:54 AM    TRIGLYCERIDE 192 (H) 01/29/2024 09:54 AM         Currently Rx Statin: Yes     She  reports that she has never smoked. She has never used smokeless tobacco.      Plan:     Discussed and educated on:   - All medications, side effects and compliance (discussed carefully)  - Annual eye examinations at Ophthalmology  - Home glucose monitoring emphasized  - Weight control and daily  exercise    Recommended medication changes: She just went up on her Rybelsus from 3 mg to 7 mg last week so we will wait to increase it.  She will continue her Jardiance 25 mg daily, Metformin 1000 mg BID, and Glimepiride 4 mg daily.  She will let us know if she needs to go up on the Rybelsus through My Chart.  She will follow up with Dr. Posey in 6 months.

## 2024-07-22 ENCOUNTER — OFFICE VISIT (OUTPATIENT)
Dept: MEDICAL GROUP | Facility: PHYSICIAN GROUP | Age: 69
End: 2024-07-22
Payer: MEDICARE

## 2024-07-22 VITALS
WEIGHT: 180.6 LBS | TEMPERATURE: 99.4 F | DIASTOLIC BLOOD PRESSURE: 60 MMHG | HEART RATE: 81 BPM | SYSTOLIC BLOOD PRESSURE: 118 MMHG | BODY MASS INDEX: 27.37 KG/M2 | HEIGHT: 68 IN | OXYGEN SATURATION: 97 %

## 2024-07-22 DIAGNOSIS — E11.42 CONTROLLED TYPE 2 DIABETES MELLITUS WITH DIABETIC POLYNEUROPATHY, WITHOUT LONG-TERM CURRENT USE OF INSULIN (HCC): ICD-10-CM

## 2024-07-22 DIAGNOSIS — M76.60 ACHILLES TENDON PAIN: ICD-10-CM

## 2024-07-22 PROBLEM — M79.671 PAIN IN BOTH FEET: Status: RESOLVED | Noted: 2024-07-22 | Resolved: 2024-07-22

## 2024-07-22 PROBLEM — M79.672 PAIN IN BOTH FEET: Status: ACTIVE | Noted: 2024-07-22

## 2024-07-22 PROBLEM — M79.672 PAIN IN BOTH FEET: Status: RESOLVED | Noted: 2024-07-22 | Resolved: 2024-07-22

## 2024-07-22 PROBLEM — M79.671 PAIN IN BOTH FEET: Status: ACTIVE | Noted: 2024-07-22

## 2024-07-22 PROCEDURE — 99214 OFFICE O/P EST MOD 30 MIN: CPT | Performed by: FAMILY MEDICINE

## 2024-07-22 PROCEDURE — 3078F DIAST BP <80 MM HG: CPT | Performed by: FAMILY MEDICINE

## 2024-07-22 PROCEDURE — 3074F SYST BP LT 130 MM HG: CPT | Performed by: FAMILY MEDICINE

## 2024-07-22 RX ORDER — GLIMEPIRIDE 4 MG/1
2 TABLET ORAL EVERY MORNING
Qty: 100 TABLET | Refills: 2
Start: 2024-07-22 | End: 2024-07-30

## 2024-07-22 ASSESSMENT — FIBROSIS 4 INDEX: FIB4 SCORE: 1.4

## 2024-07-23 DIAGNOSIS — E11.29 TYPE 2 DIABETES MELLITUS WITH OTHER DIABETIC KIDNEY COMPLICATION (HCC): ICD-10-CM

## 2024-07-29 DIAGNOSIS — E11.42 CONTROLLED TYPE 2 DIABETES MELLITUS WITH DIABETIC POLYNEUROPATHY, WITHOUT LONG-TERM CURRENT USE OF INSULIN (HCC): ICD-10-CM

## 2024-07-29 PROCEDURE — RXMED WILLOW AMBULATORY MEDICATION CHARGE: Performed by: PHYSICIAN ASSISTANT

## 2024-07-30 RX ORDER — GLIMEPIRIDE 4 MG/1
4 TABLET ORAL EVERY MORNING
Qty: 100 TABLET | Refills: 2 | Status: SHIPPED | OUTPATIENT
Start: 2024-07-30

## 2024-07-31 ENCOUNTER — PHARMACY VISIT (OUTPATIENT)
Dept: PHARMACY | Facility: MEDICAL CENTER | Age: 69
End: 2024-07-31
Payer: COMMERCIAL

## 2024-09-24 DIAGNOSIS — E11.29 TYPE 2 DIABETES MELLITUS WITH OTHER DIABETIC KIDNEY COMPLICATION (HCC): ICD-10-CM

## 2024-09-24 RX ORDER — ORAL SEMAGLUTIDE 7 MG/1
1 TABLET ORAL
Qty: 30 TABLET | Refills: 3 | Status: SHIPPED | OUTPATIENT
Start: 2024-09-24

## 2024-10-02 ENCOUNTER — OFFICE VISIT (OUTPATIENT)
Dept: MEDICAL GROUP | Facility: PHYSICIAN GROUP | Age: 69
End: 2024-10-02
Payer: MEDICARE

## 2024-10-02 VITALS
OXYGEN SATURATION: 97 % | DIASTOLIC BLOOD PRESSURE: 62 MMHG | WEIGHT: 176.7 LBS | HEART RATE: 103 BPM | TEMPERATURE: 99.8 F | HEIGHT: 68 IN | BODY MASS INDEX: 26.78 KG/M2 | SYSTOLIC BLOOD PRESSURE: 112 MMHG

## 2024-10-02 DIAGNOSIS — N64.59 BREAST COMPLAINT: ICD-10-CM

## 2024-10-02 DIAGNOSIS — Z23 NEED FOR VACCINATION: ICD-10-CM

## 2024-10-02 DIAGNOSIS — N61.0 NIPPLE INFLAMMATION: ICD-10-CM

## 2024-10-02 PROCEDURE — 99214 OFFICE O/P EST MOD 30 MIN: CPT | Mod: 25 | Performed by: FAMILY MEDICINE

## 2024-10-02 PROCEDURE — G0008 ADMIN INFLUENZA VIRUS VAC: HCPCS | Performed by: FAMILY MEDICINE

## 2024-10-02 PROCEDURE — 90662 IIV NO PRSV INCREASED AG IM: CPT | Performed by: FAMILY MEDICINE

## 2024-10-02 RX ORDER — NYSTATIN AND TRIAMCINOLONE ACETONIDE 100000; 1 [USP'U]/G; MG/G
OINTMENT TOPICAL
Qty: 30 G | Refills: 1 | Status: SHIPPED | OUTPATIENT
Start: 2024-10-02

## 2024-10-02 ASSESSMENT — FIBROSIS 4 INDEX: FIB4 SCORE: 1.4

## 2024-10-15 ENCOUNTER — HOSPITAL ENCOUNTER (OUTPATIENT)
Dept: RADIOLOGY | Facility: MEDICAL CENTER | Age: 69
End: 2024-10-15
Attending: FAMILY MEDICINE
Payer: MEDICARE

## 2024-10-15 DIAGNOSIS — N64.59 BREAST COMPLAINT: ICD-10-CM

## 2024-10-15 PROCEDURE — G0279 TOMOSYNTHESIS, MAMMO: HCPCS

## 2024-10-15 PROCEDURE — 76642 ULTRASOUND BREAST LIMITED: CPT | Mod: LT

## 2024-10-21 ENCOUNTER — APPOINTMENT (RX ONLY)
Dept: URBAN - METROPOLITAN AREA CLINIC 15 | Facility: CLINIC | Age: 69
Setting detail: DERMATOLOGY
End: 2024-10-21

## 2024-10-21 DIAGNOSIS — Z71.89 OTHER SPECIFIED COUNSELING: ICD-10-CM

## 2024-10-21 DIAGNOSIS — R21 RASH AND OTHER NONSPECIFIC SKIN ERUPTION: ICD-10-CM | Status: INADEQUATELY CONTROLLED

## 2024-10-21 DIAGNOSIS — D22 MELANOCYTIC NEVI: ICD-10-CM

## 2024-10-21 DIAGNOSIS — D18.0 HEMANGIOMA: ICD-10-CM

## 2024-10-21 DIAGNOSIS — L82.1 OTHER SEBORRHEIC KERATOSIS: ICD-10-CM

## 2024-10-21 DIAGNOSIS — L30.4 ERYTHEMA INTERTRIGO: ICD-10-CM

## 2024-10-21 DIAGNOSIS — L57.8 OTHER SKIN CHANGES DUE TO CHRONIC EXPOSURE TO NONIONIZING RADIATION: ICD-10-CM

## 2024-10-21 DIAGNOSIS — L81.4 OTHER MELANIN HYPERPIGMENTATION: ICD-10-CM

## 2024-10-21 DIAGNOSIS — L57.0 ACTINIC KERATOSIS: ICD-10-CM

## 2024-10-21 PROBLEM — D18.01 HEMANGIOMA OF SKIN AND SUBCUTANEOUS TISSUE: Status: ACTIVE | Noted: 2024-10-21

## 2024-10-21 PROBLEM — D22.5 MELANOCYTIC NEVI OF TRUNK: Status: ACTIVE | Noted: 2024-10-21

## 2024-10-21 PROCEDURE — ? DIAGNOSIS COMMENT

## 2024-10-21 PROCEDURE — ? MEDICATION COUNSELING

## 2024-10-21 PROCEDURE — ? COUNSELING

## 2024-10-21 PROCEDURE — ? ADDITIONAL NOTES

## 2024-10-21 PROCEDURE — ? PHOTO-DOCUMENTATION

## 2024-10-21 PROCEDURE — ? LIQUID NITROGEN

## 2024-10-21 PROCEDURE — ? PRESCRIPTION

## 2024-10-21 PROCEDURE — 17003 DESTRUCT PREMALG LES 2-14: CPT

## 2024-10-21 PROCEDURE — 17000 DESTRUCT PREMALG LESION: CPT

## 2024-10-21 PROCEDURE — 99213 OFFICE O/P EST LOW 20 MIN: CPT | Mod: 25

## 2024-10-21 PROCEDURE — ? SUNSCREEN RECOMMENDATIONS

## 2024-10-21 RX ORDER — TRIAMCINOLONE ACETONIDE 1 MG/G
CREAM TOPICAL BID
Qty: 453.6 | Refills: 1 | Status: ERX | COMMUNITY
Start: 2024-10-21

## 2024-10-21 RX ADMIN — TRIAMCINOLONE ACETONIDE: 1 CREAM TOPICAL at 00:00

## 2024-10-21 ASSESSMENT — LOCATION DETAILED DESCRIPTION DERM
LOCATION DETAILED: RIGHT SUPERIOR MEDIAL MIDBACK
LOCATION DETAILED: RIGHT MEDIAL UPPER BACK
LOCATION DETAILED: LEFT NASAL SIDEWALL
LOCATION DETAILED: NASAL SUPRATIP
LOCATION DETAILED: RIGHT RADIAL PALM
LOCATION DETAILED: RIGHT DISTAL DORSAL FOREARM
LOCATION DETAILED: LEFT INFERIOR UPPER BACK
LOCATION DETAILED: LEFT AREOLA
LOCATION DETAILED: LEFT SUPERIOR CENTRAL MALAR CHEEK

## 2024-10-21 ASSESSMENT — LOCATION ZONE DERM
LOCATION ZONE: HAND
LOCATION ZONE: FACE
LOCATION ZONE: ARM
LOCATION ZONE: TRUNK
LOCATION ZONE: NOSE

## 2024-10-21 ASSESSMENT — LOCATION SIMPLE DESCRIPTION DERM
LOCATION SIMPLE: LEFT CHEEK
LOCATION SIMPLE: RIGHT FOREARM
LOCATION SIMPLE: LEFT BREAST
LOCATION SIMPLE: RIGHT HAND
LOCATION SIMPLE: LEFT UPPER BACK
LOCATION SIMPLE: NOSE
LOCATION SIMPLE: RIGHT UPPER BACK
LOCATION SIMPLE: LEFT NOSE
LOCATION SIMPLE: RIGHT LOWER BACK

## 2024-10-21 NOTE — PROCEDURE: ADDITIONAL NOTES
Render Risk Assessment In Note?: no
Additional Notes: has had inverted nipple.  Now starting to cause her problems and get rashy.  Refer to General Surgery to see if there is something they could do.  Has had workup for breast cancer and was negative.  Always have to think of Paget's but Not much to see today.  
Detail Level: Detailed

## 2024-10-21 NOTE — PROCEDURE: MEDICATION COUNSELING
Bactrim Counseling:  I discussed with the patient the risks of sulfa antibiotics including but not limited to GI upset, allergic reaction, drug rash, diarrhea, dizziness, photosensitivity, and yeast infections.  Rarely, more serious reactions can occur including but not limited to aplastic anemia, agranulocytosis, methemoglobinemia, blood dyscrasias, liver or kidney failure, lung infiltrates or desquamative/blistering drug rashes.
Finasteride Male Counseling: Finasteride Counseling:  I discussed with the patient the risks of use of finasteride including but not limited to decreased libido, decreased ejaculate volume, gynecomastia, and depression. Women should not handle medication.  All of the patient's questions and concerns were addressed.
Dupixent Pregnancy And Lactation Text: This medication likely crosses the placenta but the risk for the fetus is uncertain. This medication is excreted in breast milk.
Rifampin Counseling: I discussed with the patient the risks of rifampin including but not limited to liver damage, kidney damage, red-orange body fluids, nausea/vomiting and severe allergy.
Sotyktu Counseling:  I discussed the most common side effects of Sotyktu including: common cold, sore throat, sinus infections, cold sores, canker sores, folliculitis, and acne.? I also discussed more serious side effects of Sotyktu including but not limited to: serious allergic reactions; increased risk for infections such as TB; cancers such as lymphomas; rhabdomyolysis and elevated CPK; and elevated triglycerides and liver enzymes.?
Infliximab Counseling:  I discussed with the patient the risks of infliximab including but not limited to myelosuppression, immunosuppression, autoimmune hepatitis, demyelinating diseases, lymphoma, and serious infections.  The patient understands that monitoring is required including a PPD at baseline and must alert us or the primary physician if symptoms of infection or other concerning signs are noted.
Cellcept Counseling:  I discussed with the patient the risks of mycophenolate mofetil including but not limited to infection/immunosuppression, GI upset, hypokalemia, hypercholesterolemia, bone marrow suppression, lymphoproliferative disorders, malignancy, GI ulceration/bleed/perforation, colitis, interstitial lung disease, kidney failure, progressive multifocal leukoencephalopathy, and birth defects.  The patient understands that monitoring is required including a baseline creatinine and regular CBC testing. In addition, patient must alert us immediately if symptoms of infection or other concerning signs are noted.
Opzelura Pregnancy And Lactation Text: There is insufficient data to evaluate drug-associated risk for major birth defects, miscarriage, or other adverse maternal or fetal outcomes.  There is a pregnancy registry that monitors pregnancy outcomes in pregnant persons exposed to the medication during pregnancy.  It is unknown if this medication is excreted in breast milk.  Do not breastfeed during treatment and for about 4 weeks after the last dose.
Erivedge Counseling- I discussed with the patient the risks of Erivedge including but not limited to nausea, vomiting, diarrhea, constipation, weight loss, changes in the sense of taste, decreased appetite, muscle spasms, and hair loss.  The patient verbalized understanding of the proper use and possible adverse effects of Erivedge.  All of the patient's questions and concerns were addressed.
Solaraze Counseling:  I discussed with the patient the risks of Solaraze including but not limited to erythema, scaling, itching, weeping, crusting, and pain.
Gabapentin Counseling: I discussed with the patient the risks of gabapentin including but not limited to dizziness, somnolence, fatigue and ataxia.
Topical Clindamycin Pregnancy And Lactation Text: This medication is Pregnancy Category B and is considered safe during pregnancy. It is unknown if it is excreted in breast milk.
Prednisone Pregnancy And Lactation Text: This medication is Pregnancy Category C and it isn't know if it is safe during pregnancy. This medication is excreted in breast milk.
Valtrex Counseling: I discussed with the patient the risks of valacyclovir including but not limited to kidney damage, nausea, vomiting and severe allergy.  The patient understands that if the infection seems to be worsening or is not improving, they are to call.
Doxepin Counseling:  Patient advised that the medication is sedating and not to drive a car after taking this medication. Patient informed of potential adverse effects including but not limited to dry mouth, urinary retention, and blurry vision.  The patient verbalized understanding of the proper use and possible adverse effects of doxepin.  All of the patient's questions and concerns were addressed.
Isotretinoin Pregnancy And Lactation Text: This medication is Pregnancy Category X and is considered extremely dangerous during pregnancy. It is unknown if it is excreted in breast milk.
Erivedge Pregnancy And Lactation Text: This medication is Pregnancy Category X and is absolutely contraindicated during pregnancy. It is unknown if it is excreted in breast milk.
Azelaic Acid Pregnancy And Lactation Text: This medication is considered safe during pregnancy and breast feeding.
Topical Ketoconazole Counseling: Patient counseled that this medication may cause skin irritation or allergic reactions.  In the event of skin irritation, the patient was advised to reduce the amount of the drug applied or use it less frequently.   The patient verbalized understanding of the proper use and possible adverse effects of ketoconazole.  All of the patient's questions and concerns were addressed.
Gabapentin Pregnancy And Lactation Text: This medication is Pregnancy Category C and isn't considered safe during pregnancy. It is excreted in breast milk.
5-Fu Pregnancy And Lactation Text: This medication is Pregnancy Category X and contraindicated in pregnancy and in women who may become pregnant. It is unknown if this medication is excreted in breast milk.
Adbry Counseling: I discussed with the patient the risks of tralokinumab including but not limited to eye infection and irritation, cold sores, injection site reactions, worsening of asthma, allergic reactions and increased risk of parasitic infection.  Live vaccines should be avoided while taking tralokinumab. The patient understands that monitoring is required and they must alert us or the primary physician if symptoms of infection or other concerning signs are noted.
Imiquimod Counseling:  I discussed with the patient the risks of imiquimod including but not limited to erythema, scaling, itching, weeping, crusting, and pain.  Patient understands that the inflammatory response to imiquimod is variable from person to person and was educated regarded proper titration schedule.  If flu-like symptoms develop, patient knows to discontinue the medication and contact us.
Griseofulvin Pregnancy And Lactation Text: This medication is Pregnancy Category X and is known to cause serious birth defects. It is unknown if this medication is excreted in breast milk but breast feeding should be avoided.
Nsaids Counseling: NSAID Counseling: I discussed with the patient that NSAIDs should be taken with food. Prolonged use of NSAIDs can result in the development of stomach ulcers.  Patient advised to stop taking NSAIDs if abdominal pain occurs.  The patient verbalized understanding of the proper use and possible adverse effects of NSAIDs.  All of the patient's questions and concerns were addressed.
Stelara Counseling:  I discussed with the patient the risks of ustekinumab including but not limited to immunosuppression, malignancy, posterior leukoencephalopathy syndrome, and serious infections.  The patient understands that monitoring is required including a PPD at baseline and must alert us or the primary physician if symptoms of infection or other concerning signs are noted.
Doxycycline Pregnancy And Lactation Text: This medication is Pregnancy Category D and not consider safe during pregnancy. It is also excreted in breast milk but is considered safe for shorter treatment courses.
Sotyktu Pregnancy And Lactation Text: There is insufficient data to evaluate whether or not Sotyktu is safe to use during pregnancy.? ?It is not known if Sotyktu passes into breast milk and whether or not it is safe to use when breastfeeding.??
Erythromycin Counseling:  I discussed with the patient the risks of erythromycin including but not limited to GI upset, allergic reaction, drug rash, diarrhea, increase in liver enzymes, and yeast infections.
Adbry Pregnancy And Lactation Text: It is unknown if this medication will adversely affect pregnancy or breast feeding.
Enbrel Counseling:  I discussed with the patient the risks of etanercept including but not limited to myelosuppression, immunosuppression, autoimmune hepatitis, demyelinating diseases, lymphoma, and infections.  The patient understands that monitoring is required including a PPD at baseline and must alert us or the primary physician if symptoms of infection or other concerning signs are noted.
Cibinqo Counseling: I discussed with the patient the risks of Cibinqo therapy including but not limited to common cold, nausea, headache, cold sores, increased blood CPK levels, dizziness, UTIs, fatigue, acne, and vomitting. Live vaccines should be avoided.  This medication has been linked to serious infections; higher rate of mortality; malignancy and lymphoproliferative disorders; major adverse cardiovascular events; thrombosis; thrombocytopenia and lymphopenia; lipid elevations; and retinal detachment.
Rifampin Pregnancy And Lactation Text: This medication is Pregnancy Category C and it isn't know if it is safe during pregnancy. It is also excreted in breast milk and should not be used if you are breast feeding.
Stelara Pregnancy And Lactation Text: This medication is Pregnancy Category B and is considered safe during pregnancy. It is unknown if this medication is excreted in breast milk.
Cellcept Pregnancy And Lactation Text: This medication is Pregnancy Category D and isn't considered safe during pregnancy. It is unknown if this medication is excreted in breast milk.
Picato Counseling:  I discussed with the patient the risks of Picato including but not limited to erythema, scaling, itching, weeping, crusting, and pain.
Propranolol Counseling:  I discussed with the patient the risks of propranolol including but not limited to low heart rate, low blood pressure, low blood sugar, restlessness and increased cold sensitivity. They should call the office if they experience any of these side effects.
Arava Counseling:  Patient counseled regarding adverse effects of Arava including but not limited to nausea, vomiting, abnormalities in liver function tests. Patients may develop mouth sores, rash, diarrhea, and abnormalities in blood counts. The patient understands that monitoring is required including LFTs and blood counts.  There is a rare possibility of scarring of the liver and lung problems that can occur when taking methotrexate. Persistent nausea, loss of appetite, pale stools, dark urine, cough, and shortness of breath should be reported immediately. Patient advised to discontinue Arava treatment and consult with a physician prior to attempting conception. The patient will have to undergo a treatment to eliminate Arava from the body prior to conception.
Valtrex Pregnancy And Lactation Text: this medication is Pregnancy Category B and is considered safe during pregnancy. This medication is not directly found in breast milk but it's metabolite acyclovir is present.
VTAMA Counseling: I discussed with the patient that VTAMA is not for use in the eyes, mouth or mouth. They should call the office if they develop any signs of allergic reactions to VTAMA. The patient verbalized understanding of the proper use and possible adverse effects of VTAMA.  All of the patient's questions and concerns were addressed.
Solaraze Pregnancy And Lactation Text: This medication is Pregnancy Category B and is considered safe. There is some data to suggest avoiding during the third trimester. It is unknown if this medication is excreted in breast milk.
Bactrim Pregnancy And Lactation Text: This medication is Pregnancy Category D and is known to cause fetal risk.  It is also excreted in breast milk.
Finasteride Female Counseling: Finasteride Counseling:  I discussed with the patient the risks of use of finasteride including but not limited to decreased libido and sexual dysfunction. Explained the teratogenic nature of the medication and stressed the importance of not getting pregnant during treatment. All of the patient's questions and concerns were addressed.
Siliq Counseling:  I discussed with the patient the risks of Siliq including but not limited to new or worsening depression, suicidal thoughts and behavior, immunosuppression, malignancy, posterior leukoencephalopathy syndrome, and serious infections.  The patient understands that monitoring is required including a PPD at baseline and must alert us or the primary physician if symptoms of infection or other concerning signs are noted. There is also a special program designed to monitor depression which is required with Siliq.
Rituxan Counseling:  I discussed with the patient the risks of Rituxan infusions. Side effects can include infusion reactions, severe drug rashes including mucocutaneous reactions, reactivation of latent hepatitis and other infections and rarely progressive multifocal leukoencephalopathy.  All of the patient's questions and concerns were addressed.
Finasteride Pregnancy And Lactation Text: This medication is absolutely contraindicated during pregnancy. It is unknown if it is excreted in breast milk.
Doxepin Pregnancy And Lactation Text: This medication is Pregnancy Category C and it isn't known if it is safe during pregnancy. It is also excreted in breast milk and breast feeding isn't recommended.
Soolantra Counseling: I discussed with the patients the risks of topial Soolantra. This is a medicine which decreases the number of mites and inflammation in the skin. You experience burning, stinging, eye irritation or allergic reactions.  Please call our office if you develop any problems from using this medication.
Benzoyl Peroxide Counseling: Patient counseled that medicine may cause skin irritation and bleach clothing.  In the event of skin irritation, the patient was advised to reduce the amount of the drug applied or use it less frequently.   The patient verbalized understanding of the proper use and possible adverse effects of benzoyl peroxide.  All of the patient's questions and concerns were addressed.
Drysol Counseling:  I discussed with the patient the risks of drysol/aluminum chloride including but not limited to skin rash, itching, irritation, burning.
Nsaids Pregnancy And Lactation Text: These medications are considered safe up to 30 weeks gestation. It is excreted in breast milk.
Imiquimod Pregnancy And Lactation Text: This medication is Pregnancy Category C. It is unknown if this medication is excreted in breast milk.
Glycopyrrolate Counseling:  I discussed with the patient the risks of glycopyrrolate including but not limited to skin rash, drowsiness, dry mouth, difficulty urinating, and blurred vision.
Winlevi Counseling:  I discussed with the patient the risks of topical clascoterone including but not limited to erythema, scaling, itching, and stinging. Patient voiced their understanding.
Itraconazole Counseling:  I discussed with the patient the risks of itraconazole including but not limited to liver damage, nausea/vomiting, neuropathy, and severe allergy.  The patient understands that this medication is best absorbed when taken with acidic beverages such as non-diet cola or ginger ale.  The patient understands that monitoring is required including baseline LFTs and repeat LFTs at intervals.  The patient understands that they are to contact us or the primary physician if concerning signs are noted.
Libtayo Counseling- I discussed with the patient the risks of Libtayo including but not limited to nausea, vomiting, diarrhea, and bone or muscle pain.  The patient verbalized understanding of the proper use and possible adverse effects of Libtayo.  All of the patient's questions and concerns were addressed.
Itraconazole Pregnancy And Lactation Text: This medication is Pregnancy Category C and it isn't know if it is safe during pregnancy. It is also excreted in breast milk.
Bimzelx Counseling:  I discussed with the patient the risks of Bimzelx including but not limited to depression, immunosuppression, allergic reactions and infections.  The patient understands that monitoring is required including a PPD at baseline and must alert us or the primary physician if symptoms of infection or other concerning signs are noted.
Erythromycin Pregnancy And Lactation Text: This medication is Pregnancy Category B and is considered safe during pregnancy. It is also excreted in breast milk.
Taltz Counseling: I discussed with the patient the risks of ixekizumab including but not limited to immunosuppression, serious infections, worsening of inflammatory bowel disease and drug reactions.  The patient understands that monitoring is required including a PPD at baseline and must alert us or the primary physician if symptoms of infection or other concerning signs are noted.
Winlevi Pregnancy And Lactation Text: This medication is considered safe during pregnancy and breastfeeding.
Olanzapine Counseling- I discussed with the patient the common side effects of olanzapine including but are not limited to: lack of energy, dry mouth, increased appetite, sleepiness, tremor, constipation, dizziness, changes in behavior, or restlessness.  Explained that teenagers are more likely to experience headaches, abdominal pain, pain in the arms or legs, tiredness, and sleepiness.  Serious side effects include but are not limited: increased risk of death in elderly patients who are confused, have memory loss, or dementia-related psychosis; hyperglycemia; increased cholesterol and triglycerides; and weight gain.
Klisyri Counseling:  I discussed with the patient the risks of Klisyri including but not limited to erythema, scaling, itching, weeping, crusting, and pain.
Use Enhanced Medication Counseling?: No
Vtama Pregnancy And Lactation Text: It is unknown if this medication can cause problems during pregnancy and breastfeeding.
Propranolol Pregnancy And Lactation Text: This medication is Pregnancy Category C and it isn't known if it is safe during pregnancy. It is excreted in breast milk.
Cyclophosphamide Counseling:  I discussed with the patient the risks of cyclophosphamide including but not limited to hair loss, hormonal abnormalities, decreased fertility, abdominal pain, diarrhea, nausea and vomiting, bone marrow suppression and infection. The patient understands that monitoring is required while taking this medication.
Xeljanz Counseling: I discussed with the patient the risks of Xeljanz therapy including increased risk of infection, liver issues, headache, diarrhea, or cold symptoms. Live vaccines should be avoided. They were instructed to call if they have any problems.
Sarecycline Counseling: Patient advised regarding possible photosensitivity and discoloration of the teeth, skin, lips, tongue and gums.  Patient instructed to avoid sunlight, if possible.  When exposed to sunlight, patients should wear protective clothing, sunglasses, and sunscreen.  The patient was instructed to call the office immediately if the following severe adverse effects occur:  hearing changes, easy bruising/bleeding, severe headache, or vision changes.  The patient verbalized understanding of the proper use and possible adverse effects of sarecycline.  All of the patient's questions and concerns were addressed.
Birth Control Pills Counseling: Birth Control Pill Counseling: I discussed with the patient the potential side effects of OCPs including but not limited to increased risk of stroke, heart attack, thrombophlebitis, deep venous thrombosis, hepatic adenomas, breast changes, GI upset, headaches, and depression.  The patient verbalized understanding of the proper use and possible adverse effects of OCPs. All of the patient's questions and concerns were addressed.
Zoryve Counseling:  I discussed with the patient that Zoryve is not for use in the eyes, mouth or vagina. The most commonly reported side effects include diarrhea, headache, insomnia, application site pain, upper respiratory tract infections, and urinary tract infections.  All of the patient's questions and concerns were addressed.
Sarecycline Pregnancy And Lactation Text: This medication is Pregnancy Category D and not consider safe during pregnancy. It is also excreted in breast milk.
Rituxan Pregnancy And Lactation Text: This medication is Pregnancy Category C and it isn't know if it is safe during pregnancy. It is unknown if this medication is excreted in breast milk but similar antibodies are known to be excreted.
Soolantra Pregnancy And Lactation Text: This medication is Pregnancy Category C. This medication is considered safe during breast feeding.
Hydroxyzine Counseling: Patient advised that the medication is sedating and not to drive a car after taking this medication.  Patient informed of potential adverse effects including but not limited to dry mouth, urinary retention, and blurry vision.  The patient verbalized understanding of the proper use and possible adverse effects of hydroxyzine.  All of the patient's questions and concerns were addressed.
Benzoyl Peroxide Pregnancy And Lactation Text: This medication is Pregnancy Category C. It is unknown if benzoyl peroxide is excreted in breast milk.
Libtayo Pregnancy And Lactation Text: This medication is contraindicated in pregnancy and when breast feeding.
Cibinqo Pregnancy And Lactation Text: It is unknown if this medication will adversely affect pregnancy or breast feeding.  You should not take this medication if you are currently pregnant or planning a pregnancy or while breastfeeding.
Glycopyrrolate Pregnancy And Lactation Text: This medication is Pregnancy Category B and is considered safe during pregnancy. It is unknown if it is excreted breast milk.
Clofazimine Counseling:  I discussed with the patient the risks of clofazimine including but not limited to skin and eye pigmentation, liver damage, nausea/vomiting, gastrointestinal bleeding and allergy.
Topical Metronidazole Counseling: Metronidazole is a topical antibiotic medication. You may experience burning, stinging, redness, or allergic reactions.  Please call our office if you develop any problems from using this medication.
Siliq Pregnancy And Lactation Text: The risk during pregnancy and breastfeeding is uncertain with this medication.
Litfulo Counseling: I discussed with the patient the risks of Litfulo therapy including but not limited to upper respiratory tract infections, shingles, cold sores, and nausea. Live vaccines should be avoided.  This medication has been linked to serious infections; higher rate of mortality; malignancy and lymphoproliferative disorders; major adverse cardiovascular events; thrombosis; gastrointestinal perforations; neutropenia; lymphopenia; anemia; liver enzyme elevations; and lipid elevations.
Hydroxyzine Pregnancy And Lactation Text: This medication is not safe during pregnancy and should not be taken. It is also excreted in breast milk and breast feeding isn't recommended.
Carac Counseling:  I discussed with the patient the risks of Carac including but not limited to erythema, scaling, itching, weeping, crusting, and pain.
Bimzelx Pregnancy And Lactation Text: This medication crosses the placenta and the safety is uncertain during pregnancy. It is unknown if this medication is present in breast milk.
Simponi Counseling:  I discussed with the patient the risks of golimumab including but not limited to myelosuppression, immunosuppression, autoimmune hepatitis, demyelinating diseases, lymphoma, and serious infections.  The patient understands that monitoring is required including a PPD at baseline and must alert us or the primary physician if symptoms of infection or other concerning signs are noted.
Topical Metronidazole Pregnancy And Lactation Text: This medication is Pregnancy Category B and considered safe during pregnancy.  It is also considered safe to use while breastfeeding.
Elidel Counseling: Patient may experience a mild burning sensation during topical application. Elidel is not approved in children less than 2 years of age. There have been case reports of hematologic and skin malignancies in patients using topical calcineurin inhibitors although causality is questionable.
Olanzapine Pregnancy And Lactation Text: This medication is pregnancy category C.   There are no adequate and well controlled trials with olanzapine in pregnant females.  Olanzapine should be used during pregnancy only if the potential benefit justifies the potential risk to the fetus.   In a study in lactating healthy women, olanzapine was excreted in breast milk.  It is recommended that women taking olanzapine should not breast feed.
Cyclophosphamide Pregnancy And Lactation Text: This medication is Pregnancy Category D and it isn't considered safe during pregnancy. This medication is excreted in breast milk.
Klisyri Pregnancy And Lactation Text: It is unknown if this medication can harm a developing fetus or if it is excreted in breast milk.
Humira Counseling:  I discussed with the patient the risks of adalimumab including but not limited to myelosuppression, immunosuppression, autoimmune hepatitis, demyelinating diseases, lymphoma, and serious infections.  The patient understands that monitoring is required including a PPD at baseline and must alert us or the primary physician if symptoms of infection or other concerning signs are noted.
Ketoconazole Counseling:   Patient counseled regarding improving absorption with orange juice.  Adverse effects include but are not limited to breast enlargement, headache, diarrhea, nausea, upset stomach, liver function test abnormalities, taste disturbance, and stomach pain.  There is a rare possibility of liver failure that can occur when taking ketoconazole. The patient understands that monitoring of LFTs may be required, especially at baseline. The patient verbalized understanding of the proper use and possible adverse effects of ketoconazole.  All of the patient's questions and concerns were addressed.
SSKI Counseling:  I discussed with the patient the risks of SSKI including but not limited to thyroid abnormalities, metallic taste, GI upset, fever, headache, acne, arthralgias, paraesthesias, lymphadenopathy, easy bleeding, arrhythmias, and allergic reaction.
Protopic Counseling: Patient may experience a mild burning sensation during topical application. Protopic is not approved in children less than 2 years of age. There have been case reports of hematologic and skin malignancies in patients using topical calcineurin inhibitors although causality is questionable.
Detail Level: Simple
Xelhoracioz Pregnancy And Lactation Text: This medication is Pregnancy Category D and is not considered safe during pregnancy.  The risk during breast feeding is also uncertain.
Metronidazole Counseling:  I discussed with the patient the risks of metronidazole including but not limited to seizures, nausea/vomiting, a metallic taste in the mouth, nausea/vomiting and severe allergy.
Tetracycline Counseling: Patient counseled regarding possible photosensitivity and increased risk for sunburn.  Patient instructed to avoid sunlight, if possible.  When exposed to sunlight, patients should wear protective clothing, sunglasses, and sunscreen.  The patient was instructed to call the office immediately if the following severe adverse effects occur:  hearing changes, easy bruising/bleeding, severe headache, or vision changes.  The patient verbalized understanding of the proper use and possible adverse effects of tetracycline.  All of the patient's questions and concerns were addressed. Patient understands to avoid pregnancy while on therapy due to potential birth defects.
Cyclosporine Counseling:  I discussed with the patient the risks of cyclosporine including but not limited to hypertension, gingival hyperplasia,myelosuppression, immunosuppression, liver damage, kidney damage, neurotoxicity, lymphoma, and serious infections. The patient understands that monitoring is required including baseline blood pressure, CBC, CMP, lipid panel and uric acid, and then 1-2 times monthly CMP and blood pressure.
Protopic Pregnancy And Lactation Text: This medication is Pregnancy Category C. It is unknown if this medication is excreted in breast milk when applied topically.
Odomzo Counseling- I discussed with the patient the risks of Odomzo including but not limited to nausea, vomiting, diarrhea, constipation, weight loss, changes in the sense of taste, decreased appetite, muscle spasms, and hair loss.  The patient verbalized understanding of the proper use and possible adverse effects of Odomzo.  All of the patient's questions and concerns were addressed.
High Dose Vitamin A Counseling: Side effects reviewed, pt to contact office should one occur.
Hydroxychloroquine Counseling:  I discussed with the patient that a baseline ophthalmologic exam is needed at the start of therapy and every year thereafter while on therapy. A CBC may also be warranted for monitoring.  The side effects of this medication were discussed with the patient, including but not limited to agranulocytosis, aplastic anemia, seizures, rashes, retinopathy, and liver toxicity. Patient instructed to call the office should any adverse effect occur.  The patient verbalized understanding of the proper use and possible adverse effects of Plaquenil.  All the patient's questions and concerns were addressed.
Topical Retinoid counseling:  Patient advised to apply a pea-sized amount only at bedtime and wait 30 minutes after washing their face before applying.  If too drying, patient may add a non-comedogenic moisturizer. The patient verbalized understanding of the proper use and possible adverse effects of retinoids.  All of the patient's questions and concerns were addressed.
Birth Control Pills Pregnancy And Lactation Text: This medication should be avoided if pregnant and for the first 30 days post-partum.
Cephalexin Counseling: I counseled the patient regarding use of cephalexin as an antibiotic for prophylactic and/or therapeutic purposes. Cephalexin (commonly prescribed under brand name Keflex) is a cephalosporin antibiotic which is active against numerous classes of bacteria, including most skin bacteria. Side effects may include nausea, diarrhea, gastrointestinal upset, rash, hives, yeast infections, and in rare cases, hepatitis, kidney disease, seizures, fever, confusion, neurologic symptoms, and others. Patients with severe allergies to penicillin medications are cautioned that there is about a 10% incidence of cross-reactivity with cephalosporins. When possible, patients with penicillin allergies should use alternatives to cephalosporins for antibiotic therapy.
High Dose Vitamin A Pregnancy And Lactation Text: High dose vitamin A therapy is contraindicated during pregnancy and breast feeding.
Acitretin Counseling:  I discussed with the patient the risks of acitretin including but not limited to hair loss, dry lips/skin/eyes, liver damage, hyperlipidemia, depression/suicidal ideation, photosensitivity.  Serious rare side effects can include but are not limited to pancreatitis, pseudotumor cerebri, bony changes, clot formation/stroke/heart attack.  Patient understands that alcohol is contraindicated since it can result in liver toxicity and significantly prolong the elimination of the drug by many years.
Topical Steroids Counseling: I discussed with the patient that prolonged use of topical steroids can result in the increased appearance of superficial blood vessels (telangiectasias), lightening (hypopigmentation) and thinning of the skin (atrophy).  Patient understands to avoid using high potency steroids in skin folds, the groin or the face.  The patient verbalized understanding of the proper use and possible adverse effects of topical steroids.  All of the patient's questions and concerns were addressed.
Hydroxychloroquine Pregnancy And Lactation Text: This medication has been shown to cause fetal harm but it isn't assigned a Pregnancy Risk Category. There are small amounts excreted in breast milk.
Metronidazole Pregnancy And Lactation Text: This medication is Pregnancy Category B and considered safe during pregnancy.  It is also excreted in breast milk.
Sski Pregnancy And Lactation Text: This medication is Pregnancy Category D and isn't considered safe during pregnancy. It is excreted in breast milk.
Cimzia Counseling:  I discussed with the patient the risks of Cimzia including but not limited to immunosuppression, allergic reactions and infections.  The patient understands that monitoring is required including a PPD at baseline and must alert us or the primary physician if symptoms of infection or other concerning signs are noted.
Oral Minoxidil Counseling- I discussed with the patient the risks of oral minoxidil including but not limited to shortness of breath, swelling of the feet or ankles, dizziness, lightheadedness, unwanted hair growth and allergic reaction.  The patient verbalized understanding of the proper use and possible adverse effects of oral minoxidil.  All of the patient's questions and concerns were addressed.
Minoxidil Counseling: Minoxidil is a topical medication which can increase blood flow where it is applied. It is uncertain how this medication increases hair growth. Side effects are uncommon and include stinging and allergic reactions.
Ketoconazole Pregnancy And Lactation Text: This medication is Pregnancy Category C and it isn't know if it is safe during pregnancy. It is also excreted in breast milk and breast feeding isn't recommended.
Tremfya Counseling: I discussed with the patient the risks of guselkumab including but not limited to immunosuppression, serious infections, worsening of inflammatory bowel disease and drug reactions.  The patient understands that monitoring is required including a PPD at baseline and must alert us or the primary physician if symptoms of infection or other concerning signs are noted.
Litfulo Pregnancy And Lactation Text: Based on animal studies, Lifulo may cause embryo-fetal harm when administered to pregnant women.  The medication should not be used in pregnancy.  Breastfeeding is not recommended during treatment.
Minocycline Counseling: Patient advised regarding possible photosensitivity and discoloration of the teeth, skin, lips, tongue and gums.  Patient instructed to avoid sunlight, if possible.  When exposed to sunlight, patients should wear protective clothing, sunglasses, and sunscreen.  The patient was instructed to call the office immediately if the following severe adverse effects occur:  hearing changes, easy bruising/bleeding, severe headache, or vision changes.  The patient verbalized understanding of the proper use and possible adverse effects of minocycline.  All of the patient's questions and concerns were addressed.
Cimzia Pregnancy And Lactation Text: This medication crosses the placenta but can be considered safe in certain situations. Cimzia may be excreted in breast milk.
Olumiant Counseling: I discussed with the patient the risks of Olumiant therapy including but not limited to upper respiratory tract infections, shingles, cold sores, and nausea. Live vaccines should be avoided.  This medication has been linked to serious infections; higher rate of mortality; malignancy and lymphoproliferative disorders; major adverse cardiovascular events; thrombosis; gastrointestinal perforations; neutropenia; lymphopenia; anemia; liver enzyme elevations; and lipid elevations.
Hyrimoz Counseling:  I discussed with the patient the risks of adalimumab including but not limited to myelosuppression, immunosuppression, autoimmune hepatitis, demyelinating diseases, lymphoma, and serious infections.  The patient understands that monitoring is required including a PPD at baseline and must alert us or the primary physician if symptoms of infection or other concerning signs are noted.
Thalidomide Counseling: I discussed with the patient the risks of thalidomide including but not limited to birth defects, anxiety, weakness, chest pain, dizziness, cough and severe allergy.
Qbrexza Counseling:  I discussed with the patient the risks of Qbrexza including but not limited to headache, mydriasis, blurred vision, dry eyes, nasal dryness, dry mouth, dry throat, dry skin, urinary hesitation, and constipation.  Local skin reactions including erythema, burning, stinging, and itching can also occur.
Albendazole Counseling:  I discussed with the patient the risks of albendazole including but not limited to cytopenia, kidney damage, nausea/vomiting and severe allergy.  The patient understands that this medication is being used in an off-label manner.
Colchicine Counseling:  Patient counseled regarding adverse effects including but not limited to stomach upset (nausea, vomiting, stomach pain, or diarrhea).  Patient instructed to limit alcohol consumption while taking this medication.  Colchicine may reduce blood counts especially with prolonged use.  The patient understands that monitoring of kidney function and blood counts may be required, especially at baseline. The patient verbalized understanding of the proper use and possible adverse effects of colchicine.  All of the patient's questions and concerns were addressed.
Zyclara Counseling:  I discussed with the patient the risks of imiquimod including but not limited to erythema, scaling, itching, weeping, crusting, and pain.  Patient understands that the inflammatory response to imiquimod is variable from person to person and was educated regarded proper titration schedule.  If flu-like symptoms develop, patient knows to discontinue the medication and contact us.
Spironolactone Counseling: Patient advised regarding risks of diarrhea, abdominal pain, hyperkalemia, birth defects (for female patients), liver toxicity and renal toxicity. The patient may need blood work to monitor liver and kidney function and potassium levels while on therapy. The patient verbalized understanding of the proper use and possible adverse effects of spironolactone.  All of the patient's questions and concerns were addressed.
Spironolactone Pregnancy And Lactation Text: This medication can cause feminization of the male fetus and should be avoided during pregnancy. The active metabolite is also found in breast milk.
Tazorac Counseling:  Patient advised that medication is irritating and drying.  Patient may need to apply sparingly and wash off after an hour before eventually leaving it on overnight.  The patient verbalized understanding of the proper use and possible adverse effects of tazorac.  All of the patient's questions and concerns were addressed.
Cephalexin Pregnancy And Lactation Text: This medication is Pregnancy Category B and considered safe during pregnancy.  It is also excreted in breast milk but can be used safely for shorter doses.
Calcipotriene Counseling:  I discussed with the patient the risks of calcipotriene including but not limited to erythema, scaling, itching, and irritation.
Eucrisa Counseling: Patient may experience a mild burning sensation during topical application. Eucrisa is not approved in children less than 2 years of age.
Oral Minoxidil Pregnancy And Lactation Text: This medication should only be used when clearly needed if you are pregnant, attempting to become pregnant or breast feeding.
Topical Steroids Applications Pregnancy And Lactation Text: Most topical steroids are considered safe to use during pregnancy and lactation.  Any topical steroid applied to the breast or nipple should be washed off before breastfeeding.
Acitretin Pregnancy And Lactation Text: This medication is Pregnancy Category X and should not be given to women who are pregnant or may become pregnant in the future. This medication is excreted in breast milk.
Terbinafine Counseling: Patient counseling regarding adverse effects of terbinafine including but not limited to headache, diarrhea, rash, upset stomach, liver function test abnormalities, itching, taste/smell disturbance, nausea, abdominal pain, and flatulence.  There is a rare possibility of liver failure that can occur when taking terbinafine.  The patient understands that a baseline LFT and kidney function test may be required. The patient verbalized understanding of the proper use and possible adverse effects of terbinafine.  All of the patient's questions and concerns were addressed.
Minoxidil Pregnancy And Lactation Text: This medication has not been assigned a Pregnancy Risk Category but animal studies failed to show danger with the topical medication. It is unknown if the medication is excreted in breast milk.
Low Dose Naltrexone Counseling- I discussed with the patient the potential risks and side effects of low dose naltrexone including but not limited to: more vivid dreams, headaches, nausea, vomiting, abdominal pain, fatigue, dizziness, and anxiety.
Skyrizi Counseling: I discussed with the patient the risks of risankizumab-rzaa including but not limited to immunosuppression, and serious infections.  The patient understands that monitoring is required including a PPD at baseline and must alert us or the primary physician if symptoms of infection or other concerning signs are noted.
Terbinafine Pregnancy And Lactation Text: This medication is Pregnancy Category B and is considered safe during pregnancy. It is also excreted in breast milk and breast feeding isn't recommended.
Olumiant Pregnancy And Lactation Text: Based on animal studies, Olumiant may cause embryo-fetal harm when administered to pregnant women.  The medication should not be used in pregnancy.  Breastfeeding is not recommended during treatment.
Xolair Counseling:  Patient informed of potential adverse effects including but not limited to fever, muscle aches, rash and allergic reactions.  The patient verbalized understanding of the proper use and possible adverse effects of Xolair.  All of the patient's questions and concerns were addressed.
Methotrexate Counseling:  Patient counseled regarding adverse effects of methotrexate including but not limited to nausea, vomiting, abnormalities in liver function tests. Patients may develop mouth sores, rash, diarrhea, and abnormalities in blood counts. The patient understands that monitoring is required including LFT's and blood counts.  There is a rare possibility of scarring of the liver and lung problems that can occur when taking methotrexate. Persistent nausea, loss of appetite, pale stools, dark urine, cough, and shortness of breath should be reported immediately. Patient advised to discontinue methotrexate treatment at least three months before attempting to become pregnant.  I discussed the need for folate supplements while taking methotrexate.  These supplements can decrease side effects during methotrexate treatment. The patient verbalized understanding of the proper use and possible adverse effects of methotrexate.  All of the patient's questions and concerns were addressed.
Cosentyx Counseling:  I discussed with the patient the risks of Cosentyx including but not limited to worsening of Crohn's disease, immunosuppression, allergic reactions and infections.  The patient understands that monitoring is required including a PPD at baseline and must alert us or the primary physician if symptoms of infection or other concerning signs are noted.
Clindamycin Counseling: I counseled the patient regarding use of clindamycin as an antibiotic for prophylactic and/or therapeutic purposes. Clindamycin is active against numerous classes of bacteria, including skin bacteria. Side effects may include nausea, diarrhea, gastrointestinal upset, rash, hives, yeast infections, and in rare cases, colitis.
Mirvaso Counseling: Mirvaso is a topical medication which can decrease superficial blood flow where applied. Side effects are uncommon and include stinging, redness and allergic reactions.
Otezla Counseling: The side effects of Otezla were discussed with the patient, including but not limited to worsening or new depression, weight loss, diarrhea, nausea, upper respiratory tract infection, and headache. Patient instructed to call the office should any adverse effect occur.  The patient verbalized understanding of the proper use and possible adverse effects of Otezla.  All the patient's questions and concerns were addressed.
Qbrexza Pregnancy And Lactation Text: There is no available data on Qbrexza use in pregnant women.  There is no available data on Qbrexza use in lactation.
Albendazole Pregnancy And Lactation Text: This medication is Pregnancy Category C and it isn't known if it is safe during pregnancy. It is also excreted in breast milk.
Dutasteride Male Counseling: Dustasteride Counseling:  I discussed with the patient the risks of use of dutasteride including but not limited to decreased libido, decreased ejaculate volume, and gynecomastia. Women who can become pregnant should not handle medication.  All of the patient's questions and concerns were addressed.
Ilumya Counseling: I discussed with the patient the risks of tildrakizumab including but not limited to immunosuppression, malignancy, posterior leukoencephalopathy syndrome, and serious infections.  The patient understands that monitoring is required including a PPD at baseline and must alert us or the primary physician if symptoms of infection or other concerning signs are noted.
Dutasteride Female Counseling: Dutasteride Counseling:  I discussed with the patient the risks of use of dutasteride including but not limited to decreased libido and sexual dysfunction. Explained the teratogenic nature of the medication and stressed the importance of not getting pregnant during treatment. All of the patient's questions and concerns were addressed.
Aklief counseling:  Patient advised to apply a pea-sized amount only at bedtime and wait 30 minutes after washing their face before applying.  If too drying, patient may add a non-comedogenic moisturizer.  The most commonly reported side effects including irritation, redness, scaling, dryness, stinging, burning, itching, and increased risk of sunburn.  The patient verbalized understanding of the proper use and possible adverse effects of retinoids.  All of the patient's questions and concerns were addressed.
Tazorac Pregnancy And Lactation Text: This medication is not safe during pregnancy. It is unknown if this medication is excreted in breast milk.
Bexarotene Counseling:  I discussed with the patient the risks of bexarotene including but not limited to hair loss, dry lips/skin/eyes, liver abnormalities, hyperlipidemia, pancreatitis, depression/suicidal ideation, photosensitivity, drug rash/allergic reactions, hypothyroidism, anemia, leukopenia, infection, cataracts, and teratogenicity.  Patient understands that they will need regular blood tests to check lipid profile, liver function tests, white blood cell count, thyroid function tests and pregnancy test if applicable.
Calcipotriene Pregnancy And Lactation Text: The use of this medication during pregnancy or lactation is not recommended as there is insufficient data.
Low Dose Naltrexone Pregnancy And Lactation Text: Naltrexone is pregnancy category C.  There have been no adequate and well-controlled studies in pregnant women.  It should be used in pregnancy only if the potential benefit justifies the potential risk to the fetus.   Limited data indicates that naltrexone is minimally excreted into breastmilk.
Dapsone Counseling: I discussed with the patient the risks of dapsone including but not limited to hemolytic anemia, agranulocytosis, rashes, methemoglobinemia, kidney failure, peripheral neuropathy, headaches, GI upset, and liver toxicity.  Patients who start dapsone require monitoring including baseline LFTs and weekly CBCs for the first month, then every month thereafter.  The patient verbalized understanding of the proper use and possible adverse effects of dapsone.  All of the patient's questions and concerns were addressed.
Fluconazole Counseling:  Patient counseled regarding adverse effects of fluconazole including but not limited to headache, diarrhea, nausea, upset stomach, liver function test abnormalities, taste disturbance, and stomach pain.  There is a rare possibility of liver failure that can occur when taking fluconazole.  The patient understands that monitoring of LFTs and kidney function test may be required, especially at baseline. The patient verbalized understanding of the proper use and possible adverse effects of fluconazole.  All of the patient's questions and concerns were addressed.
Topical Sulfur Applications Counseling: Topical Sulfur Counseling: Patient counseled that this medication may cause skin irritation or allergic reactions.  In the event of skin irritation, the patient was advised to reduce the amount of the drug applied or use it less frequently.   The patient verbalized understanding of the proper use and possible adverse effects of topical sulfur application.  All of the patient's questions and concerns were addressed.
Niacinamide Counseling: I recommended taking niacin or niacinamide, also know as vitamin B3, twice daily. Recent evidence suggests that taking vitamin B3 (500 mg twice daily) can reduce the risk of actinic keratoses and non-melanoma skin cancers. Side effects of vitamin B3 include flushing and headache.
Rinvoq Counseling: I discussed with the patient the risks of Rinvoq therapy including but not limited to upper respiratory tract infections, shingles, cold sores, bronchitis, nausea, cough, fever, acne, and headache. Live vaccines should be avoided.  This medication has been linked to serious infections; higher rate of mortality; malignancy and lymphoproliferative disorders; major adverse cardiovascular events; thrombosis; thrombocytopenia, anemia, and neutropenia; lipid elevations; liver enzyme elevations; and gastrointestinal perforations.
Opioid Counseling: I discussed with the patient the potential side effects of opioids including but not limited to addiction, altered mental status, and depression. I stressed avoiding alcohol, benzodiazepines, muscle relaxants and sleep aids unless specifically okayed by a physician. The patient verbalized understanding of the proper use and possible adverse effects of opioids. All of the patient's questions and concerns were addressed. They were instructed to flush the remaining pills down the toilet if they did not need them for pain.
Clindamycin Pregnancy And Lactation Text: This medication can be used in pregnancy if certain situations. Clindamycin is also present in breast milk.
Spevigo Counseling: I discussed with the patient the risks of Spevigo including but not limited to fatigue, nasuea, vomiting, headache, pruritus, urinary tract infection, an infusion related reactions.  The patient understands that monitoring is required including screening for tuberculosis at baseline and yearly screening thereafter while continuing Spevigo therapy. They should contact us if symptoms of infection or other concerning signs are noted.
Topical Sulfur Applications Pregnancy And Lactation Text: This medication is Pregnancy Category C and has an unknown safety profile during pregnancy. It is unknown if this topical medication is excreted in breast milk.
Hydroquinone Counseling:  Patient advised that medication may result in skin irritation, lightening (hypopigmentation), dryness, and burning.  In the event of skin irritation, the patient was advised to reduce the amount of the drug applied or use it less frequently.  Rarely, spots that are treated with hydroquinone can become darker (pseudoochronosis).  Should this occur, patient instructed to stop medication and call the office. The patient verbalized understanding of the proper use and possible adverse effects of hydroquinone.  All of the patient's questions and concerns were addressed.
Methotrexate Pregnancy And Lactation Text: This medication is Pregnancy Category X and is known to cause fetal harm. This medication is excreted in breast milk.
Tranexamic Acid Counseling:  Patient advised of the small risk of bleeding problems with tranexamic acid. They were also instructed to call if they developed any nausea, vomiting or diarrhea. All of the patient's questions and concerns were addressed.
Mirvaso Pregnancy And Lactation Text: This medication has not been assigned a Pregnancy Risk Category. It is unknown if the medication is excreted in breast milk.
Azathioprine Counseling:  I discussed with the patient the risks of azathioprine including but not limited to myelosuppression, immunosuppression, hepatotoxicity, lymphoma, and infections.  The patient understands that monitoring is required including baseline LFTs, Creatinine, possible TPMP genotyping and weekly CBCs for the first month and then every 2 weeks thereafter.  The patient verbalized understanding of the proper use and possible adverse effects of azathioprine.  All of the patient's questions and concerns were addressed.
Ivermectin Counseling:  Patient instructed to take medication on an empty stomach with a full glass of water.  Patient informed of potential adverse effects including but not limited to nausea, diarrhea, dizziness, itching, and swelling of the extremities or lymph nodes.  The patient verbalized understanding of the proper use and possible adverse effects of ivermectin.  All of the patient's questions and concerns were addressed.
Azithromycin Counseling:  I discussed with the patient the risks of azithromycin including but not limited to GI upset, allergic reaction, drug rash, diarrhea, and yeast infections.
Otezla Pregnancy And Lactation Text: This medication is Pregnancy Category C and it isn't known if it is safe during pregnancy. It is unknown if it is excreted in breast milk.
Rhofade Counseling: Rhofade is a topical medication which can decrease superficial blood flow where applied. Side effects are uncommon and include stinging, redness and allergic reactions.
Xolair Pregnancy And Lactation Text: This medication is Pregnancy Category B and is considered safe during pregnancy. This medication is excreted in breast milk.
Quinolones Counseling:  I discussed with the patient the risks of fluoroquinolones including but not limited to GI upset, allergic reaction, drug rash, diarrhea, dizziness, photosensitivity, yeast infections, liver function test abnormalities, tendonitis/tendon rupture.
Cantharidin Pregnancy And Lactation Text: This medication has not been proven safe during pregnancy. It is unknown if this medication is excreted in breast milk.
Dutasteride Pregnancy And Lactation Text: This medication is absolutely contraindicated in women, especially during pregnancy and breast feeding. Feminization of male fetuses is possible if taking while pregnant.
Prednisone Counseling:  I discussed with the patient the risks of prolonged use of prednisone including but not limited to weight gain, insomnia, osteoporosis, mood changes, diabetes, susceptibility to infection, glaucoma and high blood pressure.  In cases where prednisone use is prolonged, patients should be monitored with blood pressure checks, serum glucose levels and an eye exam.  Additionally, the patient may need to be placed on GI prophylaxis, PCP prophylaxis, and calcium and vitamin D supplementation and/or a bisphosphonate.  The patient verbalized understanding of the proper use and the possible adverse effects of prednisone.  All of the patient's questions and concerns were addressed.
Tranexamic Acid Pregnancy And Lactation Text: It is unknown if this medication is safe during pregnancy or breast feeding.
Cimetidine Counseling:  I discussed with the patient the risks of Cimetidine including but not limited to gynecomastia, headache, diarrhea, nausea, drowsiness, arrhythmias, pancreatitis, skin rashes, psychosis, bone marrow suppression and kidney toxicity.
Bexarotene Pregnancy And Lactation Text: This medication is Pregnancy Category X and should not be given to women who are pregnant or may become pregnant. This medication should not be used if you are breast feeding.
Aklief Pregnancy And Lactation Text: It is unknown if this medication is safe to use during pregnancy.  It is unknown if this medication is excreted in breast milk.  Breastfeeding women should use the topical cream on the smallest area of the skin for the shortest time needed while breastfeeding.  Do not apply to nipple and areola.
Topical Clindamycin Counseling: Patient counseled that this medication may cause skin irritation or allergic reactions.  In the event of skin irritation, the patient was advised to reduce the amount of the drug applied or use it less frequently.   The patient verbalized understanding of the proper use and possible adverse effects of clindamycin.  All of the patient's questions and concerns were addressed.
Cantharidin Counseling:  I discussed with the patient the risks of Cantharidin including but not limited to pain, redness, burning, itching, and blistering.
Dapsone Pregnancy And Lactation Text: This medication is Pregnancy Category C and is not considered safe during pregnancy or breast feeding.
Spevigo Pregnancy And Lactation Text: The risk during pregnancy and breastfeeding is uncertain with this medication. This medication does cross the placenta. It is unknown if this medication is found in breast milk.
Azelaic Acid Counseling: Patient counseled that medicine may cause skin irritation and to avoid applying near the eyes.  In the event of skin irritation, the patient was advised to reduce the amount of the drug applied or use it less frequently.   The patient verbalized understanding of the proper use and possible adverse effects of azelaic acid.  All of the patient's questions and concerns were addressed.
Opioid Pregnancy And Lactation Text: These medications can lead to premature delivery and should be avoided during pregnancy. These medications are also present in breast milk in small amounts.
Doxycycline Counseling:  Patient counseled regarding possible photosensitivity and increased risk for sunburn.  Patient instructed to avoid sunlight, if possible.  When exposed to sunlight, patients should wear protective clothing, sunglasses, and sunscreen.  The patient was instructed to call the office immediately if the following severe adverse effects occur:  hearing changes, easy bruising/bleeding, severe headache, or vision changes.  The patient verbalized understanding of the proper use and possible adverse effects of doxycycline.  All of the patient's questions and concerns were addressed.
Isotretinoin Counseling: Patient should get monthly blood tests, not donate blood, not drive at night if vision affected, not share medication, and not undergo elective surgery for 6 months after tx completed. Side effects reviewed, pt to contact office should one occur.
5-Fu Counseling: 5-Fluorouracil Counseling:  I discussed with the patient the risks of 5-fluorouracil including but not limited to erythema, scaling, itching, weeping, crusting, and pain.
Niacinamide Pregnancy And Lactation Text: These medications are considered safe during pregnancy.
Wartpeel Counseling:  I discussed with the patient the risks of Wartpeel including but not limited to erythema, scaling, itching, weeping, crusting, and pain.
Dupixent Counseling: I discussed with the patient the risks of dupilumab including but not limited to eye infection and irritation, cold sores, injection site reactions, worsening of asthma, allergic reactions and increased risk of parasitic infection.  Live vaccines should be avoided while taking dupilumab. Dupilumab will also interact with certain medications such as warfarin and cyclosporine. The patient understands that monitoring is required and they must alert us or the primary physician if symptoms of infection or other concerning signs are noted.
Griseofulvin Counseling:  I discussed with the patient the risks of griseofulvin including but not limited to photosensitivity, cytopenia, liver damage, nausea/vomiting and severe allergy.  The patient understands that this medication is best absorbed when taken with a fatty meal (e.g., ice cream or french fries).
Azithromycin Pregnancy And Lactation Text: This medication is considered safe during pregnancy and is also secreted in breast milk.
Opzelura Counseling:  I discussed with the patient the risks of Opzelura including but not limited to nasopharngitis, bronchitis, ear infection, eosinophila, hives, diarrhea, folliculitis, tonsillitis, and rhinorrhea.  Taken orally, this medication has been linked to serious infections; higher rate of mortality; malignancy and lymphoproliferative disorders; major adverse cardiovascular events; thrombosis; thrombocytopenia, anemia, and neutropenia; and lipid elevations.
Oxybutynin Counseling:  I discussed with the patient the risks of oxybutynin including but not limited to skin rash, drowsiness, dry mouth, difficulty urinating, and blurred vision.
Rinvoq Pregnancy And Lactation Text: Based on animal studies, Rinvoq may cause embryo-fetal harm when administered to pregnant women.  The medication should not be used in pregnancy.  Breastfeeding is not recommended during treatment and for 6 days after the last dose.

## 2024-10-21 NOTE — PROCEDURE: SUNSCREEN RECOMMENDATIONS
Progress Notes by Ritchie Donovan at 02/27/18 01:46 PM     Author:  Ritchie Donovan Service:  (none) Author Type:       Filed:  02/27/18 01:47 PM Encounter Date:  2/27/2018 Status:  Signed     :  Ritchie Donovan ()            talked to [employer] valente copay info.  will pay dos[LD1.1M]       Revision History        User Key Date/Time User Provider Type Action    > LD1.1 02/27/18 01:47 PM Ritchie Donovan  Sign    M - Manual            
General Sunscreen Counseling: I recommended a broad spectrum sunscreen with a SPF of 30 or higher.  I explained that SPF 30 sunscreens block approximately 97 percent of the sun's harmful rays.  Sunscreens should be applied at least 15 minutes prior to expected sun exposure and then every 2 hours after that as long as sun exposure continues. If swimming or exercising sunscreen should be reapplied every 45 minutes to an hour after getting wet or sweating.  One ounce, or the equivalent of a shot glass full of sunscreen, is adequate to protect the skin not covered by a bathing suit. I also recommended a lip balm with a sunscreen as well. Sun protective clothing can be used in lieu of sunscreen but must be worn the entire time you are exposed to the sun's rays.
Detail Level: Detailed

## 2024-10-21 NOTE — PROCEDURE: LIQUID NITROGEN
Post-Care Instructions: I reviewed with the patient in detail post-care instructions. Patient is to wear sunprotection, and avoid picking at any of the treated lesions. Pt may apply Vaseline to crusted or scabbing areas.
Number Of Freeze-Thaw Cycles: 1 freeze-thaw cycle
Render Post-Care Instructions In Note?: yes
Render Note In Bullet Format When Appropriate: No
Duration Of Freeze Thaw-Cycle (Seconds): 3
Aperture Size (Optional): A
Detail Level: Detailed
Consent: The patient's consent was obtained including but not limited to risks of crusting, scabbing, blistering, scarring, darker or lighter pigmentary change, recurrence, incomplete removal and infection.

## 2024-10-21 NOTE — PROCEDURE: DIAGNOSIS COMMENT
Render Risk Assessment In Note?: no
Comment: Pt will trial triamcinolone cream on affected area, photos taken today. DX: psoriasis, eczema.  Trial of topical steroids.  Seems to be small.
Detail Level: Detailed

## 2024-10-22 PROCEDURE — RXMED WILLOW AMBULATORY MEDICATION CHARGE: Performed by: FAMILY MEDICINE

## 2024-10-23 ENCOUNTER — HOSPITAL ENCOUNTER (OUTPATIENT)
Dept: LAB | Facility: MEDICAL CENTER | Age: 69
End: 2024-10-23
Attending: FAMILY MEDICINE
Payer: MEDICARE

## 2024-10-23 ENCOUNTER — PHARMACY VISIT (OUTPATIENT)
Dept: PHARMACY | Facility: MEDICAL CENTER | Age: 69
End: 2024-10-23
Payer: COMMERCIAL

## 2024-10-23 DIAGNOSIS — E11.42 CONTROLLED TYPE 2 DIABETES MELLITUS WITH DIABETIC POLYNEUROPATHY, WITHOUT LONG-TERM CURRENT USE OF INSULIN (HCC): ICD-10-CM

## 2024-10-23 LAB
BASOPHILS # BLD AUTO: 0.3 % (ref 0–1.8)
BASOPHILS # BLD: 0.02 K/UL (ref 0–0.12)
EOSINOPHIL # BLD AUTO: 0.1 K/UL (ref 0–0.51)
EOSINOPHIL NFR BLD: 1.6 % (ref 0–6.9)
ERYTHROCYTE [DISTWIDTH] IN BLOOD BY AUTOMATED COUNT: 46 FL (ref 35.9–50)
EST. AVERAGE GLUCOSE BLD GHB EST-MCNC: 174 MG/DL
HBA1C MFR BLD: 7.7 % (ref 4–5.6)
HCT VFR BLD AUTO: 48.5 % (ref 37–47)
HGB BLD-MCNC: 15.4 G/DL (ref 12–16)
IMM GRANULOCYTES # BLD AUTO: 0.01 K/UL (ref 0–0.11)
IMM GRANULOCYTES NFR BLD AUTO: 0.2 % (ref 0–0.9)
LYMPHOCYTES # BLD AUTO: 2.16 K/UL (ref 1–4.8)
LYMPHOCYTES NFR BLD: 35.1 % (ref 22–41)
MCH RBC QN AUTO: 29.4 PG (ref 27–33)
MCHC RBC AUTO-ENTMCNC: 31.8 G/DL (ref 32.2–35.5)
MCV RBC AUTO: 92.7 FL (ref 81.4–97.8)
MONOCYTES # BLD AUTO: 0.48 K/UL (ref 0–0.85)
MONOCYTES NFR BLD AUTO: 7.8 % (ref 0–13.4)
NEUTROPHILS # BLD AUTO: 3.39 K/UL (ref 1.82–7.42)
NEUTROPHILS NFR BLD: 55 % (ref 44–72)
NRBC # BLD AUTO: 0 K/UL
NRBC BLD-RTO: 0 /100 WBC (ref 0–0.2)
PLATELET # BLD AUTO: 254 K/UL (ref 164–446)
PMV BLD AUTO: 10 FL (ref 9–12.9)
RBC # BLD AUTO: 5.23 M/UL (ref 4.2–5.4)
WBC # BLD AUTO: 6.2 K/UL (ref 4.8–10.8)

## 2024-10-23 PROCEDURE — 85025 COMPLETE CBC W/AUTO DIFF WBC: CPT

## 2024-10-23 PROCEDURE — 36415 COLL VENOUS BLD VENIPUNCTURE: CPT

## 2024-10-23 PROCEDURE — 83036 HEMOGLOBIN GLYCOSYLATED A1C: CPT

## 2024-10-23 PROCEDURE — 82607 VITAMIN B-12: CPT

## 2024-10-24 LAB — VIT B12 SERPL-MCNC: 2888 PG/ML (ref 211–911)

## 2024-10-26 PROCEDURE — RXMED WILLOW AMBULATORY MEDICATION CHARGE: Performed by: INTERNAL MEDICINE

## 2024-10-27 ENCOUNTER — PHARMACY VISIT (OUTPATIENT)
Dept: PHARMACY | Facility: MEDICAL CENTER | Age: 69
End: 2024-10-27
Payer: COMMERCIAL

## 2024-10-28 ENCOUNTER — OFFICE VISIT (OUTPATIENT)
Dept: MEDICAL GROUP | Facility: PHYSICIAN GROUP | Age: 69
End: 2024-10-28
Payer: MEDICARE

## 2024-10-28 VITALS
DIASTOLIC BLOOD PRESSURE: 62 MMHG | HEART RATE: 75 BPM | OXYGEN SATURATION: 98 % | BODY MASS INDEX: 26.84 KG/M2 | TEMPERATURE: 99.3 F | SYSTOLIC BLOOD PRESSURE: 120 MMHG | HEIGHT: 68 IN | WEIGHT: 177.1 LBS

## 2024-10-28 DIAGNOSIS — N64.59 BREAST COMPLAINT: ICD-10-CM

## 2024-10-28 DIAGNOSIS — E11.42 CONTROLLED TYPE 2 DIABETES MELLITUS WITH DIABETIC POLYNEUROPATHY, WITHOUT LONG-TERM CURRENT USE OF INSULIN (HCC): ICD-10-CM

## 2024-10-28 DIAGNOSIS — F43.9 STRESS: ICD-10-CM

## 2024-10-28 DIAGNOSIS — R79.89 ELEVATED VITAMIN B12 LEVEL: ICD-10-CM

## 2024-10-28 PROCEDURE — 3074F SYST BP LT 130 MM HG: CPT | Performed by: FAMILY MEDICINE

## 2024-10-28 PROCEDURE — 3078F DIAST BP <80 MM HG: CPT | Performed by: FAMILY MEDICINE

## 2024-10-28 PROCEDURE — 99215 OFFICE O/P EST HI 40 MIN: CPT | Performed by: FAMILY MEDICINE

## 2024-10-28 ASSESSMENT — FIBROSIS 4 INDEX: FIB4 SCORE: 1.35

## 2024-10-29 ENCOUNTER — PATIENT OUTREACH (OUTPATIENT)
Dept: HEALTH INFORMATION MANAGEMENT | Facility: OTHER | Age: 69
End: 2024-10-29
Payer: MEDICARE

## 2024-10-29 SDOH — ECONOMIC STABILITY: INCOME INSECURITY: IN THE LAST 12 MONTHS, WAS THERE A TIME WHEN YOU WERE NOT ABLE TO PAY THE MORTGAGE OR RENT ON TIME?: NO

## 2024-10-29 SDOH — ECONOMIC STABILITY: FOOD INSECURITY: WITHIN THE PAST 12 MONTHS, YOU WORRIED THAT YOUR FOOD WOULD RUN OUT BEFORE YOU GOT MONEY TO BUY MORE.: NEVER TRUE

## 2024-10-29 SDOH — ECONOMIC STABILITY: TRANSPORTATION INSECURITY
IN THE PAST 12 MONTHS, HAS THE LACK OF TRANSPORTATION KEPT YOU FROM MEDICAL APPOINTMENTS OR FROM GETTING MEDICATIONS?: NO

## 2024-10-29 SDOH — ECONOMIC STABILITY: TRANSPORTATION INSECURITY
IN THE PAST 12 MONTHS, HAS LACK OF TRANSPORTATION KEPT YOU FROM MEETINGS, WORK, OR FROM GETTING THINGS NEEDED FOR DAILY LIVING?: NO

## 2024-10-29 SDOH — ECONOMIC STABILITY: FOOD INSECURITY: WITHIN THE PAST 12 MONTHS, THE FOOD YOU BOUGHT JUST DIDN'T LAST AND YOU DIDN'T HAVE MONEY TO GET MORE.: NEVER TRUE

## 2024-10-29 ASSESSMENT — PATIENT HEALTH QUESTIONNAIRE - PHQ9: CLINICAL INTERPRETATION OF PHQ2 SCORE: 0

## 2024-10-29 ASSESSMENT — SOCIAL DETERMINANTS OF HEALTH (SDOH)
WITHIN THE LAST YEAR, HAVE YOU BEEN HUMILIATED OR EMOTIONALLY ABUSED IN OTHER WAYS BY YOUR PARTNER OR EX-PARTNER?: YES
DO YOU BELONG TO ANY CLUBS OR ORGANIZATIONS SUCH AS CHURCH GROUPS UNIONS, FRATERNAL OR ATHLETIC GROUPS, OR SCHOOL GROUPS?: YES
HOW HARD IS IT FOR YOU TO PAY FOR THE VERY BASICS LIKE FOOD, HOUSING, MEDICAL CARE, AND HEATING?: NOT HARD AT ALL
HOW OFTEN DO YOU ATTEND CHURCH OR RELIGIOUS SERVICES?: NEVER
WITHIN THE LAST YEAR, HAVE YOU BEEN AFRAID OF YOUR PARTNER OR EX-PARTNER?: NO
HOW OFTEN DO YOU GET TOGETHER WITH FRIENDS OR RELATIVES?: MORE THAN THREE TIMES A WEEK
WITHIN THE LAST YEAR, HAVE TO BEEN RAPED OR FORCED TO HAVE ANY KIND OF SEXUAL ACTIVITY BY YOUR PARTNER OR EX-PARTNER?: NO
HOW OFTEN DO YOU ATTENT MEETINGS OF THE CLUB OR ORGANIZATION YOU BELONG TO?: MORE THAN 4 TIMES PER YEAR
IN A TYPICAL WEEK, HOW MANY TIMES DO YOU TALK ON THE PHONE WITH FAMILY, FRIENDS, OR NEIGHBORS?: MORE THAN THREE TIMES A WEEK
IN THE PAST 12 MONTHS, HAS THE ELECTRIC, GAS, OIL, OR WATER COMPANY THREATENED TO SHUT OFF SERVICE IN YOUR HOME?: NO
WITHIN THE LAST YEAR, HAVE YOU BEEN KICKED, HIT, SLAPPED, OR OTHERWISE PHYSICALLY HURT BY YOUR PARTNER OR EX-PARTNER?: NO

## 2024-10-29 ASSESSMENT — LIFESTYLE VARIABLES
HOW OFTEN DO YOU HAVE SIX OR MORE DRINKS ON ONE OCCASION: NEVER
AUDIT-C TOTAL SCORE: 0
HOW MANY STANDARD DRINKS CONTAINING ALCOHOL DO YOU HAVE ON A TYPICAL DAY: PATIENT DOES NOT DRINK
SKIP TO QUESTIONS 9-10: 1
HOW OFTEN DO YOU HAVE A DRINK CONTAINING ALCOHOL: NEVER

## 2024-10-31 ENCOUNTER — OFFICE VISIT (OUTPATIENT)
Dept: SURGERY | Facility: MEDICAL CENTER | Age: 69
End: 2024-10-31
Payer: MEDICARE

## 2024-10-31 VITALS
BODY MASS INDEX: 26.37 KG/M2 | HEART RATE: 77 BPM | HEIGHT: 68 IN | DIASTOLIC BLOOD PRESSURE: 68 MMHG | TEMPERATURE: 96.5 F | OXYGEN SATURATION: 96 % | SYSTOLIC BLOOD PRESSURE: 107 MMHG | WEIGHT: 174 LBS

## 2024-10-31 DIAGNOSIS — N64.59 INVERSION OF LEFT NIPPLE: ICD-10-CM

## 2024-10-31 RX ORDER — ALPRAZOLAM 0.25 MG/1
0.25 TABLET ORAL 2 TIMES DAILY PRN
Qty: 2 TABLET | Refills: 0 | Status: SHIPPED | OUTPATIENT
Start: 2024-10-31 | End: 2024-11-02

## 2024-10-31 ASSESSMENT — ENCOUNTER SYMPTOMS
GASTROINTESTINAL NEGATIVE: 1
EYES NEGATIVE: 1
MUSCULOSKELETAL NEGATIVE: 1
ENDOCRINE NEGATIVE: 1
HEMATOLOGIC/LYMPHATIC NEGATIVE: 1
NEUROLOGICAL NEGATIVE: 1
CONSTITUTIONAL NEGATIVE: 1
PSYCHIATRIC NEGATIVE: 1
RESPIRATORY NEGATIVE: 1
CARDIOVASCULAR NEGATIVE: 1

## 2024-10-31 ASSESSMENT — FIBROSIS 4 INDEX: FIB4 SCORE: 1.35

## 2024-11-01 DIAGNOSIS — E78.5 DYSLIPIDEMIA: ICD-10-CM

## 2024-11-01 NOTE — TELEPHONE ENCOUNTER
Received request via: Pharmacy    Was the patient seen in the last year in this department? Yes    Does the patient have an active prescription (recently filled or refills available) for medication(s) requested? No    Ph[facility]    Does the patient have alf Plus and need 100-day supply? (This applies to ALL medications) Yes, quantity updated to 100 days

## 2024-11-04 RX ORDER — COLESEVELAM 180 1/1
TABLET ORAL
Qty: 200 TABLET | Refills: 3 | Status: SHIPPED | OUTPATIENT
Start: 2024-11-04

## 2024-11-16 DIAGNOSIS — M79.2 NEUROPATHIC PAIN: ICD-10-CM

## 2024-11-16 DIAGNOSIS — E11.29 TYPE 2 DIABETES MELLITUS WITH OTHER DIABETIC KIDNEY COMPLICATION (HCC): ICD-10-CM

## 2024-11-16 DIAGNOSIS — E11.42 CONTROLLED TYPE 2 DIABETES MELLITUS WITH DIABETIC POLYNEUROPATHY, WITHOUT LONG-TERM CURRENT USE OF INSULIN (HCC): ICD-10-CM

## 2024-11-16 DIAGNOSIS — E78.5 DYSLIPIDEMIA: ICD-10-CM

## 2024-11-17 RX ORDER — GABAPENTIN 300 MG/1
300 CAPSULE ORAL EVERY EVENING
Qty: 90 CAPSULE | Refills: 1 | Status: SHIPPED | OUTPATIENT
Start: 2024-11-17

## 2024-11-18 RX ORDER — ATORVASTATIN CALCIUM 20 MG/1
TABLET, FILM COATED ORAL
Qty: 100 TABLET | Refills: 3 | Status: SHIPPED | OUTPATIENT
Start: 2024-11-18

## 2024-11-19 DIAGNOSIS — E11.42 CONTROLLED TYPE 2 DIABETES MELLITUS WITH DIABETIC POLYNEUROPATHY, WITHOUT LONG-TERM CURRENT USE OF INSULIN (HCC): ICD-10-CM

## 2024-11-19 RX ORDER — EMPAGLIFLOZIN 25 MG/1
1 TABLET, FILM COATED ORAL DAILY
Qty: 30 TABLET | Refills: 8 | Status: SHIPPED | OUTPATIENT
Start: 2024-11-19

## 2024-12-02 ENCOUNTER — APPOINTMENT (OUTPATIENT)
Dept: MEDICAL GROUP | Facility: PHYSICIAN GROUP | Age: 69
End: 2024-12-02
Payer: MEDICARE

## 2024-12-02 VITALS
SYSTOLIC BLOOD PRESSURE: 112 MMHG | OXYGEN SATURATION: 96 % | DIASTOLIC BLOOD PRESSURE: 60 MMHG | HEIGHT: 68 IN | WEIGHT: 175.1 LBS | HEART RATE: 81 BPM | TEMPERATURE: 99 F | BODY MASS INDEX: 26.54 KG/M2

## 2024-12-02 DIAGNOSIS — F43.9 STRESS: ICD-10-CM

## 2024-12-02 DIAGNOSIS — E11.42 CONTROLLED TYPE 2 DIABETES MELLITUS WITH DIABETIC POLYNEUROPATHY, WITHOUT LONG-TERM CURRENT USE OF INSULIN (HCC): ICD-10-CM

## 2024-12-02 DIAGNOSIS — N64.59 INVERSION OF LEFT NIPPLE: ICD-10-CM

## 2024-12-02 DIAGNOSIS — R79.89 ELEVATED VITAMIN B12 LEVEL: ICD-10-CM

## 2024-12-02 PROCEDURE — 3074F SYST BP LT 130 MM HG: CPT | Performed by: FAMILY MEDICINE

## 2024-12-02 PROCEDURE — 99214 OFFICE O/P EST MOD 30 MIN: CPT | Performed by: FAMILY MEDICINE

## 2024-12-02 PROCEDURE — 3078F DIAST BP <80 MM HG: CPT | Performed by: FAMILY MEDICINE

## 2024-12-02 ASSESSMENT — ENCOUNTER SYMPTOMS: COUGH: 1

## 2024-12-02 ASSESSMENT — FIBROSIS 4 INDEX: FIB4 SCORE: 1.35

## 2024-12-02 NOTE — PROGRESS NOTES
Verbal consent was acquired by the patient to use "[employer] ambient listening note generation during this visit         History of Present Illness  The patient presents today for a follow-up visit. She was last seen on 10/28/2024.    She reports experiencing stress due to marital issues, which she has discussed with a . She is planning to move out in three weeks. She has consulted a counselor and an  once each.    She mentions a recent illness that lasted for three weeks, characterized by a severe cold and cough, which disrupted her sleep for three nights. Her voice was hoarse for three weeks, but her condition has since improved. She has not received the RSV vaccine.    She has consulted a breast surgeon for an inverted left nipple. The surgeon advised against the use of creams, which has led to an improvement in her condition. Occasionally, she experiences some discharge, which she cleans with a Q-tip. She has an MRI scheduled for 12/24/2024 and a follow-up appointment with the surgeon in early January 2025. The surgeon does not believe there is a serious issue.    She is currently taking B12 supplements daily and engages in regular exercise, including walking. She has lost two pounds since her last visit.    She is still taking metformin 1000 mg twice a day, Rybelsus 7 mg, Jardiance, and glimepiride for her diabetes.    IMMUNIZATIONS  She is up to date on her COVID-19 and influenza vaccines.      Review of Systems   Respiratory:  Positive for cough (improving).    Psychiatric/Behavioral:          Marital stress- improved.       Outpatient Medications Marked as Taking for the 12/2/24 encounter (Office Visit) with Rose Marie Gao M.D.   Medication Sig Dispense Refill    JARDIANCE 25 MG Tab TAKE 1 TABLET BY MOUTH EVERY DAY 30 Tablet 8    atorvastatin (LIPITOR) 20 MG Tab TAKE 1 TABLET BY MOUTH EVERY  Tablet 3    gabapentin (NEURONTIN) 300 MG Cap TAKE 1 CAPSULE BY MOUTH EVERY EVENING 90  "Capsule 1    metformin (GLUCOPHAGE) 1000 MG tablet TAKE 1 TABLET BY MOUTH TWICE A DAY WITH FOOD 200 Tablet 2    ACCU-CHEK GUIDE strip TEST STRIPS ONE TIME PER  Strip 1    colesevelam (WELCHOL) 625 MG Tab TAKE 1 TABLET BY MOUTH EVERY MORNING AND TAKE 2 TABS EVERY EVENING WITH DINNER 200 Tablet 3    RYBELSUS 7 MG Tab TAKE 1 TABLET BY MOUTH EVERY MORNING BEFORE BREAKFAST. PATIENT STARTS THIS AFTER FINISHING THE SAMPLE DOSE 30 Tablet 3    glimepiride (AMARYL) 4 MG Tab TAKE 1 TABLET BY MOUTH EVERY DAY IN THE MORNING 100 Tablet 2    Blood Glucose Monitoring Suppl Device Meter: Dispense Device of Insurance Preference.  Accucheck Guide 1 Each 0    Blood Glucose Test Strips Test strips order: Test strips for Accucheck Guide meter.  Testing one time per day 100 Strip 1    alendronate (FOSAMAX) 70 MG Tab Take 1 Tablet by mouth every 7 days. 12 Tablet 3    potassium citrate SR (UROCIT-K SR) 10 MEQ (1080 MG) Tab CR Take 2 tablets 3 times a day by oral route as directed for 90 days. 540 Tablet 3    aspirin (ASPIRIN 81) 81 MG EC tablet Take 1 Tablet by mouth every day. 30 Tablet 0    valACYclovir (VALTREX) 500 MG Tab Take 1 Tablet by mouth 2 times a day. Take twice a day as needed for flareups 20 Tablet 2    clindamycin (CLEOCIN) 300 MG Cap PRN dental work      Cyanocobalamin (VITAMIN B 12 PO) Take 1,000 mcg by mouth every day.      Lancets Lancets order: Lancets for Song Contour Next meter. Sig: use daily and prn ssx high or low sugar. 100 Each 3    triamcinolone acetonide (KENALOG) 0.1 % Ointment Apply 1 Application  topically 2 times a day as needed.         /60   Pulse 81   Temp 37.2 °C (99 °F) (Temporal)   Ht 1.727 m (5' 8\")   Wt 79.4 kg (175 lb 1.6 oz)   SpO2 96% , Body mass index is 26.62 kg/m².        Physical Exam  Constitutional:       Appearance: Normal appearance.   Eyes:      Extraocular Movements: Extraocular movements intact.   Cardiovascular:      Rate and Rhythm: Normal rate and regular rhythm. "   Pulmonary:      Effort: Pulmonary effort is normal.      Breath sounds: Normal breath sounds.   Neurological:      Mental Status: She is alert.   Psychiatric:         Mood and Affect: Mood normal.         Behavior: Behavior normal.         Results  Laboratory Studies  B12 levels were high in October.       Assessment & Plan  1. Stress.  Chronic. Improving. She reports feeling better since making the decision to move out, although she still feels some guilt about leaving her . She has been advised that she is not his caregiver. An advanced directive and POLST form were provided for completion. She was advised to receive the RSV vaccine at a local pharmacy. She has been experiencing weight loss, losing 2 pounds since the last visit. She is currently packing and preparing for the move, which involves a lot of physical activity.    2. Left nipple inversion.  Chronic. Improving. She reports improvement in her condition after discontinuing the use of creams as advised by the breast surgeon. A breast MRI has been scheduled for 12/24/2024 to further evaluate the condition. She will follow up with the breast surgeon in early January.    3. Diabetes Mellitus.  Chronic.  Improving.  October labs had A1c at 7.7%.  She continues to take metformin 1000 mg twice a day, Rybelsus 7 mg, Jardiance, and glimepiride as prescribed. She will have lab work done before her appointment with Dr. Dias in January, including a B12 level test due to previously elevated levels.    4.  Elevated vitamin B12 level  Chronic. Improving. Stopped taking B12.   October labs had vitamin B12 level at 2888.  Will plan on rechecking these with next labs.    Follow-up  Return in 3 months for an annual wellness visit.    Orders Placed This Encounter    VITAMIN B12           This note was created using voice recognition software (Dragon). The accuracy of the dictation is limited by the abilities of the software. I have reviewed the note prior to  signing, however some errors in grammar and context are still possible. If you have any questions related to this note please do not hesitate to contact our office.

## (undated) DEVICE — TUBE CONNECTING SUCTION - CLEAR PLASTIC STERILE 72 IN (50EA/CA)

## (undated) DEVICE — CANISTER SUCTION RIGID RED 1500CC (40EA/CA)

## (undated) DEVICE — WRAP COBAN SELF-ADHERENT 6 IN X  5YDS STERILE TAN (12/CA)

## (undated) DEVICE — ELECTRODE 850 FOAM ADHESIVE - HYDROGEL RADIOTRNSPRNT (50/PK)

## (undated) DEVICE — LACTATED RINGERS INJ 1000 ML - (14EA/CA 60CA/PF)

## (undated) DEVICE — TUBE E-T HI-LO CUFF 7.0MM (10EA/PK)

## (undated) DEVICE — GOWN WARMING STANDARD FLEX - (30/CA)

## (undated) DEVICE — STAPLER SKIN DISP - (6/BX 10BX/CA) VISISTAT

## (undated) DEVICE — KIT ROOM DECONTAMINATION

## (undated) DEVICE — CHLORAPREP 26 ML APPLICATOR - ORANGE TINT(25/CA)

## (undated) DEVICE — GLOVE BIOGEL SZ 8 SURGICAL PF LTX - (50PR/BX 4BX/CA)

## (undated) DEVICE — GLOVE BIOGEL SZ 7.5 SURGICAL PF LTX - (50PR/BX 4BX/CA)

## (undated) DEVICE — SUTURE 1 PDS-2 PLUS CTX - (24/BX)

## (undated) DEVICE — WATER IRRIGATION STERILE 1000ML (12EA/CA)

## (undated) DEVICE — HEAD HOLDER JUNIOR/ADULT

## (undated) DEVICE — SYSTEM PREVENA INCISION MNGM - (1/EA)

## (undated) DEVICE — HANDPIECE 10FT INTPLS SCT PLS IRRIGATION HAND CONTROL SET (6/PK)

## (undated) DEVICE — SODIUM CHL IRRIGATION 0.9% 1000ML (12EA/CA)

## (undated) DEVICE — KIT ANESTHESIA W/CIRCUIT & 3/LT BAG W/FILTER (20EA/CA)

## (undated) DEVICE — BAG, SPONGE COUNT 50600

## (undated) DEVICE — IMPLANT FLEXIBLE DRILL 25MM (1EA)

## (undated) DEVICE — LENS/HOOD FOR SPACESUIT - (32/PK) PEEL AWAY FACE

## (undated) DEVICE — BLADE SAGITTAL SAW DUAL CUT 75.0 X 25.0MM (1/EA)

## (undated) DEVICE — PROTECTOR ULNA NERVE - (36PR/CA)

## (undated) DEVICE — GLOVE BIOGEL PI INDICATOR SZ 7.5 SURGICAL PF LF -(50/BX 4BX/CA)

## (undated) DEVICE — NEPTUNE 4 PORT MANIFOLD - (20/PK)

## (undated) DEVICE — GLOVE, LITE (PAIR)

## (undated) DEVICE — SODIUM CHL. IRRIGATION 0.9% 3000ML (4EA/CA 65CA/PF)

## (undated) DEVICE — PAD BABY LAP 4X18 W/O - RINGS PREWASHED 5/PK 40PK/CS

## (undated) DEVICE — HUMID-VENT HEAT AND MOISTURE EXCHANGE- (50/BX)

## (undated) DEVICE — PACK TOTAL HIP - (1/CA)

## (undated) DEVICE — SUTURE 5 ETHIBOND V-37 (12PK/BX)

## (undated) DEVICE — ELECTRODE DUAL RETURN W/ CORD - (50/PK)

## (undated) DEVICE — STOCKINETTE IMPERVIOUS 12X48 - STERILELF (10/CA)"

## (undated) DEVICE — SUTURE GENERAL

## (undated) DEVICE — SUTURE 1 VICRYL PLUS CTX - 8 X 18 INCH (12/BX)

## (undated) DEVICE — SUTURE 2-0 VICRYL PLUS CT-1 - 8 X 18 INCH(12/BX)

## (undated) DEVICE — Device

## (undated) DEVICE — MASK ANESTHESIA ADULT  - (100/CA)

## (undated) DEVICE — SENSOR SPO2 NEO LNCS ADHESIVE (20/BX) SEE USER NOTES

## (undated) DEVICE — SUCTION INSTRUMENT YANKAUER BULBOUS TIP W/O VENT (50EA/CA)

## (undated) DEVICE — SUTURE 3-0 MONOCRYL PLUS PS-1 - 27 INCH (36/BX)